# Patient Record
Sex: MALE | Race: WHITE | Employment: FULL TIME | URBAN - METROPOLITAN AREA
[De-identification: names, ages, dates, MRNs, and addresses within clinical notes are randomized per-mention and may not be internally consistent; named-entity substitution may affect disease eponyms.]

---

## 2017-03-06 ENCOUNTER — HOSPITAL ENCOUNTER (OUTPATIENT)
Dept: PHYSICAL THERAPY | Age: 62
Discharge: HOME | End: 2017-03-06
Payer: COMMERCIAL

## 2017-03-06 ENCOUNTER — HOSPITAL ENCOUNTER (OUTPATIENT)
Dept: SURGERY | Age: 62
Discharge: HOME | End: 2017-03-06
Payer: COMMERCIAL

## 2017-03-06 VITALS
BODY MASS INDEX: 35.2 KG/M2 | RESPIRATION RATE: 16 BRPM | DIASTOLIC BLOOD PRESSURE: 84 MMHG | HEART RATE: 78 BPM | WEIGHT: 298.13 LBS | OXYGEN SATURATION: 96 % | HEIGHT: 77 IN | SYSTOLIC BLOOD PRESSURE: 145 MMHG | TEMPERATURE: 97.7 F

## 2017-03-06 LAB
ANION GAP BLD CALC-SCNC: 7 MMOL/L (ref 7–16)
APPEARANCE UR: CLEAR
APTT PPP: 24.4 SEC (ref 25.3–32.9)
BACTERIA SPEC CULT: ABNORMAL
BASOPHILS # BLD AUTO: 0.1 K/UL (ref 0–0.2)
BASOPHILS # BLD: 1 % (ref 0–2)
BILIRUB UR QL: NEGATIVE
BUN SERPL-MCNC: 17 MG/DL (ref 8–23)
CALCIUM SERPL-MCNC: 9 MG/DL (ref 8.3–10.4)
CHLORIDE SERPL-SCNC: 98 MMOL/L (ref 98–107)
CO2 SERPL-SCNC: 30 MMOL/L (ref 21–32)
COLOR UR: YELLOW
CREAT SERPL-MCNC: 0.98 MG/DL (ref 0.8–1.5)
DIFFERENTIAL METHOD BLD: ABNORMAL
EOSINOPHIL # BLD: 0.2 K/UL (ref 0–0.8)
EOSINOPHIL NFR BLD: 3 % (ref 0.5–7.8)
ERYTHROCYTE [DISTWIDTH] IN BLOOD BY AUTOMATED COUNT: 12.9 % (ref 11.9–14.6)
GLUCOSE SERPL-MCNC: 300 MG/DL (ref 65–100)
GLUCOSE UR STRIP.AUTO-MCNC: >1000 MG/DL
HCT VFR BLD AUTO: 46.4 % (ref 41.1–50.3)
HGB BLD-MCNC: 16 G/DL (ref 13.6–17.2)
HGB UR QL STRIP: NEGATIVE
IMM GRANULOCYTES # BLD: 0 K/UL (ref 0–0.5)
IMM GRANULOCYTES NFR BLD AUTO: 0.4 % (ref 0–5)
INR PPP: 1 (ref 0.9–1.2)
KETONES UR QL STRIP.AUTO: NEGATIVE MG/DL
LEUKOCYTE ESTERASE UR QL STRIP.AUTO: NEGATIVE
LYMPHOCYTES # BLD AUTO: 36 % (ref 13–44)
LYMPHOCYTES # BLD: 2.5 K/UL (ref 0.5–4.6)
MCH RBC QN AUTO: 28.3 PG (ref 26.1–32.9)
MCHC RBC AUTO-ENTMCNC: 34.5 G/DL (ref 31.4–35)
MCV RBC AUTO: 82.1 FL (ref 79.6–97.8)
MONOCYTES # BLD: 0.7 K/UL (ref 0.1–1.3)
MONOCYTES NFR BLD AUTO: 10 % (ref 4–12)
NEUTS SEG # BLD: 3.4 K/UL (ref 1.7–8.2)
NEUTS SEG NFR BLD AUTO: 50 % (ref 43–78)
NITRITE UR QL STRIP.AUTO: NEGATIVE
PH UR STRIP: 7 [PH] (ref 5–9)
PLATELET # BLD AUTO: 270 K/UL (ref 150–450)
PMV BLD AUTO: 9.5 FL (ref 10.8–14.1)
POTASSIUM SERPL-SCNC: 4 MMOL/L (ref 3.5–5.1)
PROT UR STRIP-MCNC: NEGATIVE MG/DL
PROTHROMBIN TIME: 10.4 SEC (ref 9.6–12)
RBC # BLD AUTO: 5.65 M/UL (ref 4.23–5.67)
SERVICE CMNT-IMP: ABNORMAL
SODIUM SERPL-SCNC: 135 MMOL/L (ref 136–145)
SP GR UR REFRACTOMETRY: 1.03 (ref 1–1.02)
UROBILINOGEN UR QL STRIP.AUTO: 0.2 EU/DL (ref 0.2–1)
WBC # BLD AUTO: 7 K/UL (ref 4.3–11.1)

## 2017-03-06 PROCEDURE — 97161 PT EVAL LOW COMPLEX 20 MIN: CPT

## 2017-03-06 RX ORDER — METOPROLOL SUCCINATE 25 MG/1
25 TABLET, EXTENDED RELEASE ORAL DAILY
COMMUNITY
End: 2018-06-11 | Stop reason: SDUPTHER

## 2017-03-06 RX ORDER — ASPIRIN 325 MG
325 TABLET ORAL DAILY
COMMUNITY
End: 2017-03-30

## 2017-03-06 RX ORDER — CHOLECALCIFEROL (VITAMIN D3) 125 MCG
CAPSULE ORAL 2 TIMES DAILY
COMMUNITY
End: 2017-03-30

## 2017-03-06 RX ORDER — AA/PROT/LYSINE/METHIO/VIT C/B6 50-12.5 MG
TABLET ORAL DAILY
COMMUNITY

## 2017-03-06 RX ORDER — VERAPAMIL HYDROCHLORIDE 180 MG/1
180 TABLET, EXTENDED RELEASE ORAL DAILY
COMMUNITY
End: 2018-06-11 | Stop reason: SDUPTHER

## 2017-03-06 RX ORDER — METFORMIN HYDROCHLORIDE 1000 MG/1
1000 TABLET ORAL DAILY
COMMUNITY
End: 2017-09-14

## 2017-03-06 NOTE — PROGRESS NOTES
Carol Hughes  : (19 y.o.) [facility] at [facility]  [address], [address]  [phone]       Physical Therapy Prehab Plan of Treatment and Evaluation Summary:3/6/2017    ICD-10: Treatment Diagnosis:   · Pain in Right Knee (M25.561)  · Stiffness of Right Knee, Not elsewhere classified (M25.661)  · Difficulty in walking, Not elsewhere classified (R26.2)  Precautions/Allergies:   Review of patient's allergies indicates no known allergies. MEDICAL/REFERRING DIAGNOSIS:  Unilateral primary osteoarthritis, right knee [M17.11]  REFERRING PHYSICIAN: Beatriz Falcon MD  DATE OF SURGERY: 3/28/17   Assessment:   Comments:  Pt. Had a left monique in . Pt. Inquiring about rehab so he was given information. I encouraged him to think about going home with HHPT and have his wife (works) and friend help him at home. PROBLEM LIST (Impacting functional limitations):  Mr. Devin Coburn presents with the following right lower extremity(s) problems:  1. Strength  2. Range of Motion  3. Home Exercise Program   INTERVENTIONS PLANNED:  1. Home Exercise Program  2. Educational Discussion     TREATMENT PLAN: Effective Dates: 3/6/2017 TO 3/6/2017. Frequency/Duration: Patient to continue to perform home exercise program at least twice per day up until his surgery. GOALS: (Goals have been discussed and agreed upon with patient.)  Discharge Goals: Time Frame: 1 Day  1. Patient will demonstrate independence with a home exercise program designed to increase strength, range of motion and pain control to minimize functional deficits and optimize patient for total joint replacement. Rehabilitation Potential For Stated Goals: Good  Regarding Darren Kenny's therapy, I certify that the treatment plan above will be carried out by a therapist or under their direction.   Thank you for this referral,  Fredo Sharp PT               HISTORY:   Present Symptoms:  Pain Intensity 1:  (8 at worst)  Pain Location 1: Knee   History of Present Injury/Illness (Reason for Referral):  Medical/Referring Diagnosis: Unilateral primary osteoarthritis, right knee [M17.11]   Past Medical History/Comorbidities:   Mr. Manuel Cervantes  has a past medical history of Arrhythmia (10 - 15 years ago); Arthritis; CAD (coronary artery disease); Cancer ([address] Depression; Diabetes ([address] (6-7 years ago); HTN (hypertension) (7/18/2014); Hypercholesterolemia; Hyperlipidemia (7/18/2014); Hypertension (diagnosed at 24years old); Morbid obesity ([address] and Psychiatric disorder. He also has no past medical history of Aneurysm ([address] Asthma; Autoimmune disease ([address] Chronic kidney disease; Chronic pain; Coagulation disorder ([address] COPD; Difficult intubation; Endocarditis; GERD (gastroesophageal reflux disease); Heart failure ([address] Liver disease; Malignant hyperthermia due to anesthesia; Nausea & vomiting; Other ill-defined conditions(799.89); Pseudocholinesterase deficiency; PUD (peptic ulcer disease); Rheumatic fever; Seizures ([address] Stroke [facility]); Thromboembolus ([address] Thyroid disease; Unspecified adverse effect of anesthesia; or Unspecified sleep apnea. Mr. Manuel Cervantes  has a past surgical history that includes other surgical; orthopaedic (Left, 2003); orthopaedic (Left, 2009); colonoscopy (2013); orthopaedic (Right, 2016); and heent (1971).   Social History/Living Environment:   Home Environment: Private residence  # Steps to Enter: 2  Rails to Enter: No  One/Two Story Residence: Two story, live on 1st floor  # of Interior Steps: 10  Interior Rails: Left  Lift Chair Available: No  Living Alone: No  Support Systems: Spouse/Significant Other/Partner  Patient Expects to be Discharged to[de-identified] Private residence  Current DME Used/Available at Home: Walker, rolling, Clerance Spray, straight, Commode, bedside  Tub or Shower Type: Shower  Work/Activity:  Employed, light activity  Dominant Side:  RIGHT  Current Medications:  See Pre-assessment nursing note   Number of Personal Factors/Comorbidities that affect the Plan of Care: 0: LOW COMPLEXITY   EXAMINATION:   ADLs (Current Functional Status):   Ambulation:  [x] Independent  [] Walk Indoors Only  [] Walk Outdoors  [] Use Assistive Device  [] Use Wheelchair Only Dressing:  [x] Independent  Requires Assistance from Someone for:  [] Sock/Shoes  [] Pants  [] Everything   Bathing/Showering:   [x] Independent  [] Requires Assistance from Someone  [] Sponge Bath Only Household Activities:  [x] Routine house and yard work  [] Light Housework Only  [] None   Observation/Orthostatic Postural Assessment:       ROM/Flexibility:   Gross Assessment: Yes  AROM: Within functional limits (left LE)                       RLE Assessment  RLE Assessment (WDL): Exceptions to WDL  RLE AROM  R Knee Flexion: 115  R Knee Extension: 5   Strength:   Gross Assessment: Yes  Strength: Within functional limits (left LE)              RLE Strength  R Knee Flexion: 4  R Knee Extension: 4   Functional Mobility:    Gross Assessment: Yes    Gait Description (WDL): Exceptions to WDL  Stand to Sit: Independent, Additional time  Sit to Stand: Independent, Additional time  Bed to Chair: Independent, Additional time  Distance (ft): 250 Feet (ft)  Ambulation - Level of Assistance: Independent  Stance: Right decreased  Gait Abnormalities: Antalgic          Balance:    Sitting: Intact  Standing: Intact   Body Structures Involved:  1. Bones  2. Joints  3. Muscles  4. Ligaments Body Functions Affected:  1. Movement Related Activities and Participation Affected:  1. Mobility   Number of elements that affect the Plan of Care: 1-2: LOW COMPLEXITY   CLINICAL PRESENTATION:   Presentation: Stable and uncomplicated: LOW COMPLEXITY   CLINICAL DECISION MAKING:   Outcome Measure:    Tool Used: Lower Extremity Functional Scale (LEFS)  Score:  Initial: 56/80 Most Recent: X/80 (Date: -- )   Interpretation of Score: 20 questions each scored on a 5 point scale with 0 representing \"extreme difficulty or unable to perform\" and 4 representing \"no difficulty\". The lower the score, the greater the functional disability. 80/80 represents no disability. Minimal detectable change is 9 points. Score 80 79-65 64-49 48-33 32-17 16-1 0   Modifier CH CI CJ CK CL CM CN     ? Mobility - Walking and Moving Around:     - CURRENT STATUS: CJ - 20%-39% impaired, limited or restricted    - GOAL STATUS: CJ - 20%-39% impaired, limited or restricted    - D/C STATUS:  CJ - 20%-39% impaired, limited or restricted  Medical Necessity:   · Mr. Maximilian Dueñas is expected to optimize his lower extremity strength and ROM in preparation for joint replacement surgery. Reason for Services/Other Comments:  · Achieve baseline assesment of musculoskeletal system, functional mobility and home environment. , educate in PT HEP in preparation for surgery, educate in hospital plan of care. Use of outcome tool(s) and clinical judgement create a POC that gives a: Clear prediction of patient's progress: LOW COMPLEXITY   TREATMENT:   Treatment/Session Assessment:  Patient was instructed in PT- HEP to increase strength and ROM in LEs. Answered all questions. · Post session pain:  No complaints  · Compliance with Program/Exercises: Will assess as treatment progresses.   Total Treatment Duration:  PT Patient Time In/Time Out  Time In: 1220  Time Out: [address], PT

## 2017-03-06 NOTE — PERIOP NOTES
Patient verified name, , and surgery as listed in [facility]. Type 3 surgery, joint camp assessment complete. Labs per surgeon: cbc, bmp, pt, ptt, ua ; results within anesthesia guidelines; glucose >300; printed/placed on chart~routed to PCP, Dr. Gracie Stubbs and to surgeon, Dr. Cyndie Armas for further review. Labs per anesthesia protocol: none  EKG:completed 16~within anesthesia guidelines; request sent to [facility] requesting EKG, ECHO, other tests and most recent office note to place on chart for reference. Hibiclens and instructions given per hospital policy. Nasal Swab collected per MD order and instructions for Mupirocin nasal ointment if required. Patient provided with handouts including Guide to Surgery, Pain Management, Hand Hygiene, Blood Transfusion Education, and [place] [facility] Brochure. Patient answered medical/surgical history questions at their best of ability. All prior to admission medications documented in [facility]. Original medication prescription bottle YES visualized during patient appointment. Patient instructed to hold all vitamins 7 days prior to surgery and NSAIDS 5 days prior to surgery. Medications to be held prior to surgery Naproxen hold for 5 days prior to surgery; Change 325 mg ASpirin to 81 mg 5 days prior to surgery. Patient instructed to continue previous medications as prescribed prior to surgery and to take the following medications the day of surgery according to anesthesia guidelines with a small sip of water: Flecainide, Metoprolol, Acyclovir, Verapamil, Aspirin 81 mg. Patient reminded to bring Living Will papers. Patient taught back and verbalized understanding.

## 2017-03-06 NOTE — PROGRESS NOTES
03/06/17 1200   Oxygen Therapy   O2 Sat (%) 100 %   Pulse via Oximetry 79 beats per minute   O2 Device Room air   Pre-Treatment   Breath Sounds Bilateral Clear   Pre FEV1 (liters) 3.6 liters   % Predicted 85     Initial respiratory Assessment completed with pt. Pt was interviewed and evaluated in Joint camp prior to surgery. Patient ID:  Sayra Lot  [id number]  89 y.o.  1955  Surgeon: Dr. Belem Zhou  Date of Surgery: 3/28/2017  Procedure: Total Right Knee Arthroplasty  Primary Care Physician: Debo Garza -[phone]  Specialists:                                  Pt instructed in the use of Incentive Spirometry. Pt instructed to bring Incentive Spirometer back on date of surgery & to start using Is upon return to pt room. Pt taught proper cough technique      History of smoking:   FORMER 2 PPD      Quit date:QUIT 8/31/1992    Secondhand smoke:PARENTS      Past procedures with Oxygen desaturation:NONE    Past Medical History:   Diagnosis Date    Arrhythmia 10 - 15 years ago    atrial fib. - none in 5 years    Arthritis     hips, knees - osteo    CAD (coronary artery disease)     had cath~states was ok    Cancer ([address]     skin ca. removed    Depression     no longer takes medication~no longer considers self depressed    Diabetes ([address] 6-7 years ago    range: 130's    HTN (hypertension) 7/18/2014    Hypercholesterolemia     Hyperlipidemia 7/18/2014    Hypertension diagnosed at 24years old    Morbid obesity ([address]     Psychiatric disorder     depression                                                                                                                                                         Respiratory history:HX OF PNA AS ACHILD                                  DENIES SOB                                 HX OF ONEYDA?    YES        Respiratory meds:                                                     PAST SLEEP STUDY:       YES                                              FAMILY PRESENT:    NONE                                    ONEYDA assessment:     POSITIVE FOR ONEYDA- 6-7- YEARS AGO PT STATES HE LOST 20-30 LBS,  DANGERS OF LIT-COMPLIANCE EXPLAINED TO PT                                          SLEEPS ON SIDE,   BACK  &  STOMACH                                                 PHYSICAL EXAM   Body mass index is 35.35 kg/(m^2).    Visit Vitals    /84 (BP 1 Location: Right arm, BP Patient Position: Sitting)    Pulse 78    Temp 97.7 °F (36.5 °C)    Resp 16    Ht 6' 5\" (1.956 m)    Wt 135.2 kg (298 lb 2 oz)    SpO2 96%    BMI 35.35 kg/m2     Neck circumference:  50    cm    Loud snoring:YES      Witnessed apnea or wakening gasping or choking:,DENIES, -   DOES WAKE HIMSELF UP SNORING    Awakens with headaches:DENIES    Morning or daytime tiredness/ sleepiness: DENIES      Dry mouth or sore throat in morning:SOME    Freidman stage:4    SACS score:60    STOP/BAN                              CPAP:DOES NOT USE               CONT SAT HS         Referrals:    Pt. Phone Number:

## 2017-03-06 NOTE — PERIOP NOTES
Call placed to Dr. Andrei Troncoso regarding patient's glucose level~states to remind patient to check blood sugar/diet controlled in weeks prior to surgery; pt states ok; made aware that surgery would be cancelled if glucose >300. Glucose note routed to surgeon, Dr. Hedy Davis for review per protocol.

## 2017-03-06 NOTE — PERIOP NOTES
Recent Results (from the past 8 hour(s))   CBC WITH AUTOMATED DIFF    Collection Time: 03/06/17 12:22 PM   Result Value Ref Range    WBC 7.0 4.3 - 11.1 K/uL    RBC 5.65 4.23 - 5.67 M/uL    HGB 16.0 13.6 - 17.2 g/dL    HCT 46.4 41.1 - 50.3 %    MCV 82.1 79.6 - 97.8 FL    MCH 28.3 26.1 - 32.9 PG    MCHC 34.5 31.4 - 35.0 g/dL    RDW 12.9 11.9 - 14.6 %    PLATELET 657 448 - 634 K/uL    MPV 9.5 (L) 10.8 - 14.1 FL    DF AUTOMATED      NEUTROPHILS 50 43 - 78 %    LYMPHOCYTES 36 13 - 44 %    MONOCYTES 10 4.0 - 12.0 %    EOSINOPHILS 3 0.5 - 7.8 %    BASOPHILS 1 0.0 - 2.0 %    IMMATURE GRANULOCYTES 0.4 0.0 - 5.0 %    ABS. NEUTROPHILS 3.4 1.7 - 8.2 K/UL    ABS. LYMPHOCYTES 2.5 0.5 - 4.6 K/UL    ABS. MONOCYTES 0.7 0.1 - 1.3 K/UL    ABS. EOSINOPHILS 0.2 0.0 - 0.8 K/UL    ABS. BASOPHILS 0.1 0.0 - 0.2 K/UL    ABS. IMM.  GRANS. 0.0 0.0 - 0.5 K/UL   METABOLIC PANEL, BASIC    Collection Time: 03/06/17 12:22 PM   Result Value Ref Range    Sodium 135 (L) 136 - 145 mmol/L    Potassium 4.0 3.5 - 5.1 mmol/L    Chloride 98 98 - 107 mmol/L    CO2 30 21 - 32 mmol/L    Anion gap 7 7 - 16 mmol/L    Glucose 300 (H) 65 - 100 mg/dL    BUN 17 8 - 23 MG/DL    Creatinine 0.98 0.8 - 1.5 MG/DL    GFR est AA >60 >60 ml/min/1.73m2    GFR est non-AA >60 >60 ml/min/1.73m2    Calcium 9.0 8.3 - 10.4 MG/DL   PROTHROMBIN TIME + INR    Collection Time: 03/06/17 12:22 PM   Result Value Ref Range    Prothrombin time 10.4 9.6 - 12.0 sec    INR 1.0 0.9 - 1.2     PTT    Collection Time: 03/06/17 12:22 PM   Result Value Ref Range    aPTT 24.4 (L) 25.3 - 32.9 SEC   URINALYSIS W/ RFLX MICROSCOPIC    Collection Time: 03/06/17 12:22 PM   Result Value Ref Range    Color YELLOW      Appearance CLEAR      Specific gravity 1.030 (H) 1.001 - 1.023      pH (UA) 7.0 5.0 - 9.0      Protein NEGATIVE  NEG mg/dL    Glucose >1000 mg/dL    Ketone NEGATIVE  NEG mg/dL    Bilirubin NEGATIVE  NEG      Blood NEGATIVE  NEG      Urobilinogen 0.2 0.2 - 1.0 EU/dL    Nitrites NEGATIVE  NEG Leukocyte Esterase NEGATIVE  NEG

## 2017-03-06 NOTE — PERIOP NOTES
Dr Daphnie Peguero:      Patient: Kimani Artis, DOS 3/28/17    During a recent visit to the surgical preadmission testing center, the above mentioned patient was found to have a non-fasting blood glucose level of 300 mg/dL. This may indicate inadequate diabetic management and raises concerns that the patient is not medically optimized for surgery. It is our standard practice to postpone elective surgery for patients who present fasting blood glucose level >300 mg/dL on the day of their procedure. The patient has been advised of this policy and counseled on the importance of glucose control. We feel that this patient is at increased risk of cancellation; however, their blood glucose may be in acceptable range when they are NPO. Therefore, we will leave the decision to you whether to delay the surgery and refer the patient to their primary care provider or keep them as currently scheduled. Our goal is to prevent as many delays and cancellations as possible while ensuring patient safety.     Sincerely,    [facility]est[facility]

## 2017-03-07 NOTE — ADVANCED PRACTICE NURSE
Total Joint Surgery Preoperative Chart Review      Patient ID:  Laura Howard  [id number]  34 y.o.  1955  Surgeon: Dr. Elisa Magdaleno  Date of Surgery: 3/28/2017  Procedure: Total Right Knee Arthroplasty  Primary Care Physician: Arjun Mclean -[phone]  Specialty Physician(s):      Subjective:   Laura Howard is a 57 y.o. WHITE OR  male who presents for preoperative evaluation for Total Right Knee arthroplasty. This is a preoperative chart review note based on data collected by the nurse at the surgical Pre-Assessment visit. Past Medical History:   Diagnosis Date    Arrhythmia 10 - 15 years ago    atrial fib. - none in 5 years    Arthritis     hips, knees - osteo    CAD (coronary artery disease)     had cath~states was ok    Cancer ([address]     skin ca.  removed    Depression     no longer takes medication~no longer considers self depressed    Diabetes ([address] 6-7 years ago    range: 130's    HTN (hypertension) 7/18/2014    Hypercholesterolemia     Hyperlipidemia 7/18/2014    Hypertension diagnosed at 24years old   Trey Radha Morbid obesity ([address]     Psychiatric disorder     depression      Past Surgical History:   Procedure Laterality Date    HX COLONOSCOPY  2013    HX HEENT  1971    tonsillectomy    HX ORTHOPAEDIC Left 2003     for broken 5th metatarsal    HX ORTHOPAEDIC Left 2009    hip replacement    HX ORTHOPAEDIC Right 2016    bone spur removal; arthroscopy    HX OTHER SURGICAL      hemorroidectomy 1992;tonsilectomy 1971     Family History   Problem Relation Age of Onset    Coronary Artery Disease Father     Heart Attack Father     Diabetes Father     Cancer Mother      multiple myloma    Hypertension Brother     Malignant Hyperthermia Neg Hx     Pseudocholinesterase Deficiency Neg Hx     Delayed Awakening Neg Hx     Post-op Nausea/Vomiting Neg Hx     Emergence Delirium Neg Hx     Post-op Cognitive Dysfunction Neg Hx     Other Neg Hx       Social History   Substance Use Topics    Smoking status: Former Smoker     Packs/day: 2.00     Years: 20.00    Smokeless tobacco: Former User    Alcohol use No       Prior to Admission medications    Medication Sig Start Date End Date Taking? Authorizing Provider   verapamil ER (CALAN-SR) 180 mg CR tablet Take 180 mg by mouth daily. Take / use AM day of surgery  per anesthesia protocols. Indications: hypertension   Yes Historical Provider   aspirin (ASPIRIN) 325 mg tablet Take 325 mg by mouth daily. Change to 81 mg aspirin 5 days prior to surgery   Indications: myocardial infarction prevention   Yes Historical Provider   FA/NIACINAMIDE/CUPRIC OX/ZN OX (NICOTINAMIDE PO) Take  by mouth two (2) times a day. Yes Historical Provider   coenzyme q10 10 mg cap Take  by mouth daily. Yes Historical Provider   naproxen sodium 220 mg cap Take  by mouth two (2) times a day. Indications: Pain   Yes Historical Provider   metFORMIN (GLUCOPHAGE) 1,000 mg tablet Take 1,000 mg by mouth daily. Takes 2 tablets every morning   Indications: type 2 diabetes mellitus   Yes Historical Provider   metoprolol succinate (TOPROL-XL) 25 mg XL tablet Take 25 mg by mouth daily. Take / use AM day of surgery  per anesthesia protocols. Indications: hypertension   Yes Historical Provider   glimepiride (AMARYL) 4 mg tablet TAKE 1 TABLET EVERY MORNING 12/21/16  Yes Dayana Garcias MD   acyclovir (ZOVIRAX) 200 mg capsule Take 1 Cap by mouth daily. Patient taking differently: Take 200 mg by mouth daily. Take / use AM day of surgery  per anesthesia protocols. 11/8/16  Yes Dayana Garcias MD   flecainide (TAMBOCOR) 100 mg tablet Take 100 mg by mouth two (2) times a day. Take / use AM day of surgery  per anesthesia protocols. Indications: PREVENTION OF RECURRENT ATRIAL FIBRILLATION 4/16/15  Yes Historical Provider   simvastatin (ZOCOR) 80 mg tablet Take 1 Tab by mouth nightly. Patient taking differently: Take 80 mg by mouth nightly.  Take / use AM day of surgery  per anesthesia protocols. Indications: hyperlipidemia 6/12/15  Yes Amanda Miller MD   lisinopril (PRINIVIL, ZESTRIL) 10 mg tablet Take 1 Tab by mouth daily. Patient taking differently: Take 10 mg by mouth daily. Indications: hypertension 6/12/15  Yes Amanda Miller MD   OTHER,NON-FORMULARY, daily. multivitamin    Yes Historical Provider   FISH OIL PO take  by mouth. am    Yes Historical Provider   VITAMIN D-3 PO take  by mouth daily. am    Yes Historical Provider     No Known Allergies       Objective:     Physical Exam:   Patient Vitals for the past 24 hrs:   Temp Pulse Resp BP SpO2   03/06/17 1335 97.7 °F (36.5 °C) 78 16 145/84 96 %   03/06/17 1200 - - - - 100 %       ECG:    EKG Results     None          Data Review:   Labs:   Recent Results (from the past 24 hour(s))   CBC WITH AUTOMATED DIFF    Collection Time: 03/06/17 12:22 PM   Result Value Ref Range    WBC 7.0 4.3 - 11.1 K/uL    RBC 5.65 4.23 - 5.67 M/uL    HGB 16.0 13.6 - 17.2 g/dL    HCT 46.4 41.1 - 50.3 %    MCV 82.1 79.6 - 97.8 FL    MCH 28.3 26.1 - 32.9 PG    MCHC 34.5 31.4 - 35.0 g/dL    RDW 12.9 11.9 - 14.6 %    PLATELET 076 951 - 600 K/uL    MPV 9.5 (L) 10.8 - 14.1 FL    DF AUTOMATED      NEUTROPHILS 50 43 - 78 %    LYMPHOCYTES 36 13 - 44 %    MONOCYTES 10 4.0 - 12.0 %    EOSINOPHILS 3 0.5 - 7.8 %    BASOPHILS 1 0.0 - 2.0 %    IMMATURE GRANULOCYTES 0.4 0.0 - 5.0 %    ABS. NEUTROPHILS 3.4 1.7 - 8.2 K/UL    ABS. LYMPHOCYTES 2.5 0.5 - 4.6 K/UL    ABS. MONOCYTES 0.7 0.1 - 1.3 K/UL    ABS. EOSINOPHILS 0.2 0.0 - 0.8 K/UL    ABS. BASOPHILS 0.1 0.0 - 0.2 K/UL    ABS. IMM.  GRANS. 0.0 0.0 - 0.5 K/UL   METABOLIC PANEL, BASIC    Collection Time: 03/06/17 12:22 PM   Result Value Ref Range    Sodium 135 (L) 136 - 145 mmol/L    Potassium 4.0 3.5 - 5.1 mmol/L    Chloride 98 98 - 107 mmol/L    CO2 30 21 - 32 mmol/L    Anion gap 7 7 - 16 mmol/L    Glucose 300 (H) 65 - 100 mg/dL    BUN 17 8 - 23 MG/DL    Creatinine 0.98 0.8 - 1.5 MG/DL    GFR est AA >60 >60 ml/min/1.73m2    GFR est non-AA >60 >60 ml/min/1.73m2    Calcium 9.0 8.3 - 10.4 MG/DL   PROTHROMBIN TIME + INR    Collection Time: 03/06/17 12:22 PM   Result Value Ref Range    Prothrombin time 10.4 9.6 - 12.0 sec    INR 1.0 0.9 - 1.2     PTT    Collection Time: 03/06/17 12:22 PM   Result Value Ref Range    aPTT 24.4 (L) 25.3 - 32.9 SEC   URINALYSIS W/ RFLX MICROSCOPIC    Collection Time: 03/06/17 12:22 PM   Result Value Ref Range    Color YELLOW      Appearance CLEAR      Specific gravity 1.030 (H) 1.001 - 1.023      pH (UA) 7.0 5.0 - 9.0      Protein NEGATIVE  NEG mg/dL    Glucose >1000 mg/dL    Ketone NEGATIVE  NEG mg/dL    Bilirubin NEGATIVE  NEG      Blood NEGATIVE  NEG      Urobilinogen 0.2 0.2 - 1.0 EU/dL    Nitrites NEGATIVE  NEG      Leukocyte Esterase NEGATIVE  NEG     MSSA/MRSA SC BY PCR, NASAL SWAB    Collection Time: 03/06/17  1:40 PM   Result Value Ref Range    Special Requests: NO SPECIAL REQUESTS      Culture result: (A)       MRSA target DNA not detected, SA target DNA detected. A MRSA negative, SA positive test result does not preclude MRSA nasal colonization. Problem List:  )  Patient Active Problem List   Diagnosis Code    [facility] ([facility] E11.9    Hyperlipidemia E78.5    HTN (hypertension) I10    Depression F32.9    CAD (coronary artery disease) I25.10    Cardiac dysrhythmia, unspecified I49.9       Total Joint Surgery Pre-Assessment Recommendations: The patient is not compliant with wearing CPAP. Recommend oxygen saturation monitoring during hospitalization.         Signed By: MARYAM Reyna    March 7, 2017

## 2017-03-09 NOTE — PERIOP NOTES
LE for 3/7/17:      Nasal swab results reviewed:   Culture result:  (A)    Final   MRSA target DNA not detected, SA target DNA detected.   A MRSA negative, SA positive test result does not preclude MRSA nasal colonization.          Called pt and informed of results    Instructed:Called Mupirocin Rx to [facility] . Pt to apply to ea nostril intranasally twice a day for 5 days beginning :3/23/17

## 2017-03-24 NOTE — H&P
H&P    Patient ID:  Gauri Melo  [id number]  42 y.o.  1955  Surgeon:  Osman Goode MD  Date of Surgery: * No surgery date entered *  Procedure: Right Knee Total Arthroplasty  Primary Care Physician: Lisha Becerril MD        Subjective:  Gauri Melo is a 60 y.o. WHITE [place]  male who presents with Right Knee pain. He has history of Right Knee pain for several months ago. Symptoms worse with walking and relieved with rest. Conservative treatment consisting of  activity modification has not helped. The patient  lives with their spouse. The patients goal after surgery is improved pain and function. Past Medical History:   Diagnosis Date    Arrhythmia 10 - 15 years ago    atrial fib. - none in 5 years    Arthritis     hips, knees - osteo    CAD (coronary artery disease)     had cath~states was ok    Cancer ([facility] [address]     skin ca.  removed    Depression     no longer takes medication~no longer considers self depressed    Diabetes ([address] 6-7 years ago    range: 130's    HTN (hypertension) 7/18/2014    Hypercholesterolemia     Hyperlipidemia 7/18/2014    Hypertension diagnosed at 24years old   [facility] Morbid obesity ([facility] [address]     Psychiatric disorder     depression      Past Surgical History:   Procedure Laterality Date    HX COLONOSCOPY  2013    HX HEENT  1971    tonsillectomy    HX ORTHOPAEDIC Left 2003     for broken 5th metatarsal    HX ORTHOPAEDIC Left 2009    hip replacement    HX ORTHOPAEDIC Right 2016    bone spur removal; arthroscopy    HX OTHER SURGICAL      hemorroidectomy 1992;tonsilectomy 1971     Family History   Problem Relation Age of Onset    Coronary Artery Disease Father     Heart Attack Father     Diabetes Father     Cancer Mother      multiple myloma    Hypertension Brother     Malignant Hyperthermia Neg Hx     Pseudocholinesterase Deficiency Neg Hx     Delayed Awakening Neg Hx     Post-op Nausea/Vomiting Neg Hx     Emergence Delirium Neg Hx     Post-op Cognitive Dysfunction Neg Hx     Other Neg Hx       Social History   Substance Use Topics    Smoking status: Former Smoker     Packs/day: 2.00     Years: 20.00    Smokeless tobacco: Former User    Alcohol use No       Prior to Admission medications    Medication Sig Start Date End Date Taking? Authorizing Provider   glimepiride (AMARYL) 4 mg tablet TAKE 1 TABLET EVERY MORNING 3/20/17   Kristen Hayes MD   verapamil ER (CALAN-SR) 180 mg CR tablet Take 180 mg by mouth daily. Take / use AM day of surgery  per anesthesia protocols. Indications: hypertension    Historical Provider   aspirin (ASPIRIN) 325 mg tablet Take 325 mg by mouth daily. Change to 81 mg aspirin 5 days prior to surgery   Indications: myocardial infarction prevention    Historical Provider   FA/NIACINAMIDE/CUPRIC OX/ZN OX (NICOTINAMIDE PO) Take  by mouth two (2) times a day. Historical Provider   coenzyme q10 10 mg cap Take  by mouth daily. Historical Provider   naproxen sodium 220 mg cap Take  by mouth two (2) times a day. Indications: Pain    Historical Provider   metFORMIN (GLUCOPHAGE) 1,000 mg tablet Take 1,000 mg by mouth daily. Takes 2 tablets every morning   Indications: type 2 diabetes mellitus    Historical Provider   metoprolol succinate (TOPROL-XL) 25 mg XL tablet Take 25 mg by mouth daily. Take / use AM day of surgery  per anesthesia protocols. Indications: hypertension    Historical Provider   acyclovir (ZOVIRAX) 200 mg capsule Take 1 Cap by mouth daily. Patient taking differently: Take 200 mg by mouth daily. Take / use AM day of surgery  per anesthesia protocols. 11/8/16   Kristen Hayes MD   flecainide (TAMBOCOR) 100 mg tablet Take 100 mg by mouth two (2) times a day. Take / use AM day of surgery  per anesthesia protocols. Indications: PREVENTION OF RECURRENT ATRIAL FIBRILLATION 4/16/15   Historical Provider   simvastatin (ZOCOR) 80 mg tablet Take 1 Tab by mouth nightly.   Patient taking differently: Take 80 mg by mouth nightly. Take / use AM day of surgery  per anesthesia protocols. Indications: hyperlipidemia 6/12/15   Wesley Benites MD   lisinopril (PRINIVIL, ZESTRIL) 10 mg tablet Take 1 Tab by mouth daily. Patient taking differently: Take 10 mg by mouth daily. Indications: hypertension 6/12/15   Wesley Benites MD   OTHER,NON-FORMULARY, daily. multivitamin     Historical Provider   FISH OIL PO take  by mouth. am     Historical Provider   VITAMIN D-3 PO take  by mouth daily. am     Historical Provider     No Known Allergies     REVIEW OF SYSTEMS:  CONSTITUTIONAL: Denies fever, decreased appetite, weight loss/gain, night sweats or fatigue. HEENT: Denies vision or hearing changes. has glasses. denies hearing aids. CARDIAC: Denies CP, palpitations, rheumatic fever, murmur, peripheral edema, carotid artery disease or syncopal episodes. RESPIRATORY: Denies dyspnea on exertion, asthma, COPD or orthopnea. GI: Denies GERD, history of GI bleed or melena, PUD, hepatitis or cirrhosis. : Denies dysuria, hematuria. denies BPH symptoms. HEMATOLOGIC: Denies anemia or blood disorders. ENDOCRINE: Denies thyroid disease. MUSCULOSKELETAL: See HPI. NEUROLOGIC: Denies seizure, peripheral neuropathy or memory loss. PSYCH: Denies depression, anxiety or insomnia. SKIN: Denies rash or open sores. Objective:    PHYSICAL EXAM  GENERAL: No data found. EYES: PERRL. EOM intact. MOUTH:Teeth and Gums normal. NECK: Full ROM. Trachea midline. No thyromegaly or JVD. CARDIOVASCULAR: Regular rate and rhythm. No murmur or gallops. No carotid bruits. Peripheral pulses: radial 2 +, PT 2+, DP 2+ bilaterally. LUNGS: CTA bilaterally. No wheezes, rhonchi or rales. GI: positive BS. Abdomen nontender. NEUROLOGIC: Alert and oriented x 3. Bilateral equal strong had grasp and bilateral equal strong plantar flexion and dorsiflexion. GAIT: abnormal  MUSCULOSKELETAL: ROM: full range with pain. Tenderness: Medial joint line and Lateral joint line. Crepitus: present.  SKIN: No rash, bruising, swelling, redness or warmth. Labs:  No results found for this or any previous visit (from the past 24 hour(s)). Xray Right Knee: Joint space narrowing    Assessment:  Advanced Right Knee Osteoarthritis. Total Right Knee Arthroplasty Indicated. Patient Active Problem List   Diagnosis Code    Diabetes ([facility] [address] E11.9    Hyperlipidemia E78.5    HTN (hypertension) I10    Depression F32.9    CAD (coronary artery disease) I25.10    Cardiac dysrhythmia, unspecified I49.9       Plan:  I have advised the patient of the risks and consequences, including possible complications of performing total joint replacement, as well as not doing this operation. The patient had the opportunity to ask questions and have them answered to their satisfaction.      Signed:  SUYAPA Jimenez 3/24/2017

## 2017-03-27 ENCOUNTER — ANESTHESIA EVENT (OUTPATIENT)
Dept: SURGERY | Age: 62
DRG: 470 | End: 2017-03-27
Payer: COMMERCIAL

## 2017-03-27 RX ORDER — SODIUM CHLORIDE, SODIUM LACTATE, POTASSIUM CHLORIDE, CALCIUM CHLORIDE 600; 310; 30; 20 MG/100ML; MG/100ML; MG/100ML; MG/100ML
50 INJECTION, SOLUTION INTRAVENOUS CONTINUOUS
Status: CANCELLED | OUTPATIENT
Start: 2017-03-27

## 2017-03-27 RX ORDER — HYDROMORPHONE HYDROCHLORIDE 2 MG/ML
0.5 INJECTION, SOLUTION INTRAMUSCULAR; INTRAVENOUS; SUBCUTANEOUS
Status: CANCELLED | OUTPATIENT
Start: 2017-03-27

## 2017-03-27 RX ORDER — ALBUTEROL SULFATE 0.83 MG/ML
2.5 SOLUTION RESPIRATORY (INHALATION) AS NEEDED
Status: CANCELLED | OUTPATIENT
Start: 2017-03-27

## 2017-03-27 RX ORDER — SODIUM CHLORIDE 0.9 % (FLUSH) 0.9 %
5-10 SYRINGE (ML) INJECTION AS NEEDED
Status: CANCELLED | OUTPATIENT
Start: 2017-03-27

## 2017-03-27 RX ORDER — OXYCODONE HYDROCHLORIDE 5 MG/1
5 TABLET ORAL
Status: CANCELLED | OUTPATIENT
Start: 2017-03-27

## 2017-03-28 ENCOUNTER — SURGERY (OUTPATIENT)
Age: 62
End: 2017-03-28

## 2017-03-28 ENCOUNTER — HOSPITAL ENCOUNTER (INPATIENT)
Age: 62
LOS: 2 days | Discharge: HOME HEALTH | DRG: 470 | End: 2017-03-30
Attending: ORTHOPAEDIC SURGERY | Admitting: ORTHOPAEDIC SURGERY
Payer: COMMERCIAL

## 2017-03-28 ENCOUNTER — ANESTHESIA (OUTPATIENT)
Dept: SURGERY | Age: 62
DRG: 470 | End: 2017-03-28
Payer: COMMERCIAL

## 2017-03-28 ENCOUNTER — HOME HEALTH ADMISSION (OUTPATIENT)
Dept: HOME HEALTH SERVICES | Facility: HOME HEALTH | Age: 62
End: 2017-03-28
Payer: COMMERCIAL

## 2017-03-28 PROBLEM — M19.90 OSTEOARTHRITIS: Status: ACTIVE | Noted: 2017-03-28

## 2017-03-28 LAB
ABO + RH BLD: NORMAL
BLOOD GROUP ANTIBODIES SERPL: NORMAL
GLUCOSE BLD STRIP.AUTO-MCNC: 254 MG/DL (ref 65–100)
GLUCOSE BLD STRIP.AUTO-MCNC: 279 MG/DL (ref 65–100)
HGB BLD-MCNC: 15 G/DL (ref 13.6–17.2)
SPECIMEN EXP DATE BLD: NORMAL

## 2017-03-28 PROCEDURE — 86900 BLOOD TYPING SEROLOGIC ABO: CPT | Performed by: ORTHOPAEDIC SURGERY

## 2017-03-28 PROCEDURE — 74011250636 HC RX REV CODE- 250/636

## 2017-03-28 PROCEDURE — 74011000258 HC RX REV CODE- 258: Performed by: ORTHOPAEDIC SURGERY

## 2017-03-28 PROCEDURE — 76010010054 HC POST OP PAIN BLOCK: Performed by: ORTHOPAEDIC SURGERY

## 2017-03-28 PROCEDURE — 77030011208: Performed by: ORTHOPAEDIC SURGERY

## 2017-03-28 PROCEDURE — 74011250637 HC RX REV CODE- 250/637: Performed by: ANESTHESIOLOGY

## 2017-03-28 PROCEDURE — C1776 JOINT DEVICE (IMPLANTABLE): HCPCS | Performed by: ORTHOPAEDIC SURGERY

## 2017-03-28 PROCEDURE — 77030006720 HC BLD PAT RMR ZIMM -B: Performed by: ORTHOPAEDIC SURGERY

## 2017-03-28 PROCEDURE — 77030007880 HC KT SPN EPDRL BBMI -B: Performed by: ANESTHESIOLOGY

## 2017-03-28 PROCEDURE — 77030013727 HC IRR FAN PULSVC ZIMM -B: Performed by: ORTHOPAEDIC SURGERY

## 2017-03-28 PROCEDURE — 76942 ECHO GUIDE FOR BIOPSY: CPT | Performed by: ORTHOPAEDIC SURGERY

## 2017-03-28 PROCEDURE — 74011250636 HC RX REV CODE- 250/636: Performed by: ORTHOPAEDIC SURGERY

## 2017-03-28 PROCEDURE — 77030003602 HC NDL NRV BLK BBMI -B: Performed by: ANESTHESIOLOGY

## 2017-03-28 PROCEDURE — 77030003665 HC NDL SPN BBMI -A: Performed by: ANESTHESIOLOGY

## 2017-03-28 PROCEDURE — 82962 GLUCOSE BLOOD TEST: CPT

## 2017-03-28 PROCEDURE — 77030006804 HC BLD SAW RECIP CNMD -B: Performed by: ORTHOPAEDIC SURGERY

## 2017-03-28 PROCEDURE — 65270000029 HC RM PRIVATE

## 2017-03-28 PROCEDURE — 86580 TB INTRADERMAL TEST: CPT | Performed by: ORTHOPAEDIC SURGERY

## 2017-03-28 PROCEDURE — 77030012890

## 2017-03-28 PROCEDURE — 77030034849: Performed by: ORTHOPAEDIC SURGERY

## 2017-03-28 PROCEDURE — 74011250636 HC RX REV CODE- 250/636: Performed by: ANESTHESIOLOGY

## 2017-03-28 PROCEDURE — 77030020782 HC GWN BAIR PAWS FLX 3M -B: Performed by: ANESTHESIOLOGY

## 2017-03-28 PROCEDURE — 74011000250 HC RX REV CODE- 250: Performed by: ORTHOPAEDIC SURGERY

## 2017-03-28 PROCEDURE — 74011000250 HC RX REV CODE- 250

## 2017-03-28 PROCEDURE — 77030032490 HC SLV COMPR SCD KNE COVD -B

## 2017-03-28 PROCEDURE — 77030013819 HC MX SYS CEM ZIMM -B: Performed by: ORTHOPAEDIC SURGERY

## 2017-03-28 PROCEDURE — 74011000302 HC RX REV CODE- 302: Performed by: ORTHOPAEDIC SURGERY

## 2017-03-28 PROCEDURE — 77030011640 HC PAD GRND REM COVD -A: Performed by: ORTHOPAEDIC SURGERY

## 2017-03-28 PROCEDURE — 85018 HEMOGLOBIN: CPT | Performed by: ORTHOPAEDIC SURGERY

## 2017-03-28 PROCEDURE — 77030018836 HC SOL IRR NACL ICUM -A: Performed by: ORTHOPAEDIC SURGERY

## 2017-03-28 PROCEDURE — 77030033067 HC SUT PDO STRATFX SPIR J&J -B: Performed by: ORTHOPAEDIC SURGERY

## 2017-03-28 PROCEDURE — 77030012935 HC DRSG AQUACEL BMS -B: Performed by: ORTHOPAEDIC SURGERY

## 2017-03-28 PROCEDURE — 36415 COLL VENOUS BLD VENIPUNCTURE: CPT | Performed by: ORTHOPAEDIC SURGERY

## 2017-03-28 PROCEDURE — 74011250637 HC RX REV CODE- 250/637: Performed by: ORTHOPAEDIC SURGERY

## 2017-03-28 PROCEDURE — 76010000162 HC OR TIME 1.5 TO 2 HR INTENSV-TIER 1: Performed by: ORTHOPAEDIC SURGERY

## 2017-03-28 PROCEDURE — 76210000016 HC OR PH I REC 1 TO 1.5 HR: Performed by: ORTHOPAEDIC SURGERY

## 2017-03-28 PROCEDURE — 77030006777 HC BLD SAW OSC CNMD -B: Performed by: ORTHOPAEDIC SURGERY

## 2017-03-28 PROCEDURE — 77030019557 HC ELECTRD VES SEAL MEDT -F: Performed by: ORTHOPAEDIC SURGERY

## 2017-03-28 PROCEDURE — 0SRC0J9 REPLACEMENT OF RIGHT KNEE JOINT WITH SYNTHETIC SUBSTITUTE, CEMENTED, OPEN APPROACH: ICD-10-PCS | Performed by: ORTHOPAEDIC SURGERY

## 2017-03-28 PROCEDURE — 77010033678 HC OXYGEN DAILY

## 2017-03-28 PROCEDURE — 94762 N-INVAS EAR/PLS OXIMTRY CONT: CPT

## 2017-03-28 PROCEDURE — 77030031139 HC SUT VCRL2 J&J -A: Performed by: ORTHOPAEDIC SURGERY

## 2017-03-28 PROCEDURE — 94760 N-INVAS EAR/PLS OXIMETRY 1: CPT

## 2017-03-28 PROCEDURE — 77030008467 HC STPLR SKN COVD -B: Performed by: ORTHOPAEDIC SURGERY

## 2017-03-28 PROCEDURE — 76060000034 HC ANESTHESIA 1.5 TO 2 HR: Performed by: ORTHOPAEDIC SURGERY

## 2017-03-28 PROCEDURE — C1713 ANCHOR/SCREW BN/BN,TIS/BN: HCPCS | Performed by: ORTHOPAEDIC SURGERY

## 2017-03-28 PROCEDURE — 77030037622 HC TIB TRIAL PK ATTUNE INTTN J&J -C: Performed by: ORTHOPAEDIC SURGERY

## 2017-03-28 PROCEDURE — 77030018673: Performed by: ORTHOPAEDIC SURGERY

## 2017-03-28 PROCEDURE — 74011000250 HC RX REV CODE- 250: Performed by: ANESTHESIOLOGY

## 2017-03-28 DEVICE — IMPLANTABLE DEVICE: Type: IMPLANTABLE DEVICE | Site: KNEE | Status: FUNCTIONAL

## 2017-03-28 DEVICE — COMPONENT PAT DIA41MM POLYETH DOME CEM MEDIALIZED ATTUNE: Type: IMPLANTABLE DEVICE | Site: KNEE | Status: FUNCTIONAL

## 2017-03-28 DEVICE — COMPONENT FEM SZ 9 R KNEE POST STBL CEM ATTUNE: Type: IMPLANTABLE DEVICE | Site: KNEE | Status: FUNCTIONAL

## 2017-03-28 DEVICE — (D)CEMENT BNE HV R 40GM -- DUPE USE ITEM 353850: Type: IMPLANTABLE DEVICE | Site: KNEE | Status: FUNCTIONAL

## 2017-03-28 DEVICE — BASEPLATE TIB SZ 8 KNEE CEM FIX BEAR ATTUNE: Type: IMPLANTABLE DEVICE | Site: KNEE | Status: FUNCTIONAL

## 2017-03-28 RX ORDER — SIMVASTATIN 40 MG/1
80 TABLET, FILM COATED ORAL
Status: DISCONTINUED | OUTPATIENT
Start: 2017-03-28 | End: 2017-03-30 | Stop reason: HOSPADM

## 2017-03-28 RX ORDER — MIDAZOLAM HYDROCHLORIDE 1 MG/ML
2 INJECTION, SOLUTION INTRAMUSCULAR; INTRAVENOUS
Status: DISCONTINUED | OUTPATIENT
Start: 2017-03-28 | End: 2017-03-28 | Stop reason: HOSPADM

## 2017-03-28 RX ORDER — FLECAINIDE ACETATE 100 MG/1
100 TABLET ORAL 2 TIMES DAILY
Status: DISCONTINUED | OUTPATIENT
Start: 2017-03-28 | End: 2017-03-30 | Stop reason: HOSPADM

## 2017-03-28 RX ORDER — INSULIN LISPRO 100 [IU]/ML
8 INJECTION, SOLUTION INTRAVENOUS; SUBCUTANEOUS ONCE
Status: COMPLETED | OUTPATIENT
Start: 2017-03-28 | End: 2017-03-28

## 2017-03-28 RX ORDER — PROPOFOL 10 MG/ML
INJECTION, EMULSION INTRAVENOUS
Status: DISCONTINUED | OUTPATIENT
Start: 2017-03-28 | End: 2017-03-28 | Stop reason: HOSPADM

## 2017-03-28 RX ORDER — AMOXICILLIN 250 MG
2 CAPSULE ORAL DAILY
Status: DISCONTINUED | OUTPATIENT
Start: 2017-03-29 | End: 2017-03-30 | Stop reason: HOSPADM

## 2017-03-28 RX ORDER — SODIUM CHLORIDE 0.9 % (FLUSH) 0.9 %
5-10 SYRINGE (ML) INJECTION EVERY 8 HOURS
Status: DISCONTINUED | OUTPATIENT
Start: 2017-03-28 | End: 2017-03-28 | Stop reason: HOSPADM

## 2017-03-28 RX ORDER — METFORMIN HYDROCHLORIDE 500 MG/1
1000 TABLET ORAL
Status: DISCONTINUED | OUTPATIENT
Start: 2017-03-29 | End: 2017-03-30 | Stop reason: HOSPADM

## 2017-03-28 RX ORDER — OXYCODONE HYDROCHLORIDE 5 MG/1
5-10 TABLET ORAL
Status: DISCONTINUED | OUTPATIENT
Start: 2017-03-28 | End: 2017-03-30 | Stop reason: HOSPADM

## 2017-03-28 RX ORDER — TRISODIUM CITRATE DIHYDRATE AND CITRIC ACID MONOHYDRATE 500; 334 MG/5ML; MG/5ML
30 SOLUTION ORAL
Status: COMPLETED | OUTPATIENT
Start: 2017-03-28 | End: 2017-03-28

## 2017-03-28 RX ORDER — DEXAMETHASONE SODIUM PHOSPHATE 100 MG/10ML
10 INJECTION INTRAMUSCULAR; INTRAVENOUS ONCE
Status: ACTIVE | OUTPATIENT
Start: 2017-03-29 | End: 2017-03-30

## 2017-03-28 RX ORDER — ONDANSETRON 8 MG/1
8 TABLET, ORALLY DISINTEGRATING ORAL
Status: DISCONTINUED | OUTPATIENT
Start: 2017-03-28 | End: 2017-03-30 | Stop reason: HOSPADM

## 2017-03-28 RX ORDER — FENTANYL CITRATE 50 UG/ML
100 INJECTION, SOLUTION INTRAMUSCULAR; INTRAVENOUS ONCE
Status: COMPLETED | OUTPATIENT
Start: 2017-03-28 | End: 2017-03-28

## 2017-03-28 RX ORDER — CEFAZOLIN SODIUM IN 0.9 % NACL 2 G/50 ML
2 INTRAVENOUS SOLUTION, PIGGYBACK (ML) INTRAVENOUS EVERY 8 HOURS
Status: COMPLETED | OUTPATIENT
Start: 2017-03-28 | End: 2017-03-29

## 2017-03-28 RX ORDER — KETOROLAC TROMETHAMINE 30 MG/ML
INJECTION, SOLUTION INTRAMUSCULAR; INTRAVENOUS AS NEEDED
Status: DISCONTINUED | OUTPATIENT
Start: 2017-03-28 | End: 2017-03-28 | Stop reason: HOSPADM

## 2017-03-28 RX ORDER — ONDANSETRON 2 MG/ML
INJECTION INTRAMUSCULAR; INTRAVENOUS AS NEEDED
Status: DISCONTINUED | OUTPATIENT
Start: 2017-03-28 | End: 2017-03-28 | Stop reason: HOSPADM

## 2017-03-28 RX ORDER — ACETAMINOPHEN 10 MG/ML
1000 INJECTION, SOLUTION INTRAVENOUS ONCE
Status: COMPLETED | OUTPATIENT
Start: 2017-03-28 | End: 2017-03-28

## 2017-03-28 RX ORDER — ACETAMINOPHEN 500 MG
1000 TABLET ORAL EVERY 6 HOURS
Status: DISCONTINUED | OUTPATIENT
Start: 2017-03-29 | End: 2017-03-30 | Stop reason: HOSPADM

## 2017-03-28 RX ORDER — ASPIRIN 325 MG
325 TABLET, DELAYED RELEASE (ENTERIC COATED) ORAL EVERY 12 HOURS
Status: DISCONTINUED | OUTPATIENT
Start: 2017-03-28 | End: 2017-03-30 | Stop reason: HOSPADM

## 2017-03-28 RX ORDER — SODIUM CHLORIDE 0.9 % (FLUSH) 0.9 %
5-10 SYRINGE (ML) INJECTION AS NEEDED
Status: DISCONTINUED | OUTPATIENT
Start: 2017-03-28 | End: 2017-03-28 | Stop reason: HOSPADM

## 2017-03-28 RX ORDER — TRANEXAMIC ACID 100 MG/ML
INJECTION, SOLUTION INTRAVENOUS AS NEEDED
Status: DISCONTINUED | OUTPATIENT
Start: 2017-03-28 | End: 2017-03-28 | Stop reason: HOSPADM

## 2017-03-28 RX ORDER — BUPIVACAINE HYDROCHLORIDE 7.5 MG/ML
INJECTION, SOLUTION INTRASPINAL AS NEEDED
Status: DISCONTINUED | OUTPATIENT
Start: 2017-03-28 | End: 2017-03-28 | Stop reason: HOSPADM

## 2017-03-28 RX ORDER — LIDOCAINE HYDROCHLORIDE 10 MG/ML
0.1 INJECTION INFILTRATION; PERINEURAL AS NEEDED
Status: DISCONTINUED | OUTPATIENT
Start: 2017-03-28 | End: 2017-03-28 | Stop reason: HOSPADM

## 2017-03-28 RX ORDER — PROMETHAZINE HYDROCHLORIDE 25 MG/ML
25 INJECTION, SOLUTION INTRAMUSCULAR; INTRAVENOUS
Status: DISCONTINUED | OUTPATIENT
Start: 2017-03-28 | End: 2017-03-30 | Stop reason: HOSPADM

## 2017-03-28 RX ORDER — CELECOXIB 200 MG/1
200 CAPSULE ORAL
Status: COMPLETED | OUTPATIENT
Start: 2017-03-28 | End: 2017-03-28

## 2017-03-28 RX ORDER — HYDROMORPHONE HYDROCHLORIDE 1 MG/ML
1 INJECTION, SOLUTION INTRAMUSCULAR; INTRAVENOUS; SUBCUTANEOUS
Status: DISCONTINUED | OUTPATIENT
Start: 2017-03-28 | End: 2017-03-30 | Stop reason: HOSPADM

## 2017-03-28 RX ORDER — METOPROLOL SUCCINATE 25 MG/1
25 TABLET, EXTENDED RELEASE ORAL DAILY
Status: DISCONTINUED | OUTPATIENT
Start: 2017-03-29 | End: 2017-03-30 | Stop reason: HOSPADM

## 2017-03-28 RX ORDER — SODIUM CHLORIDE 0.9 % (FLUSH) 0.9 %
5-10 SYRINGE (ML) INJECTION AS NEEDED
Status: DISCONTINUED | OUTPATIENT
Start: 2017-03-28 | End: 2017-03-30 | Stop reason: HOSPADM

## 2017-03-28 RX ORDER — ACYCLOVIR 200 MG/1
200 CAPSULE ORAL DAILY
Status: DISCONTINUED | OUTPATIENT
Start: 2017-03-29 | End: 2017-03-30 | Stop reason: HOSPADM

## 2017-03-28 RX ORDER — ROPIVACAINE HYDROCHLORIDE 2 MG/ML
INJECTION, SOLUTION EPIDURAL; INFILTRATION; PERINEURAL AS NEEDED
Status: DISCONTINUED | OUTPATIENT
Start: 2017-03-28 | End: 2017-03-28 | Stop reason: HOSPADM

## 2017-03-28 RX ORDER — DIPHENHYDRAMINE HCL 25 MG
25 CAPSULE ORAL
Status: DISCONTINUED | OUTPATIENT
Start: 2017-03-28 | End: 2017-03-30 | Stop reason: HOSPADM

## 2017-03-28 RX ORDER — SODIUM CHLORIDE 9 MG/ML
100 INJECTION, SOLUTION INTRAVENOUS CONTINUOUS
Status: DISPENSED | OUTPATIENT
Start: 2017-03-28 | End: 2017-03-30

## 2017-03-28 RX ORDER — ZOLPIDEM TARTRATE 5 MG/1
5 TABLET ORAL
Status: DISCONTINUED | OUTPATIENT
Start: 2017-03-28 | End: 2017-03-30 | Stop reason: HOSPADM

## 2017-03-28 RX ORDER — GLIMEPIRIDE 2 MG/1
4 TABLET ORAL
Status: DISCONTINUED | OUTPATIENT
Start: 2017-03-29 | End: 2017-03-30 | Stop reason: HOSPADM

## 2017-03-28 RX ORDER — SODIUM CHLORIDE, SODIUM LACTATE, POTASSIUM CHLORIDE, CALCIUM CHLORIDE 600; 310; 30; 20 MG/100ML; MG/100ML; MG/100ML; MG/100ML
75 INJECTION, SOLUTION INTRAVENOUS CONTINUOUS
Status: DISCONTINUED | OUTPATIENT
Start: 2017-03-28 | End: 2017-03-28 | Stop reason: HOSPADM

## 2017-03-28 RX ORDER — MORPHINE SULFATE 10 MG/ML
INJECTION, SOLUTION INTRAMUSCULAR; INTRAVENOUS AS NEEDED
Status: DISCONTINUED | OUTPATIENT
Start: 2017-03-28 | End: 2017-03-28 | Stop reason: HOSPADM

## 2017-03-28 RX ORDER — VERAPAMIL HYDROCHLORIDE 180 MG/1
180 TABLET, EXTENDED RELEASE ORAL DAILY
Status: DISCONTINUED | OUTPATIENT
Start: 2017-03-29 | End: 2017-03-30 | Stop reason: HOSPADM

## 2017-03-28 RX ORDER — NALOXONE HYDROCHLORIDE 0.4 MG/ML
.2-.4 INJECTION, SOLUTION INTRAMUSCULAR; INTRAVENOUS; SUBCUTANEOUS
Status: DISCONTINUED | OUTPATIENT
Start: 2017-03-28 | End: 2017-03-30 | Stop reason: HOSPADM

## 2017-03-28 RX ORDER — CELECOXIB 200 MG/1
200 CAPSULE ORAL EVERY 12 HOURS
Status: DISCONTINUED | OUTPATIENT
Start: 2017-03-28 | End: 2017-03-30 | Stop reason: HOSPADM

## 2017-03-28 RX ORDER — SODIUM CHLORIDE 0.9 % (FLUSH) 0.9 %
5-10 SYRINGE (ML) INJECTION EVERY 8 HOURS
Status: DISCONTINUED | OUTPATIENT
Start: 2017-03-28 | End: 2017-03-30 | Stop reason: HOSPADM

## 2017-03-28 RX ORDER — DEXAMETHASONE SODIUM PHOSPHATE 4 MG/ML
INJECTION, SOLUTION INTRA-ARTICULAR; INTRALESIONAL; INTRAMUSCULAR; INTRAVENOUS; SOFT TISSUE AS NEEDED
Status: DISCONTINUED | OUTPATIENT
Start: 2017-03-28 | End: 2017-03-28 | Stop reason: HOSPADM

## 2017-03-28 RX ORDER — MIDAZOLAM HYDROCHLORIDE 1 MG/ML
2 INJECTION, SOLUTION INTRAMUSCULAR; INTRAVENOUS ONCE
Status: DISCONTINUED | OUTPATIENT
Start: 2017-03-28 | End: 2017-03-28 | Stop reason: HOSPADM

## 2017-03-28 RX ORDER — LISINOPRIL 5 MG/1
10 TABLET ORAL DAILY
Status: DISCONTINUED | OUTPATIENT
Start: 2017-03-29 | End: 2017-03-30 | Stop reason: HOSPADM

## 2017-03-28 RX ADMIN — LIDOCAINE HYDROCHLORIDE 0.1 ML: 10 INJECTION, SOLUTION INFILTRATION; PERINEURAL at 09:49

## 2017-03-28 RX ADMIN — TUBERCULIN PURIFIED PROTEIN DERIVATIVE 5 UNITS: 5 INJECTION, SOLUTION INTRADERMAL at 09:49

## 2017-03-28 RX ADMIN — SIMVASTATIN 80 MG: 40 TABLET, FILM COATED ORAL at 21:12

## 2017-03-28 RX ADMIN — KETOROLAC TROMETHAMINE 30 MG: 30 INJECTION, SOLUTION INTRAMUSCULAR; INTRAVENOUS at 12:21

## 2017-03-28 RX ADMIN — DEXAMETHASONE SODIUM PHOSPHATE 10 MG: 4 INJECTION, SOLUTION INTRA-ARTICULAR; INTRALESIONAL; INTRAMUSCULAR; INTRAVENOUS; SOFT TISSUE at 11:51

## 2017-03-28 RX ADMIN — Medication 10 ML: at 21:12

## 2017-03-28 RX ADMIN — OXYCODONE HYDROCHLORIDE 10 MG: 5 TABLET ORAL at 21:12

## 2017-03-28 RX ADMIN — PROPOFOL 75 MCG/KG/MIN: 10 INJECTION, EMULSION INTRAVENOUS at 11:47

## 2017-03-28 RX ADMIN — CEFAZOLIN 2 G: 1 INJECTION, POWDER, FOR SOLUTION INTRAMUSCULAR; INTRAVENOUS; PARENTERAL at 18:15

## 2017-03-28 RX ADMIN — ROPIVACAINE HYDROCHLORIDE 60 ML: 2 INJECTION, SOLUTION EPIDURAL; INFILTRATION at 12:22

## 2017-03-28 RX ADMIN — ACETAMINOPHEN 1000 MG: 10 INJECTION, SOLUTION INTRAVENOUS at 18:00

## 2017-03-28 RX ADMIN — SODIUM CHLORIDE, SODIUM LACTATE, POTASSIUM CHLORIDE, AND CALCIUM CHLORIDE 75 ML/HR: 600; 310; 30; 20 INJECTION, SOLUTION INTRAVENOUS at 09:49

## 2017-03-28 RX ADMIN — ONDANSETRON 4 MG: 2 INJECTION INTRAMUSCULAR; INTRAVENOUS at 11:51

## 2017-03-28 RX ADMIN — CEFAZOLIN 3 G: 1 INJECTION, POWDER, FOR SOLUTION INTRAMUSCULAR; INTRAVENOUS; PARENTERAL at 11:29

## 2017-03-28 RX ADMIN — MIDAZOLAM HYDROCHLORIDE 2 MG: 1 INJECTION, SOLUTION INTRAMUSCULAR; INTRAVENOUS at 10:55

## 2017-03-28 RX ADMIN — SODIUM CITRATE AND CITRIC ACID MONOHYDRATE 30 ML: 500; 334 SOLUTION ORAL at 10:08

## 2017-03-28 RX ADMIN — BUPIVACAINE HYDROCHLORIDE 2 ML: 7.5 INJECTION, SOLUTION INTRASPINAL at 11:44

## 2017-03-28 RX ADMIN — SODIUM CHLORIDE, SODIUM LACTATE, POTASSIUM CHLORIDE, AND CALCIUM CHLORIDE: 600; 310; 30; 20 INJECTION, SOLUTION INTRAVENOUS at 11:28

## 2017-03-28 RX ADMIN — ASPIRIN 325 MG: 325 TABLET, DELAYED RELEASE ORAL at 21:12

## 2017-03-28 RX ADMIN — CELECOXIB 200 MG: 200 CAPSULE ORAL at 09:48

## 2017-03-28 RX ADMIN — TRANEXAMIC ACID 1000 MG: 100 INJECTION, SOLUTION INTRAVENOUS at 12:03

## 2017-03-28 RX ADMIN — FENTANYL CITRATE 50 MCG: 50 INJECTION, SOLUTION INTRAMUSCULAR; INTRAVENOUS at 10:55

## 2017-03-28 RX ADMIN — SODIUM CHLORIDE 1 G: 900 INJECTION, SOLUTION INTRAVENOUS at 12:22

## 2017-03-28 RX ADMIN — CELECOXIB 200 MG: 200 CAPSULE ORAL at 21:12

## 2017-03-28 RX ADMIN — SODIUM CHLORIDE 100 ML/HR: 900 INJECTION, SOLUTION INTRAVENOUS at 15:00

## 2017-03-28 RX ADMIN — MORPHINE SULFATE 10 MG: 10 INJECTION INTRAMUSCULAR; INTRAVENOUS; SUBCUTANEOUS at 12:22

## 2017-03-28 RX ADMIN — INSULIN LISPRO 8 UNITS: 100 INJECTION, SOLUTION INTRAVENOUS; SUBCUTANEOUS at 10:29

## 2017-03-28 RX ADMIN — FLECAINIDE ACETATE 100 MG: 100 TABLET ORAL at 18:04

## 2017-03-28 NOTE — PERIOP NOTES
TRANSFER - OUT REPORT:    Verbal report given to [facility] RN on Cherylene Guernsey  being transferred to [facility] for routine progression of care       Report consisted of patients Situation, Background, Assessment and   Recommendations(SBAR). Information from the following report(s) SBAR was reviewed with the receiving nurse. Lines:   Peripheral IV 03/28/17 Left; Inner Forearm (Active)   Site Assessment Clean, dry, & intact 3/28/2017  1:30 PM   Phlebitis Assessment 0 3/28/2017  1:30 PM   Infiltration Assessment 0 3/28/2017  1:30 PM   Dressing Status Clean, dry, & intact 3/28/2017  1:30 PM   Dressing Type Tape;Transparent 3/28/2017  1:30 PM   Hub Color/Line Status Infusing 3/28/2017  1:30 PM   Action Taken Blood drawn 3/28/2017  9:37 AM        Opportunity for questions and clarification was provided.       Patient transported with:   O2 @ 2 liters

## 2017-03-28 NOTE — PROGRESS NOTES
Patient arrived via bed into room. Patient alert and oriented. Patient unable to move lower extremities due to surgery. Pedal pulses present 2+. Compression stockings applied. Admission assessment complete. Discussed diet and pain.

## 2017-03-28 NOTE — ANESTHESIA POSTPROCEDURE EVALUATION
Post-Anesthesia Evaluation and Assessment    Patient: Angie Phillips MRN: [id number]  SSN: [id number]    YOB: 1955  Age: 60 y.o. Sex: male       Cardiovascular Function/Vital Signs  Visit Vitals    /80 (BP 1 Location: Right arm, BP Patient Position: At rest)    Pulse 84    Temp 36.6 °C (97.8 °F)    Resp 16    Ht 6' 5\" (1.956 m)    Wt 135.2 kg (298 lb 2 oz)    SpO2 96%    BMI 35.35 kg/m2       Patient is status post spinal anesthesia for Procedure(s):  RIGHT KNEE ARTHROPLASTY TOTAL/ [facility]. Nausea/Vomiting: None    Postoperative hydration reviewed and adequate. Pain:  Pain Scale 1: Numeric (0 - 10) (03/28/17 1414)  Pain Intensity 1: 0 (03/28/17 1414)   Managed    Neurological Status:   Neuro (WDL): Exceptions to WDL (03/28/17 1414)  Neuro  Neurologic State: Drowsy (03/28/17 1414)  Orientation Level: Oriented X4 (03/28/17 1414)  Cognition: Follows commands (03/28/17 1414)  Speech: Clear (03/28/17 1414)  LUE Motor Response: Purposeful (03/28/17 1414)  LLE Motor Response: Pharmacologically paralyzed (03/28/17 1414)  RUE Motor Response: Purposeful (03/28/17 1414)  RLE Motor Response: Pharmacologically paralyzed (03/28/17 1414)   At baseline    Mental Status and Level of Consciousness: Arousable    Pulmonary Status:   O2 Device: Nasal cannula (03/28/17 1429)   Adequate oxygenation and airway patent    Complications related to anesthesia: None    Post-anesthesia assessment completed.  No concerns    Signed By: Carlyn Campos MD     March 28, 2017

## 2017-03-28 NOTE — PROGRESS NOTES
03/28/17 1640   Oxygen Therapy   O2 Sat (%) 96 %   Pulse via Oximetry 62 beats per minute   O2 Device Nasal cannula   O2 Flow Rate (L/min) 2 l/min  (weaned to room air)    Patient encouraged to do IS every hour while awake-patient agreed and demonstrated. No shortness of breath or distress noted. BS are clear b/l.    [facility] notes reviewed- continuous sat # 7 ordered HS

## 2017-03-28 NOTE — IP AVS SNAPSHOT
[address] [place] [address] 
[phone] Patient: Patrick Mckay MRN: [id number] [facility]:3/82/9974 You are allergic to the following No active allergies Immunizations Administered for This Admission Name Date  
 TB Skin Test (PPD) Intradermal 3/28/2017 Recent Documentation Height Weight BMI Smoking Status 1.956 m 135.2 kg 35.35 kg/m2 Former Smoker Emergency Contacts Name Discharge Info Relation Home Work Mobile Jenny Kenny DISCHARGE CAREGIVER [3] Spouse [3]   [phone] Chalino Peña  Child [2] [phone] About your hospitalization You were admitted on:  March 28, 2017 You last received care in the:  [facility] You were discharged on:  March 30, 2017 Unit phone number:  [phone] Why you were hospitalized Your primary diagnosis was:  S/P Total Knee Arthroplasty Your diagnoses also included:  Osteoarthritis Providers Seen During Your Hospitalizations Provider Role Specialty Primary office phone Derrell Sanches MD Attending Provider Orthopedic Surgery [phone] Your Primary Care Physician (PCP) Primary Care Physician Office Phone Office Fax Via [address] [phone] Follow-up Information Follow up With Details Comments Contact Info Batool August MD  As needed 7892 [facility] [address] [place] [address] 123  Kathryn Phan 
[phone] Derrell Sanches MD  As scheduled by office [address] [employer] [facility] [address] 
[phone] [address]  Will contact you within 48 hrs [address] [place] [address] [address] [place] [id number] 
[phone] Current Discharge Medication List  
  
START taking these medications Dose & Instructions Dispensing Information Comments Morning Noon Evening Bedtime  
 aspirin delayed-release 325 mg tablet Replaces:  aspirin 325 mg tablet Your next dose is: Today Dose:  325 mg Take 1 Tab by mouth every twelve (12) hours every twelve (12) hours for 30 days. Quantity:  60 Tab Refills:  0  
     
   
   
  
   
  
 oxyCODONE IR 5 mg immediate release tablet Commonly known as:  Godfreylizzy Bhaskariwona Your next dose is: Today Dose:  5-10 mg Take 1-2 Tabs by mouth every three (3) hours as needed. Max Daily Amount: 80 mg.  
 Quantity:  60 Tab Refills:  0 CONTINUE these medications which have CHANGED Dose & Instructions Dispensing Information Comments Morning Noon Evening Bedtime  
 acyclovir 200 mg capsule Commonly known as:  ZOVIRAX What changed:  additional instructions Your next dose is:  Tomorrow Dose:  200 mg Take 1 Cap by mouth daily. Quantity:  30 Cap Refills:  0  
     
  
   
   
   
  
 simvastatin 80 mg tablet Commonly known as:  ZOCOR What changed:  additional instructions Your next dose is: Today Dose:  80 mg Take 1 Tab by mouth nightly. Quantity:  90 Tab Refills:  1 CONTINUE these medications which have NOT CHANGED Dose & Instructions Dispensing Information Comments Morning Noon Evening Bedtime  
 coenzyme q10 10 mg Cap Your next dose is:  DO NOT TAKE X 5 WEEKS!!  
   
 Take  by mouth daily. Refills:  0  
     
   
   
   
  
 flecainide 100 mg tablet Commonly known as:  TAMBOCOR Your next dose is: Today Dose:  100 mg Take 100 mg by mouth two (2) times a day. Take / use AM day of surgery  per anesthesia protocols. Indications: PREVENTION OF RECURRENT ATRIAL FIBRILLATION Refills:  0  
     
   
   
  
   
  
 glimepiride 4 mg tablet Commonly known as:  AMARYL Your next dose is:  Tomorrow TAKE 1 TABLET EVERY MORNING Quantity:  90 Tab Refills:  1 lisinopril 10 mg tablet Commonly known as:  Fifi Barreto Your next dose is:  Tomorrow Dose:  10 mg Take 1 Tab by mouth daily. Quantity:  90 Tab Refills:  1  
     
  
   
   
   
  
 metFORMIN 1,000 mg tablet Commonly known as:  GLUCOPHAGE Your next dose is:  Tomorrow Dose:  1000 mg Take 1,000 mg by mouth daily. Takes 2 tablets every morning   Indications: type 2 diabetes mellitus Refills:  0  
     
  
   
   
   
  
 metoprolol succinate 25 mg XL tablet Commonly known as:  TOPROL-XL Your next dose is:  Tomorrow Dose:  25 mg Take 25 mg by mouth daily. Take / use AM day of surgery  per anesthesia protocols. Indications: hypertension Refills:  0  
     
  
   
   
   
  
 NICOTINAMIDE PO Your next dose is: Today Take  by mouth two (2) times a day. Refills:  0  
     
   
   
  
   
  
 verapamil  mg CR tablet Commonly known as:  CALAN-SR Your next dose is:  Tomorrow Dose:  180 mg Take 180 mg by mouth daily. Take / use AM day of surgery  per anesthesia protocols. Indications: hypertension Refills:  0  
     
  
   
   
   
  
 VITAMIN D3 PO Your next dose is:  Tomorrow  
   
 take  by mouth daily. am  
 Refills:  0 STOP taking these medications   
 aspirin 325 mg tablet Commonly known as:  ASPIRIN Replaced by:  aspirin delayed-release 325 mg tablet BACTROBAN NASAL 2 % nasal ointment Generic drug:  mupirocin calcium FISH OIL PO  
   
  
 naproxen sodium 220 mg Cap OTHER(NON-FORMULARY) Where to Get Your Medications Information on where to get these meds will be given to you by the nurse or doctor. ! Ask your nurse or doctor about these medications  
  aspirin delayed-release 325 mg tablet  
 oxyCODONE IR 5 mg immediate release tablet Discharge Instructions [facility] [employer] Patient Discharge Instructions Kameron Rangel / [id number] : 1955 Admitted 3/28/2017 Discharged: 3/30/2017 IF YOU HAVE ANY PROBLEMS ONCE YOU ARE AT HOME CALL THE FOLLOWING NUMBERS:  
Main office number: [phone] Take Home Medications · It is important that you take the medication exactly as they are prescribed. · Keep your medication in the bottles provided by the pharmacist and keep a list of the medication names, dosages, and times to be taken in your wallet. · Do not take other medications without consulting your doctor. What to do at [facility] Resume your prehospital diet. If you have excessive nausea or vomitting call your doctor's office Home Physical Therapy is arranged. Use rolling walker when walking. Use Denilson Hose stockings until we see you in the office for your follow up appointment with Dr. Frank Dugan. Patients who have had a joint replacement should not drive until you are seen for your follow up appointment by Dr. Frank Dugan. When to Call - Call if you have a temperature greater then 101 
- Unable to keep food down - Loose control of your bladder or bowel function - Are unable to bear any weight  
- Need a pain medication refill DISCHARGE SUMMARY from Nurse The following personal items collected during your admission are returned to you:  
Dental Appliance: Dental Appliances: None Vision: Visual Aid: Glasses Hearing Aid:   na 
Jewelry: Jewelry: None Clothing: Clothing: At bedside, Footwear, Pants, Shirt, Socks, Undergarments Other Valuables: Other Valuables: Cell Phone, Other (comment) (Michele, chrome book ) Valuables sent to safe:   na 
 
PATIENT INSTRUCTIONS: 
 
After general anesthesia or intravenous sedation, for 24 hours or while taking prescription Narcotics: · Limit your activities · Do not drive and operate hazardous machinery · Do not make important personal or business decisions · Do  not drink alcoholic beverages · If you have not urinated within 8 hours after discharge, please contact your surgeon on call. Report the following to your surgeon: 
· Excessive pain, swelling, redness or odor of or around the surgical area · Temperature over 101 · Nausea and vomiting lasting longer than 4 hours or if unable to take medications · Any signs of decreased circulation or nerve impairment to extremity: change in color, persistent  numbness, tingling, coldness or increase pain · Any questions, call office @ [phone] Keep scheduled follow up appointment. If need to change, call office @ [phone]. *  Please give a list of your current medications to your Primary Care Provider. *  Please update this list whenever your medications are discontinued, doses are 
    changed, or new medications (including over-the-counter products) are added. *  Please carry medication information at all times in case of emergency situations. Total Knee Replacement: What to Expect at Home Your Recovery When you leave the hospital, you should be able to move around with a walker or crutches. But you will need someone to help you at home for the next few weeks or until you have more energy and can move around better. If there is no one to help you at home, you may go to a rehabilitation center. You will go home with a bandage and stitches or staples. Change the bandage as your doctor tells you to. Your doctor will remove your stitches or staples 10 to 21 days after your surgery. You may still have some mild pain, and the area may be swollen for 3 to 6 months after surgery. Your knee will continue to improve for 6 to 12 months. You will probably use a walker for 1 to 3 weeks and then use crutches. When you are ready, you can use a cane. You will probably be able to walk on your own in 4 to 8 weeks.  
You will need to do months of physical rehabilitation (rehab) after a knee replacement. Rehab will help you strengthen the muscles of the knee and help you regain movement. After you recover, your artificial knee will allow you to do normal daily activities with less pain or no pain at all. You may be able to hike, dance, ride a bike, and play golf. Talk to your doctor about whether you can do more strenuous activities. Always tell your caregivers that you have an artificial knee. How long it will take to walk on your own, return to normal activities, and go back to work depends on your health and how well your rehabilitation (rehab) program goes. The better you do with your rehab exercises, the quicker you will get your strength and movement back. This care sheet gives you a general idea about how long it will take for you to recover. But each person recovers at a different pace. Follow the steps below to get better as quickly as possible. How can you care for yourself at home? Activity · Rest when you feel tired. You may take a nap, but do not stay in bed all day. When you sit, use a chair with arms. You can use the arms to help you stand up. · Work with your physical therapist to find the best way to exercise. You may be able to take frequent, short walks using crutches or a walker. What you can do as your knee heals will depend on whether your new knee is cemented or uncemented. You may not be able to do certain things for a while if your new knee is uncemented. · After your knee has healed enough, you can do more strenuous activities with caution. ¨ You can golf, but use a golf cart, and do not wear shoes with spikes. ¨ You can bike on a flat road or on a stationary bike. Avoid biking up hills. ¨ Your doctor may suggest that you stay away from activities that put stress on your knee. These include tennis or badminton, squash or racquetball, contact sports like football, jumping (such as in basketball), jogging, or running. ¨ Avoid activities where you might fall. These include horseback riding, skiing, and mountain biking. · Do not sit for more than 1 hour at a time. Get up and walk around for a while before you sit again. If you must sit for a long time, prop up your leg with a chair or footstool. This will help you avoid swelling. · Ask your doctor when you can shower. You may need to take sponge baths until your stitches or staples have been removed. · Ask your doctor when you can drive again. It may take up to 8 weeks after knee replacement surgery before it is safe for you to drive. · When you get into a car, sit on the edge of the seat. Then pull in your legs, and turn to face the front. · You should be able to do many everyday activities 3 to 6 weeks after your surgery. You will probably need to take 4 to 16 weeks off from work. When you can go back to work depends on the type of work you do and how you feel. · Ask your doctor when it is okay for you to have sex. · Do not lift anything heavier than 10 pounds and do not lift weights for 12 weeks. Diet · By the time you leave the hospital, you should be eating your normal diet. If your stomach is upset, try bland, low-fat foods like plain rice, broiled chicken, toast, and yogurt. Your doctor may suggest that you take iron and vitamin supplements. · Drink plenty of fluids (unless your doctor tells you not to). · Eat healthy foods, and watch your portion sizes. Try to stay at your ideal weight. Too much weight puts more stress on your new knee. · You may notice that your bowel movements are not regular right after your surgery. This is common. Try to avoid constipation and straining with bowel movements. You may want to take a fiber supplement every day. If you have not had a bowel movement after a couple of days, ask your doctor about taking a mild laxative. Medicines · Your doctor will tell you if and when you can restart your medicines.  He or she will also give you instructions about taking any new medicines. · If you take blood thinners, such as warfarin (Coumadin), clopidogrel (Plavix), or aspirin, be sure to talk to your doctor. He or she will tell you if and when to start taking those medicines again. Make sure that you understand exactly what your doctor wants you to do. · Your doctor may give you a blood-thinning medicine to prevent blood clots. If you take a blood thinner, be sure you get instructions about how to take your medicine safely. Blood thinners can cause serious bleeding problems. This medicine could be in pill form or as a shot (injection). If a shot is necessary, your doctor will tell you how to do this. · Be safe with medicines. Take pain medicines exactly as directed. ¨ If the doctor gave you a prescription medicine for pain, take it as prescribed. ¨ If you are not taking a prescription pain medicine, ask your doctor if you can take an over-the-counter medicine. ¨ Plan to take your pain medicine 30 minutes before exercises. It is easier to prevent pain before it starts than to stop it once it has started. · If you think your pain medicine is making you sick to your stomach: 
¨ Take your medicine after meals (unless your doctor has told you not to). ¨ Ask your doctor for a different pain medicine. · If your doctor prescribed antibiotics, take them as directed. Do not stop taking them just because you feel better. You need to take the full course of antibiotics. Incision care · You will have a bandage over the cut (incision) and staples or stitches. Take the bandage off when your doctor says it is okay. · Your doctor will remove the staples or stitches 10 days to 3 weeks after the surgery and replace them with strips of tape. Leave the tape on for a week or until it falls off. Exercise · Your rehab program will give you a number of exercises to do to help you get back your knee's range of motion and strength. Always do them as your therapist tells you. Ice and elevation · For pain and swelling, put ice or a cold pack on the area for 10 to 20 minutes at a time. Put a thin cloth between the ice and your skin. Other instructions · Continue to wear your support stockings as your doctor says. These help to prevent blood clots. The length of time that you will have to wear them depends on your activity level and the amount of swelling. · Wear medical alert jewelry that says you may need antibiotics before any procedure, including dental work. You can buy this at most [employer]. · You have metal pieces in your knee. These may set off some airport metal detectors. Carry a medical alert card that says you have an artificial joint, just in case. Follow-up care is a key part of your treatment and safety. Be sure to make and go to all appointments, and call your doctor if you are having problems. It's also a good idea to know your test results and keep a list of the medicines you take. When should you call for help? Call 911 anytime you think you may need emergency care. For example, call if: 
· You passed out (lost consciousness). · You have severe trouble breathing. · You have sudden chest pain and shortness of breath, or you cough up blood. Call your doctor now or seek immediate medical care if: 
· You have signs of infection, such as: 
¨ Increased pain, swelling, warmth, or redness. ¨ Red streaks leading from the incision. ¨ Pus draining from the incision. ¨ A fever. · You have signs of a blood clot, such as: 
¨ Pain in your calf, back of the knee, thigh, or groin. ¨ Redness and swelling in your leg or groin. · Your incision comes open and begins to bleed, or the bleeding increases. · You have pain that does not get better after you take pain medicine. Watch closely for changes in your health, and be sure to contact your doctor if: · You do not have a bowel movement after taking a laxative. Where can you learn more? Go to http://amberly-brandon.info/. Enter D001 in the search box to learn more about \"Total Knee Replacement: What to Expect at Home. \" Current as of: August 4, 2016 Content Version: 11.2 © 2655-9472 [employer]. Care instructions adapted under license by [employer] (which disclaims liability or warranty for this information). If you have questions about a medical condition or this instruction, always ask your healthcare professional. [place] [address] any warranty or liability for your use of this information. These are general instructions for a healthy lifestyle: No smoking/ No tobacco products/ Avoid exposure to second hand smoke Surgeon General's Warning:  Quitting smoking now greatly reduces serious risk to your health. Obesity, smoking, and sedentary lifestyle greatly increases your risk for illness A healthy diet, regular physical exercise & weight monitoring are important for maintaining a healthy lifestyle You may be retaining fluid if you have a history of heart failure or if you experience any of the following symptoms:  Weight gain of 3 pounds or more overnight or 5 pounds in a week, increased swelling in our hands or feet or shortness of breath while lying flat in bed. Please call your doctor as soon as you notice any of these symptoms; do not wait until your next office visit. Recognize signs and symptoms of STROKE: 
 
F-face looks uneven A-arms unable to move or move even S-speech slurred or non-existent T-time-call 911 as soon as signs and symptoms begin-DO NOT go Back to bed or wait to see if you get better-TIME IS BRAIN. The discharge information has been reviewed with the patient. The patient verbalized understanding.  
 
 
Information obtained by : 
 I understand that if any problems occur once I am at home I am to contact my physician. I understand and acknowledge receipt of the instructions indicated above. Physician's or R.N.'s Signature                                                                  Date/Time Patient or Representative Signature                                                          Date/Time Discharge Orders None [facility] Announcement We are excited to announce that we are making your provider's discharge notes available to you in Kallik. You will see these notes when they are completed and signed by the physician that discharged you from your recent hospital stay. If you have any questions or concerns about any information you see in K[employer]ik, please call the Health Information Department where you were seen or reach out to your Primary Care Provider for more information about your plan of care. Introducing [facility]! Herb Sheehan introduces K[employer]ik patient portal. Now you can access parts of your medical record, email your doctor's office, and request medication refills online. 1. In your internet browser, go to [email or web address] 2. Click on the First Time User? Click Here link in the Sign In box. You will see the New Member Sign Up page. 3. Enter your Kallik Access Code exactly as it appears below. You will not need to use this code after youve completed the sign-up process. If you do not sign up before the expiration date, you must request a new code. · Kallik Access Code: [email or web address] Expires: 5/24/2017  5:11 PM 
 
 4. Enter the last four digits of your Social Security Number (xxxx) and Date of Birth (mm/dd/yyyy) as indicated and click Submit. You will be taken to the next sign-up page. 5. Create a [employer] ID. This will be your [employer] login ID and cannot be changed, so think of one that is secure and easy to remember. 6. Create a [employer] password. You can change your password at any time. 7. Enter your Password Reset Question and Answer. This can be used at a later time if you forget your password. 8. Enter your e-mail address. You will receive e-mail notification when new information is available in [address] 9. Click Sign Up. You can now view and download portions of your medical record. 10. Click the Download Summary menu link to download a portable copy of your medical information. If you have questions, please visit the Frequently Asked Questions section of the [employer] website. Remember, [employer] is NOT to be used for urgent needs. For medical emergencies, dial 911. Now available from your iPhone and Android! General Information Please provide this summary of care documentation to your next provider. Patient Signature:  ____________________________________________________________ Date:  ____________________________________________________________  
  
Aloma Snellen Provider Signature:  ____________________________________________________________ Date:  ____________________________________________________________

## 2017-03-28 NOTE — ANESTHESIA PROCEDURE NOTES
Spinal Block    Start time: 3/28/2017 11:35 AM  End time: 3/28/2017 11:44 AM  Performed by: Tara Campos  Authorized by: Tara Campos     Pre-procedure:   Indications: primary anesthetic  Preanesthetic Checklist: patient identified, risks and benefits discussed, anesthesia consent, patient being monitored and timeout performed    Timeout Time: 11:35          Spinal Block:   Patient Position:  Seated  Prep Region:  Lumbar  Prep: chlorhexidine and patient draped      Location:  L3-4  Technique:  Single shot  Local:  Lidocaine 1%  Local Dose (mL):  3    Needle:   Needle Type:  Pencan  Needle Gauge:  25 G  Attempts:  2      Events: CSF confirmed, no blood with aspiration and no paresthesia        Assessment:  Insertion:  Uncomplicated  Patient tolerance:  Patient tolerated the procedure well with no immediate complications  Long spinal needle used

## 2017-03-28 NOTE — PROGRESS NOTES
TRANSFER - IN REPORT:    [facility] on Lex Rinne  being received from PACU (unit) for routine post - op      Report consisted of patients Situation, Background, Assessment and   Recommendations(SBAR). Information from the following report(s) SBAR, Kardex, OR Summary, Procedure Summary and Intake/Output was reviewed with the receiving nurse. Opportunity for questions and clarification was provided. Assessment completed upon patients arrival to unit and care assumed.

## 2017-03-28 NOTE — OP NOTES
[facility]  Cemented Total Knee Arthroplasty  Patient:Kingsley Tran   : 1955  Medical Record OIDUDW:[id number]  Pre-operative Diagnosis:  Unilateral primary osteoarthritis, right knee [M17.11]  Post-operative Diagnosis: Unilateral primary osteoarthritis, right knee [M17.11]    Surgeon: Piyush Ryan MD  Assistant: Leona Tucker PA-C    Anesthesia: Spinal    Procedure: Total Knee Arthroplasty with use of Bone Cement  The complexity of the total joint surgery requires the use of a first assistant for positioning, retraction and assistance in closure. The patient's Body mass index is 35.35 kg/(m^2). , BMI's greater then 40 make surgical exposure and retraction extremely difficult and increase operative time. Tourniquet Time: none  EBL: 150cc  Additional Findings: Severe DJD  Releases none    No Hayden was brought to the operating room and positioned on the operating table. He was anethestized  A yanez catheter was placed preoperatively and IV antibiotics was administered. Prior to the incision being made a timeout was called identifying the patient, procedure ,operative side and surgeon. . The right leg was prepped and draped in the usual sterile manner  An anterior longitudinal incision was accomplished just medial to the tibial tubercle and extending approximal 6 centimeters proximal to the superior pole of the patella. A medial parapatellar capsular incision was performed. The medial capsular flap was elevated around to the insertion of the semimembranous tendon. The patella was everted and the knee flexed and externally rotated. The medial and external menisci were excised. The lateral half of the fat pad excised and the patella femoral ligament was released. The anterior cruciate ligament was resected and the posterior cruciate ligament was substituted. Using extramedullary instrumentation, the tibial cut was accomplished with appropriate posterior slope. Approxiamately 2 mm of bone was removed from the low side of the tibia. The distal femur was next addressed. A drill hole was made above the intracondylar notch. Using appropriate intramedullary instrumentation,a 6 degree valgus distal cut was accomplished. A femur was sized. The anterior and posterior cuts were then made about the distal femur. The osteophytes were removed from the tibial and femoral surfaces. The flexion and extension gaps were assessed with the appropriate spacer blocks. Additional surgical procedures included none. The flexion and extension gaps were deemed appropriately balanced. The appropriate cutting blocks were then utilized to perform the anterior chamfer, posterior chamfer and notch cuts, with appropriate lateral tranlation accomplished for the patellofemoral groove. The tibia was sized. The tibial base plate was pinned into place with the appropriate external rotation and stem site prepared. A preliminary range of motion was accomplished with the above size trial components. A polyethylene insert allowed the patient to obtain full extension as well as appropriate flexion. The patient's ligaments were stable in flexion and extension to medial and lateral stressing and the alignment was through the appropriate mechanical axis. The patella was then everted. The bone was resected appropriately for a pegged patella button. A trial reduction revealed appropriate tracking through the patellofemoral groove. All trial components were removed and the cut surfaces prepared for cementing with irrigation and debridement of the bone interstices. There were no femoral deficiencies. There were no tibial deficiencies. No augmentation was utilized. The implants were cemented into position and pressurized in the usual fashion. Bone and cement debris were meticulously removed. The betadine lavage protocol was used.     Laura Curd knee was placed through range of motion and noted to be stable as mentioned above with the trail components. The wound was dry, therefore no drain was used. The operative knee was injected with 60cc of Naropin, 10 cc's of morphine and 1 cc of 30mg of Toradol. The capsular layer was closed using a #1 vicryl suture, while subcutaneous layers were closed using 2-0 Vicryl interrupted sutures. Finally the skin was closed using 3-0 Vicryl and skin staples, which were applied in occlusive fashion and sterile bandage applied. An Iceman cryo pad was applied on the operative leg. Sponge count and needle counts were correct. Yamilka Brannon left the operating room     Implants:   Implant Name Type Inv.  Item Serial No.  Lot No. LRB No. Used   CEMENT BNE HV R 40GM -- PALACOS - [id number]  CEMENT BNE HV R 40GM -- PALACOS [id number] [employer] [id number] Right 2   PAT DARIUSZ DOME MEDIAL 41MM -- ATTUNE - [id number]  PAT DARIUSZ DOME MEDIAL 41MM -- ATTUNE [id number] [facility] [id number] Right 1   BASE TIB FB SZ 8 DARIUSZ -- ATTUNE - [id number]  BASE TIB FB SZ 8 DARIUSZ -- ATTUNE [id number] [facility] [id number] Right 1   FEM PS SZ 9 RT DARIUSZ -- ATTUNE - [id number]  FEM PS SZ 9 RT DARIUSZ -- ATTUNE [id number] [facility] [id number] Right 1   INSERT TIB FB PS SZ 3 6MM -- ATTUNE - [id number]   INSERT TIB FB PS SZ 3 6MM -- ATTUNE [id number] [facility] [id number] Right 1     Signed By: Buck Rosas MD

## 2017-03-28 NOTE — PROGRESS NOTES
PT/OT note:  Order received, chart reviewed and attempted to see pt, however bilateral lower extremities still numb and unsafe to mobilize. Will see in the morning.   Haley Ashley, OT

## 2017-03-28 NOTE — PERIOP NOTES
TRANSFER - OUT REPORT:    Verbal report given to Herminia Allison RN  on Patrick Mckay  being transferred to block holding area for routine progression of care       Report consisted of patients Situation, Background, Assessment and   Recommendations(SBAR). Information from the following report(s) Kardex, MAR, Recent Results and Med Rec Status was reviewed with the receiving nurse. Lines:   Peripheral IV 03/28/17 Left; Inner Forearm (Active)   Site Assessment Clean, dry, & intact 3/28/2017  9:37 AM   Phlebitis Assessment 0 3/28/2017  9:37 AM   Infiltration Assessment 0 3/28/2017  9:37 AM   Dressing Status Clean, dry, & intact 3/28/2017  9:37 AM   Dressing Type Transparent;Tape 3/28/2017  9:37 AM   Hub Color/Line Status Infusing 3/28/2017  9:37 AM   Action Taken Blood drawn 3/28/2017  9:37 AM        Opportunity for questions and clarification was provided.       Patient transported with:   [employer]

## 2017-03-28 NOTE — PERIOP NOTES
Betadine lavage:  17.5cc of betadine lot #[id number], exp. 11/18,  in 500cc of . 9NS Lot #[id number], exp.  [id number] : RIGHT KNEE

## 2017-03-28 NOTE — PROGRESS NOTES
Patient is A&Ox4. Able to verbalize needs. Resting quietly with no distress noted. Dressing to surgical site is dry and intact. Neurovascular and peripheral vascular checks WNL. Parsons draining clear yellow urine to bag. Denies needs. Bed low and locked. Call light within reach. Instructed to call for assistance. Patient verbalizes understanding. Will monitor.

## 2017-03-28 NOTE — ANESTHESIA PROCEDURE NOTES
Peripheral Block    Start time: 3/28/2017 10:55 AM  End time: 3/28/2017 10:59 AM  Performed by: Wilner Hughes  Authorized by: Wilner Hughes       Pre-procedure: Indications: at surgeon's request and post-op pain management    Preanesthetic Checklist: patient identified, risks and benefits discussed, site marked, timeout performed, anesthesia consent given and patient being monitored    Timeout Time: 10:55          Block Type:   Block Type:   Adductor canal  Laterality:  Right  Monitoring:  Responsive to questions, continuous pulse ox, oxygen, frequent vital sign checks and heart rate  Injection Technique:  Single shot  Procedures: ultrasound guided    Patient Position: supine  Prep: chlorhexidine    Location:  Upper thigh  Needle Type:  Stimuplex  Needle Gauge:  21 G  Needle Localization:  Ultrasound guidance  Medication Injected:  0.2%  ropivacaine  Adds:  Epi 1:200K  Volume (mL):  30    Assessment:  Number of attempts:  1  Injection Assessment:  Incremental injection every 5 mL, negative aspiration for CSF, no paresthesia, ultrasound image on chart, no intravascular symptoms, negative aspiration for blood and local visualized surrounding nerve on ultrasound  Patient tolerance:  Patient tolerated the procedure well with no immediate complications

## 2017-03-28 NOTE — PERIOP NOTES
Jeanette Coates, wife, is here with patient this AM. Best phone number to reach her at is [phone].

## 2017-03-28 NOTE — PERIOP NOTES
TRANSFER - IN REPORT:    Verbal report received from [facility] on Jos Tito  being received from ortho for routine progression of care      Report consisted of patients Situation, Background, Assessment and   Recommendations(SBAR). Information from the following report(s) SBAR was reviewed with the receiving nurse. Opportunity for questions and clarification was provided. Assessment completed upon patients arrival to unit and care assumed.

## 2017-03-28 NOTE — INTERVAL H&P NOTE
H&P Update:  Nura Goldstein was seen and examined. History and physical has been reviewed. The patient has been examined.  There have been no significant clinical changes since the completion of the originally dated History and Physical.    Signed By: SUYAPA Tejeda     March 28, 2017 10:12 AM

## 2017-03-29 PROBLEM — Z96.659 S/P TOTAL KNEE ARTHROPLASTY: Status: ACTIVE | Noted: 2017-03-29

## 2017-03-29 LAB
ANION GAP BLD CALC-SCNC: 11 MMOL/L (ref 7–16)
BUN SERPL-MCNC: 18 MG/DL (ref 8–23)
CALCIUM SERPL-MCNC: 8.4 MG/DL (ref 8.3–10.4)
CHLORIDE SERPL-SCNC: 100 MMOL/L (ref 98–107)
CO2 SERPL-SCNC: 23 MMOL/L (ref 21–32)
CREAT SERPL-MCNC: 1.07 MG/DL (ref 0.8–1.5)
EST. AVERAGE GLUCOSE BLD GHB EST-MCNC: 235 MG/DL
GLUCOSE BLD STRIP.AUTO-MCNC: 157 MG/DL (ref 65–100)
GLUCOSE BLD STRIP.AUTO-MCNC: 211 MG/DL (ref 65–100)
GLUCOSE BLD STRIP.AUTO-MCNC: 273 MG/DL (ref 65–100)
GLUCOSE SERPL-MCNC: 279 MG/DL (ref 65–100)
HBA1C MFR BLD: 9.8 % (ref 4.8–6)
HGB BLD-MCNC: 13.8 G/DL (ref 13.6–17.2)
POTASSIUM SERPL-SCNC: 4.2 MMOL/L (ref 3.5–5.1)
SODIUM SERPL-SCNC: 134 MMOL/L (ref 136–145)

## 2017-03-29 PROCEDURE — 97535 SELF CARE MNGMENT TRAINING: CPT

## 2017-03-29 PROCEDURE — 94760 N-INVAS EAR/PLS OXIMETRY 1: CPT

## 2017-03-29 PROCEDURE — 74011250636 HC RX REV CODE- 250/636: Performed by: ORTHOPAEDIC SURGERY

## 2017-03-29 PROCEDURE — 36415 COLL VENOUS BLD VENIPUNCTURE: CPT | Performed by: ORTHOPAEDIC SURGERY

## 2017-03-29 PROCEDURE — 97110 THERAPEUTIC EXERCISES: CPT

## 2017-03-29 PROCEDURE — 74011636637 HC RX REV CODE- 636/637: Performed by: ORTHOPAEDIC SURGERY

## 2017-03-29 PROCEDURE — 97165 OT EVAL LOW COMPLEX 30 MIN: CPT

## 2017-03-29 PROCEDURE — 97161 PT EVAL LOW COMPLEX 20 MIN: CPT

## 2017-03-29 PROCEDURE — 80048 BASIC METABOLIC PNL TOTAL CA: CPT | Performed by: ORTHOPAEDIC SURGERY

## 2017-03-29 PROCEDURE — 82962 GLUCOSE BLOOD TEST: CPT

## 2017-03-29 PROCEDURE — 74011250636 HC RX REV CODE- 250/636: Performed by: NURSE PRACTITIONER

## 2017-03-29 PROCEDURE — 97116 GAIT TRAINING THERAPY: CPT

## 2017-03-29 PROCEDURE — 65270000029 HC RM PRIVATE

## 2017-03-29 PROCEDURE — 85018 HEMOGLOBIN: CPT | Performed by: ORTHOPAEDIC SURGERY

## 2017-03-29 PROCEDURE — 83036 HEMOGLOBIN GLYCOSYLATED A1C: CPT | Performed by: ORTHOPAEDIC SURGERY

## 2017-03-29 PROCEDURE — 94762 N-INVAS EAR/PLS OXIMTRY CONT: CPT

## 2017-03-29 PROCEDURE — 74011250637 HC RX REV CODE- 250/637: Performed by: ORTHOPAEDIC SURGERY

## 2017-03-29 RX ORDER — INSULIN LISPRO 100 [IU]/ML
INJECTION, SOLUTION INTRAVENOUS; SUBCUTANEOUS
Status: DISCONTINUED | OUTPATIENT
Start: 2017-03-29 | End: 2017-03-29

## 2017-03-29 RX ORDER — INSULIN LISPRO 100 [IU]/ML
INJECTION, SOLUTION INTRAVENOUS; SUBCUTANEOUS
Status: DISCONTINUED | OUTPATIENT
Start: 2017-03-29 | End: 2017-03-30 | Stop reason: HOSPADM

## 2017-03-29 RX ORDER — OXYCODONE HYDROCHLORIDE 5 MG/1
5-10 TABLET ORAL
Qty: 60 TAB | Refills: 0 | Status: SHIPPED | OUTPATIENT
Start: 2017-03-29 | End: 2017-04-24 | Stop reason: ALTCHOICE

## 2017-03-29 RX ORDER — ASPIRIN 325 MG
325 TABLET, DELAYED RELEASE (ENTERIC COATED) ORAL EVERY 12 HOURS
Qty: 60 TAB | Refills: 0 | Status: SHIPPED | OUTPATIENT
Start: 2017-03-29 | End: 2017-04-28

## 2017-03-29 RX ADMIN — ACETAMINOPHEN 1000 MG: 500 TABLET, FILM COATED ORAL at 12:16

## 2017-03-29 RX ADMIN — ASPIRIN 325 MG: 325 TABLET, DELAYED RELEASE ORAL at 21:16

## 2017-03-29 RX ADMIN — METOPROLOL SUCCINATE 25 MG: 25 TABLET, EXTENDED RELEASE ORAL at 09:43

## 2017-03-29 RX ADMIN — OXYCODONE HYDROCHLORIDE 10 MG: 5 TABLET ORAL at 12:16

## 2017-03-29 RX ADMIN — VERAPAMIL HYDROCHLORIDE 180 MG: 180 TABLET, FILM COATED, EXTENDED RELEASE ORAL at 09:43

## 2017-03-29 RX ADMIN — LISINOPRIL 10 MG: 5 TABLET ORAL at 09:43

## 2017-03-29 RX ADMIN — FLECAINIDE ACETATE 100 MG: 100 TABLET ORAL at 16:08

## 2017-03-29 RX ADMIN — CEFAZOLIN 2 G: 1 INJECTION, POWDER, FOR SOLUTION INTRAMUSCULAR; INTRAVENOUS; PARENTERAL at 02:00

## 2017-03-29 RX ADMIN — ASPIRIN 325 MG: 325 TABLET, DELAYED RELEASE ORAL at 09:43

## 2017-03-29 RX ADMIN — ACETAMINOPHEN 1000 MG: 500 TABLET, FILM COATED ORAL at 00:06

## 2017-03-29 RX ADMIN — FLECAINIDE ACETATE 100 MG: 100 TABLET ORAL at 09:43

## 2017-03-29 RX ADMIN — GLIMEPIRIDE 4 MG: 2 TABLET ORAL at 06:09

## 2017-03-29 RX ADMIN — METFORMIN HYDROCHLORIDE 1000 MG: 500 TABLET, FILM COATED ORAL at 09:42

## 2017-03-29 RX ADMIN — OXYCODONE HYDROCHLORIDE 10 MG: 5 TABLET ORAL at 16:07

## 2017-03-29 RX ADMIN — ACETAMINOPHEN 1000 MG: 500 TABLET, FILM COATED ORAL at 06:09

## 2017-03-29 RX ADMIN — CELECOXIB 200 MG: 200 CAPSULE ORAL at 09:43

## 2017-03-29 RX ADMIN — OXYCODONE HYDROCHLORIDE 10 MG: 5 TABLET ORAL at 09:42

## 2017-03-29 RX ADMIN — ACYCLOVIR 200 MG: 200 CAPSULE ORAL at 09:43

## 2017-03-29 RX ADMIN — Medication 10 ML: at 06:09

## 2017-03-29 RX ADMIN — OXYCODONE HYDROCHLORIDE 10 MG: 5 TABLET ORAL at 06:09

## 2017-03-29 RX ADMIN — SODIUM CHLORIDE 100 ML/HR: 900 INJECTION, SOLUTION INTRAVENOUS at 00:07

## 2017-03-29 RX ADMIN — INSULIN LISPRO 4 UNITS: 100 INJECTION, SOLUTION INTRAVENOUS; SUBCUTANEOUS at 21:16

## 2017-03-29 RX ADMIN — SIMVASTATIN 80 MG: 40 TABLET, FILM COATED ORAL at 21:15

## 2017-03-29 RX ADMIN — OXYCODONE HYDROCHLORIDE 10 MG: 5 TABLET ORAL at 21:15

## 2017-03-29 RX ADMIN — INSULIN LISPRO 8 UNITS: 100 INJECTION, SOLUTION INTRAVENOUS; SUBCUTANEOUS at 12:16

## 2017-03-29 RX ADMIN — CELECOXIB 200 MG: 200 CAPSULE ORAL at 21:16

## 2017-03-29 RX ADMIN — PRAVASTATIN SODIUM 2 TABLET: 20 TABLET ORAL at 09:47

## 2017-03-29 NOTE — CONSULTS
HOSPITALIST H&P  NAME:  Bart Herrera   Age:  60 y.o.  :   1955   MRN:   [id number]  PCP: Pritesh Pierre MD  Treatment Team: Attending Provider: Teena Tsang MD; Consulting Provider: Heber Bullard MD; Consulting Provider: Cedric Duckworth MD; Care Manager: DADA Zambrano  Full Code   HPI:   Bart Herrera is a 60 y.o. male that underwent a right TKR 3/28/17 for OA. Patient denies discomfort, states is doing well and plans on going home tomorrow. The hospitalist have been consulted for medical management. Chart reviewed. Results summary of Diagnostic Studies/Procedures copied from within [facility] EMR:      Complete ROS done and is as stated in HPI or otherwise negative  Past Medical History:   Diagnosis Date    Arrhythmia 10 - 15 years ago    atrial fib. - none in 5 years    Arthritis     hips, knees - osteo    CAD (coronary artery disease)     had cath~states was ok    Cancer ([facility] [address]     skin ca. removed    Depression     no longer takes medication~no longer considers self depressed    Diabetes ([address] 6-7 years ago    range: 130's    HTN (hypertension) 2014    Hypercholesterolemia     Hyperlipidemia 2014    Hypertension diagnosed at 24years old    Morbid obesity ([address]     Psychiatric disorder     depression      Past Surgical History:   Procedure Laterality Date    HX COLONOSCOPY      HX HEENT  1971    tonsillectomy    HX ORTHOPAEDIC Left      for broken 5th metatarsal    HX ORTHOPAEDIC Left     hip replacement    HX ORTHOPAEDIC Right     bone spur removal; arthroscopy    HX OTHER SURGICAL      hemorroidectomy ;tonsilectomy       Prior to Admission Medications   Prescriptions Last Dose Informant Patient Reported? Taking? FA/NIACINAMIDE/CUPRIC OX/ZN OX (NICOTINAMIDE PO) 3/27/2017 at Unknown time  Yes Yes   Sig: Take  by mouth two (2) times a day. FISH OIL PO 3/27/2017 at Unknown time  Yes Yes   Sig: take  by mouth.  am OTHER,NON-FORMULARY, 3/27/2017 at Unknown time  Yes Yes   Sig: daily. multivitamin    VITAMIN D-3 PO 3/27/2017 at Unknown time  Yes Yes   Sig: take  by mouth daily. am    acyclovir (ZOVIRAX) 200 mg capsule 3/28/2017 at Unknown time  No Yes   Sig: Take 1 Cap by mouth daily. Patient taking differently: Take 200 mg by mouth daily. Take / use AM day of surgery  per anesthesia protocols. aspirin (ASPIRIN) 325 mg tablet 3/28/2017 at Unknown time  Yes Yes   Sig: Take 325 mg by mouth daily. Change to 81 mg aspirin 5 days prior to surgery   Indications: myocardial infarction prevention   coenzyme q10 10 mg cap 3/27/2017 at Unknown time  Yes Yes   Sig: Take  by mouth daily. flecainide (TAMBOCOR) 100 mg tablet 3/28/2017 at Unknown time  Yes Yes   Sig: Take 100 mg by mouth two (2) times a day. Take / use AM day of surgery  per anesthesia protocols. Indications: PREVENTION OF RECURRENT ATRIAL FIBRILLATION   glimepiride (AMARYL) 4 mg tablet 3/27/2017 at Unknown time  No Yes   Sig: TAKE 1 TABLET EVERY MORNING   lisinopril (PRINIVIL, ZESTRIL) 10 mg tablet 3/27/2017 at Unknown time  No Yes   Sig: Take 1 Tab by mouth daily. Patient taking differently: Take 10 mg by mouth daily. Indications: hypertension   metFORMIN (GLUCOPHAGE) 1,000 mg tablet 3/27/2017 at Unknown time  Yes Yes   Sig: Take 1,000 mg by mouth daily. Takes 2 tablets every morning   Indications: type 2 diabetes mellitus   metoprolol succinate (TOPROL-XL) 25 mg XL tablet 3/28/2017 at 0800  Yes Yes   Sig: Take 25 mg by mouth daily. Take / use AM day of surgery  per anesthesia protocols. Indications: hypertension   mupirocin calcium (BACTROBAN NASAL) 2 % nasal ointment 3/28/2017 at Unknown time  Yes Yes   Sig: by Both Nostrils route two (2) times a day. Used 5 days prior to surgery   naproxen sodium 220 mg cap 3/21/2017 at Unknown time  Yes Yes   Sig: Take  by mouth two (2) times a day.  Indications: Pain   simvastatin (ZOCOR) 80 mg tablet 3/28/2017 at Unknown time  No Yes   Sig: Take 1 Tab by mouth nightly. Patient taking differently: Take 80 mg by mouth nightly. Take / use AM day of surgery  per anesthesia protocols. Indications: hyperlipidemia   verapamil ER (CALAN-SR) 180 mg CR tablet 3/28/2017 at Unknown time  Yes Yes   Sig: Take 180 mg by mouth daily. Take / use AM day of surgery  per anesthesia protocols. Indications: hypertension      Facility-Administered Medications: None     No Known Allergies   Social History   Substance Use Topics    Smoking status: Former Smoker     Packs/day: 2.00     Years: 20.00    Smokeless tobacco: Former User    Alcohol use No      Family History   Problem Relation Age of Onset    Coronary Artery Disease Father     Heart Attack Father     Diabetes Father     Cancer Mother      multiple myloma    Hypertension Brother     Malignant Hyperthermia Neg Hx     Pseudocholinesterase Deficiency Neg Hx     Delayed Awakening Neg Hx     Post-op Nausea/Vomiting Neg Hx     Emergence Delirium Neg Hx     Post-op Cognitive Dysfunction Neg Hx     Other Neg Hx           Objective:     Visit Vitals    /86    Pulse 86    Temp 96.3 °F (35.7 °C)    Resp 18    Ht 6' 5\" (1.956 m)    Wt 135.2 kg (298 lb 2 oz)    SpO2 95%    BMI 35.35 kg/m2      Temp (24hrs), Av.4 °F (36.3 °C), Min:96.3 °F (35.7 °C), Max:98.2 °F (36.8 °C)    Oxygen Therapy  O2 Sat (%): 95 % (17 0754)  Pulse via Oximetry: 105 beats per minute (17 2152)  O2 Device: Room air (17 2152)  O2 Flow Rate (L/min): 2 l/min (weaned to room air) (17 1640)  Physical Exam:  General:    Alert, cooperative, no distress, appears stated age. Head:   Normocephalic, without obvious abnormality, atraumatic. Nose:  Nares normal. No drainage or sinus tenderness. Lungs:   CTA  Heart:  RRR S1S2 normal.  No pedal edema  Abdomen:   Soft, non-tender. Not distended. Bowel sounds normal. No masses  Extremities: No cyanosis. Pulses palpable.  No clubbing. Skin:     Texture, turgor normal. No rashes or lesions. Not Jaundiced  Neurologic: Alert and oriented x3 speech clear, CN2-12 grossly intact  Data Review:   Recent Results (from the past 24 hour(s))   TYPE & SCREEN    Collection Time: 03/28/17  9:45 AM   Result Value Ref Range    Crossmatch Expiration 03/31/2017     ABO/Rh(D) Gerilyn Clonts POSITIVE     Antibody screen NEG    GLUCOSE, POC    Collection Time: 03/28/17 10:11 AM   Result Value Ref Range    Glucose (POC) 254 (H) 65 - 100 mg/dL   GLUCOSE, POC    Collection Time: 03/28/17  4:57 PM   Result Value Ref Range    Glucose (POC) 279 (H) 65 - 100 mg/dL   HEMOGLOBIN    Collection Time: 03/28/17  7:49 PM   Result Value Ref Range    HGB 15.0 13.6 - 17.2 g/dL   HEMOGLOBIN    Collection Time: 03/29/17  4:20 AM   Result Value Ref Range    HGB 13.8 13.6 - 17.2 g/dL   HEMOGLOBIN A1C WITH EAG    Collection Time: 03/29/17  4:20 AM   Result Value Ref Range    Hemoglobin A1c 9.8 (H) 4.8 - 6.0 %    Est. average glucose 570 mg/dL   METABOLIC PANEL, BASIC    Collection Time: 03/29/17  4:20 AM   Result Value Ref Range    Sodium 134 (L) 136 - 145 mmol/L    Potassium 4.2 3.5 - 5.1 mmol/L    Chloride 100 98 - 107 mmol/L    CO2 23 21 - 32 mmol/L    Anion gap 11 7 - 16 mmol/L    Glucose 279 (H) 65 - 100 mg/dL    BUN 18 8 - 23 MG/DL    Creatinine 1.07 0.8 - 1.5 MG/DL    GFR est AA >60 >60 ml/min/1.73m2    GFR est non-AA >60 >60 ml/min/1.73m2    Calcium 8.4 8.3 - 10.4 MG/DL       Assessment and Plan: Active Hospital Problems    Diagnosis Date Noted    S/P total knee arthroplasty 03/29/2017    Osteoarthritis 03/28/2017   DM2: Add SSI and monitor. HTN:  Continue home meds. Thank you for allowing us to participate in the care of this pleasant man. Will be onstandby, call if needed. Plan discussed with Dr. Wilson Mota and patient.     Sung Sweet NP

## 2017-03-29 NOTE — PROGRESS NOTES
Continuous sat monitor ordered QHS  Follow up from initial assessment    SOB evaluated: None noted    Breath Sounds: Clear    IS patient effort: Excellent  Patient achieved:4000  Ml/sec    Patient resting comfortably, denies any discomfort at this time.

## 2017-03-29 NOTE — PROGRESS NOTES
2017         Post Op day: 1 Day Post-Op     Admit Date: 3/28/2017  Admit Diagnosis: Unilateral primary osteoarthritis, right knee [M17.11]        Subjective: Doing well, No complaints, No SOB, No Chest Pain, No Nausea or Vomiting     Objective:   Vital Signs are Stable, No Acute Distress, Alert and Oriented, Dressing is Dry,  Neurovascular exam is normal.     Assessment / Plan :  Patient Active Problem List   Diagnosis Code    [facility] ([facility] E11.9    Hyperlipidemia E78.5    HTN (hypertension) I10    Depression F32.9    CAD (coronary artery disease) I25.10    Cardiac dysrhythmia, unspecified I49.9    Osteoarthritis M19.90    S/P total knee arthroplasty Z96.659    Patient Vitals for the past 8 hrs:   BP Temp Pulse Resp SpO2   17 0400 115/72 97.1 °F (36.2 °C) 91 18 96 %   17 0012 112/61 97.5 °F (36.4 °C) 100 18 93 %    Temp (24hrs), Av.6 °F (36.4 °C), Min:97.1 °F (36.2 °C), Max:98.2 °F (36.8 °C)    Body mass index is 35.35 kg/(m^2).     Lab Results   Component Value Date/Time    HGB 13.8 2017 04:20 AM      Pt seen by and discussed with Supervising Physician   Continue PT, current pain meds  Plan for home tomorrow       Signed By: SUYAPA Bee

## 2017-03-29 NOTE — PROGRESS NOTES
[facility] - INPATIENT  Face to Face Encounter    Patients Name: Lex Rinne    YOB: 1955    Ordering Physician:  Nan Fiore    Primary Diagnosis: Unilateral primary osteoarthritis, right knee [M17.11]  S/p Jose    Date of Face to Face:   3-28-17                                 Face to Face Encounter findings are related to primary reason for home care:   yes. 1. I certify that the patient needs intermittent care as follows: physical therapy: gait/stair training    2. I certify that this patient is homebound, that is: 1) patient requires the use of a walker device, special transportation, or assistance of another to leave the home; or 2) patient's condition makes leaving the home medically contraindicated; and 3) patient has a normal inability to leave the home and leaving the home requires considerable and taxing effort. Patient may leave the home for infrequent and short duration for medical reasons, and occasional absences for non-medical reasons. Homebound status is due to the following functional limitations: Patient's ambulation limited secondary to severe pain and requires the use of an assistive device and the assistance of a caregiver for safe completion. Patient with strength and ROM deficits limiting ambulation endurance requiring the use of an assistive device and the assistance of a caregiver. Patient deemed temporarily homebound secondary to increased risk for infection when leaving home and going out into the community. 3. I certify that this patient is under my care and that I, or a nurse practitioner or  [id number], or clinical nurse specialist, or certified nurse midwife, working with me, had a Face-to-Face Encounter that meets the physician Face-to-Face Encounter requirements.   The following are the clinical findings from the [address] [place] [address] encounter that support the need for skilled services and is a summary of the encounter: see hospital chart        Judi Anderson Moose Bhagat, [address]  3/29/2017      THE FOLLOWING TO BE COMPLETED BY THE COMMUNITY PHYSICIAN:    I concur with the findings described above from the F2F encounter that this patient is homebound and in need of a skilled service.     Certifying Physician: _____________________________________      Printed Certifying Physician Name: _____________________________________    Date: _________________

## 2017-03-29 NOTE — PROGRESS NOTES
Problem: Self Care Deficits Care Plan (Adult)  Goal: *Acute Goals and Plan of Care (Insert Text)  GOALS:   DISCHARGE GOALS (in preparation for going home/rehab): 3 days  1. Mr. Ramakrishna Resendiz will perform one lower body dressing activity with minimal assistance required to demonstrate improved functional mobility and safety. 2. Mr. Ramakrishna Resendiz will perform one lower body bathing activity with minimal assistance required to demonstrate improved functional mobility and safety. 3. Mr. Ramakrishna Resendiz will perform toileting/toilet transfer with contact guard assistance to demonstrate improved functional mobility and safety. 4. Mr. Ramakrishna Resendiz will perform shower transfer with contact guard assistance to demonstrate improved functional mobility and safety. JOINT CAMP OCCUPATIONAL THERAPY TKA: Initial Assessment and AM 3/29/2017  INPATIENT: Hospital Day: 2  Payor: BLUE CROSS / Plan: [place] BLUE CROSS [facility] / Product Type: PPO /      NAME/AGE/GENDER: Saadia Vance is a 60 y.o. male     PRIMARY DIAGNOSIS:  Unilateral primary osteoarthritis, right knee [M17.11]              Procedure(s) and Anesthesia Type:     * RIGHT KNEE ARTHROPLASTY TOTAL/ 69 [facility] - Spinal (Right)  ICD-10: Treatment Diagnosis:        · Pain in Left Knee (M25.562)  · Stiffness of Left Knee, Not elsewhere classified (E18.190)  · Other lack of cordination (R27.8)       ASSESSMENT:      Mr. Ramakrishna Resendiz is s/p right TKA and presents with decreased weight bearing on right LE and decreased independence with functional mobility and activities of daily living. Patient would benefit from skilled Occupational Therapy to maximize independence and safety with self-care task and functional mobility. Pt would also benefit from education on adaptive equipment and safety precautions in preparation for going home or for recommendations for post-hospital rehab program.  Patient plans for further rehab at home with home health services and good family support.   OT reviewed therapy schedule and plan of care with patient. Patient was able to transfer and preform self care skills as charted below. Patient instructed to call for assistance when needing to get up from the bed and all needs in reach. Patient verbalized understanding of call light. This section established at most recent assessment   PROBLEM LIST (Impairments causing functional limitations):  1. Decreased Strength  2. Decreased ADL/Functional Activities  3. Decreased Transfer Abilities  4. Increased Pain  5. Increased Fatigue  6. Decreased Flexibility/Joint Mobility  7. Decreased Knowledge of Precautions    INTERVENTIONS PLANNED: (Benefits and precautions of occupational therapy have been discussed with the patient.)  1. Activities of daily living training  2. Adaptive equipment training  3. Balance training  4. Clothing management  5. Donning&doffing training  6. Theraputic activity      TREATMENT PLAN: Frequency/Duration: Follow patient 1 time to address above goals. Rehabilitation Potential For Stated Goals: GOOD      RECOMMENDED REHABILITATION/EQUIPMENT: (at time of discharge pending progress): Continue Skilled Therapy and Home Health: Physical Therapy. OCCUPATIONAL PROFILE AND HISTORY:   History of Present Injury/Illness (Reason for Referral): Pt presents this date s/p (right) TKA. Past Medical History/Comorbidities:   Mr. Dimitris Bynum  has a past medical history of Arrhythmia (10 - 15 years ago); Arthritis; CAD (coronary artery disease); Cancer ([address] Depression; Diabetes ([address] (6-7 years ago); HTN (hypertension) (7/18/2014); Hypercholesterolemia; Hyperlipidemia (7/18/2014); Hypertension (diagnosed at 24years old); Morbid obesity ([address] and Psychiatric disorder. He also has no past medical history of Aneurysm ([address] Asthma; Autoimmune disease ([address] Chronic kidney disease; Chronic pain; Coagulation disorder ([address] COPD;  Difficult intubation; Endocarditis; GERD (gastroesophageal reflux disease); Heart failure ([address] Liver disease; Malignant hyperthermia due to anesthesia; Nausea & vomiting; Other ill-defined conditions(799.89); Pseudocholinesterase deficiency; PUD (peptic ulcer disease); Rheumatic fever; Seizures ([address] Stroke [facility]); Thromboembolus ([facility] Thyroid disease; Unspecified adverse effect of anesthesia; or Unspecified sleep apnea. Mr. Dino Gregory  has a past surgical history that includes other surgical; orthopaedic (Left, 2003); orthopaedic (Left, 2009); colonoscopy (2013); orthopaedic (Right, 2016); and heent (1971). Social History/Living Environment:   Home Environment: Private residence  # Steps to Enter: 2  Rails to Enter: No  One/Two Story Residence: Two story, live on 1st floor  # of Interior Steps: 10  Living Alone: No  Support Systems: Spouse/Significant Other/Partner  Patient Expects to be Discharged to[de-identified] Private residence  Current DME Used/Available at Home: None  Tub or Shower Type: Shower  Prior Level of Function/Work/Activity:  Works from home independent with ADLs   Number of Personal Factors/Comorbidities that affect the Plan of Care: Brief history (0):  LOW COMPLEXITY   ASSESSMENT OF OCCUPATIONAL PERFORMANCE[de-identified]   Most Recent Physical Functioning:   Balance  Sitting: Intact; Without support  Standing: Impaired; With support (walker)        Patient Vitals for the past 6 hrs:       BP SpO2 Pulse   03/29/17 0754 145/86 95 % 86   03/29/17 0920 - 93 % -        Gross Assessment: Yes  Gross Assessment  AROM: Within functional limits (left LE)  Strength: Within functional limits (left LE)            LLE Assessment  LLE Assessment (WDL): Within defined limits Coordination  Fine Motor Skills-Upper: Left Intact; Right Intact  Gross Motor Skills-Upper: Left Intact; Right Intact           Mental Status  Neurologic State: Alert  Orientation Level: Oriented X4  Cognition: Appropriate decision making; Appropriate for age attention/concentration  Perception: Appears intact  Perseveration: No perseveration noted  Safety/Judgement: Awareness of environment; Fall prevention            RLE Assessment  RLE Assessment (WDL): Exceptions to WDL  RLE PROM  R Knee Flexion: 65 (~post op)  R Knee Extension: -10 (~post op)       Basic ADLs (From Assessment) Complex ADLs (From Assessment)   Basic ADL  Feeding: Setup  Oral Facial Hygiene/Grooming: Supervision  Bathing: Moderate assistance  Upper Body Dressing: Supervision  Lower Body Dressing: Moderate assistance  Toileting: Contact guard assistance, Minimum assistance     Grooming/Bathing/Dressing Activities of Daily Living     Cognitive Retraining  Safety/Judgement: Awareness of environment; Fall prevention                 Functional Transfers  Toilet Transfer : Contact guard assistance;Minimum assistance  Shower Transfer: Contact guard assistance;Minimum assistance     Bed/Mat Mobility  Supine to Sit: Stand-by asssistance  Sit to Supine: Stand-by asssistance  Sit to Stand: Contact guard assistance  Bed to Chair: Contact guard assistance (with walker)  Scooting: Stand-by asssistance           Physical Skills Involved:  1. Range of Motion  2. Balance  3. Mobility Cognitive Skills Affected (resulting in the inability to perform in a timely and safe manner): 1. none Psychosocial Skills Affected:  1. Routines and Behaviors   Number of elements that affect the Plan of Care: 3-5:  MODERATE COMPLEXITY   CLINICAL DECISION MAKIN [facility] [address] [address] AM-PAC 6 Clicks   Basic Mobility Inpatient Short Form  How much help from another person does the patient currently need. .. Total A Lot A Little None   1. Putting on and taking off regular lower body clothing?   [ ] 1   [X] 2   [ ] 3   [ ] 4   2. Bathing (including washing, rinsing, drying)? [ ] 1   [X] 2   [ ] 3   [ ] 4   3. Toileting, which includes using toilet, bedpan or urinal?   [ ] 1   [ ] 2   [X] 3   [ ] 4   4. Putting on and taking off regular upper body clothing?   [ ] 1   [ ] 2   [X] 3   [ ] 4   5.   Taking care of personal grooming such as brushing teeth? [ ] 1   [ ] 2   [X] 3   [ ] 4   6. Eating meals? [ ] 1   [ ] 2   [ ] 3   [X] 4   © 2007, Trustees of [address] [place] [address] [address] [address], under license to [employer]. All rights reserved   Score:  Initial:17 Most Recent: X (Date: -- )     Interpretation of Tool:  Represents activities that are increasingly more difficult (i.e. Bed mobility, Transfers, Gait). Score 24 23 22-20 19-15 14-10 9-7 6       Modifier CH CI CJ CK CL CM CN         · Self Care:               - CURRENT STATUS:    CK - 40%-59% impaired, limited or restricted               - GOAL STATUS:           CJ - 20%-39% impaired, limited or restricted               - D/C STATUS:                       ---------------To be determined---------------  Payor: BLUE CROSS / Plan: [facility] / Product Type: PPO /       Medical Necessity:     · Patient is expected to demonstrate progress in balance, coordination and functional technique to decrease assistance required with self care and functional mobiltiy and improve safety during self care and functional mobiltiy. Reason for Services/Other Comments:  · Patient continues to require skilled intervention due to decreased self care and functional mobiltiy.    Use of outcome tool(s) and clinical judgement create a POC that gives a: LOW COMPLEXITY                 TREATMENT:   (In addition to Assessment/Re-Assessment sessions the following treatments were rendered)      Pre-treatment Symptoms/Complaints:  none  Pain: Initial:   Pain Intensity 1: 0  Post Session:  0/10      Assessment/Reassessment only, no treatment provided today     Treatment/Session Assessment:         Response to Treatment:  Tolerated well, wants to shower today, waiting on wife to come in with his clothing     Education:  [ ] Home Exercises  [X] Fall Precautions  [ ] Hip Precautions [ ] Going Home Video  [X] Knee/Hip Prosthesis Review  [X] Walker Management/Safety [X] Adaptive Equipment as Needed         Interdisciplinary Collaboration:   · Physical Therapist  · Occupational Therapist  · Registered Nurse  · Certified Nursing Assistant/Patient Care Technician     After treatment position/precautions:   · Up in chair  · Bed/Chair-wheels locked  · Bed in low position  · Call light within reach  · RN notified     Compliance with Program/Exercises: compliant all of the time. Recommendations/Intent for next treatment session:  Treatment next visit will focus on increasing Mr. Merinos independence with bed mobility, transfers, gait training, strength/ROM exercises, modalities for pain, and patient education.        Total Treatment Duration:  OT Patient Time In/Time Out  Time In: 3656  Time Out: Roberto Rasmussen OT

## 2017-03-29 NOTE — PROGRESS NOTES
Problem: Mobility Impaired (Adult and Pediatric)  Goal: *Acute Goals and Plan of Care (Insert Text)  GOALS (1-4 days):  (1.)Mr. Jasper Arnett will move from supine to sit and sit to supine in bed with MODIFIED INDEPENDENCE. (2.)Mr. Jasper Arnett will transfer from bed to chair and chair to bed with SUPERVISION using the least restrictive device. (3.)Mr. Jasper Arnett will ambulate with SUPERVISION for 200 feet with the least restrictive device. (4.)Mr. Jasper Arnett will ambulate up/down 1 steps with no railing with STAND BY ASSIST with walker. (5.)Mr. Jasper Arnett will increase right knee ROM to 5°-80°.  ________________________________________________________________________________________________      PHYSICAL THERAPY JOINT CAMP TKA: Daily Note, Treatment Day: Day of Assessment and PM 3/29/2017  INPATIENT: Hospital Day: 2  Payor: Sai Mohr / Plan: [facility] / Product Type: PPO /      NAME/AGE/GENDER: Jos Francis is a 60 y.o. male     PRIMARY DIAGNOSIS:  Unilateral primary osteoarthritis, right knee [M17.11]              Procedure(s) and Anesthesia Type:     * RIGHT KNEE ARTHROPLASTY TOTAL/ 69 [facility] - Spinal (Right)  ICD-10: Treatment Diagnosis:        · Pain in Right Knee (M25.561)  · Stiffness of Right Knee, Not elsewhere classified (M25.661)  · Difficulty in walking, Not elsewhere classified (R26.2)       ASSESSMENT:      Mr. Jasper Arnett presents with impaired strength & mobility s/p right TKA. Pt also had decreased stability during out of bed activity. Pt will benefit from follow up therapy to help restore safe function prior to returning home with caregiver. In pm session pt showed increased gait distance & improved level of assist for transfers & gait. This section established at most recent assessment   PROBLEM LIST (Impairments causing functional limitations):  1. Decreased Strength  2. Decreased ADL/Functional Activities  3. Decreased Transfer Abilities  4.  Decreased Ambulation Ability/Technique  5. Decreased Balance  6. Increased Pain  7. Decreased Activity Tolerance  8. Decreased Flexibility/Joint Mobility  9. Decreased [place] with Home Exercise Program    INTERVENTIONS PLANNED: (Benefits and precautions of physical therapy have been discussed with the patient.)  1. Bed Mobility  2. Gait Training  3. Home Exercise Program (HEP)  4. Therapeutic Exercise/Strengthening  5. Transfer Training  6. Range of Motion: active/assisted/passive  7. Therapeutic Activities  8. Group Therapy      TREATMENT PLAN: Frequency/Duration: Follow patient BID   to address above goals. Rehabilitation Potential For Stated Goals: GOOD      RECOMMENDED REHABILITATION/EQUIPMENT: (at time of discharge pending progress): Continue Skilled Therapy and Home Health: Physical Therapy. HISTORY:   History of Present Injury/Illness (Reason for Referral):  Samantha Potter is a 60 y.o. WHITE [place]  male who presents with Right Knee pain. He has history of Right Knee pain for several months ago. Symptoms worse with walking and relieved with rest. Conservative treatment consisting of activity modification has not helped. The patient lives with their spouse. The patients goal after surgery is improved pain and function. Past Medical History/Comorbidities:   Mr. Karla De La Paz  has a past medical history of Arrhythmia (10 - 15 years ago); Arthritis; CAD (coronary artery disease); Cancer ([address] Depression; Diabetes ([address] (6-7 years ago); HTN (hypertension) (7/18/2014); Hypercholesterolemia; Hyperlipidemia (7/18/2014); Hypertension (diagnosed at 24years old); Morbid obesity ([address] and Psychiatric disorder. He also has no past medical history of Aneurysm ([address] Asthma; Autoimmune disease ([address] Chronic kidney disease; Chronic pain; Coagulation disorder ([address] COPD; Difficult intubation; Endocarditis; GERD (gastroesophageal reflux disease); Heart failure ([address]  Liver disease; Malignant hyperthermia due to anesthesia; Nausea & vomiting; Other ill-defined conditions(799.89); Pseudocholinesterase deficiency; PUD (peptic ulcer disease); Rheumatic fever; Seizures ([facility] Stroke [facility]); Thromboembolus ([facility] Thyroid disease; Unspecified adverse effect of anesthesia; or Unspecified sleep apnea. Mr. Maximino Miller  has a past surgical history that includes other surgical; orthopaedic (Left, 2003); orthopaedic (Left, 2009); colonoscopy (2013); orthopaedic (Right, 2016); and heent (1971). Social History/Living Environment:   Home Environment: Private residence  # Steps to Enter: 2  Rails to Enter: No  One/Two Story Residence: Two story, live on 1st floor  # of Interior Steps: 10  Living Alone: No  Support Systems: Spouse/Significant Other/Partner  Patient Expects to be Discharged to[de-identified] Private residence  Current DME Used/Available at Home: None  Tub or Shower Type: Shower  Prior Level of Function/Work/Activity:  Pt was independent without an assistive device prior to this admission   Number of Personal Factors/Comorbidities that affect the Plan of Care: 3+: HIGH COMPLEXITY   EXAMINATION:   Most Recent Physical Functioning:   Gross Assessment: Yes  Gross Assessment  AROM: Within functional limits (left LE)  Strength: Within functional limits (left LE)          RLE PROM  R Knee Flexion: 65 (~post op)  R Knee Extension: -10 (~post op)             Bed Mobility  Supine to Sit:  (NT)  Sit to Supine: Stand-by asssistance  Scooting: Stand-by asssistance     Transfers  Sit to Stand: Stand-by asssistance  Stand to Sit: Stand-by asssistance  Bed to Chair: Stand-by asssistance (with walker)     Balance  Sitting: Intact; Without support  Standing: Impaired; With support (walker)                Weight Bearing Status  Right Side Weight Bearing: As tolerated  Distance (ft): 500 Feet (ft)  Ambulation - Level of Assistance: Stand-by asssistance  Assistive Device: Walker, rolling  Speed/[place]: Delayed  Stance: Right decreased  Gait Abnormalities: Antalgic;Decreased step clearance         Braces/Orthotics: none     Right Knee Cold  Type: Cryocuff       Body Structures Involved:  1. Joints  2. Muscles Body Functions Affected:  1. Movement Related Activities and Participation Affected:  1. Mobility   Number of elements that affect the Plan of Care: 4+: HIGH COMPLEXITY   CLINICAL PRESENTATION:   Presentation: Stable and uncomplicated: LOW COMPLEXITY   CLINICAL DECISION MAKIN[address] [place] [address] AM-PAC 6 Clicks   Basic Mobility Inpatient Short Form  How much difficulty does the patient currently have. .. Unable A Lot A Little None   1. Turning over in bed (including adjusting bedclothes, sheets and blankets)? [ ] 1   [ ] 2   [X] 3   [ ] 4   2. Sitting down on and standing up from a chair with arms ( e.g., wheelchair, bedside commode, etc.)   [ ] 1   [ ] 2   [X] 3   [ ] 4   3. Moving from lying on back to sitting on the side of the bed? [ ] 1   [ ] 2   [X] 3   [ ] 4   How much help from another person does the patient currently need. .. Total A Lot A Little None   4. Moving to and from a bed to a chair (including a wheelchair)? [ ] 1   [ ] 2   [X] 3   [ ] 4   5. Need to walk in hospital room? [ ] 1   [ ] 2   [X] 3   [ ] 4   6. Climbing 3-5 steps with a railing? [X] 1   [ ] 2   [ ] 3   [ ] 4   © , Trustees of [address] [place] [address], under license to [email or web address]. All rights reserved       Score:  Initial: 16 Most Recent: X (Date: -- )     Interpretation of Tool:  Represents activities that are increasingly more difficult (i.e. Bed mobility, Transfers, Gait).        Score 24 23 22-20 19-15 14-10 9-7 6       Modifier CH CI CJ CK CL CM CN         · Mobility - Walking and Moving Around:               - CURRENT STATUS:    CK - 40%-59% impaired, limited or restricted               - GOAL STATUS:           CJ - 20%-39% impaired, limited or restricted               - D/C STATUS:                       ---------------To be determined---------------  Payor: BLUE CROSS / Plan[place]UE CR[place] [facility] / Product Type: PPO /       Medical Necessity:     · Patient is expected to demonstrate progress in strength, range of motion, balance, coordination and functional technique to decrease assistance required with bed mobility, transfers & gait. Reason for Services/Other Comments:  · Patient continues to require skilled intervention due to pt not independent with functional mobility. Use of outcome tool(s) and clinical judgement create a POC that gives a: Clear prediction of patient's progress: LOW COMPLEXITY                 TREATMENT:   (In addition to Assessment/Re-Assessment sessions the following treatments were rendered)      Pre-treatment Symptoms/Complaints:  Pt happy with progress  Pain: Initial: visual scale  Pain Intensity 1: 2  Pain Location 1: Knee  Pain Orientation 1: Right  Pain Intervention(s) 1: Cold pack, Exercise  Post Session:  2/10      Therapeutic Exercise: (13 Minutes):  Exercises per grid below to improve mobility and dynamic movement of leg - right to improve functional endurance. Required minimal verbal cues to promote proper body alignment and promote proper body mechanics. Progressed range and repetitions as indicated. Gait Training (12 Minutes):  Gait training to improve and/or restore physical functioning as related to mobility, strength, balance, coordination and dynamic movement of leg - right to improve functional gait. Ambulated 500 Feet (ft) with Stand-by asssistance using a Walker, rolling and minimal cues related to their stride length and heel strike to promote proper body alignment, promote proper body posture and promote proper body mechanics.            Date:  3/29 Date:    Date:      ACTIVITY/EXERCISE AM PM AM PM AM PM   GROUP THERAPY  [ ]  [ ]  [ ]  [ ]  [ ]  [ ]   Ankle Pumps 15  20           Quad Sets 15  20           Gluteal Sets 15  20           Hip ABd/ADduction 15  20 Straight Leg Raises 15  20           Knee Slides 15  20           Short Arc Quads 15  20           Long Arc Quads               Chair Slides                               B = bilateral; AA = active assistive; A = active; P = passive       Treatment/Session Assessment:         Response to Treatment:  No concerns     Education:  [X] Home Exercises  [X] Fall Precautions  [ ] Hip Precautions [ ] Going Home Video  [ ] Knee/Hip Prosthesis Review  [X] Walker Management/Safety [ ] Adaptive Equipment as Needed         Interdisciplinary Collaboration:   · Registered Nurse     After treatment position/precautions:   · Supine in bed, Bed/Chair-wheels locked, Bed in low position, Caregiver at bedside, Call light within reach, RN notified and Family at bedside     Compliance with Program/Exercises: Will assess as treatment progresses. Recommendations/Intent for next treatment session:  Treatment next visit will focus on increasing Mr. Merinos independence with bed mobility, transfers, gait training, strength/ROM exercises, modalities for pain, and patient education.        Total Treatment Duration:  PT Patient Time In/Time Out  Time In: 1335  Time Out: [address] [place] PT

## 2017-03-29 NOTE — PROGRESS NOTES
Problem: Mobility Impaired (Adult and Pediatric)  Goal: *Acute Goals and Plan of Care (Insert Text)  GOALS (1-4 days):  (1.)Mr. Jasper Arnett will move from supine to sit and sit to supine in bed with MODIFIED INDEPENDENCE. (2.)Mr. Jasper Arnett will transfer from bed to chair and chair to bed with SUPERVISION using the least restrictive device. (3.)Mr. Jasper Arnett will ambulate with SUPERVISION for 200 feet with the least restrictive device. (4.)Mr. Jasper Arnett will ambulate up/down 1 steps with no railing with STAND BY ASSIST with walker. (5.)Mr. Jasper Arnett will increase right knee ROM to 5°-80°.  ________________________________________________________________________________________________      PHYSICAL THERAPY JOINT CAMP TKA: INITIAL ASSESSMENT, TREATMENT DAY: DAY OF ASSESSMENT, AM 3/29/2017  INPATIENT: Hospital Day: 2  Payor[place]UE CR[place] / [place] [facility] / Product Type: PPO /      NAME/AGE/GENDER: Jos Francis is a 60 y.o. male     PRIMARY DIAGNOSIS:  Unilateral primary osteoarthritis, right knee [M17.11]              Procedure(s) and Anesthesia Type:     * RIGHT KNEE ARTHROPLASTY TOTAL/ [facility] - Spinal (Right)  ICD-10: Treatment Diagnosis:        · Pain in Right Knee (M25.561)  · Stiffness of Right Knee, Not elsewhere classified (M25.661)  · Difficulty in walking, Not elsewhere classified (R26.2)       ASSESSMENT:      Mr. Jasper Arnett presents with impaired strength & mobility s/p right TKA. Pt also had decreased stability during out of bed activity. Pt will benefit from follow up therapy to help restore safe function prior to returning home with caregiver      This section established at most recent assessment   PROBLEM LIST (Impairments causing functional limitations):  1. Decreased Strength  2. Decreased ADL/Functional Activities  3. Decreased Transfer Abilities  4. Decreased Ambulation Ability/Technique  5. Decreased Balance  6. Increased Pain  7. Decreased Activity Tolerance  8.  Decreased Flexibility/Joint Mobility  9. Decreased [place] with Home Exercise Program    INTERVENTIONS PLANNED: (Benefits and precautions of physical therapy have been discussed with the patient.)  1. Bed Mobility  2. Gait Training  3. Home Exercise Program (HEP)  4. Therapeutic Exercise/Strengthening  5. Transfer Training  6. Range of Motion: active/assisted/passive  7. Therapeutic Activities  8. Group Therapy      TREATMENT PLAN: Frequency/Duration: Follow patient BID   to address above goals. Rehabilitation Potential For Stated Goals: GOOD      RECOMMENDED REHABILITATION/EQUIPMENT: (at time of discharge pending progress): Continue Skilled Therapy and Home Health: Physical Therapy. HISTORY:   History of Present Injury/Illness (Reason for Referral):  Sayra Brandon is a 60 y.o. WHITE [place]  male who presents with Right Knee pain. He has history of Right Knee pain for several months ago. Symptoms worse with walking and relieved with rest. Conservative treatment consisting of activity modification has not helped. The patient lives with their spouse. The patients goal after surgery is improved pain and function. Past Medical History/Comorbidities:   Mr. Cheikh Jo  has a past medical history of Arrhythmia (10 - 15 years ago); Arthritis; CAD (coronary artery disease); Cancer ([address] Depression; Diabetes ([address] (6-7 years ago); HTN (hypertension) (7/18/2014); Hypercholesterolemia; Hyperlipidemia (7/18/2014); Hypertension (diagnosed at 24years old); Morbid obesity ([address] and Psychiatric disorder. He also has no past medical history of Aneurysm ([address] Asthma; Autoimmune disease ([address] Chronic kidney disease; Chronic pain; Coagulation disorder ([address] COPD; Difficult intubation; Endocarditis; GERD (gastroesophageal reflux disease); Heart failure ([address] Liver disease; Malignant hyperthermia due to anesthesia; Nausea & vomiting; Other ill-defined conditions(799.89);  Pseudocholinesterase deficiency; PUD (peptic ulcer disease); Rheumatic fever; Seizures ([facility] Stroke [facility]); Thromboembolus ([facility] [address] Thyroid disease; Unspecified adverse effect of anesthesia; or Unspecified sleep apnea. Mr. Mary Jorgensen  has a past surgical history that includes other surgical; orthopaedic (Left, 2003); orthopaedic (Left, 2009); colonoscopy (2013); orthopaedic (Right, 2016); and heent (1971). Social History/Living Environment:   Home Environment: Private residence  # Steps to Enter: 2  Rails to Enter: No  One/Two Story Residence: Two story, live on 1st floor  # of Interior Steps: 10  Living Alone: No  Support Systems: Spouse/Significant Other/Partner  Patient Expects to be Discharged to[de-identified] Private residence  Current DME Used/Available at Home: None  Tub or Shower Type: Shower  Prior Level of Function/Work/Activity:  Pt was independent without an assistive device prior to this admission   Number of Personal Factors/Comorbidities that affect the Plan of Care: 3+: HIGH COMPLEXITY   EXAMINATION:   Most Recent Physical Functioning:   Gross Assessment: Yes  Gross Assessment  AROM: Within functional limits (left LE)  Strength: Within functional limits (left LE)          RLE PROM  R Knee Flexion: 65 (~post op)  R Knee Extension: -10 (~post op)             Bed Mobility  Supine to Sit: Stand-by asssistance  Sit to Supine: Stand-by asssistance  Scooting: Stand-by asssistance     Transfers  Sit to Stand: Contact guard assistance  Stand to Sit: Contact guard assistance  Bed to Chair: Contact guard assistance (with walker)     Balance  Sitting: Intact; Without support  Standing: Impaired; With support (walker)                Weight Bearing Status  Right Side Weight Bearing: As tolerated  Distance (ft): 120 Feet (ft)  Ambulation - Level of Assistance: Contact guard assistance  Assistive Device: Walker, rolling  Speed/[place]: Delayed  Stance: Right decreased  Gait Abnormalities: Antalgic;Decreased step clearance         Braces/Orthotics: none     Right Knee Cold  Type: Cryocuff       Body Structures Involved:  1. Joints  2. Muscles Body Functions Affected:  1. Movement Related Activities and Participation Affected:  1. Mobility   Number of elements that affect the Plan of Care: 4+: HIGH COMPLEXITY   CLINICAL PRESENTATION:   Presentation: Stable and uncomplicated: LOW COMPLEXITY   CLINICAL DECISION MAKIN[address] [place] [address] AM-PAC 6 Clicks   Basic Mobility Inpatient Short Form  How much difficulty does the patient currently have. .. Unable A Lot A Little None   1. Turning over in bed (including adjusting bedclothes, sheets and blankets)? [ ] 1   [ ] 2   [X] 3   [ ] 4   2. Sitting down on and standing up from a chair with arms ( e.g., wheelchair, bedside commode, etc.)   [ ] 1   [ ] 2   [X] 3   [ ] 4   3. Moving from lying on back to sitting on the side of the bed? [ ] 1   [ ] 2   [X] 3   [ ] 4   How much help from another person does the patient currently need. .. Total A Lot A Little None   4. Moving to and from a bed to a chair (including a wheelchair)? [ ] 1   [ ] 2   [X] 3   [ ] 4   5. Need to walk in hospital room? [ ] 1   [ ] 2   [X] 3   [ ] 4   6. Climbing 3-5 steps with a railing? [X] 1   [ ] 2   [ ] 3   [ ] 4   © , Trustees of [address] [place] [address], under license to [employer]. All rights reserved       Score:  Initial: 16 Most Recent: X (Date: -- )     Interpretation of Tool:  Represents activities that are increasingly more difficult (i.e. Bed mobility, Transfers, Gait).        Score 24 23 22-20 19-15 14-10 9-7 6       Modifier CH CI CJ CK CL CM CN         · Mobility - Walking and Moving Around:               - CURRENT STATUS:    CK - 40%-59% impaired, limited or restricted               - GOAL STATUS:           CJ - 20%-39% impaired, limited or restricted               - D/C STATUS:                       ---------------To be determined---------------  Payor: BLUE CROSS / Plan: [facility] / Product Type: PPO /       Medical Necessity:     · Patient is expected to demonstrate progress in strength, range of motion, balance, coordination and functional technique to decrease assistance required with bed mobility, transfers & gait. Reason for Services/Other Comments:  · Patient continues to require skilled intervention due to pt not independent with functional mobility. Use of outcome tool(s) and clinical judgement create a POC that gives a: Clear prediction of patient's progress: LOW COMPLEXITY                 TREATMENT:   (In addition to Assessment/Re-Assessment sessions the following treatments were rendered)      Pre-treatment Symptoms/Complaints:  none  Pain: Initial: visual scale  Pain Intensity 1: 2  Pain Location 1: Knee  Pain Orientation 1: Right  Pain Intervention(s) 1: Cold pack, Exercise  Post Session:  2/10      Therapeutic Exercise: (13 Minutes):  Exercises per grid below to improve mobility and dynamic movement of leg - right to improve functional endurance. Required minimal verbal cues to promote proper body alignment and promote proper body mechanics. Progressed range and repetitions as indicated. Assessment/12 min           Date:  3/29 Date:    Date:      ACTIVITY/EXERCISE AM PM AM PM AM PM   GROUP THERAPY  [ ]  [ ]  [ ]  [ ]  [ ]  [ ]   Ankle Pumps 15             Quad Sets 15             Gluteal Sets 15             Hip ABd/ADduction 15             Straight Leg Raises 15             Knee Slides 15             Short Arc Quads 15             Long Arc Quads               Chair Slides                               B = bilateral; AA = active assistive; A = active; P = passive       Treatment/Session Assessment:         Response to Treatment:  Tolerated well.      Education:  [X] Home Exercises  [X] Fall Precautions  [ ] Hip Precautions [ ] Going Home Video  [ ] Knee/Hip Prosthesis Review  [X] Walker Management/Safety [ ] Adaptive Equipment as Needed         Interdisciplinary Collaboration: · Registered Nurse     After treatment position/precautions:   · Up in chair  · Bed/Chair-wheels locked  · Call light within reach  · RN notified     Compliance with Program/Exercises: Will assess as treatment progresses. Recommendations/Intent for next treatment session:  Treatment next visit will focus on increasing Mr. Kenny's independence with bed mobility, transfers, gait training, strength/ROM exercises, modalities for pain, and patient education.        Total Treatment Duration:  PT Patient Time In/Time Out  Time In: 0805  Time Out: Megha 19, PT

## 2017-03-29 NOTE — PROGRESS NOTES
Care Management Interventions  Mode of Transport at Discharge: Self  Transition of Care Consult (CM Consult): [address]: Yes  Discharge Durable Medical Equipment: No  Physical Therapy Consult: Yes  Occupational Therapy Consult: Yes  Current Support Network: Lives with Spouse  Confirm Follow Up Transport: Family  Plan discussed with Pt/Family/Caregiver: Yes  Freedom of Choice Offered: Yes  Discharge Location  Discharge Placement: Home with home health    Patient is a 60y.o. year old male admitted for Right TKA . Patient lives with His spouse and plans to return home on discharge. Order received to arrange home health. Patient without preference towards agency. Referral sent to [facility]. Patient denies any equipment needs as he has a walker and bedside commode. Will follow until discharge. Bill Ochoa    Thurs 3-30. Patient now asking for us to arrange a walker and BSC and feels he needs heavy duty. Referral for heavy duty walker and BSC sent to [address] [place] [address] and requested delivery before discharge today 3-30.   Bill Ochoa

## 2017-03-29 NOTE — PROGRESS NOTES
Problem: Self Care Deficits Care Plan (Adult)  Goal: *Acute Goals and Plan of Care (Insert Text)  GOALS:   DISCHARGE GOALS (in preparation for going home/rehab): 3 days  1. Mr. Jasper Arnett will perform one lower body dressing activity with minimal assistance required to demonstrate improved functional mobility and safety. GOAL MET 3/29/2017    2. Mr. Jasper Arnett will perform one lower body bathing activity with minimal assistance required to demonstrate improved functional mobility and safety. GOAL MET 3/29/2017    3. Mr. Jasper Arnett will perform toileting/toilet transfer with contact guard assistance to demonstrate improved functional mobility and safety. GOAL MET 3/29/2017    4. Mr. Jasper Arnett will perform shower transfer with contact guard assistance to demonstrate improved functional mobility and safety. GOAL MET 3/29/2017        JOINT CAMP OCCUPATIONAL THERAPY TKA: Daily Note and AM 3/29/2017  INPATIENT: Hospital Day: 2  Payor[place]UE CR[place] / [place] [facility] / Product Type: PPO /      NAME/AGE/GENDER: Jos Francis is a 60 y.o. male     PRIMARY DIAGNOSIS:  Unilateral primary osteoarthritis, right knee [M17.11]              Procedure(s) and Anesthesia Type:     * RIGHT KNEE ARTHROPLASTY TOTAL/ [facility] - Spinal (Right)  ICD-10: Treatment Diagnosis:        · Pain in Left Knee (M25.562)  · Stiffness of Left Knee, Not elsewhere classified (W37.217)  · Other lack of cordination (R27.8)       ASSESSMENT:      Mr. Jasper Arnett is s/p right TKA and presents with decreased weight bearing on right LE and decreased independence with functional mobility and activities of daily living. Patient completed shower and dressing as charter below in ADL grid and is ambulating with rolling walker and CGA assist.  Patient has met 4/4 goals and plans to return home with good family support. Family able to provide patient with appropriate level of assistance at this time.   OT reviewed safety precautions throughout session and therapy schedule for the remainder of today. Patient instructed to call for assistance when needing to get up from recliner and all needs in reach. Patient verbalized understanding of call light. This section established at most recent assessment   PROBLEM LIST (Impairments causing functional limitations):  1. Decreased Strength  2. Decreased ADL/Functional Activities  3. Decreased Transfer Abilities  4. Increased Pain  5. Increased Fatigue  6. Decreased Flexibility/Joint Mobility  7. Decreased Knowledge of Precautions    INTERVENTIONS PLANNED: (Benefits and precautions of occupational therapy have been discussed with the patient.)  1. Activities of daily living training  2. Adaptive equipment training  3. Balance training  4. Clothing management  5. Donning&doffing training  6. Theraputic activity      TREATMENT PLAN: Frequency/Duration: Follow patient 1 time to address above goals. Rehabilitation Potential For Stated Goals: GOOD      RECOMMENDED REHABILITATION/EQUIPMENT: (at time of discharge pending progress): Continue Skilled Therapy and Home Health: Physical Therapy. OCCUPATIONAL PROFILE AND HISTORY:   History of Present Injury/Illness (Reason for Referral): Pt presents this date s/p (right) TKA. Past Medical History/Comorbidities:   Mr. Ambreen Zelaya  has a past medical history of Arrhythmia (10 - 15 years ago); Arthritis; CAD (coronary artery disease); Cancer ([address] Depression; Diabetes ([address] (6-7 years ago); HTN (hypertension) (7/18/2014); Hypercholesterolemia; Hyperlipidemia (7/18/2014); Hypertension (diagnosed at 24years old); Morbid obesity ([address] and Psychiatric disorder. He also has no past medical history of Aneurysm ([address] Asthma; Autoimmune disease ([address] Chronic kidney disease; Chronic pain; Coagulation disorder ([address] COPD; Difficult intubation; Endocarditis; GERD (gastroesophageal reflux disease); Heart failure ([address]  Liver disease; Malignant hyperthermia due to anesthesia; Nausea & vomiting; Other ill-defined conditions(799.89); Pseudocholinesterase deficiency; PUD (peptic ulcer disease); Rheumatic fever; Seizures ([facility] [address] Stroke [facility]); Thromboembolus ([facility] [address] [facility] Thyroid disease; Unspecified adverse effect of anesthesia; or Unspecified sleep apnea. Mr. Angelika Trejo  has a past surgical history that includes other surgical; orthopaedic (Left, 2003); orthopaedic (Left, 2009); colonoscopy (2013); orthopaedic (Right, 2016); and heent (1971). Social History/Living Environment:   Home Environment: Private residence  # Steps to Enter: 2  Rails to Enter: No  One/Two Story Residence: Two story, live on 1st floor  # of Interior Steps: 10  Living Alone: No  Support Systems: Spouse/Significant Other/Partner  Patient Expects to be Discharged to[de-identified] Private residence  Current DME Used/Available at Home: None  Tub or Shower Type: Shower  Prior Level of Function/Work/Activity:  Works from home independent with ADLs   Number of Personal Factors/Comorbidities that affect the Plan of Care: Brief history (0):  LOW COMPLEXITY   ASSESSMENT OF OCCUPATIONAL PERFORMANCE[de-identified]   Most Recent Physical Functioning:   Balance  Sitting: Intact; Without support  Standing: Impaired; With support (walker)        Patient Vitals for the past 6 hrs:   BP SpO2 Pulse   03/29/17 0754 145/86 95 % 86   03/29/17 0920 - 93 % -        Gross Assessment: Yes  Gross Assessment  AROM: Within functional limits (left LE)  Strength: Within functional limits (left LE)            LLE Assessment  LLE Assessment (WDL): Within defined limits Coordination  Fine Motor Skills-Upper: Left Intact; Right Intact  Gross Motor Skills-Upper: Left Intact; Right Intact           Mental Status  Neurologic State: Alert; Appropriate for age  Orientation Level: Appropriate for age  Cognition: Appropriate for age attention/concentration; Follows commands; Impulsive  Perception: Appears intact  Perseveration: No perseveration noted  Safety/Judgement: Awareness of environment; Fall prevention            RLE Assessment  RLE Assessment (WDL): Exceptions to WDL  RLE PROM  R Knee Flexion: 65 (~post op)  R Knee Extension: -10 (~post op)       Basic ADLs (From Assessment) Complex ADLs (From Assessment)   Basic ADL  Feeding: Setup  Oral Facial Hygiene/Grooming: Supervision  Bathing: Moderate assistance  Upper Body Dressing: Supervision  Lower Body Dressing: Moderate assistance  Toileting: Contact guard assistance, Minimum assistance     Grooming/Bathing/Dressing Activities of Daily Living   Grooming  Grooming Assistance: Supervision/set up  Washing Face: Supervision/set-up  Washing Hands: Supervision/set-up Cognitive Retraining  Safety/Judgement: Awareness of environment; Fall prevention   Upper Body Bathing  Bathing Assistance: Supervision/set-up  Position Performed: Seated in chair;Standing     Lower Body Bathing  Bathing Assistance: Minimum assistance  Perineal  : Supervision/set-up; Stand-by assistance  Position Performed: Seated in chair;Standing  Lower Body : Minimum assistance (min assist dry feet)  Position Performed: Seated in chair;Standing  Adaptive Equipment: Grab bar; Shower chair     Upper Body Dressing Assistance  Dressing Assistance: Supervision/set-up  Pullover Shirt: Supervision/set-up Functional Transfers  Bathroom Mobility: Contact guard assistance  Toilet Transfer : Stand-by asssistance  Shower Transfer: Stand-by asssistance   Lower Body Dressing Assistance  Dressing Assistance: Minimum assistance  Underpants: Minimum assistance  Pants With Elastic Waist: Supervision/set-up; Stand-by assistance  Socks: Moderate assistance  Antiembolitic Stockings: Maximum assistance Bed/Mat Mobility  Supine to Sit: Stand-by asssistance  Sit to Supine: Stand-by asssistance  Sit to Stand: Contact guard assistance  Bed to Chair: Contact guard assistance (with walker)  Scooting: Stand-by asssistance           Physical Skills Involved:  1. Range of Motion  2. Balance  3.  Mobility Cognitive Skills Affected (resulting in the inability to perform in a timely and safe manner): 1. none Psychosocial Skills Affected:  1. Routines and Behaviors   Number of elements that affect the Plan of Care: 3-5:  MODERATE COMPLEXITY   CLINICAL DECISION MAKIN[address] [place] [address] AM-PAC 6 Clicks   Basic Mobility Inpatient Short Form  How much help from another person does the patient currently need. .. Total A Lot A Little None   1. Putting on and taking off regular lower body clothing?   [ ] 1   [X] 2   [ ] 3   [ ] 4   2. Bathing (including washing, rinsing, drying)? [ ] 1   [X] 2   [ ] 3   [ ] 4   3. Toileting, which includes using toilet, bedpan or urinal?   [ ] 1   [ ] 2   [X] 3   [ ] 4   4. Putting on and taking off regular upper body clothing?   [ ] 1   [ ] 2   [X] 3   [ ] 4   5. Taking care of personal grooming such as brushing teeth? [ ] 1   [ ] 2   [X] 3   [ ] 4   6. Eating meals? [ ] 1   [ ] 2   [ ] 3   [X] 4   © , Trustees of [address] [place] [address], under license to [employer]. All rights reserved   Score:  Initial:17 Most Recent: X (Date: -- )     Interpretation of Tool:  Represents activities that are increasingly more difficult (i.e. Bed mobility, Transfers, Gait). Score 24 23 22-20 19-15 14-10 9-7 6       Modifier CH CI CJ CK CL CM CN         · Self Care:               - CURRENT STATUS:    CK - 40%-59% impaired, limited or restricted               - GOAL STATUS:           CJ - 20%-39% impaired, limited or restricted               - D/C STATUS:                       ---------------To be determined---------------  Payor[place] BLUE CR[place] / [place] [facility] / Product Type: PPO /       Medical Necessity:     · Patient is expected to demonstrate progress in balance, coordination and functional technique to decrease assistance required with self care and functional mobiltiy and improve safety during self care and functional mobiltiy.   Reason for Services/Other Comments:  · Patient continues to require skilled intervention due to decreased self care and functional mobiltiy. Use of outcome tool(s) and clinical judgement create a POC that gives a: LOW COMPLEXITY                 TREATMENT:   (In addition to Assessment/Re-Assessment sessions the following treatments were rendered)      Pre-treatment Symptoms/Complaints:  none  Pain: Initial:   Pain Intensity 1: 0  Post Session:  0/10      Self Care: (15): Procedure(s) (per grid) utilized to improve and/or restore self-care/home management as related to dressing, bathing, toileting and grooming. Required minimal visual, verbal and tactile cueing to facilitate activities of daily living skills and compensatory activities. Treatment/Session Assessment:         Response to Treatment:  Tolerated well, no complaints going home tomorrow     Education:  [ ] Home Exercises  [X] Fall Precautions  [ ] Hip Precautions [ ] [address] [email or web address] Video  [X] Knee/Hip Prosthesis Review  [X] Walker Management/Safety [X] Adaptive Equipment as Needed         Interdisciplinary Collaboration:   · Occupational Therapist, Registered Nurse and Certified Nursing Assistant/Patient Care Technician     After treatment position/precautions:   · Up in chair, Bed/Chair-wheels locked, Bed in low position, Call light within reach, RN notified and Family at bedside     Compliance with Program/Exercises: compliant all of the time. Recommendations/Intent for next treatment session:  Treatment next visit will focus on increasing Mr. Merinos independence with bed mobility, transfers, gait training, strength/ROM exercises, modalities for pain, and patient education.        Total Treatment Duration:15  OT Patient Time In/Time Out  Time In: 1140  Time Out: [facility], OT

## 2017-03-29 NOTE — DISCHARGE SUMMARY
[facility]  Total [facility] Discharge Summary      Patient ID:  Samantha Potter  [id number]  69 y.o.  1955    Admit date: 3/28/2017  Discharge date and time: 3-30-17  Admitting Physician: Cecilia Tran MD  Surgeon: Same  Admission Diagnoses: Unilateral primary osteoarthritis, right knee [M17.11]  Discharge Diagnoses: Principal Problem:    S/P total knee arthroplasty (3/29/2017)    Active Problems:    Osteoarthritis (3/28/2017)                                Perioperative Antibiotics: Ancef 1 to 2 mg was given depending on patient's weight. If allergic to Ancef or due to other indications, patient was given Vancomycin. Hospital Medications given:   [unfilled]  [unfilled]  [unfilled]    Discharge Medications given:  Current Discharge Medication List      START taking these medications    Details   aspirin delayed-release 325 mg tablet Take 1 Tab by mouth every twelve (12) hours every twelve (12) hours for 30 days. Qty: 60 Tab, Refills: 0      oxyCODONE IR (ROXICODONE) 5 mg immediate release tablet Take 1-2 Tabs by mouth every three (3) hours as needed. Max Daily Amount: 80 mg.  Qty: 60 Tab, Refills: 0         CONTINUE these medications which have NOT CHANGED    Details   glimepiride (AMARYL) 4 mg tablet TAKE 1 TABLET EVERY MORNING  Qty: 90 Tab, Refills: 1      verapamil ER (CALAN-SR) 180 mg CR tablet Take 180 mg by mouth daily. Take / use AM day of surgery  per anesthesia protocols. Indications: hypertension      FA/NIACINAMIDE/CUPRIC OX/ZN OX (NICOTINAMIDE PO) Take  by mouth two (2) times a day. coenzyme q10 10 mg cap Take  by mouth daily. metFORMIN (GLUCOPHAGE) 1,000 mg tablet Take 1,000 mg by mouth daily. Takes 2 tablets every morning   Indications: type 2 diabetes mellitus      metoprolol succinate (TOPROL-XL) 25 mg XL tablet Take 25 mg by mouth daily. Take / use AM day of surgery  per anesthesia protocols.    Indications: hypertension      acyclovir (ZOVIRAX) 200 mg capsule Take 1 Cap by mouth daily. Qty: 30 Cap, Refills: 0      flecainide (TAMBOCOR) 100 mg tablet Take 100 mg by mouth two (2) times a day. Take / use AM day of surgery  per anesthesia protocols. Indications: PREVENTION OF RECURRENT ATRIAL FIBRILLATION    Associated Diagnoses: Ventricular tachycardia (HCC)      simvastatin (ZOCOR) 80 mg tablet Take 1 Tab by mouth nightly. Qty: 90 Tab, Refills: 1    Associated Diagnoses: Hyperlipidemia      lisinopril (PRINIVIL, ZESTRIL) 10 mg tablet Take 1 Tab by mouth daily. Qty: 90 Tab, Refills: 1    Associated Diagnoses: Essential hypertension      VITAMIN D-3 PO take  by mouth daily. am          STOP taking these medications       mupirocin calcium (BACTROBAN NASAL) 2 % nasal ointment Comments:   Reason for Stopping:         aspirin (ASPIRIN) 325 mg tablet Comments:   Reason for Stopping:         naproxen sodium 220 mg cap Comments:   Reason for Stopping:         OTHER,NON-FORMULARY, Comments:   Reason for Stopping:         FISH OIL PO Comments:   Reason for Stopping:                Additional DVT Prophylaxis:  NEYDA Hose,Plexi-Pulse    Postoperative transfusions:   none  Post Op complications: none    Hemoglobin at discharge:   Lab Results   Component Value Date/Time    HGB 13.8 03/29/2017 04:20 AM       Wound appears to be healing without any evidence of infection. Physical Therapy started on the day following surgery and progressed to independent ambulation with the aid of a walker. At the time of discharge, able to go up and down stairs and had understanding of precautions needed following surgery.       PT/OT:            Assistive Device: Walker (comment)                Discharged to: home    Discharge instructions:  -Rx pain medication given  - Anticoagulate with: Ecotrin 325 mg PO BID x 4 weeks  -Resume pre hospital diet             -Resume home medications per medical continuation form     -Ambulate with walker, appropriate total joint protocol  -Follow up in office as scheduled       Signed:  SUYAPA Zhong  3/29/2017  7:27 AM

## 2017-03-30 VITALS
TEMPERATURE: 96.9 F | HEIGHT: 77 IN | SYSTOLIC BLOOD PRESSURE: 143 MMHG | HEART RATE: 68 BPM | DIASTOLIC BLOOD PRESSURE: 74 MMHG | RESPIRATION RATE: 18 BRPM | BODY MASS INDEX: 35.2 KG/M2 | OXYGEN SATURATION: 96 % | WEIGHT: 298.13 LBS

## 2017-03-30 LAB — GLUCOSE BLD STRIP.AUTO-MCNC: 215 MG/DL (ref 65–100)

## 2017-03-30 PROCEDURE — 82962 GLUCOSE BLOOD TEST: CPT

## 2017-03-30 PROCEDURE — 77030012935 HC DRSG AQUACEL BMS -B

## 2017-03-30 PROCEDURE — 97150 GROUP THERAPEUTIC PROCEDURES: CPT

## 2017-03-30 PROCEDURE — 74011250637 HC RX REV CODE- 250/637: Performed by: ORTHOPAEDIC SURGERY

## 2017-03-30 PROCEDURE — 94762 N-INVAS EAR/PLS OXIMTRY CONT: CPT

## 2017-03-30 PROCEDURE — 97116 GAIT TRAINING THERAPY: CPT

## 2017-03-30 RX ADMIN — OXYCODONE HYDROCHLORIDE 10 MG: 5 TABLET ORAL at 09:40

## 2017-03-30 RX ADMIN — CELECOXIB 200 MG: 200 CAPSULE ORAL at 09:40

## 2017-03-30 RX ADMIN — ACYCLOVIR 200 MG: 200 CAPSULE ORAL at 09:41

## 2017-03-30 RX ADMIN — GLIMEPIRIDE 4 MG: 2 TABLET ORAL at 05:37

## 2017-03-30 RX ADMIN — LISINOPRIL 10 MG: 5 TABLET ORAL at 09:40

## 2017-03-30 RX ADMIN — ASPIRIN 325 MG: 325 TABLET, DELAYED RELEASE ORAL at 09:40

## 2017-03-30 RX ADMIN — VERAPAMIL HYDROCHLORIDE 180 MG: 180 TABLET, FILM COATED, EXTENDED RELEASE ORAL at 09:40

## 2017-03-30 RX ADMIN — Medication 10 ML: at 05:39

## 2017-03-30 RX ADMIN — PRAVASTATIN SODIUM 2 TABLET: 20 TABLET ORAL at 09:40

## 2017-03-30 RX ADMIN — OXYCODONE HYDROCHLORIDE 10 MG: 5 TABLET ORAL at 14:04

## 2017-03-30 RX ADMIN — FLECAINIDE ACETATE 100 MG: 100 TABLET ORAL at 09:41

## 2017-03-30 RX ADMIN — METFORMIN HYDROCHLORIDE 1000 MG: 500 TABLET, FILM COATED ORAL at 09:41

## 2017-03-30 RX ADMIN — METOPROLOL SUCCINATE 25 MG: 25 TABLET, EXTENDED RELEASE ORAL at 09:40

## 2017-03-30 RX ADMIN — OXYCODONE HYDROCHLORIDE 10 MG: 5 TABLET ORAL at 05:37

## 2017-03-30 RX ADMIN — ACETAMINOPHEN 1000 MG: 500 TABLET, FILM COATED ORAL at 05:37

## 2017-03-30 NOTE — PROGRESS NOTES
Patient place on oxinet #7, alarms set and functioning properly. Monitor room notified at this time.

## 2017-03-30 NOTE — DISCHARGE INSTRUCTIONS
[id number] [facility]   Patient Discharge Instructions    Lex Rinne / [id number] : 1955    Admitted 3/28/2017 Discharged: 3/30/2017     IF YOU HAVE ANY PROBLEMS ONCE YOU ARE AT HOME CALL THE FOLLOWING NUMBERS:   Main office number: [phone]    Take Home Medications     · It is important that you take the medication exactly as they are prescribed. · Keep your medication in the bottles provided by the pharmacist and keep a list of the medication names, dosages, and times to be taken in your wallet. · Do not take other medications without consulting your doctor. What to do at [address] your prehospital diet. If you have excessive nausea or vomitting call your doctor's office     Home Physical Therapy is arranged. Use rolling walker when walking. Use Denilson Hose stockings until we see you in the office for your follow up appointment with Dr. Nan Fiore. Patients who have had a joint replacement should not drive until you are seen for your follow up appointment by Dr. Nan Fiore. When to Call    - Call if you have a temperature greater then 101  - Unable to keep food down  - Loose control of your bladder or bowel function  - Are unable to bear any weight   - Need a pain medication refill       DISCHARGE SUMMARY from Nurse    The following personal items collected during your admission are returned to you:   Dental Appliance: Dental Appliances: None  Vision: Visual Aid: Glasses  Hearing Aid:   na  Jewelry: Jewelry: None  Clothing: Clothing: At bedside, Footwear, Pants, Shirt, Socks, Undergarments  Other Valuables:  Other Valuables: Cell Phone, Other (comment) (Michele, chrome book )  Valuables sent to safe:   na    PATIENT INSTRUCTIONS:    After general anesthesia or intravenous sedation, for 24 hours or while taking prescription Narcotics:  · Limit your activities  · Do not drive and operate hazardous machinery  · Do not make important personal or business decisions  · Do  not drink alcoholic beverages  · If you have not urinated within 8 hours after discharge, please contact your surgeon on call. Report the following to your surgeon:  · Excessive pain, swelling, redness or odor of or around the surgical area  · Temperature over 101  · Nausea and vomiting lasting longer than 4 hours or if unable to take medications  · Any signs of decreased circulation or nerve impairment to extremity: change in color, persistent  numbness, tingling, coldness or increase pain  · Any questions, call office @ [phone]      Keep scheduled follow up appointment. If need to change, call office @ [phone]. *  Please give a list of your current medications to your Primary Care Provider. *  Please update this list whenever your medications are discontinued, doses are      changed, or new medications (including over-the-counter products) are added. *  Please carry medication information at all times in case of emergency situations. Total Knee Replacement: What to Expect at [address] [place] [address]    When you leave the hospital, you should be able to move around with a walker or crutches. But you will need someone to help you at home for the next few weeks or until you have more energy and can move around better. If there is no one to help you at home, you may go to a rehabilitation center. You will go home with a bandage and stitches or staples. Change the bandage as your doctor tells you to. Your doctor will remove your stitches or staples 10 to 21 days after your surgery. You may still have some mild pain, and the area may be swollen for 3 to 6 months after surgery. Your knee will continue to improve for 6 to 12 months. You will probably use a walker for 1 to 3 weeks and then use crutches. When you are ready, you can use a cane. You will probably be able to walk on your own in 4 to 8 weeks. You will need to do months of physical rehabilitation (rehab) after a knee replacement.  Rehab will help you strengthen the muscles of the knee and help you regain movement. After you recover, your artificial knee will allow you to do normal daily activities with less pain or no pain at all. You may be able to hike, dance, ride a bike, and play golf. Talk to your doctor about whether you can do more strenuous activities. Always tell your caregivers that you have an artificial knee. How long it will take to walk on your own, return to normal activities, and go back to work depends on your health and how well your rehabilitation (rehab) program goes. The better you do with your rehab exercises, the quicker you will get your strength and movement back. This care sheet gives you a general idea about how long it will take for you to recover. But each person recovers at a different pace. Follow the steps below to get better as quickly as possible. How can you care for yourself at home? Activity  · Rest when you feel tired. You may take a nap, but do not stay in bed all day. When you sit, use a chair with arms. You can use the arms to help you stand up. · Work with your physical therapist to find the best way to exercise. You may be able to take frequent, short walks using crutches or a walker. What you can do as your knee heals will depend on whether your new knee is cemented or uncemented. You may not be able to do certain things for a while if your new knee is uncemented. · After your knee has healed enough, you can do more strenuous activities with caution. ¨ You can golf, but use a golf cart, and do not wear shoes with spikes. ¨ You can bike on a flat road or on a stationary bike. Avoid biking up hills. ¨ Your doctor may suggest that you stay away from activities that put stress on your knee. These include tennis or badminton, squash or racquetball, contact sports like football, jumping (such as in basketball), jogging, or running. ¨ Avoid activities where you might fall.  These include horseback riding, skiing, and mountain biking. · Do not sit for more than 1 hour at a time. Get up and walk around for a while before you sit again. If you must sit for a long time, prop up your leg with a chair or footstool. This will help you avoid swelling. · Ask your doctor when you can shower. You may need to take sponge baths until your stitches or staples have been removed. · Ask your doctor when you can drive again. It may take up to 8 weeks after knee replacement surgery before it is safe for you to drive. · When you get into a car, sit on the edge of the seat. Then pull in your legs, and turn to face the front. · You should be able to do many everyday activities 3 to 6 weeks after your surgery. You will probably need to take 4 to 16 weeks off from work. When you can go back to work depends on the type of work you do and how you feel. · Ask your doctor when it is okay for you to have sex. · Do not lift anything heavier than 10 pounds and do not lift weights for 12 weeks. Diet  · By the time you leave the hospital, you should be eating your normal diet. If your stomach is upset, try bland, low-fat foods like plain rice, broiled chicken, toast, and yogurt. Your doctor may suggest that you take iron and vitamin supplements. · Drink plenty of fluids (unless your doctor tells you not to). · Eat healthy foods, and watch your portion sizes. Try to stay at your ideal weight. Too much weight puts more stress on your new knee. · You may notice that your bowel movements are not regular right after your surgery. This is common. Try to avoid constipation and straining with bowel movements. You may want to take a fiber supplement every day. If you have not had a bowel movement after a couple of days, ask your doctor about taking a mild laxative. Medicines  · Your doctor will tell you if and when you can restart your medicines. He or she will also give you instructions about taking any new medicines.   · If you take blood thinners, such as warfarin (Coumadin), clopidogrel (Plavix), or aspirin, be sure to talk to your doctor. He or she will tell you if and when to start taking those medicines again. Make sure that you understand exactly what your doctor wants you to do. · Your doctor may give you a blood-thinning medicine to prevent blood clots. If you take a blood thinner, be sure you get instructions about how to take your medicine safely. Blood thinners can cause serious bleeding problems. This medicine could be in pill form or as a shot (injection). If a shot is necessary, your doctor will tell you how to do this. · Be safe with medicines. Take pain medicines exactly as directed. ¨ If the doctor gave you a prescription medicine for pain, take it as prescribed. ¨ If you are not taking a prescription pain medicine, ask your doctor if you can take an over-the-counter medicine. ¨ Plan to take your pain medicine 30 minutes before exercises. It is easier to prevent pain before it starts than to stop it once it has started. · If you think your pain medicine is making you sick to your stomach:  ¨ Take your medicine after meals (unless your doctor has told you not to). ¨ Ask your doctor for a different pain medicine. · If your doctor prescribed antibiotics, take them as directed. Do not stop taking them just because you feel better. You need to take the full course of antibiotics. Incision care  · You will have a bandage over the cut (incision) and staples or stitches. Take the bandage off when your doctor says it is okay. · Your doctor will remove the staples or stitches 10 days to 3 weeks after the surgery and replace them with strips of tape. Leave the tape on for a week or until it falls off. Exercise  · Your rehab program will give you a number of exercises to do to help you get back your knee's range of motion and strength. Always do them as your therapist tells you.   Ice and elevation  · For pain and swelling, put ice or a cold pack on the area for 10 to 20 minutes at a time. Put a thin cloth between the ice and your skin. Other instructions  · Continue to wear your support stockings as your doctor says. These help to prevent blood clots. The length of time that you will have to wear them depends on your activity level and the amount of swelling. · Wear medical alert jewelry that says you may need antibiotics before any procedure, including dental work. You can buy this at most drugstores. · You have metal pieces in your knee. These may set off some airport metal detectors. Carry a medical alert card that says you have an artificial joint, just in case. Follow-up care is a key part of your treatment and safety. Be sure to make and go to all appointments, and call your doctor if you are having problems. It's also a good idea to know your test results and keep a list of the medicines you take. When should you call for help? Call 911 anytime you think you may need emergency care. For example, call if:  · You passed out (lost consciousness). · You have severe trouble breathing. · You have sudden chest pain and shortness of breath, or you cough up blood. Call your doctor now or seek immediate medical care if:  · You have signs of infection, such as:  ¨ Increased pain, swelling, warmth, or redness. ¨ Red streaks leading from the incision. ¨ Pus draining from the incision. ¨ A fever. · You have signs of a blood clot, such as:  ¨ Pain in your calf, back of the knee, thigh, or groin. ¨ Redness and swelling in your leg or groin. · Your incision comes open and begins to bleed, or the bleeding increases. · You have pain that does not get better after you take pain medicine. Watch closely for changes in your health, and be sure to contact your doctor if:  · You do not have a bowel movement after taking a laxative. Where can you learn more? Go to http://amberly-brandon.info/.   Enter B849 in the search box to learn more about \"Total Knee Replacement: What to Expect at Home. \"  Current as of: August 4, 2016  Content Version: 11.2  © 3020-6227 [employer]. Care instructions adapted under license by [employer] (which disclaims liability or warranty for this information). If you have questions about a medical condition or this instruction, always ask your healthcare professional. [facility] any warranty or liability for your use of this information. These are general instructions for a healthy lifestyle:    No smoking/ No tobacco products/ Avoid exposure to second hand smoke    Surgeon General's Warning:  Quitting smoking now greatly reduces serious risk to your health. Obesity, smoking, and sedentary lifestyle greatly increases your risk for illness    A healthy diet, regular physical exercise & weight monitoring are important for maintaining a healthy lifestyle    You may be retaining fluid if you have a history of heart failure or if you experience any of the following symptoms:  Weight gain of 3 pounds or more overnight or 5 pounds in a week, increased swelling in our hands or feet or shortness of breath while lying flat in bed. Please call your doctor as soon as you notice any of these symptoms; do not wait until your next office visit. Recognize signs and symptoms of STROKE:    F-face looks uneven    A-arms unable to move or move even    S-speech slurred or non-existent    T-time-call 911 as soon as signs and symptoms begin-DO NOT go       Back to bed or wait to see if you get better-TIME IS BRAIN. The discharge information has been reviewed with the patient. The patient verbalized understanding. Information obtained by :  I understand that if any problems occur once I am at home I am to contact my physician. I understand and acknowledge receipt of the instructions indicated above. Physician's or R.N.'s Signature                                                                  Date/Time                                                                                                                                              Patient or Representative Signature                                                          Date/Time

## 2017-03-30 NOTE — PROGRESS NOTES
2017         Post Op day: 2 Days Post-Op     Admit Date: 3/28/2017  Admit Diagnosis: Unilateral primary osteoarthritis, right knee [M17.11]        Subjective: Doing well, No complaints, No SOB, No Chest Pain, No Nausea or Vomiting     Objective:   Vital Signs are Stable, No Acute Distress, Alert and Oriented, Dressing is Dry,  Neurovascular exam is normal.     Assessment / Plan :  Patient Active Problem List   Diagnosis Code    [facility] E11.9    Hyperlipidemia E78.5    HTN (hypertension) I10    Depression F32.9    CAD (coronary artery disease) I25.10    Cardiac dysrhythmia, unspecified I49.9    Osteoarthritis M19.90    S/P total knee arthroplasty Z96.659    Patient Vitals for the past 8 hrs:   BP Temp Pulse Resp SpO2   17 0400 115/84 97.1 °F (36.2 °C) 67 18 94 %   17 0000 120/86 95.8 °F (35.4 °C) 72 18 95 %    Temp (24hrs), Av.5 °F (35.8 °C), Min:95.8 °F (35.4 °C), Max:97.1 °F (36.2 °C)    Body mass index is 35.35 kg/(m^2).     Lab Results   Component Value Date/Time    HGB 13.8 2017 04:20 AM      Pt seen by and discussed with Supervising Physician   Continue PT, current pain meds  Plan for home today        Signed By: SUYAPA Bello

## 2017-03-30 NOTE — PROGRESS NOTES
Problem: Mobility Impaired (Adult and Pediatric)  Goal: *Acute Goals and Plan of Care (Insert Text)  GOALS (1-4 days):  (1.)Mr. Maximilian Dueñas will move from supine to sit and sit to supine in bed with MODIFIED INDEPENDENCE. (2.)Mr. Maximilian Dueñas will transfer from bed to chair and chair to bed with SUPERVISION using the least restrictive device. (3.)Mr. Maximilian Dueñas will ambulate with SUPERVISION for 200 feet with the least restrictive device. (4.)Mr. Maximilian Dueñas will ambulate up/down 1 steps with no railing with STAND BY ASSIST with walker. (5.)Mr. Maximilian Dueñas will increase right knee ROM to 5°-80°.  ________________________________________________________________________________________________      PHYSICAL THERAPY JOINT CAMP TKA: Daily Note and AM 3/30/2017  INPATIENT: Hospital Day: 3  Payor: BLUE CROSS / Plan: [place] BLUE CROSS [facility] / Product Type: PPO /      NAME/AGE/GENDER: Leeroy Campos is a 60 y.o. male     PRIMARY DIAGNOSIS:  Unilateral primary osteoarthritis, right knee [M17.11]              Procedure(s) and Anesthesia Type:     * RIGHT KNEE ARTHROPLASTY TOTAL/ [facility] - Spinal (Right)  ICD-10: Treatment Diagnosis:        · Pain in Right Knee (M25.561)  · Stiffness of Right Knee, Not elsewhere classified (M25.661)  · Difficulty in walking, Not elsewhere classified (R26.2)       ASSESSMENT:      Mr. Maximilian Dueñas participated well with therapy this morning despite hurting more today. He is planning on going home with HHPT today. He is waiting on his walker to be delivered. This section established at most recent assessment   PROBLEM LIST (Impairments causing functional limitations):  1. Decreased Strength  2. Decreased ADL/Functional Activities  3. Decreased Transfer Abilities  4. Decreased Ambulation Ability/Technique  5. Decreased Balance  6. Increased Pain  7. Decreased Activity Tolerance  8. Decreased Flexibility/Joint Mobility  9.  Decreased [place] with Home Exercise Program    INTERVENTIONS PLANNED: (Benefits and precautions of physical therapy have been discussed with the patient.)  1. Bed Mobility  2. Gait Training  3. Home Exercise Program (HEP)  4. Therapeutic Exercise/Strengthening  5. Transfer Training  6. Range of Motion: active/assisted/passive  7. Therapeutic Activities  8. Group Therapy      TREATMENT PLAN: Frequency/Duration: Follow patient BID   to address above goals. Rehabilitation Potential For Stated Goals: GOOD      RECOMMENDED REHABILITATION/EQUIPMENT: (at time of discharge pending progress): Continue Skilled Therapy and Home Health: Physical Therapy. HISTORY:   History of Present Injury/Illness (Reason for Referral):  Shola Naranjo is a 60 y.o. WHITE [place]  male who presents with Right Knee pain. He has history of Right Knee pain for several months ago. Symptoms worse with walking and relieved with rest. Conservative treatment consisting of activity modification has not helped. The patient lives with their spouse. The patients goal after surgery is improved pain and function. Past Medical History/Comorbidities:   Mr. Manuel Cervantes  has a past medical history of Arrhythmia (10 - 15 years ago); Arthritis; CAD (coronary artery disease); Cancer ([address] Depression; Diabetes ([address] (6-7 years ago); HTN (hypertension) (7/18/2014); Hypercholesterolemia; Hyperlipidemia (7/18/2014); Hypertension (diagnosed at 24years old); Morbid obesity ([address] and Psychiatric disorder. He also has no past medical history of Aneurysm ([address] Asthma; Autoimmune disease ([address] Chronic kidney disease; Chronic pain; Coagulation disorder ([address] COPD; Difficult intubation; Endocarditis; GERD (gastroesophageal reflux disease); Heart failure ([address] Liver disease; Malignant hyperthermia due to anesthesia; Nausea & vomiting; Other ill-defined conditions(799.89); Pseudocholinesterase deficiency; PUD (peptic ulcer disease); Rheumatic fever; Seizures ([address] Stroke [facility]); Thromboembolus ([address]  Thyroid disease; Unspecified adverse effect of anesthesia; or Unspecified sleep apnea. Mr. Ramakrishna Resendiz  has a past surgical history that includes other surgical; orthopaedic (Left, 2003); orthopaedic (Left, 2009); colonoscopy (2013); orthopaedic (Right, 2016); and heent (1971). Social History/Living Environment:   Home Environment: Private residence  # Steps to Enter: 2  Rails to Enter: No  One/Two Story Residence: Two story, live on 1st floor  # of Interior Steps: 10  Living Alone: No  Support Systems: Spouse/Significant Other/Partner  Patient Expects to be Discharged to[de-identified] Private residence  Current DME Used/Available at Home: None  Tub or Shower Type: Shower  Prior Level of Function/Work/Activity:  Pt was independent without an assistive device prior to this admission   Number of Personal Factors/Comorbidities that affect the Plan of Care: 3+: HIGH COMPLEXITY   EXAMINATION:   Most Recent Physical Functioning:                 RLE PROM  R Knee Flexion: 92  R Knee Extension: 8             Bed Mobility  Supine to Sit: Stand-by asssistance  Sit to Supine: Stand-by asssistance  Scooting: Stand-by asssistance     Transfers  Sit to Stand: Stand-by asssistance  Stand to Sit: Stand-by asssistance  Bed to Chair: Stand-by asssistance     Balance  Sitting: Intact  Standing: Pull to stand; Without support  Standing - Static: Good  Standing - Dynamic : Good                Weight Bearing Status  Right Side Weight Bearing: As tolerated  Distance (ft): 308 Feet (ft) (walked distance twice)  Ambulation - Level of Assistance: Stand-by asssistance  Assistive Device: Walker, rolling  Speed/[place]: Pace decreased (<100 feet/min)  Stance: Right decreased  Gait Abnormalities: Antalgic;Decreased step clearance  Number of Stairs Trained: 3  Stairs - Level of Assistance: Stand-by asssistance  Interventions: Safety awareness training      Braces/Orthotics: none     Right Knee Cold  Type: Cryocuff       Body Structures Involved:  1. Joints  2.  Muscles Body Functions Affected:  1. Movement Related Activities and Participation Affected:  1. Mobility   Number of elements that affect the Plan of Care: 4+: HIGH COMPLEXITY   CLINICAL PRESENTATION:   Presentation: Stable and uncomplicated: LOW COMPLEXITY   CLINICAL DECISION MAKING:   [employer] AM-PAC 6 Clicks   Basic Mobility Inpatient Short Form  How much difficulty does the patient currently have. .. Unable A Lot A Little None   1. Turning over in bed (including adjusting bedclothes, sheets and blankets)? [ ] 1   [ ] 2   [X] 3   [ ] 4   2. Sitting down on and standing up from a chair with arms ( e.g., wheelchair, bedside commode, etc.)   [ ] 1   [ ] 2   [X] 3   [ ] 4   3. Moving from lying on back to sitting on the side of the bed? [ ] 1   [ ] 2   [X] 3   [ ] 4   How much help from another person does the patient currently need. .. Total A Lot A Little None   4. Moving to and from a bed to a chair (including a wheelchair)? [ ] 1   [ ] 2   [X] 3   [ ] 4   5. Need to walk in hospital room? [ ] 1   [ ] 2   [X] 3   [ ] 4   6. Climbing 3-5 steps with a railing? [X] 1   [ ] 2   [ ] 3   [ ] 4   © 2007, Trustees of [facility] [employer], under license to [employer]. All rights reserved       Score:  Initial: 16 Most Recent: X (Date: -- )     Interpretation of Tool:  Represents activities that are increasingly more difficult (i.e. Bed mobility, Transfers, Gait).        Score 24 23 22-20 19-15 14-10 9-7 6       Modifier CH CI CJ CK CL CM CN         · Mobility - Walking and Moving Around:               - CURRENT STATUS:    CK - 40%-59% impaired, limited or restricted               - GOAL STATUS:           CJ - 20%-39% impaired, limited or restricted               - D/C STATUS:                       ---------------To be determined---------------  Payor: BLUE CROSS / Plan: [facility] / Product Type: PPO /       Medical Necessity:     · Patient is expected to demonstrate progress in strength, range of motion, balance, coordination and functional technique to decrease assistance required with bed mobility, transfers & gait. Reason for Services/Other Comments:  · Patient continues to require skilled intervention due to pt not independent with functional mobility. Use of outcome tool(s) and clinical judgement create a POC that gives a: Clear prediction of patient's progress: LOW COMPLEXITY                 TREATMENT:   (In addition to Assessment/Re-Assessment sessions the following treatments were rendered)      Pre-treatment Symptoms/Complaints:  Pt happy with progress  Pain: Initial: visual scale  Pain Intensity 1: 4  Post Session: 4/10      Therapeutic Exercise: (45 Minutes (group therapy)):  Exercises per grid below to improve mobility and dynamic movement of leg - right to improve functional endurance. Required minimal verbal cues to promote proper body alignment and promote proper body mechanics. Progressed range and repetitions as indicated. Gait Training (15 Minutes):  Gait training to improve and/or restore physical functioning as related to mobility, strength, balance, coordination and dynamic movement of leg - right to improve functional gait. Ambulated 308 Feet (ft) (walked distance twice) with Stand-by asssistance using a Walker, rolling and minimal cues related to their stride length and heel strike to promote proper body alignment, promote proper body posture and promote proper body mechanics.            Date:  3/29 Date:  3/30  Date:      ACTIVITY/EXERCISE AM PM AM PM AM PM   GROUP THERAPY  [ ]  [ ]  [ ]  [ ]  [ ]  [ ]   Ankle Pumps 15  20 20          Quad Sets 15  20  20         Gluteal Sets 15  20  20         Hip ABd/ADduction 15  20  20         Straight Leg Raises 15  20  20         Knee Slides 15  20  20         Short Arc Quads 15  20  20         Long Arc Quads               Chair Slides      20                         B = bilateral; AA = active assistive; A = active; P = passive       Treatment/Session Assessment:         Response to Treatment:  No concerns     Education:  [X] Home Exercises  [X] Fall Precautions  [ ] Hip Precautions [ ] Going Home Video  [x ] Knee/Hip Prosthesis Review  [X] Walker Management/Safety [ ] Adaptive Equipment as Needed         Interdisciplinary Collaboration:   · Physical Therapist, Physical Therapy Assistant, Registered Nurse and Rehabilitation Attendant     After treatment position/precautions:   · Supine in bed, Bed/Chair-wheels locked, Bed in low position, Caregiver at bedside, Call light within reach, RN notified and Family at bedside     Compliance with Program/Exercises: Will assess as treatment progresses. Recommendations/Intent for next treatment session:  Treatment next visit will focus on increasing Mr. Merinos independence with bed mobility, transfers, gait training, strength/ROM exercises, modalities for pain, and patient education.        Total Treatment Duration:  PT Patient Time In/Time Out  Time In: 1030  Time Out: 24 Tana Jorgensen, PT

## 2017-04-01 ENCOUNTER — HOME CARE VISIT (OUTPATIENT)
Dept: SCHEDULING | Facility: HOME HEALTH | Age: 62
End: 2017-04-01
Payer: COMMERCIAL

## 2017-04-01 VITALS
TEMPERATURE: 97.9 F | HEART RATE: 90 BPM | SYSTOLIC BLOOD PRESSURE: 142 MMHG | DIASTOLIC BLOOD PRESSURE: 78 MMHG | RESPIRATION RATE: 18 BRPM

## 2017-04-01 PROCEDURE — 400013 HH SOC

## 2017-04-01 PROCEDURE — G0151 HHCP-SERV OF PT,EA 15 MIN: HCPCS

## 2017-04-03 ENCOUNTER — HOME CARE VISIT (OUTPATIENT)
Dept: SCHEDULING | Facility: HOME HEALTH | Age: 62
End: 2017-04-03
Payer: COMMERCIAL

## 2017-04-03 VITALS
TEMPERATURE: 98.4 F | RESPIRATION RATE: 17 BRPM | SYSTOLIC BLOOD PRESSURE: 138 MMHG | DIASTOLIC BLOOD PRESSURE: 78 MMHG | HEART RATE: 88 BPM

## 2017-04-03 PROCEDURE — G0157 HHC PT ASSISTANT EA 15: HCPCS

## 2017-04-05 ENCOUNTER — HOME CARE VISIT (OUTPATIENT)
Dept: SCHEDULING | Facility: HOME HEALTH | Age: 62
End: 2017-04-05
Payer: COMMERCIAL

## 2017-04-05 VITALS
SYSTOLIC BLOOD PRESSURE: 124 MMHG | HEART RATE: 90 BPM | TEMPERATURE: 96.9 F | DIASTOLIC BLOOD PRESSURE: 76 MMHG | RESPIRATION RATE: 17 BRPM

## 2017-04-05 PROCEDURE — G0157 HHC PT ASSISTANT EA 15: HCPCS

## 2017-04-07 ENCOUNTER — HOME CARE VISIT (OUTPATIENT)
Dept: SCHEDULING | Facility: HOME HEALTH | Age: 62
End: 2017-04-07
Payer: COMMERCIAL

## 2017-04-07 PROCEDURE — G0157 HHC PT ASSISTANT EA 15: HCPCS

## 2017-04-09 VITALS
DIASTOLIC BLOOD PRESSURE: 84 MMHG | HEART RATE: 78 BPM | RESPIRATION RATE: 17 BRPM | TEMPERATURE: 97 F | SYSTOLIC BLOOD PRESSURE: 130 MMHG

## 2017-04-10 ENCOUNTER — HOME CARE VISIT (OUTPATIENT)
Dept: SCHEDULING | Facility: HOME HEALTH | Age: 62
End: 2017-04-10
Payer: COMMERCIAL

## 2017-04-10 VITALS
RESPIRATION RATE: 17 BRPM | HEART RATE: 92 BPM | DIASTOLIC BLOOD PRESSURE: 78 MMHG | SYSTOLIC BLOOD PRESSURE: 130 MMHG | TEMPERATURE: 97.3 F

## 2017-04-10 PROCEDURE — G0157 HHC PT ASSISTANT EA 15: HCPCS

## 2017-04-11 ENCOUNTER — HOME CARE VISIT (OUTPATIENT)
Dept: SCHEDULING | Facility: HOME HEALTH | Age: 62
End: 2017-04-11
Payer: COMMERCIAL

## 2017-04-11 VITALS
RESPIRATION RATE: 17 BRPM | DIASTOLIC BLOOD PRESSURE: 84 MMHG | HEART RATE: 88 BPM | TEMPERATURE: 96.9 F | SYSTOLIC BLOOD PRESSURE: 130 MMHG

## 2017-04-11 PROCEDURE — G0157 HHC PT ASSISTANT EA 15: HCPCS

## 2017-04-14 ENCOUNTER — HOME CARE VISIT (OUTPATIENT)
Dept: SCHEDULING | Facility: HOME HEALTH | Age: 62
End: 2017-04-14
Payer: COMMERCIAL

## 2017-04-14 PROCEDURE — G0157 HHC PT ASSISTANT EA 15: HCPCS

## 2017-04-16 VITALS
TEMPERATURE: 97 F | HEART RATE: 76 BPM | DIASTOLIC BLOOD PRESSURE: 82 MMHG | SYSTOLIC BLOOD PRESSURE: 130 MMHG | RESPIRATION RATE: 17 BRPM

## 2017-04-17 ENCOUNTER — HOME CARE VISIT (OUTPATIENT)
Dept: SCHEDULING | Facility: HOME HEALTH | Age: 62
End: 2017-04-17
Payer: COMMERCIAL

## 2017-04-17 VITALS — SYSTOLIC BLOOD PRESSURE: 140 MMHG | RESPIRATION RATE: 17 BRPM | HEART RATE: 79 BPM | DIASTOLIC BLOOD PRESSURE: 94 MMHG

## 2017-04-17 PROCEDURE — G0157 HHC PT ASSISTANT EA 15: HCPCS

## 2017-04-19 ENCOUNTER — HOME CARE VISIT (OUTPATIENT)
Dept: SCHEDULING | Facility: HOME HEALTH | Age: 62
End: 2017-04-19
Payer: COMMERCIAL

## 2017-04-19 VITALS
SYSTOLIC BLOOD PRESSURE: 160 MMHG | TEMPERATURE: 98 F | OXYGEN SATURATION: 98 % | HEART RATE: 87 BPM | RESPIRATION RATE: 16 BRPM | DIASTOLIC BLOOD PRESSURE: 90 MMHG

## 2017-04-19 PROCEDURE — G0151 HHCP-SERV OF PT,EA 15 MIN: HCPCS

## 2017-04-19 PROCEDURE — A4649 SURGICAL SUPPLIES: HCPCS

## 2017-04-24 PROBLEM — I48.0 PAROXYSMAL ATRIAL FIBRILLATION (HCC): Status: ACTIVE | Noted: 2017-04-24

## 2017-04-24 PROBLEM — I47.29: Status: ACTIVE | Noted: 2017-04-24

## 2017-04-24 PROBLEM — Z96.659 S/P TOTAL KNEE ARTHROPLASTY: Status: RESOLVED | Noted: 2017-03-29 | Resolved: 2017-04-24

## 2017-04-24 PROBLEM — I34.0 MITRAL VALVE INSUFFICIENCY: Status: ACTIVE | Noted: 2017-04-24

## 2017-04-24 PROBLEM — E78.5 DYSLIPIDEMIA: Status: ACTIVE | Noted: 2017-04-24

## 2017-04-27 ENCOUNTER — HOSPITAL ENCOUNTER (OUTPATIENT)
Dept: PHYSICAL THERAPY | Age: 62
Discharge: HOME | End: 2017-04-27
Payer: COMMERCIAL

## 2017-04-27 PROCEDURE — 97162 PT EVAL MOD COMPLEX 30 MIN: CPT

## 2017-04-27 PROCEDURE — 97110 THERAPEUTIC EXERCISES: CPT

## 2017-04-27 NOTE — PROGRESS NOTES
Ambulatory/Rehab Services H2 Model Falls Risk Assessment    Risk Factor Pts. ·   Confusion/Disorientation/Impulsivity  []    4 ·   Symptomatic Depression  []   2 ·   Altered Elimination  []   1 ·   Dizziness/Vertigo  []   1 ·   Gender (Male)  [x]   1 ·   Any administered antiepileptics (anticonvulsants):  []   2 ·   Any administered benzodiazepines:  []   1 ·   Visual Impairment (specify):  []   1 ·   Portable Oxygen Use  []   1 ·   Orthostatic ? BP  []   1 ·   History of Recent Falls (within 3 mos.)  []   5     Ability to Rise from Chair (choose one) Pts. ·   Ability to rise in a single movement  []   0 ·   Pushes up, successful in one attempt  [x]   1 ·   Multiple attempts, but successful  []   3 ·   Unable to rise without assistance  []   4   Total: (5 or greater = High Risk) 2     Falls Prevention Plan:   []                Physical Limitations to Exercise (specify):   []                Mobility Assistance Device (type):   []                Exercise/Equipment Adaptation (specify):    ©2010 [facility] of [employer] Patent #[id number].  Federal Law prohibits the replication, distribution or use without written permission from [facility] [employer]

## 2017-04-27 NOTE — PROGRESS NOTES
Alice Dixon  : 1955 [facility] [address] [place] [address]  [phone]   [phone]        OUTPATIENT PHYSICAL THERAPY:Initial Assessment and Daily Note 2017    ICD-10: Treatment Diagnosis: pain in joint; right knee M25.561  ICD-10: Treatment Diagnosis: stiffness in joint; right knee M25.661  ICD-10: Treatment Diagnosis: muscle weakness, generalized M62.81  Precautions/Allergies: Review of patient's allergies indicates no known allergies. Fall Risk Score: 2 (? 5 = High Risk)  MD Orders: evaluate and treat for right TKA MEDICAL/REFERRING DIAGNOSIS:  Status post total right knee replacement [Z96.651]   DATE OF ONSET: surgical date: 3/28/17  REFERRING PHYSICIAN: Boubacar Bello MD  RETURN PHYSICIAN APPOINTMENT: 5/10/17     INITIAL ASSESSMENT:  Mr. Liya Galan is a 60 y.o. male who presents to physical therapy s/p right TKA surgery on 3/28/17. He presents with limitations in right knee ROM, strength and endurance. He is challenged with gait, secondary to unable to obtain terminal knee extension. Currently using a cane with ambulation. He would benefit from physical therapy services to improve overall mobility and function to return to his ADLs and recreational activities. PROBLEM LIST (Impacting functional limitations):  1. Decreased Strength  2. Decreased ADL/Functional Activities  3. Decreased Transfer Abilities  4. Decreased Ambulation Ability/Technique  5. Decreased Balance  6. Increased Pain  7. Decreased Activity Tolerance  8. Increased Fatigue  9. Decreased Flexibility/Joint Mobility INTERVENTIONS PLANNED:  1. Balance Exercise  2. Cold  3. Gait Training  4. Home Exercise Program (HEP)  5. Manual Therapy  6. Neuromuscular Re-education/Strengthening  7. Range of Motion (ROM)  8. Therapeutic Activites  9. Therapeutic Exercise/Strengthening   TREATMENT PLAN:  Effective Dates: 17 TO 17.   Frequency/Duration: 2 times a week for 8 weeks, progress to 1x a week for 4 weeks. GOALS: (Goals have been discussed and agreed upon with patient.)  Short-Term Functional Goals: Time Frame:  6 weeks  1. Patient demonstrates independence with his HEP without verbal cueing from therapist.  2. Demonstrates improve right knee ROM 0-120 degrees to perform daily activities. 3. Patient able to ambulate for 15 minutes with pain no more than 0-2/10  Discharge Goals: Time Frame: 12 weeks  1. Improve LE and core strength to 4+/5 to perform ADLs  2. Patient improve right knee ROM to 0-125 degrees to perform ADLs  3. Patient able to ambulate for 30 minutes without onset of right knee pain. 4. Improve LEFS score from 44/80 to 60/80 to improve daily activities. Rehabilitation Potential For Stated Goals: Excellent  Regarding Don Kenny's therapy, I certify that the treatment plan above will be carried out by a therapist or under their direction. Thank you for this referral,  Shelley Persaud, PT     Referring Physician Signature: Lukas Goel MD              Date                    The information in this section was collected on 4/27/17 (except where otherwise noted). HISTORY:   History of Present Injury/Illness (Reason for Referral):  Chronic history of right knee pain, s/p right TKA on 3/28/17, obtained 3 weeks of home health PT, notes improvement in overall mobility and less pain levels. In 2016, he had right knee surgery to repair ITB/patella. Notes he was not able to straighten his leg completely after than procedure. Notes overall mobility around the home is fair, able to drive without challenges. He continues to be limited with amount of ambulation secondary to increased discomfort with prolonged weight bearing and ambulation. Patient denies dizziness, drop attacks, numbness/tingling, bowel/bladder dysfunction and/or unexplained weight gain/loss.     Past Medical History/Comorbidities:   Mr. Mercedes Muro  has a past medical history of Arrhythmia (10 - 15 years ago); Arthritis; CAD (coronary artery disease); Cancer ([facility] Depression; Diabetes ([facility] [address] (6-7 years ago); HTN (hypertension) (7/18/2014); Hypercholesterolemia; Hyperlipidemia (7/18/2014); Hypertension (diagnosed at 24years old); Knee pain (5/25/2016); Morbid obesity ([facility] [address] Psychiatric disorder; and S/P total knee arthroplasty (3/29/2017). He also has no past medical history of Aneurysm ([address] Asthma; Autoimmune disease ([address] Chronic kidney disease; Chronic pain; Coagulation disorder ([facility] [address] [facility] COPD; Difficult intubation; Endocarditis; GERD (gastroesophageal reflux disease); Heart failure ([facility] [address] Liver disease; Malignant hyperthermia due to anesthesia; Nausea & vomiting; Other ill-defined conditions; Pseudocholinesterase deficiency; PUD (peptic ulcer disease); Rheumatic fever; Seizures ([facility] [address] [facility] Stroke [facility]); Thromboembolus ([facility] [address] [facility] Thyroid disease; Unspecified adverse effect of anesthesia; or Unspecified sleep apnea. Mr. Annel Long  has a past surgical history that includes other surgical; colonoscopy (2013); heent (1971); orthopaedic (Left, 2003); orthopaedic (Left, 2009); orthopaedic (Right, 2016); and knee replacement (Right, 03/28/2017). Social History/Living Environment:     Lives in private home with his wife, one story home, with stairs into home. Prior Level of Function/Work/Activity:  Independent, however experienced limited knee extension with gait, works full time as a .   Dominant Side:         RIGHT    Current Medications:       Current Outpatient Prescriptions:     docusate sodium (COLACE) 100 mg capsule, Take 200 mg by mouth daily. , Disp: , Rfl:     aspirin delayed-release 325 mg tablet, Take 1 Tab by mouth every twelve (12) hours every twelve (12) hours for 30 days. , Disp: 60 Tab, Rfl: 0    glimepiride (AMARYL) 4 mg tablet, TAKE 1 TABLET EVERY MORNING, Disp: 90 Tab, Rfl: 1    verapamil ER (CALAN-SR) 180 mg CR tablet, Take 180 mg by mouth daily.  Take / use AM day of surgery  per anesthesia protocols. Indications: hypertension, Disp: , Rfl:     FA/NIACINAMIDE/CUPRIC OX/ZN OX (NICOTINAMIDE PO), Take  by mouth two (2) times a day., Disp: , Rfl:     coenzyme q10 10 mg cap, Take  by mouth daily. , Disp: , Rfl:     metFORMIN (GLUCOPHAGE) 1,000 mg tablet, Take 1,000 mg by mouth daily. Takes 2 tablets every morning   Indications: type 2 diabetes mellitus, Disp: , Rfl:     metoprolol succinate (TOPROL-XL) 25 mg XL tablet, Take 25 mg by mouth daily. Take / use AM day of surgery  per anesthesia protocols. Indications: hypertension, Disp: , Rfl:     acyclovir (ZOVIRAX) 200 mg capsule, Take 1 Cap by mouth daily. (Patient taking differently: Take 200 mg by mouth daily. Take / use AM day of surgery  per anesthesia protocols.), Disp: 30 Cap, Rfl: 0    flecainide (TAMBOCOR) 100 mg tablet, Take 100 mg by mouth two (2) times a day. Take / use AM day of surgery  per anesthesia protocols. Indications: PREVENTION OF RECURRENT ATRIAL FIBRILLATION, Disp: , Rfl:     simvastatin (ZOCOR) 80 mg tablet, Take 1 Tab by mouth nightly. (Patient taking differently: Take 80 mg by mouth nightly. Take / use AM day of surgery  per anesthesia protocols. Indications: hyperlipidemia), Disp: 90 Tab, Rfl: 1    lisinopril (PRINIVIL, ZESTRIL) 10 mg tablet, Take 1 Tab by mouth daily. (Patient taking differently: Take 10 mg by mouth daily. Indications: hypertension), Disp: 90 Tab, Rfl: 1    VITAMIN D-3 PO, take  by mouth daily. am , Disp: , Rfl:    Date Last Reviewed:  4/27/2017   Number of Personal Factors/Comorbidities that affect the Plan of Care: 1-2: MODERATE COMPLEXITY   EXAMINATION:   Observation/Orthostatic Postural Assessment:           Lower extremity weight bearing is slight increased left, with right knee flexed and weight shifted left. Observation of gait indicates decreased right knee extension, demonstrates antalgic gait, currently using straight cane.  Patient exhibits a neutral lumbar lordosis and slight increased thoracic kyphosis. Palpation of lower quadrant bony landmarks are left iliac crest elevated compared to right. Knee alignment is right knee flexed, bilateral femur internally rotated, right greater than left. Patellar tracking with short knee bend indicates restricted movement in right knee. Single leg stand exhibits challenged to perform, 2 second hold. Soft tissue observation indicates restrictions noted in right anterior quadriceps, iliopsoas and bilateral hamstrings. Palpation: Patient exhibits tenderness to palpation of right incision. In modified Cristiano position, muscle length of tensor fascia marilu (TFL)  is restricted bilaterally, right greater than left; muscle length of iliopsoas is restricted right greater than left; and muscle length of rectus femoris is restricted right greater than left. Hamstring flexibility tested supine with straight leg raise is restricted bilaterally, right 50 degrees, left 60 degrees. Piriformis flexibility is restricted  bilaterally. Quadriceps flexibility tested heel to buttock is restricted bilaterally. Muscle length of gastrocnemius is restricted bilaterally. Muscle length of soleus is restricted right greater than left. ROM:   AROM(PROM) Right Left   Knee flexion 3-108 1-130   Knee extension Lacks 3 degrees Lacks 1 degree   Hip flexion 85 90   Hip external rotation (ER) 20 20   Hip internal rotation (IR) 10 15   Hip extension 5 5     Strength:     Manual Muscle Test (*/5) Right Left   Knee extension 4- 4+   Knee flexion 4 4+   Hip flexion 4- 4   Hip ER 4 4   Hip IR 4 4   Hip extension 3+ 4-   Hip abduction 3+ 4-   Hip adduction 5 5   Ankle DF 5 5   Ankle PF 4+ 5   Core stability 4-/5     Functional strength with standing heel raise is WFL. Functional strength with single leg partial squat exhibits not tested today. Special Tests:  None today    Neurological Screen:  Myotomes: Key muscle strength testing for bilateral LE is intact.   Dermatomes: Sensation testing through bilateral LE for light touch is intact. Reflexes: Patellar (L4) and achilles (S1) are not tested today. Neural tension tests: Slump test is negative. Passive straight leg raise (SLR) test is negative. Functional Mobility:  Challenged with prolonged sitting, standing and ambulation. Body Structures Involved:  1. Nerves  2. Bones  3. Joints  4. Muscles Body Functions Affected:  1. Sensory/Pain  2. Neuromusculoskeletal  3. Movement Related Activities and Participation Affected:  1. General Tasks and Demands  2. Mobility  3. Self Care  4. Community, Social and [place]   Number of elements (examined above) that affect the Plan of Care: 4+: HIGH COMPLEXITY   CLINICAL PRESENTATION:   Presentation: Evolving clinical presentation with changing clinical characteristics: MODERATE COMPLEXITY   CLINICAL DECISION MAKING:   Outcome Measure: Tool Used: Lower Extremity Functional Scale (LEFS)  Score:  Initial: 44/80 Most Recent: X/80 (Date: -- )   Interpretation of Score: 20 questions each scored on a 5 point scale with 0 representing \"extreme difficulty or unable to perform\" and 4 representing \"no difficulty\". The lower the score, the greater the functional disability. 80/80 represents no disability. Minimal detectable change is 9 points. Medical Necessity:   · Patient is expected to demonstrate progress in strength, range of motion, balance and coordination to increase independence with ADLs and improve endurance with ambulation. Reason for Services/Other Comments:  · Patient continues to require skilled intervention due to continues to demonstrate limited ROM in right knee, challenged with prolonged sitting and prolonged weight bearing and ambulation.    Use of outcome tool(s) and clinical judgement create a POC that gives a: Questionable prediction of patient's progress: MODERATE COMPLEXITY            TREATMENT:   (In addition to Assessment/Re-Assessment sessions the following treatments were rendered)  Pre-treatment Symptoms/Complaints:  Patient notes pain levels have been low, however increase with ROM exercises, especially as he reaches his available end range of knee flexion and extension  Pain: Initial:   Pain Intensity 1: 2  Pain Location 1: Knee  Pain Orientation 1: Right  Post Session:  1/10     Therapeutic Exercise: (15 Minutes):  Exercises per grid below to improve mobility, strength, balance and coordination. Required minimal verbal and manual cues to promote proper body alignment, promote proper body posture and promote proper body mechanics. Progressed resistance, range, repetitions and complexity of movement as indicated. Date:  4/27/2017   Activity/Exercise Parameters   Review exercises provided by home health X 5 minutes   Quad sets X 10 reps, 10 sec holds   Straight leg raises X 10 reps, 5 sec holds   AAROM knee flexion with strap X 10 reps, 5 sec holds  Followed by x 10 reps AROM heelslides   Prone knee hangs X 3 minutes             Manual Therapy (    Soft Tissue Mobilization Duration  Duration: 5 Minutes): Manual techniques to facilitate improved motion and decreased pain. · Supine right patella mobilizations in all directions  · Supine soft tissue mobilization and scar tissue mobilization around right knee incision. (Used abbreviations: MET - muscle energy technique; PNF - proprioceptive neuromuscular facilitation; NMR - neuromuscular re-education; a/p - anterior to posterior; p/a - posterior to anterior, FMP - functional movement patterns)    Treatment/Session Assessment:    · Response to Treatment:  Improved awareness of importance of obtaining ROM in right knee to perform daily activities, improved knee ROM to 2-118 degrees at end of session. · Compliance with Program/Exercises: compliant all of the time. · Recommendations/Intent for next treatment session: \"Next visit will focus on advancements to more challenging activities\".   Total Treatment Duration:  PT Patient Time In/Time Out  Time In: 1430  Time Out: [address] [place], PT

## 2017-05-02 ENCOUNTER — HOSPITAL ENCOUNTER (OUTPATIENT)
Dept: PHYSICAL THERAPY | Age: 62
Discharge: HOME | End: 2017-05-02
Payer: COMMERCIAL

## 2017-05-02 PROCEDURE — 97110 THERAPEUTIC EXERCISES: CPT

## 2017-05-02 PROCEDURE — 97140 MANUAL THERAPY 1/> REGIONS: CPT

## 2017-05-02 NOTE — PROGRESS NOTES
Nohemi Dodd  : [facility] [address] [place] [address]  [phone]   [phone]        OUTPATIENT PHYSICAL THERAPY:Daily Note 2017    ICD-10: Treatment Diagnosis: pain in joint; right knee M25.561  ICD-10: Treatment Diagnosis: stiffness in joint; right knee M25.661  ICD-10: Treatment Diagnosis: muscle weakness, generalized M62.81  Precautions/Allergies: Review of patient's allergies indicates no known allergies. Fall Risk Score: 2 (? 5 = High Risk)  MD Orders: evaluate and treat for right TKA MEDICAL/REFERRING DIAGNOSIS:  Status post total right knee replacement [Z96.651]   DATE OF ONSET: surgical date: 3/28/17  REFERRING PHYSICIAN: Randy Xavier MD  RETURN PHYSICIAN APPOINTMENT: 5/10/17     INITIAL ASSESSMENT:  Mr. Karina Lloyd is a 60 y.o. male who presents to physical therapy s/p right TKA surgery on 3/28/17. He presents with limitations in right knee ROM, strength and endurance. He is challenged with gait, secondary to unable to obtain terminal knee extension. Currently using a cane with ambulation. He would benefit from physical therapy services to improve overall mobility and function to return to his ADLs and recreational activities. PROBLEM LIST (Impacting functional limitations):  1. Decreased Strength  2. Decreased ADL/Functional Activities  3. Decreased Transfer Abilities  4. Decreased Ambulation Ability/Technique  5. Decreased Balance  6. Increased Pain  7. Decreased Activity Tolerance  8. Increased Fatigue  9. Decreased Flexibility/Joint Mobility INTERVENTIONS PLANNED:  1. Balance Exercise  2. Cold  3. Gait Training  4. Home Exercise Program (HEP)  5. Manual Therapy  6. Neuromuscular Re-education/Strengthening  7. Range of Motion (ROM)  8. Therapeutic Activites  9. Therapeutic Exercise/Strengthening   TREATMENT PLAN:  Effective Dates: 17 TO 17.   Frequency/Duration: 2 times a week for 8 weeks, progress to 1x a week for 4 weeks. GOALS: (Goals have been discussed and agreed upon with patient.)  Short-Term Functional Goals: Time Frame:  6 weeks  1. Patient demonstrates independence with his HEP without verbal cueing from therapist.  2. Demonstrates improve right knee ROM 0-120 degrees to perform daily activities. 3. Patient able to ambulate for 15 minutes with pain no more than 0-2/10  Discharge Goals: Time Frame: 12 weeks  1. Improve LE and core strength to 4+/5 to perform ADLs  2. Patient improve right knee ROM to 0-125 degrees to perform ADLs  3. Patient able to ambulate for 30 minutes without onset of right knee pain. 4. Improve LEFS score from 44/80 to 60/80 to improve daily activities. Rehabilitation Potential For Stated Goals: Excellent  Regarding Akshat Kenny's therapy, I certify that the treatment plan above will be carried out by a therapist or under their direction. Thank you for this referral,  Ronit Whyte, PT     Referring Physician Signature: Wilner Kimball MD              Date                    The information in this section was collected on 4/27/17 (except where otherwise noted). HISTORY:   History of Present Injury/Illness (Reason for Referral):  Chronic history of right knee pain, s/p right TKA on 3/28/17, obtained 3 weeks of home health PT, notes improvement in overall mobility and less pain levels. In 2016, he had right knee surgery to repair ITB/patella. Notes he was not able to straighten his leg completely after than procedure. Notes overall mobility around the home is fair, able to drive without challenges. He continues to be limited with amount of ambulation secondary to increased discomfort with prolonged weight bearing and ambulation. Patient denies dizziness, drop attacks, numbness/tingling, bowel/bladder dysfunction and/or unexplained weight gain/loss.     Past Medical History/Comorbidities:   Mr. Karina Hernández  has a past medical history of Arrhythmia (10 - 15 years ago); Arthritis; CAD (coronary artery disease); Cancer ([facility] Depression; Diabetes ([facility] (6-7 years ago); HTN (hypertension) (7/18/2014); Hypercholesterolemia; Hyperlipidemia (7/18/2014); Hypertension (diagnosed at 24years old); Knee pain (5/25/2016); Morbid obesity ([facility] Psychiatric disorder; and S/P total knee arthroplasty (3/29/2017). He also has no past medical history of Aneurysm ([facility] Asthma; Autoimmune disease ([facility] Chronic kidney disease; Chronic pain; Coagulation disorder ([facility] COPD; Difficult intubation; Endocarditis; GERD (gastroesophageal reflux disease); Heart failure ([facility] Liver disease; Malignant hyperthermia due to anesthesia; Nausea & vomiting; Other ill-defined conditions; Pseudocholinesterase deficiency; PUD (peptic ulcer disease); Rheumatic fever; Seizures ([facility] Stroke [facility]); Thromboembolus ([facility] Thyroid disease; Unspecified adverse effect of anesthesia; or Unspecified sleep apnea. Mr. Susan Pineda  has a past surgical history that includes other surgical; colonoscopy (2013); heent (1971); orthopaedic (Left, 2003); orthopaedic (Left, 2009); orthopaedic (Right, 2016); and knee replacement (Right, 03/28/2017). Social History/Living Environment:     Lives in private home with his wife, one story home, with stairs into home. Prior Level of Function/Work/Activity:  Independent, however experienced limited knee extension with gait, works full time as a .   Dominant Side:         RIGHT    Current Medications:       Current Outpatient Prescriptions:     docusate sodium (COLACE) 100 mg capsule, Take 200 mg by mouth daily. , Disp: , Rfl:     glimepiride (AMARYL) 4 mg tablet, TAKE 1 TABLET EVERY MORNING, Disp: 90 Tab, Rfl: 1    verapamil ER (CALAN-SR) 180 mg CR tablet, Take 180 mg by mouth daily. Take / use AM day of surgery  per anesthesia protocols.   Indications: hypertension, Disp: , Rfl:     FA/NIACINAMIDE/CUPRIC OX/ZN OX (NICOTINAMIDE PO), Take  by mouth two (2) times a day., Disp: , Rfl:    coenzyme q10 10 mg cap, Take  by mouth daily. , Disp: , Rfl:     metFORMIN (GLUCOPHAGE) 1,000 mg tablet, Take 1,000 mg by mouth daily. Takes 2 tablets every morning   Indications: type 2 diabetes mellitus, Disp: , Rfl:     metoprolol succinate (TOPROL-XL) 25 mg XL tablet, Take 25 mg by mouth daily. Take / use AM day of surgery  per anesthesia protocols. Indications: hypertension, Disp: , Rfl:     acyclovir (ZOVIRAX) 200 mg capsule, Take 1 Cap by mouth daily. (Patient taking differently: Take 200 mg by mouth daily. Take / use AM day of surgery  per anesthesia protocols.), Disp: 30 Cap, Rfl: 0    flecainide (TAMBOCOR) 100 mg tablet, Take 100 mg by mouth two (2) times a day. Take / use AM day of surgery  per anesthesia protocols. Indications: PREVENTION OF RECURRENT ATRIAL FIBRILLATION, Disp: , Rfl:     simvastatin (ZOCOR) 80 mg tablet, Take 1 Tab by mouth nightly. (Patient taking differently: Take 80 mg by mouth nightly. Take / use AM day of surgery  per anesthesia protocols. Indications: hyperlipidemia), Disp: 90 Tab, Rfl: 1    lisinopril (PRINIVIL, ZESTRIL) 10 mg tablet, Take 1 Tab by mouth daily. (Patient taking differently: Take 10 mg by mouth daily. Indications: hypertension), Disp: 90 Tab, Rfl: 1    VITAMIN D-3 PO, take  by mouth daily. am , Disp: , Rfl:    Date Last Reviewed:  5/2/2017   Number of Personal Factors/Comorbidities that affect the Plan of Care: 1-2: MODERATE COMPLEXITY   EXAMINATION:   Observation/Orthostatic Postural Assessment:           Lower extremity weight bearing is slight increased left, with right knee flexed and weight shifted left. Observation of gait indicates decreased right knee extension, demonstrates antalgic gait, currently using straight cane. Patient exhibits a neutral lumbar lordosis and slight increased thoracic kyphosis. Palpation of lower quadrant bony landmarks are left iliac crest elevated compared to right.  Knee alignment is right knee flexed, bilateral femur internally rotated, right greater than left. Patellar tracking with short knee bend indicates restricted movement in right knee. Single leg stand exhibits challenged to perform, 2 second hold. Soft tissue observation indicates restrictions noted in right anterior quadriceps, iliopsoas and bilateral hamstrings. Palpation: Patient exhibits tenderness to palpation of right incision. In modified Cristiano position, muscle length of tensor fascia marilu (TFL)  is restricted bilaterally, right greater than left; muscle length of iliopsoas is restricted right greater than left; and muscle length of rectus femoris is restricted right greater than left. Hamstring flexibility tested supine with straight leg raise is restricted bilaterally, right 50 degrees, left 60 degrees. Piriformis flexibility is restricted  bilaterally. Quadriceps flexibility tested heel to buttock is restricted bilaterally. Muscle length of gastrocnemius is restricted bilaterally. Muscle length of soleus is restricted right greater than left. ROM:   AROM(PROM) Right Left   Knee flexion 3-108 1-130   Knee extension Lacks 3 degrees Lacks 1 degree   Hip flexion 85 90   Hip external rotation (ER) 20 20   Hip internal rotation (IR) 10 15   Hip extension 5 5     Strength:     Manual Muscle Test (*/5) Right Left   Knee extension 4- 4+   Knee flexion 4 4+   Hip flexion 4- 4   Hip ER 4 4   Hip IR 4 4   Hip extension 3+ 4-   Hip abduction 3+ 4-   Hip adduction 5 5   Ankle DF 5 5   Ankle PF 4+ 5   Core stability 4-/5     Functional strength with standing heel raise is WFL. Functional strength with single leg partial squat exhibits not tested today. Special Tests:  None today    Neurological Screen:  Myotomes: Key muscle strength testing for bilateral LE is intact. Dermatomes: Sensation testing through bilateral LE for light touch is intact. Reflexes: Patellar (L4) and achilles (S1) are not tested today. Neural tension tests: Slump test is negative. Passive straight leg raise (SLR) test is negative. Functional Mobility:  Challenged with prolonged sitting, standing and ambulation. Body Structures Involved:  1. Nerves  2. Bones  3. Joints  4. Muscles Body Functions Affected:  1. Sensory/Pain  2. Neuromusculoskeletal  3. Movement Related Activities and Participation Affected:  1. General Tasks and Demands  2. Mobility  3. Self Care  4. Community, Social and [place]   Number of elements (examined above) that affect the Plan of Care: 4+: HIGH COMPLEXITY   CLINICAL PRESENTATION:   Presentation: Evolving clinical presentation with changing clinical characteristics: MODERATE COMPLEXITY   CLINICAL DECISION MAKING:   Outcome Measure: Tool Used: Lower Extremity Functional Scale (LEFS)  Score:  Initial: 44/80 Most Recent: X/80 (Date: -- )   Interpretation of Score: 20 questions each scored on a 5 point scale with 0 representing \"extreme difficulty or unable to perform\" and 4 representing \"no difficulty\". The lower the score, the greater the functional disability. 80/80 represents no disability. Minimal detectable change is 9 points. Medical Necessity:   · Patient is expected to demonstrate progress in strength, range of motion, balance and coordination to increase independence with ADLs and improve endurance with ambulation. Reason for Services/Other Comments:  · Patient continues to require skilled intervention due to continues to demonstrate limited ROM in right knee, challenged with prolonged sitting and prolonged weight bearing and ambulation.    Use of outcome tool(s) and clinical judgement create a POC that gives a: Questionable prediction of patient's progress: MODERATE COMPLEXITY            TREATMENT:   (In addition to Assessment/Re-Assessment sessions the following treatments were rendered)  Pre-treatment Symptoms/Complaints:  Patient notes has been up since 5am and notes some discomfort from sitting in waiting room waiting for his wife from outpatient surgery  Pain: Initial:   Pain Intensity 1: 2  Pain Location 1: Knee  Pain Orientation 1: Right  Post Session:  1/10     Therapeutic Exercise: (35 Minutes):  Exercises per grid below to improve mobility, strength, balance and coordination. Required minimal verbal and manual cues to promote proper body alignment, promote proper body posture and promote proper body mechanics. Progressed resistance, range, repetitions and complexity of movement as indicated. Date:  5/2/2017   Activity/Exercise Parameters   Review exercises provided by home health X 5 minutes   Quad sets X 10 reps, 10 sec holds   Straight leg raises X 10 reps, 5 sec holds  X 10 reps , 5 sec holds hip abduction/side lying   AAROM knee flexion with strap X 10 reps, 5 sec holds  Followed by x 10 reps AROM heelslides   Prone knee hangs 5 x 30 sec holds   Upright bike X 6 minutes   Standing knee extension ball on wall X 10 reps, 5 sec holds   Prone knee extension X 10 reps, 5 sec holds   Standing hip abduction, flexion, extension on firm foam X 10 reps each leg   Standing calf stretch 2 X 30 sec holds   Quarterback Squats X 10 reps     Manual Therapy (    Soft Tissue Mobilization Duration  Duration: 10 Minutes): Manual techniques to facilitate improved motion and decreased pain. · Supine right patella mobilizations in all directions  · Supine soft tissue mobilization and scar tissue mobilization around right knee incision. · Prone soft tissue mobilization to right posterior hamstrings and calf with FMP of knee flexion    (Used abbreviations: MET - muscle energy technique; PNF - proprioceptive neuromuscular facilitation; NMR - neuromuscular re-education; a/p - anterior to posterior; p/a - posterior to anterior, FMP - functional movement patterns)    Treatment/Session Assessment:    · Response to Treatment:  Fatigued with weight bearing exercises and hip abduction, demonstrated full rotation with upright bike.   · Compliance with Program/Exercises: compliant all of the time. · Recommendations/Intent for next treatment session: \"Next visit will focus on advancements to more challenging activities\".   Total Treatment Duration:  PT Patient Time In/Time Out  Time In: 0930  Time Out: John Crabtree, PT

## 2017-05-04 ENCOUNTER — HOSPITAL ENCOUNTER (OUTPATIENT)
Dept: PHYSICAL THERAPY | Age: 62
Discharge: HOME | End: 2017-05-04
Payer: COMMERCIAL

## 2017-05-04 PROCEDURE — 97110 THERAPEUTIC EXERCISES: CPT

## 2017-05-04 PROCEDURE — 97140 MANUAL THERAPY 1/> REGIONS: CPT

## 2017-05-05 NOTE — PROGRESS NOTES
Kierra Josiah  : [facility] at [address] [place] [address]  [phone]   [phone]        OUTPATIENT PHYSICAL THERAPY:Daily Note 2017    ICD-10: Treatment Diagnosis: pain in joint; right knee M25.561  ICD-10: Treatment Diagnosis: stiffness in joint; right knee M25.661  ICD-10: Treatment Diagnosis: muscle weakness, generalized M62.81  Precautions/Allergies: Review of patient's allergies indicates no known allergies. Fall Risk Score: 2 (? 5 = High Risk)  MD Orders: evaluate and treat for right TKA MEDICAL/REFERRING DIAGNOSIS:  Status post total right knee replacement [Z96.651]   DATE OF ONSET: surgical date: 3/28/17  REFERRING PHYSICIAN: Adan Li MD  RETURN PHYSICIAN APPOINTMENT: 5/10/17     INITIAL ASSESSMENT:  Mr. Hubert Gatica is a 60 y.o. male who presents to physical therapy s/p right TKA surgery on 3/28/17. He presents with limitations in right knee ROM, strength and endurance. He is challenged with gait, secondary to unable to obtain terminal knee extension. Currently using a cane with ambulation. He would benefit from physical therapy services to improve overall mobility and function to return to his ADLs and recreational activities. PROBLEM LIST (Impacting functional limitations):  1. Decreased Strength  2. Decreased ADL/Functional Activities  3. Decreased Transfer Abilities  4. Decreased Ambulation Ability/Technique  5. Decreased Balance  6. Increased Pain  7. Decreased Activity Tolerance  8. Increased Fatigue  9. Decreased Flexibility/Joint Mobility INTERVENTIONS PLANNED:  1. Balance Exercise  2. Cold  3. Gait Training  4. Home Exercise Program (HEP)  5. Manual Therapy  6. Neuromuscular Re-education/Strengthening  7. Range of Motion (ROM)  8. Therapeutic Activites  9. Therapeutic Exercise/Strengthening   TREATMENT PLAN:  Effective Dates: 17 TO 17.   Frequency/Duration: 2 times a week for 8 weeks, progress to 1x a week for 4 weeks. GOALS: (Goals have been discussed and agreed upon with patient.)  Short-Term Functional Goals: Time Frame:  6 weeks  1. Patient demonstrates independence with his HEP without verbal cueing from therapist.  2. Demonstrates improve right knee ROM 0-120 degrees to perform daily activities. 3. Patient able to ambulate for 15 minutes with pain no more than 0-2/10  Discharge Goals: Time Frame: 12 weeks  1. Improve LE and core strength to 4+/5 to perform ADLs  2. Patient improve right knee ROM to 0-125 degrees to perform ADLs  3. Patient able to ambulate for 30 minutes without onset of right knee pain. 4. Improve LEFS score from 44/80 to 60/80 to improve daily activities. Rehabilitation Potential For Stated Goals: Excellent  Regarding Tahir Ember Hernandezck's therapy, I certify that the treatment plan above will be carried out by a therapist or under their direction. Thank you for this referral,  Malika Alatorre PT     Referring Physician Signature: Tawana Antunez MD              Date                    The information in this section was collected on 4/27/17 (except where otherwise noted). HISTORY:   History of Present Injury/Illness (Reason for Referral):  Chronic history of right knee pain, s/p right TKA on 3/28/17, obtained 3 weeks of home health PT, notes improvement in overall mobility and less pain levels. In 2016, he had right knee surgery to repair ITB/patella. Notes he was not able to straighten his leg completely after than procedure. Notes overall mobility around the home is fair, able to drive without challenges. He continues to be limited with amount of ambulation secondary to increased discomfort with prolonged weight bearing and ambulation. Patient denies dizziness, drop attacks, numbness/tingling, bowel/bladder dysfunction and/or unexplained weight gain/loss.     Past Medical History/Comorbidities:   Mr. Oliverio Kim  has a past medical history of Arrhythmia (10 - 15 years ago); Arthritis; CAD (coronary artery disease); Cancer ([facility] Depression; Diabetes ([facility] (6-7 years ago); HTN (hypertension) (7/18/2014); Hypercholesterolemia; Hyperlipidemia (7/18/2014); Hypertension (diagnosed at 24years old); Knee pain (5/25/2016); Morbid obesity ([facility] Psychiatric disorder; and S/P total knee arthroplasty (3/29/2017). He also has no past medical history of Aneurysm ([facility] Asthma; Autoimmune disease ([facility] Chronic kidney disease; Chronic pain; Coagulation disorder ([facility] COPD; Difficult intubation; Endocarditis; GERD (gastroesophageal reflux disease); Heart failure ([facility] Liver disease; Malignant hyperthermia due to anesthesia; Nausea & vomiting; Other ill-defined conditions; Pseudocholinesterase deficiency; PUD (peptic ulcer disease); Rheumatic fever; Seizures ([facility] Stroke [facility]); Thromboembolus ([facility] Thyroid disease; Unspecified adverse effect of anesthesia; or Unspecified sleep apnea. Mr. Marisa Terrazas  has a past surgical history that includes other surgical; colonoscopy (2013); heent (1971); orthopaedic (Left, 2003); orthopaedic (Left, 2009); orthopaedic (Right, 2016); and knee replacement (Right, 03/28/2017). Social History/Living Environment:     Lives in private home with his wife, one story home, with stairs into home. Prior Level of Function/Work/Activity:  Independent, however experienced limited knee extension with gait, works full time as a .   Dominant Side:         RIGHT    Current Medications:       Current Outpatient Prescriptions:     docusate sodium (COLACE) 100 mg capsule, Take 200 mg by mouth daily. , Disp: , Rfl:     glimepiride (AMARYL) 4 mg tablet, TAKE 1 TABLET EVERY MORNING, Disp: 90 Tab, Rfl: 1    verapamil ER (CALAN-SR) 180 mg CR tablet, Take 180 mg by mouth daily. Take / use AM day of surgery  per anesthesia protocols.   Indications: hypertension, Disp: , Rfl:     FA/NIACINAMIDE/CUPRIC OX/ZN OX (NICOTINAMIDE PO), Take  by mouth two (2) times a day., Disp: , Rfl:    coenzyme q10 10 mg cap, Take  by mouth daily. , Disp: , Rfl:     metFORMIN (GLUCOPHAGE) 1,000 mg tablet, Take 1,000 mg by mouth daily. Takes 2 tablets every morning   Indications: type 2 diabetes mellitus, Disp: , Rfl:     metoprolol succinate (TOPROL-XL) 25 mg XL tablet, Take 25 mg by mouth daily. Take / use AM day of surgery  per anesthesia protocols. Indications: hypertension, Disp: , Rfl:     acyclovir (ZOVIRAX) 200 mg capsule, Take 1 Cap by mouth daily. (Patient taking differently: Take 200 mg by mouth daily. Take / use AM day of surgery  per anesthesia protocols.), Disp: 30 Cap, Rfl: 0    flecainide (TAMBOCOR) 100 mg tablet, Take 100 mg by mouth two (2) times a day. Take / use AM day of surgery  per anesthesia protocols. Indications: PREVENTION OF RECURRENT ATRIAL FIBRILLATION, Disp: , Rfl:     simvastatin (ZOCOR) 80 mg tablet, Take 1 Tab by mouth nightly. (Patient taking differently: Take 80 mg by mouth nightly. Take / use AM day of surgery  per anesthesia protocols. Indications: hyperlipidemia), Disp: 90 Tab, Rfl: 1    lisinopril (PRINIVIL, ZESTRIL) 10 mg tablet, Take 1 Tab by mouth daily. (Patient taking differently: Take 10 mg by mouth daily. Indications: hypertension), Disp: 90 Tab, Rfl: 1    VITAMIN D-3 PO, take  by mouth daily. am , Disp: , Rfl:    Date Last Reviewed:  5/4/2017   Number of Personal Factors/Comorbidities that affect the Plan of Care: 1-2: MODERATE COMPLEXITY   EXAMINATION:   Observation/Orthostatic Postural Assessment:           Lower extremity weight bearing is slight increased left, with right knee flexed and weight shifted left. Observation of gait indicates decreased right knee extension, demonstrates antalgic gait, currently using straight cane. Patient exhibits a neutral lumbar lordosis and slight increased thoracic kyphosis. Palpation of lower quadrant bony landmarks are left iliac crest elevated compared to right.  Knee alignment is right knee flexed, bilateral femur internally rotated, right greater than left. Patellar tracking with short knee bend indicates restricted movement in right knee. Single leg stand exhibits challenged to perform, 2 second hold. Soft tissue observation indicates restrictions noted in right anterior quadriceps, iliopsoas and bilateral hamstrings. Palpation: Patient exhibits tenderness to palpation of right incision. In modified Cristiano position, muscle length of tensor fascia marilu (TFL)  is restricted bilaterally, right greater than left; muscle length of iliopsoas is restricted right greater than left; and muscle length of rectus femoris is restricted right greater than left. Hamstring flexibility tested supine with straight leg raise is restricted bilaterally, right 50 degrees, left 60 degrees. Piriformis flexibility is restricted  bilaterally. Quadriceps flexibility tested heel to buttock is restricted bilaterally. Muscle length of gastrocnemius is restricted bilaterally. Muscle length of soleus is restricted right greater than left. ROM:   AROM(PROM) Right Left   Knee flexion 3-108 1-130   Knee extension Lacks 3 degrees Lacks 1 degree   Hip flexion 85 90   Hip external rotation (ER) 20 20   Hip internal rotation (IR) 10 15   Hip extension 5 5     Strength:     Manual Muscle Test (*/5) Right Left   Knee extension 4- 4+   Knee flexion 4 4+   Hip flexion 4- 4   Hip ER 4 4   Hip IR 4 4   Hip extension 3+ 4-   Hip abduction 3+ 4-   Hip adduction 5 5   Ankle DF 5 5   Ankle PF 4+ 5   Core stability 4-/5     Functional strength with standing heel raise is WFL. Functional strength with single leg partial squat exhibits not tested today. Special Tests:  None today    Neurological Screen:  Myotomes: Key muscle strength testing for bilateral LE is intact. Dermatomes: Sensation testing through bilateral LE for light touch is intact. Reflexes: Patellar (L4) and achilles (S1) are not tested today. Neural tension tests: Slump test is negative. Passive straight leg raise (SLR) test is negative. Functional Mobility:  Challenged with prolonged sitting, standing and ambulation. Body Structures Involved:  1. Nerves  2. Bones  3. Joints  4. Muscles Body Functions Affected:  1. Sensory/Pain  2. Neuromusculoskeletal  3. Movement Related Activities and Participation Affected:  1. General Tasks and Demands  2. Mobility  3. Self Care  4. Community, Social and [place]   Number of elements (examined above) that affect the Plan of Care: 4+: HIGH COMPLEXITY   CLINICAL PRESENTATION:   Presentation: Evolving clinical presentation with changing clinical characteristics: MODERATE COMPLEXITY   CLINICAL DECISION MAKING:   Outcome Measure: Tool Used: Lower Extremity Functional Scale (LEFS)  Score:  Initial: 44/80 Most Recent: X/80 (Date: -- )   Interpretation of Score: 20 questions each scored on a 5 point scale with 0 representing \"extreme difficulty or unable to perform\" and 4 representing \"no difficulty\". The lower the score, the greater the functional disability. 80/80 represents no disability. Minimal detectable change is 9 points. Medical Necessity:   · Patient is expected to demonstrate progress in strength, range of motion, balance and coordination to increase independence with ADLs and improve endurance with ambulation. Reason for Services/Other Comments:  · Patient continues to require skilled intervention due to continues to demonstrate limited ROM in right knee, challenged with prolonged sitting and prolonged weight bearing and ambulation. Use of outcome tool(s) and clinical judgement create a POC that gives a: Questionable prediction of patient's progress: MODERATE COMPLEXITY            TREATMENT:   (In addition to Assessment/Re-Assessment sessions the following treatments were rendered)  Pre-treatment Symptoms/Complaints:  Patient notes soreness noted with bending knee, however notes his ambulation is getting better.    Pain: Initial:   Pain Intensity 1: 2  Pain Location 1: Knee  Pain Orientation 1: Right  Post Session:  1/10     Therapeutic Exercise: (35 Minutes):  Exercises per grid below to improve mobility, strength, balance and coordination. Required minimal verbal and manual cues to promote proper body alignment, promote proper body posture and promote proper body mechanics. Progressed resistance, range, repetitions and complexity of movement as indicated. Date:  5/4/2017   Activity/Exercise Parameters   Review HEP X 5 minutes   Quad sets X 10 reps, 10 sec holds   Straight leg raises X 10 reps, 5 sec holds  X 10 reps , 5 sec holds hip abduction/side lying   AAROM knee flexion with strap X 10 reps, 5 sec holds  Followed by x 10 reps AROM heelslides   Prone knee hangs 5 x 30 sec holds   Upright bike X 6 minutes   Standing knee extension ball on wall X 10 reps, 5 sec holds   Prone knee extension X 10 reps, 5 sec holds   Standing hip abduction, flexion, extension on firm foam X 10 reps each leg   Standing calf stretch 2 X 30 sec holds   Quarterback Squats    Step ups X 10 reps BLE  X 10 reps BLE side steps  X 10 reps BLE lateral step downs         Manual Therapy (    Soft Tissue Mobilization Duration  Duration: 10 Minutes): Manual techniques to facilitate improved motion and decreased pain. · Supine right patella mobilizations in all directions  · Supine soft tissue mobilization and scar tissue mobilization around right knee incision.    · Prone soft tissue mobilization to right posterior hamstrings and calf with FMP of knee flexion  · Prone manual  rectus femoris stretch, right knee PROM     Modalities: (10 minutes): ice applied to right knee to assist in decreased swelling and inflammation     (Used abbreviations: MET - muscle energy technique; PNF - proprioceptive neuromuscular facilitation; NMR - neuromuscular re-education; a/p - anterior to posterior; p/a - posterior to anterior, FMP - functional movement patterns)    Treatment/Session Assessment:    · Response to Treatment:  Improving overall mobility in right knee, improving LE strength and endurance. · Compliance with Program/Exercises: compliant all of the time. · Recommendations/Intent for next treatment session: \"Next visit will focus on advancements to more challenging activities\".   Total Treatment Duration:  PT Patient Time In/Time Out  Time In: 0930  Time Out: [address] Lissette Ho PT

## 2017-05-08 ENCOUNTER — HOSPITAL ENCOUNTER (OUTPATIENT)
Dept: PHYSICAL THERAPY | Age: 62
Discharge: HOME | End: 2017-05-08
Payer: COMMERCIAL

## 2017-05-08 PROBLEM — E66.9 OBESITY, CLASS I, BMI 30-34.9: Status: ACTIVE | Noted: 2017-05-08

## 2017-05-08 PROBLEM — K40.90 RIGHT INGUINAL HERNIA: Status: ACTIVE | Noted: 2017-05-08

## 2017-05-08 PROCEDURE — 97110 THERAPEUTIC EXERCISES: CPT

## 2017-05-08 PROCEDURE — 97140 MANUAL THERAPY 1/> REGIONS: CPT

## 2017-05-08 NOTE — PROGRESS NOTES
Prakash Jackson  : 1955 [facility] at [address] [place] [address]  [phone]   [phone]        OUTPATIENT PHYSICAL THERAPY:Daily Note 2017    ICD-10: Treatment Diagnosis: pain in joint; right knee M25.561  ICD-10: Treatment Diagnosis: stiffness in joint; right knee M25.661  ICD-10: Treatment Diagnosis: muscle weakness, generalized M62.81  Precautions/Allergies: Review of patient's allergies indicates no known allergies. Fall Risk Score: 2 (? 5 = High Risk)  MD Orders: evaluate and treat for right TKA MEDICAL/REFERRING DIAGNOSIS:  Status post total right knee replacement [Z96.651]   DATE OF ONSET: surgical date: 3/28/17  REFERRING PHYSICIAN: Carolina Kelsey MD  RETURN PHYSICIAN APPOINTMENT: 5/10/17     INITIAL ASSESSMENT:  Mr. Margean Bloch is a 60 y.o. male who presents to physical therapy s/p right TKA surgery on 3/28/17. He presents with limitations in right knee ROM, strength and endurance. He is challenged with gait, secondary to unable to obtain terminal knee extension. Currently using a cane with ambulation. He would benefit from physical therapy services to improve overall mobility and function to return to his ADLs and recreational activities. PROBLEM LIST (Impacting functional limitations):  1. Decreased Strength  2. Decreased ADL/Functional Activities  3. Decreased Transfer Abilities  4. Decreased Ambulation Ability/Technique  5. Decreased Balance  6. Increased Pain  7. Decreased Activity Tolerance  8. Increased Fatigue  9. Decreased Flexibility/Joint Mobility INTERVENTIONS PLANNED:  1. Balance Exercise  2. Cold  3. Gait Training  4. Home Exercise Program (HEP)  5. Manual Therapy  6. Neuromuscular Re-education/Strengthening  7. Range of Motion (ROM)  8. Therapeutic Activites  9. Therapeutic Exercise/Strengthening   TREATMENT PLAN:  Effective Dates: 17 TO 17.   Frequency/Duration: 2 times a week for 8 weeks, progress to 1x a week for 4 weeks. GOALS: (Goals have been discussed and agreed upon with patient.)  Short-Term Functional Goals: Time Frame:  6 weeks  1. Patient demonstrates independence with his HEP without verbal cueing from therapist.  2. Demonstrates improve right knee ROM 0-120 degrees to perform daily activities. 3. Patient able to ambulate for 15 minutes with pain no more than 0-2/10  Discharge Goals: Time Frame: 12 weeks  1. Improve LE and core strength to 4+/5 to perform ADLs  2. Patient improve right knee ROM to 0-125 degrees to perform ADLs  3. Patient able to ambulate for 30 minutes without onset of right knee pain. 4. Improve LEFS score from 44/80 to 60/80 to improve daily activities. Rehabilitation Potential For Stated Goals: Excellent  Regarding Akshat Kenny's therapy, I certify that the treatment plan above will be carried out by a therapist or under their direction. Thank you for this referral,  Ronit Whyte, PT     Referring Physician Signature: Wilner Kimball MD              Date                    The information in this section was collected on 4/27/17 (except where otherwise noted). HISTORY:   History of Present Injury/Illness (Reason for Referral):  Chronic history of right knee pain, s/p right TKA on 3/28/17, obtained 3 weeks of home health PT, notes improvement in overall mobility and less pain levels. In 2016, he had right knee surgery to repair ITB/patella. Notes he was not able to straighten his leg completely after than procedure. Notes overall mobility around the home is fair, able to drive without challenges. He continues to be limited with amount of ambulation secondary to increased discomfort with prolonged weight bearing and ambulation. Patient denies dizziness, drop attacks, numbness/tingling, bowel/bladder dysfunction and/or unexplained weight gain/loss.     Past Medical History/Comorbidities:   Mr. Karina Hernández  has a past medical history of Arrhythmia (10 - 15 years ago); Arthritis; CAD (coronary artery disease); Cancer ([facility] Depression; Diabetes ([facility] (6-7 years ago); HTN (hypertension) (7/18/2014); Hypercholesterolemia; Hyperlipidemia (7/18/2014); Hypertension (diagnosed at 24years old); Knee pain (5/25/2016); Morbid obesity ([facility] Psychiatric disorder; and S/P total knee arthroplasty (3/29/2017). He also has no past medical history of Aneurysm ([facility] Asthma; Autoimmune disease ([facility] Chronic kidney disease; Chronic pain; Coagulation disorder ([facility] COPD; Difficult intubation; Endocarditis; GERD (gastroesophageal reflux disease); Heart failure ([facility] Liver disease; Malignant hyperthermia due to anesthesia; Nausea & vomiting; Other ill-defined conditions; Pseudocholinesterase deficiency; PUD (peptic ulcer disease); Rheumatic fever; Seizures ([facility] Stroke [facility]); Thromboembolus ([facility] Thyroid disease; Unspecified adverse effect of anesthesia; or Unspecified sleep apnea. Mr. Meri Leung  has a past surgical history that includes other surgical; colonoscopy (2013); heent (1971); orthopaedic (Left, 2003); orthopaedic (Left, 2009); orthopaedic (Right, 2016); and knee replacement (Right, 03/28/2017). Social History/Living Environment:     Lives in private home with his wife, one story home, with stairs into home. Prior Level of Function/Work/Activity:  Independent, however experienced limited knee extension with gait, works full time as a .   Dominant Side:         RIGHT    Current Medications:       Current Outpatient Prescriptions:     docusate sodium (COLACE) 100 mg capsule, Take 200 mg by mouth daily. , Disp: , Rfl:     glimepiride (AMARYL) 4 mg tablet, TAKE 1 TABLET EVERY MORNING, Disp: 90 Tab, Rfl: 1    verapamil ER (CALAN-SR) 180 mg CR tablet, Take 180 mg by mouth daily. Take / use AM day of surgery  per anesthesia protocols.   Indications: hypertension, Disp: , Rfl:     FA/NIACINAMIDE/CUPRIC OX/ZN OX (NICOTINAMIDE PO), Take  by mouth two (2) times a day., Disp: , Rfl:    coenzyme q10 10 mg cap, Take  by mouth daily. , Disp: , Rfl:     metFORMIN (GLUCOPHAGE) 1,000 mg tablet, Take 1,000 mg by mouth daily. Takes 2 tablets every morning   Indications: type 2 diabetes mellitus, Disp: , Rfl:     metoprolol succinate (TOPROL-XL) 25 mg XL tablet, Take 25 mg by mouth daily. Take / use AM day of surgery  per anesthesia protocols. Indications: hypertension, Disp: , Rfl:     acyclovir (ZOVIRAX) 200 mg capsule, Take 1 Cap by mouth daily. (Patient taking differently: Take 200 mg by mouth daily. Take / use AM day of surgery  per anesthesia protocols.), Disp: 30 Cap, Rfl: 0    flecainide (TAMBOCOR) 100 mg tablet, Take 100 mg by mouth two (2) times a day. Take / use AM day of surgery  per anesthesia protocols. Indications: PREVENTION OF RECURRENT ATRIAL FIBRILLATION, Disp: , Rfl:     simvastatin (ZOCOR) 80 mg tablet, Take 1 Tab by mouth nightly. (Patient taking differently: Take 80 mg by mouth nightly. Take / use AM day of surgery  per anesthesia protocols. Indications: hyperlipidemia), Disp: 90 Tab, Rfl: 1    lisinopril (PRINIVIL, ZESTRIL) 10 mg tablet, Take 1 Tab by mouth daily. (Patient taking differently: Take 10 mg by mouth daily. Indications: hypertension), Disp: 90 Tab, Rfl: 1    VITAMIN D-3 PO, take  by mouth daily. am , Disp: , Rfl:    Date Last Reviewed:  5/8/2017   Number of Personal Factors/Comorbidities that affect the Plan of Care: 1-2: MODERATE COMPLEXITY   EXAMINATION:   Observation/Orthostatic Postural Assessment:           Lower extremity weight bearing is slight increased left, with right knee flexed and weight shifted left. Observation of gait indicates decreased right knee extension, demonstrates antalgic gait, currently using straight cane. Patient exhibits a neutral lumbar lordosis and slight increased thoracic kyphosis. Palpation of lower quadrant bony landmarks are left iliac crest elevated compared to right.  Knee alignment is right knee flexed, bilateral femur internally rotated, right greater than left. Patellar tracking with short knee bend indicates restricted movement in right knee. Single leg stand exhibits challenged to perform, 2 second hold. Soft tissue observation indicates restrictions noted in right anterior quadriceps, iliopsoas and bilateral hamstrings. Palpation: Patient exhibits tenderness to palpation of right incision. In modified Cristiano position, muscle length of tensor fascia marilu (TFL)  is restricted bilaterally, right greater than left; muscle length of iliopsoas is restricted right greater than left; and muscle length of rectus femoris is restricted right greater than left. Hamstring flexibility tested supine with straight leg raise is restricted bilaterally, right 50 degrees, left 60 degrees. Piriformis flexibility is restricted  bilaterally. Quadriceps flexibility tested heel to buttock is restricted bilaterally. Muscle length of gastrocnemius is restricted bilaterally. Muscle length of soleus is restricted right greater than left. ROM:   AROM(PROM) Right Left   Knee flexion 3-108 1-130   Knee extension Lacks 3 degrees Lacks 1 degree   Hip flexion 85 90   Hip external rotation (ER) 20 20   Hip internal rotation (IR) 10 15   Hip extension 5 5     Strength:     Manual Muscle Test (*/5) Right Left   Knee extension 4- 4+   Knee flexion 4 4+   Hip flexion 4- 4   Hip ER 4 4   Hip IR 4 4   Hip extension 3+ 4-   Hip abduction 3+ 4-   Hip adduction 5 5   Ankle DF 5 5   Ankle PF 4+ 5   Core stability 4-/5     Functional strength with standing heel raise is WFL. Functional strength with single leg partial squat exhibits not tested today. Special Tests:  None today    Neurological Screen:  Myotomes: Key muscle strength testing for bilateral LE is intact. Dermatomes: Sensation testing through bilateral LE for light touch is intact. Reflexes: Patellar (L4) and achilles (S1) are not tested today. Neural tension tests: Slump test is negative. Passive straight leg raise (SLR) test is negative. Functional Mobility:  Challenged with prolonged sitting, standing and ambulation. Body Structures Involved:  1. Nerves  2. Bones  3. Joints  4. Muscles Body Functions Affected:  1. Sensory/Pain  2. Neuromusculoskeletal  3. Movement Related Activities and Participation Affected:  1. General Tasks and Demands  2. Mobility  3. Self Care  4. Community, Social and [place]   Number of elements (examined above) that affect the Plan of Care: 4+: HIGH COMPLEXITY   CLINICAL PRESENTATION:   Presentation: Evolving clinical presentation with changing clinical characteristics: MODERATE COMPLEXITY   CLINICAL DECISION MAKING:   Outcome Measure: Tool Used: Lower Extremity Functional Scale (LEFS)  Score:  Initial: 44/80 Most Recent: X/80 (Date: -- )   Interpretation of Score: 20 questions each scored on a 5 point scale with 0 representing \"extreme difficulty or unable to perform\" and 4 representing \"no difficulty\". The lower the score, the greater the functional disability. 80/80 represents no disability. Minimal detectable change is 9 points. Medical Necessity:   · Patient is expected to demonstrate progress in strength, range of motion, balance and coordination to increase independence with ADLs and improve endurance with ambulation. Reason for Services/Other Comments:  · Patient continues to require skilled intervention due to continues to demonstrate limited ROM in right knee, challenged with prolonged sitting and prolonged weight bearing and ambulation. Use of outcome tool(s) and clinical judgement create a POC that gives a: Questionable prediction of patient's progress: MODERATE COMPLEXITY            TREATMENT:   (In addition to Assessment/Re-Assessment sessions the following treatments were rendered)  Pre-treatment Symptoms/Complaints:  Patient notes right anterior thigh continues to be very tight and sore.  Notes he thinks he may have an inguinal hernia and follows up with MD today. Pain: Initial:   Pain Intensity 1: 2  Pain Location 1: Knee  Pain Orientation 1: Right  Post Session:  1/10     Therapeutic Exercise: (30 Minutes):  Exercises per grid below to improve mobility, strength, balance and coordination. Required minimal verbal and manual cues to promote proper body alignment, promote proper body posture and promote proper body mechanics. Progressed resistance, range, repetitions and complexity of movement as indicated. Date:  5/8/2017   Activity/Exercise Parameters   Review HEP X 5 minutes   Quad sets X 10 reps, 10 sec holds   Straight leg raises - side lying X 10 reps , 5 sec holds hip abduction/side lying   AAROM knee flexion with strap X 10 reps, 5 sec holds  Followed by x 10 reps AROM heelslides   Prone knee hangs    Upright bike X 8 minutes   Standing knee extension ball on wall X 10 reps, 5 sec holds   Prone knee extension X 10 reps, 5 sec holds   Standing hip abduction, flexion, extension on firm foam X 10 reps each leg   Standing calf stretch 2 X 30 sec holds   Quarterback Squats    Step ups X 10 reps BLE  X 10 reps BLE side steps  X 10 reps BLE lateral step downs   Standing lateral steps with t-band X 3 reps of '6' distance, green t-band   Supine hamstrings self stretch X 5 reps, 20 sec holds BLE     Manual Therapy (    Soft Tissue Mobilization Duration  Duration: 25 Minutes): Manual techniques to facilitate improved motion and decreased pain. · Supine right patella mobilizations in all directions  · Supine self soft tissue mobilization rolling techniques to anterior thigh as well as strumming techniques to anterior thigh/quadriceps. · Supine soft tissue mobilization and scar tissue mobilization around right knee incision. · Supine femur on tibia and tibia on femur a/p mobilizations for improved ROM. · Supine manual stretch to bilateral hamstrings and piriformis.       (Used abbreviations: MET - muscle energy technique; PNF - proprioceptive neuromuscular facilitation; NMR - neuromuscular re-education; a/p - anterior to posterior; p/a - posterior to anterior, FMP - functional movement patterns)    Treatment/Session Assessment:    · Response to Treatment:  Improving right knee ROM in flexion and extension, decreased soft tissue restrictions noted in right anterior thigh and improving hip/knee strength. · Compliance with Program/Exercises: compliant all of the time. · Recommendations/Intent for next treatment session: \"Next visit will focus on advancements to more challenging activities\".   Total Treatment Duration:  PT Patient Time In/Time Out  Time In: 0830  Time Out: 13 [facility] Emy Aragon PT

## 2017-05-11 ENCOUNTER — HOSPITAL ENCOUNTER (OUTPATIENT)
Dept: SURGERY | Age: 62
Discharge: HOME | End: 2017-05-11
Payer: COMMERCIAL

## 2017-05-11 VITALS
DIASTOLIC BLOOD PRESSURE: 79 MMHG | SYSTOLIC BLOOD PRESSURE: 138 MMHG | HEIGHT: 77 IN | HEART RATE: 94 BPM | WEIGHT: 275.31 LBS | BODY MASS INDEX: 32.51 KG/M2 | RESPIRATION RATE: 18 BRPM | TEMPERATURE: 98.5 F

## 2017-05-11 LAB — GLUCOSE BLD STRIP.AUTO-MCNC: 253 MG/DL (ref 65–100)

## 2017-05-11 PROCEDURE — 82962 GLUCOSE BLOOD TEST: CPT

## 2017-05-11 NOTE — PERIOP NOTES
Patient verified name, , and surgery as listed in [facility]. TYPE  CASE:1B  Orders per surgeon: not Received  Labs per surgeon:none  Labs per anesthesia protocol: SQBS 253 : results   EKG  :  Not needed at time of PAT      Patient provided with handouts including guide to surgery , transfusions, pain management and hand hygiene for the family and community. Pt verbalizes understanding of all pre-op instructions . Instructed that family must be present in building at all times. Nothing to eat or drink after midnight the night prior to surgery. Hibiclens and instructions given per hospital policy. Instructed patient to continue  previous medications as prescribed prior to surgery and  to take the following medications the day of surgery according to anesthesia guidelines : metoprolol, verapamil       Original medication prescription bottles not visualized during patient appointment. Continue all previous medications unless otherwise directed. Instructed patient to hold  the following medications prior to surgery: CoQ10, Vitamin D      Patient verbalized understanding of all instructions and provided all medical/health information to the best of their ability.

## 2017-05-12 ENCOUNTER — HOSPITAL ENCOUNTER (OUTPATIENT)
Dept: PHYSICAL THERAPY | Age: 62
Discharge: HOME | End: 2017-05-12
Payer: COMMERCIAL

## 2017-05-12 PROCEDURE — 97110 THERAPEUTIC EXERCISES: CPT

## 2017-05-12 PROCEDURE — 97140 MANUAL THERAPY 1/> REGIONS: CPT

## 2017-05-12 RX ORDER — VANCOMYCIN 1.75 GRAM/500 ML IN 0.9 % SODIUM CHLORIDE INTRAVENOUS
1750 ONCE
Status: CANCELLED | OUTPATIENT
Start: 2017-05-16 | End: 2017-05-16

## 2017-05-12 NOTE — PROGRESS NOTES
Roxanna De Paz  : 1955 [facility] at [address] [place] [address]  [phone]   [phone]        OUTPATIENT PHYSICAL THERAPY:Daily Note 2017    ICD-10: Treatment Diagnosis: pain in joint; right knee M25.561  ICD-10: Treatment Diagnosis: stiffness in joint; right knee M25.661  ICD-10: Treatment Diagnosis: muscle weakness, generalized M62.81  Precautions/Allergies: Review of patient's allergies indicates no known allergies. Fall Risk Score: 2 (? 5 = High Risk)  MD Orders: evaluate and treat for right TKA MEDICAL/REFERRING DIAGNOSIS:  Status post total right knee replacement [Z96.651]   DATE OF ONSET: surgical date: 3/28/17  REFERRING PHYSICIAN: Jesusita Ferrari MD  RETURN PHYSICIAN APPOINTMENT: 5/10/17     INITIAL ASSESSMENT:  Mr. Anup Plasencia is a 60 y.o. male who presents to physical therapy s/p right TKA surgery on 3/28/17. He presents with limitations in right knee ROM, strength and endurance. He is challenged with gait, secondary to unable to obtain terminal knee extension. Currently using a cane with ambulation. He would benefit from physical therapy services to improve overall mobility and function to return to his ADLs and recreational activities. PROBLEM LIST (Impacting functional limitations):  1. Decreased Strength  2. Decreased ADL/Functional Activities  3. Decreased Transfer Abilities  4. Decreased Ambulation Ability/Technique  5. Decreased Balance  6. Increased Pain  7. Decreased Activity Tolerance  8. Increased Fatigue  9. Decreased Flexibility/Joint Mobility INTERVENTIONS PLANNED:  1. Balance Exercise  2. Cold  3. Gait Training  4. Home Exercise Program (HEP)  5. Manual Therapy  6. Neuromuscular Re-education/Strengthening  7. Range of Motion (ROM)  8. Therapeutic Activites  9. Therapeutic Exercise/Strengthening   TREATMENT PLAN:  Effective Dates: 17 TO 17.   Frequency/Duration: 2 times a week for 8 weeks, progress to 1x a week for 4 weeks. GOALS: (Goals have been discussed and agreed upon with patient.)  Short-Term Functional Goals: Time Frame:  6 weeks  1. Patient demonstrates independence with his HEP without verbal cueing from therapist.  2. Demonstrates improve right knee ROM 0-120 degrees to perform daily activities. 3. Patient able to ambulate for 15 minutes with pain no more than 0-2/10  Discharge Goals: Time Frame: 12 weeks  1. Improve LE and core strength to 4+/5 to perform ADLs  2. Patient improve right knee ROM to 0-125 degrees to perform ADLs  3. Patient able to ambulate for 30 minutes without onset of right knee pain. 4. Improve LEFS score from 44/80 to 60/80 to improve daily activities. Rehabilitation Potential For Stated Goals: Excellent  Regarding Edie Hargrove Zoya's therapy, I certify that the treatment plan above will be carried out by a therapist or under their direction. Thank you for this referral,  Janis Dalton , DPT, OCS, FAAOMPT, CSCS  Referring Physician Signature: Jluis Skaggs MD              Date                    The information in this section was collected on 4/27/17 (except where otherwise noted). HISTORY:   History of Present Injury/Illness (Reason for Referral):  Chronic history of right knee pain, s/p right TKA on 3/28/17, obtained 3 weeks of home health PT, notes improvement in overall mobility and less pain levels. In 2016, he had right knee surgery to repair ITB/patella. Notes he was not able to straighten his leg completely after than procedure. Notes overall mobility around the home is fair, able to drive without challenges. He continues to be limited with amount of ambulation secondary to increased discomfort with prolonged weight bearing and ambulation. Patient denies dizziness, drop attacks, numbness/tingling, bowel/bladder dysfunction and/or unexplained weight gain/loss.     Past Medical History/Comorbidities:   Mr. rPashant Yoo  has a past medical history of Arrhythmia (10 - 13 years ago); Arthritis; CAD (coronary artery disease); Cancer ([facility] Depression; Diabetes ([facility] (6-7 years ago); HTN (hypertension) (7/18/2014); Hypercholesterolemia; Hyperlipidemia (7/18/2014); Hypertension (diagnosed at 24years old); Knee pain (5/25/2016); Morbid obesity ([facility] Psychiatric disorder; and S/P total knee arthroplasty (3/29/2017). He also has no past medical history of Difficult intubation; Malignant hyperthermia due to anesthesia; Nausea & vomiting; Other ill-defined conditions; Pseudocholinesterase deficiency; or Unspecified adverse effect of anesthesia. Mr. Oliverio Kim  has a past surgical history that includes other surgical; colonoscopy (2013); heent (1971); orthopaedic (Left, 2003); orthopaedic (Left, 2009); orthopaedic (Right, 2016); and knee replacement (Right, 03/28/2017). Social History/Living Environment:     Lives in private home with his wife, one story home, with stairs into home. Prior Level of Function/Work/Activity:  Independent, however experienced limited knee extension with gait, works full time as a .   Dominant Side:         RIGHT    Current Medications:       Current Outpatient Prescriptions:     glimepiride (AMARYL) 4 mg tablet, TAKE 1 TABLET EVERY MORNING, Disp: 90 Tab, Rfl: 1    verapamil ER (CALAN-SR) 180 mg CR tablet, Take 180 mg by mouth daily. Take / use AM day of surgery  per anesthesia protocols. Indications: hypertension, Disp: , Rfl:     FA/NIACINAMIDE/CUPRIC OX/ZN OX (NICOTINAMIDE PO), Take  by mouth two (2) times a day., Disp: , Rfl:     coenzyme q10 10 mg cap, Take  by mouth daily. , Disp: , Rfl:     metFORMIN (GLUCOPHAGE) 1,000 mg tablet, Take 1,000 mg by mouth daily. Takes 2 tablets every morning   Indications: type 2 diabetes mellitus, Disp: , Rfl:     metoprolol succinate (TOPROL-XL) 25 mg XL tablet, Take 25 mg by mouth daily. Take / use AM day of surgery  per anesthesia protocols.    Indications: hypertension, Disp: , Rfl:     acyclovir (ZOVIRAX) 200 mg capsule, Take 1 Cap by mouth daily. (Patient taking differently: Take 200 mg by mouth daily. Take / use AM day of surgery  per anesthesia protocols.), Disp: 30 Cap, Rfl: 0    flecainide (TAMBOCOR) 100 mg tablet, Take 100 mg by mouth two (2) times a day. Take / use AM day of surgery  per anesthesia protocols. Indications: PREVENTION OF RECURRENT ATRIAL FIBRILLATION, Disp: , Rfl:     simvastatin (ZOCOR) 80 mg tablet, Take 1 Tab by mouth nightly. (Patient taking differently: Take 80 mg by mouth nightly. Take / use AM day of surgery  per anesthesia protocols. Indications: hyperlipidemia), Disp: 90 Tab, Rfl: 1    lisinopril (PRINIVIL, ZESTRIL) 10 mg tablet, Take 1 Tab by mouth daily. (Patient taking differently: Take 10 mg by mouth daily. Indications: hypertension), Disp: 90 Tab, Rfl: 1    VITAMIN D-3 PO, take  by mouth daily. am , Disp: , Rfl:    Date Last Reviewed:  5/12/2017   Number of Personal Factors/Comorbidities that affect the Plan of Care: 1-2: MODERATE COMPLEXITY   EXAMINATION:   Observation/Orthostatic Postural Assessment:           Lower extremity weight bearing is slight increased left, with right knee flexed and weight shifted left. Observation of gait indicates decreased right knee extension, demonstrates antalgic gait, currently using straight cane. Patient exhibits a neutral lumbar lordosis and slight increased thoracic kyphosis. Palpation of lower quadrant bony landmarks are left iliac crest elevated compared to right. Knee alignment is right knee flexed, bilateral femur internally rotated, right greater than left. Patellar tracking with short knee bend indicates restricted movement in right knee. Single leg stand exhibits challenged to perform, 2 second hold. Soft tissue observation indicates restrictions noted in right anterior quadriceps, iliopsoas and bilateral hamstrings. Palpation: Patient exhibits tenderness to palpation of right incision.  In modified Cristiano position, muscle length of tensor fascia marilu (TFL)  is restricted bilaterally, right greater than left; muscle length of iliopsoas is restricted right greater than left; and muscle length of rectus femoris is restricted right greater than left. Hamstring flexibility tested supine with straight leg raise is restricted bilaterally, right 50 degrees, left 60 degrees. Piriformis flexibility is restricted  bilaterally. Quadriceps flexibility tested heel to buttock is restricted bilaterally. Muscle length of gastrocnemius is restricted bilaterally. Muscle length of soleus is restricted right greater than left. ROM:   AROM(PROM) Right Left   Knee flexion 3-108 1-130   Knee extension Lacks 3 degrees Lacks 1 degree   Hip flexion 85 90   Hip external rotation (ER) 20 20   Hip internal rotation (IR) 10 15   Hip extension 5 5     Strength:     Manual Muscle Test (*/5) Right Left   Knee extension 4- 4+   Knee flexion 4 4+   Hip flexion 4- 4   Hip ER 4 4   Hip IR 4 4   Hip extension 3+ 4-   Hip abduction 3+ 4-   Hip adduction 5 5   Ankle DF 5 5   Ankle PF 4+ 5   Core stability 4-/5     Functional strength with standing heel raise is WFL. Functional strength with single leg partial squat exhibits not tested today. Special Tests:  None today    Neurological Screen:  Myotomes: Key muscle strength testing for bilateral LE is intact. Dermatomes: Sensation testing through bilateral LE for light touch is intact. Reflexes: Patellar (L4) and achilles (S1) are not tested today. Neural tension tests: Slump test is negative. Passive straight leg raise (SLR) test is negative. Functional Mobility:  Challenged with prolonged sitting, standing and ambulation. Body Structures Involved:  1. Nerves  2. Bones  3. Joints  4. Muscles Body Functions Affected:  1. Sensory/Pain  2. Neuromusculoskeletal  3. Movement Related Activities and Participation Affected:  1. General Tasks and Demands  2. Mobility  3. Self Care  4.  Community, Social and [facility] Number of elements (examined above) that affect the Plan of Care: 4+: HIGH COMPLEXITY   CLINICAL PRESENTATION:   Presentation: Evolving clinical presentation with changing clinical characteristics: MODERATE COMPLEXITY   CLINICAL DECISION MAKING:   Outcome Measure: Tool Used: Lower Extremity Functional Scale (LEFS)  Score:  Initial: 44/80 Most Recent: X/80 (Date: -- )   Interpretation of Score: 20 questions each scored on a 5 point scale with 0 representing \"extreme difficulty or unable to perform\" and 4 representing \"no difficulty\". The lower the score, the greater the functional disability. 80/80 represents no disability. Minimal detectable change is 9 points. Medical Necessity:   · Patient is expected to demonstrate progress in strength, range of motion, balance and coordination to increase independence with ADLs and improve endurance with ambulation. Reason for Services/Other Comments:  · Patient continues to require skilled intervention due to continues to demonstrate limited ROM in right knee, challenged with prolonged sitting and prolonged weight bearing and ambulation. Use of outcome tool(s) and clinical judgement create a POC that gives a: Questionable prediction of patient's progress: MODERATE COMPLEXITY            TREATMENT:   (In addition to Assessment/Re-Assessment sessions the following treatments were rendered)  Pre-treatment Symptoms/Complaints:  Patient notes he feels like he is progressing well overall. He reports he felt like his distal thigh tightness felt much more lose after soft tissue mobility today     Pain: Initial:   Pain Intensity 1: 2  Pain Location 1: Knee  Pain Orientation 1: Right  Post Session:  1/10     Therapeutic Exercise: (30 Minutes):  Exercises per grid below to improve mobility, strength, balance and coordination. Required minimal verbal and manual cues to promote proper body alignment, promote proper body posture and promote proper body mechanics.   Progressed resistance, range, repetitions and complexity of movement as indicated. Date:  5/12/2017   Activity/Exercise Parameters   Review HEP X 5 minutes   Quad sets X 10 reps, 10 sec holds   (not performed)   Straight leg raises - side lying X 10 reps , 5 sec holds hip abduction/side lying   AAROM knee flexion with strap X 10 reps, 5 sec holds  Followed by x 10 reps AROM heelslides   Prone knee hangs    Upright bike X 8 minutes   Standing knee extension ball on wall X 10 reps, 5 sec holds   (not performed)   Prone knee extension X 10 reps, 5 sec holds   (not performed)   Standing hip abduction, flexion, extension on firm foam X 10 reps each leg   Standing calf stretch 2 X 30 sec holds   Quarterback Squats    Step ups X 10 reps BLE  X 10 reps BLE side steps  X 10 reps BLE lateral step downs   Standing lateral steps with t-band X 3 reps of '6' distance, green t-band  (not performed)   Supine hamstrings self stretch X 5 reps, 20 sec holds BLE   Standing Lateral Heel Taps 3x10  6 inch step   Single Leg Stance 3x30 sec each LE     Manual Therapy (    Soft Tissue Mobilization Duration  Duration: 15 Minutes): Manual techniques to facilitate improved motion and decreased pain. · Supine right patella mobilizations in all directions  · Supine self soft tissue mobilization rolling techniques to anterior thigh as well as strumming techniques to anterior thigh/quadriceps. · Supine soft tissue mobilization and scar tissue mobilization around right knee incision. · Supine femur on tibia and tibia on femur a/p mobilizations for improved ROM. · Supine manual stretch to bilateral hamstrings and piriformis.       ICE- 15 min on his R knee    (Used abbreviations: MET - muscle energy technique; PNF - proprioceptive neuromuscular facilitation; NMR - neuromuscular re-education; a/p - anterior to posterior; p/a - posterior to anterior, FMP - functional movement patterns)    Treatment/Session Assessment:    · Response to Treatment:  Pt shows good tolerance for exercises today with no irritation of pain only increased fatigue. He shows improved soft tissue mobility of proximal area of scar after manual treatments today. · Compliance with Program/Exercises: compliant all of the time. · Recommendations/Intent for next treatment session: \"Next visit will focus on advancements to more challenging activities\".   Total Treatment Duration:  PT Patient Time In/Time Out  Time In: 1300  Time Out: [id number] , DPT, OCS, FAAOMPT, CSCS

## 2017-05-15 ENCOUNTER — ANESTHESIA EVENT (OUTPATIENT)
Dept: SURGERY | Age: 62
End: 2017-05-15
Payer: COMMERCIAL

## 2017-05-15 NOTE — H&P
[facility]  [address] [place] [address]  [phone]    H&P Note   Cam Boucher   MRN: [id number]     : 1955        HPI: Cam Boucher is a 60 y.o. male who is referred by Jonel Galvez for right inguinal hernia. The patient had a right knee replacement by Dr. Caroline Nunez on . He has done well from that. He was noted to be colonized with staph aureus in his pre-operative evaluation. Since that surgery he has noticed more symptoms related to a bulge in his right groin. He has noticed that bulge for at least a year. He has never noticed a bulge on the left side. He has no history of prior hernia repairs. He has not had any symptoms consistent with incarceration or obstruction. He has had no other abdominal surgeries. He has had colonoscopies in the past and he believes he is up-to-date. He does not have any GI symptoms especially he has not seen any blood per rectum. He has experienced some constipation after his knee surgery which is likely from his pain medicine. He is a well-controlled diabetic. He does not take insulin. Past Medical History:   Diagnosis Date    Arrhythmia 10 - 15 years ago    atrial fib. - none in 5 years    Arthritis     hips, knees - osteo    CAD (coronary artery disease)     had cath~states was ok    Cancer ([facility]     skin ca.  removed    Depression     no longer takes medication~no longer considers self depressed    Diabetes ([address] 6-7 years ago    range: 130's, no hyposymptoms    HTN (hypertension) 2014    Hypercholesterolemia     Hyperlipidemia 2014    Hypertension diagnosed at 24years old   Miley Mcdaniel Knee pain 2016    Morbid obesity ([address]     Psychiatric disorder     depression    S/P total knee arthroplasty 3/29/2017     Past Surgical History:   Procedure Laterality Date    HX COLONOSCOPY      HX HEENT  1971    tonsillectomy    HX KNEE REPLACEMENT Right 2017    HX ORTHOPAEDIC Left 2003     for broken 5th metatarsal    HX ORTHOPAEDIC Left 2009    hip replacement    HX ORTHOPAEDIC Right 2016    bone spur removal; arthroscopy    HX OTHER SURGICAL      hemorroidectomy 1992;tonsilectomy 1971     No current facility-administered medications for this encounter. Current Outpatient Prescriptions   Medication Sig    glimepiride (AMARYL) 4 mg tablet TAKE 1 TABLET EVERY MORNING    verapamil ER (CALAN-SR) 180 mg CR tablet Take 180 mg by mouth daily. Take / use AM day of surgery  per anesthesia protocols. Indications: hypertension    FA/NIACINAMIDE/CUPRIC OX/ZN OX (NICOTINAMIDE PO) Take  by mouth two (2) times a day.  coenzyme q10 10 mg cap Take  by mouth daily.  metFORMIN (GLUCOPHAGE) 1,000 mg tablet Take 1,000 mg by mouth daily. Takes 2 tablets every morning   Indications: type 2 diabetes mellitus    metoprolol succinate (TOPROL-XL) 25 mg XL tablet Take 25 mg by mouth daily. Take / use AM day of surgery  per anesthesia protocols. Indications: hypertension    acyclovir (ZOVIRAX) 200 mg capsule Take 1 Cap by mouth daily. (Patient taking differently: Take 200 mg by mouth daily. Take / use AM day of surgery  per anesthesia protocols.)    flecainide (TAMBOCOR) 100 mg tablet Take 100 mg by mouth two (2) times a day. Take / use AM day of surgery  per anesthesia protocols. Indications: PREVENTION OF RECURRENT ATRIAL FIBRILLATION    simvastatin (ZOCOR) 80 mg tablet Take 1 Tab by mouth nightly. (Patient taking differently: Take 80 mg by mouth nightly. Take / use AM day of surgery  per anesthesia protocols. Indications: hyperlipidemia)    lisinopril (PRINIVIL, ZESTRIL) 10 mg tablet Take 1 Tab by mouth daily. (Patient taking differently: Take 10 mg by mouth daily. Indications: hypertension)    VITAMIN D-3 PO take  by mouth daily. am      ALLERGIES:  Review of patient's allergies indicates no known allergies.     Social History     Social History    Marital status:      Spouse name: N/A    Number of children: N/A    Years of education: N/A     Social History Main Topics    Smoking status: Former Smoker     Packs/day: 2.00     Years: 20.00    Smokeless tobacco: Former User    Alcohol use No    Drug use: No    Sexual activity: Yes     Partners: Female     Other Topics Concern    Not on file     Social History Narrative     History   Smoking Status    Former Smoker    Packs/day: 2.00    Years: 20.00   Smokeless Tobacco    Former User     Family History   Problem Relation Age of Onset    Coronary Artery Disease Father     Heart Attack Father     Diabetes Father     Cancer Mother      multiple myloma    Hypertension Brother     Malignant Hyperthermia Neg Hx     Pseudocholinesterase Deficiency Neg Hx     Delayed Awakening Neg Hx     Post-op Nausea/Vomiting Neg Hx     Emergence Delirium Neg Hx     Post-op Cognitive Dysfunction Neg Hx     Other Neg Hx        ROS: The patient has no difficulty with chest pain or shortness of breath. No fever or chills. The patient denies any personal or family history of abnormal clotting or bleeding. Comprehensive review of systems was otherwise unremarkable except as noted above. Physical Exam:   Constitutional: Alert oriented cooperative patient in no acute distress. Visit Vitals    /74    Pulse 90    Ht 6' 5\" (1.956 m)    Wt 275 lb 8 oz (125 kg)    SpO2 97%    BMI 32.67 kg/m2   Eyes:Sclera are clear without icterus. ENMT: no obvious neck masses, no ear or lip lesions  CV: RRR. Normal perfusion  Resp: No JVD. Breathing is  non-labored. GI: soft and non-distended   :  normal male external genitalia. Normal testes bilaterally. Reducible right inguinal hernia with bulging identified in supine and standing positions with straining. No left inguinal hernia examined in same manner with straining. Musculoskeletal: unremarkable with normal function.    Neuro:  No obvious focal deficits  Psychiatric: normal affect and mood, no memory impairment    Lab Results   Component Value Date/Time    WBC 7.0 03/06/2017 12:22 PM    HGB 13.8 03/29/2017 04:20 AM    HCT 46.4 03/06/2017 12:22 PM    PLATELET 636 80/44/4592 12:22 PM    MCV 82.1 03/06/2017 12:22 PM       Lab Results   Component Value Date/Time    Sodium 134 03/29/2017 04:20 AM    Potassium 4.2 03/29/2017 04:20 AM    Chloride 100 03/29/2017 04:20 AM    CO2 23 03/29/2017 04:20 AM    BUN 18 03/29/2017 04:20 AM    Creatinine 1.07 03/29/2017 04:20 AM    Glucose 279 03/29/2017 04:20 AM    INR 1.0 03/06/2017 12:22 PM    aPTT 24.4 03/06/2017 12:22 PM    Bilirubin, total 0.5 09/18/2015 09:33 AM    AST (SGOT) 20 09/18/2015 09:33 AM    Alk. phosphatase 90 09/18/2015 09:33 AM       Assessment/Plan:     Asia Gant is a 60 y.o. male who has signs and symptoms consistent with a mildly symptomatic right inguinal hernia. He has no prior history of repairs. There is no left inguinal hernia present on exam.  We discussed proceeding with an open right inguinal hernia repair using mesh. We discussed other options. He appears to be up-to-date on his colonoscopies. He has no issues with refraining from lifting for 6 weeks following the repair. He does plan a scuba trip in mid July and hopefully we will be able to get his repair and recuperation completed in this time period. I discussed the patient's condition and treatment options with the patient. I discussed risks of surgery in language the patient could understand including bleeding, infection, recurrence, chronic pain, chronic numbness, injury to cord structures, need for further surgery, abscess, fistula, SBO, DVT, PE, heart attack, stroke, renal failure, respiratory failure, ventilatory dependence, and death. The patient voiced understanding of all this and all questions were answered. Alternatives to surgery were discussed also and risks of the alternatives. The patient requested that we proceed with surgery.   Informed consent was obtained.       Problem List  Date Reviewed: 5/8/2017          Codes Class Noted    Right inguinal hernia ICD-10-CM: K40.90  ICD-9-CM: 550.90  5/8/2017        Obesity, Class I, BMI 30-34.9 ICD-10-CM: E66.9  ICD-9-CM: 278.00  5/8/2017        Dyslipidemia ICD-10-CM: E78.5  ICD-9-CM: 272.4  4/24/2017        Mitral valve insufficiency ICD-10-CM: I34.0  ICD-9-CM: 424.0  4/24/2017    Overview Signed 4/24/2017  1:52 PM by Moira Montgomery RN     Overview:   mild             Paroxysmal atrial fibrillation ([facility] ICD-10-CM: I48.0  ICD-9-CM: 427.31  4/24/2017        Right ventricular outflow tract ventricular tachycardia (HCC) ICD-10-CM: I47.2  ICD-9-CM: 427.1  4/24/2017    Overview Signed 4/24/2017  1:52 PM by Moira Montgomery RN     Overview:   RVOT/NSVT             Osteoarthritis ICD-10-CM: M19.90  ICD-9-CM: 715.90  3/28/2017        Osteoarthritis of knee ICD-10-CM: M17.10  ICD-9-CM: 715.36  5/25/2016        CAD (coronary artery disease) ICD-10-CM: I25.10  ICD-9-CM: 414.00  7/20/2014        Cardiac dysrhythmia, unspecified ICD-10-CM: I49.9  ICD-9-CM: 427.9  7/20/2014        Diabetes ([facility] ICD-10-CM: E11.9  ICD-9-CM: 250.00  7/18/2014        Essential (primary) hypertension ICD-10-CM: I10  ICD-9-CM: 401.9  7/18/2014        Depression ICD-10-CM: F32.9  ICD-9-CM: 282  7/18/2014                Amirah Byrd MD,  FACS

## 2017-05-16 ENCOUNTER — HOSPITAL ENCOUNTER (OUTPATIENT)
Age: 62
Setting detail: OUTPATIENT SURGERY
Discharge: HOME | End: 2017-05-16
Attending: SURGERY | Admitting: SURGERY
Payer: COMMERCIAL

## 2017-05-16 ENCOUNTER — ANESTHESIA (OUTPATIENT)
Dept: SURGERY | Age: 62
End: 2017-05-16
Payer: COMMERCIAL

## 2017-05-16 VITALS
OXYGEN SATURATION: 100 % | SYSTOLIC BLOOD PRESSURE: 127 MMHG | BODY MASS INDEX: 31.93 KG/M2 | WEIGHT: 269.25 LBS | RESPIRATION RATE: 16 BRPM | TEMPERATURE: 98.9 F | DIASTOLIC BLOOD PRESSURE: 72 MMHG | HEART RATE: 60 BPM

## 2017-05-16 LAB — GLUCOSE BLD STRIP.AUTO-MCNC: 168 MG/DL (ref 65–100)

## 2017-05-16 PROCEDURE — 74011000250 HC RX REV CODE- 250: Performed by: SURGERY

## 2017-05-16 PROCEDURE — 77030008703 HC TU ET UNCUF COVD -A: Performed by: ANESTHESIOLOGY

## 2017-05-16 PROCEDURE — 74011250636 HC RX REV CODE- 250/636: Performed by: ANESTHESIOLOGY

## 2017-05-16 PROCEDURE — 76210000020 HC REC RM PH II FIRST 0.5 HR: Performed by: SURGERY

## 2017-05-16 PROCEDURE — 77030012406 HC DRN WND PENRS BARD -A: Performed by: SURGERY

## 2017-05-16 PROCEDURE — 74011250636 HC RX REV CODE- 250/636: Performed by: SURGERY

## 2017-05-16 PROCEDURE — 74011250636 HC RX REV CODE- 250/636

## 2017-05-16 PROCEDURE — 76010000153 HC OR TIME 1.5 TO 2 HR: Performed by: SURGERY

## 2017-05-16 PROCEDURE — 74011000250 HC RX REV CODE- 250

## 2017-05-16 PROCEDURE — 76210000063 HC OR PH I REC FIRST 0.5 HR: Performed by: SURGERY

## 2017-05-16 PROCEDURE — 74011250637 HC RX REV CODE- 250/637: Performed by: ANESTHESIOLOGY

## 2017-05-16 PROCEDURE — 77030018836 HC SOL IRR NACL ICUM -A: Performed by: SURGERY

## 2017-05-16 PROCEDURE — 77030032490 HC SLV COMPR SCD KNE COVD -B: Performed by: SURGERY

## 2017-05-16 PROCEDURE — 74011000258 HC RX REV CODE- 258: Performed by: SURGERY

## 2017-05-16 PROCEDURE — 77030002986 HC SUT PROL J&J -A: Performed by: SURGERY

## 2017-05-16 PROCEDURE — 77030011640 HC PAD GRND REM COVD -A: Performed by: SURGERY

## 2017-05-16 PROCEDURE — 82962 GLUCOSE BLOOD TEST: CPT

## 2017-05-16 PROCEDURE — 77030010507 HC ADH SKN DERMBND J&J -B: Performed by: SURGERY

## 2017-05-16 PROCEDURE — 77030018673: Performed by: SURGERY

## 2017-05-16 PROCEDURE — 77030008477 HC STYL SATN SLP COVD -A: Performed by: ANESTHESIOLOGY

## 2017-05-16 PROCEDURE — 77030003028 HC SUT VCRL J&J -A: Performed by: SURGERY

## 2017-05-16 PROCEDURE — 77030031139 HC SUT VCRL2 J&J -A: Performed by: SURGERY

## 2017-05-16 PROCEDURE — C1781 MESH (IMPLANTABLE): HCPCS | Performed by: SURGERY

## 2017-05-16 PROCEDURE — 76060000034 HC ANESTHESIA 1.5 TO 2 HR: Performed by: SURGERY

## 2017-05-16 PROCEDURE — 77030019940 HC BLNKT HYPOTHRM STRY -B: Performed by: ANESTHESIOLOGY

## 2017-05-16 DEVICE — MESH HERN W3XL4.75IN L PLA PRECUT FLAP STYL PARTIALLY ABSRB: Type: IMPLANTABLE DEVICE | Site: GROIN | Status: FUNCTIONAL

## 2017-05-16 RX ORDER — LIDOCAINE HYDROCHLORIDE 10 MG/ML
0.1 INJECTION INFILTRATION; PERINEURAL AS NEEDED
Status: DISCONTINUED | OUTPATIENT
Start: 2017-05-16 | End: 2017-05-16 | Stop reason: HOSPADM

## 2017-05-16 RX ORDER — BUPIVACAINE HYDROCHLORIDE AND EPINEPHRINE 2.5; 5 MG/ML; UG/ML
INJECTION, SOLUTION EPIDURAL; INFILTRATION; INTRACAUDAL; PERINEURAL AS NEEDED
Status: DISCONTINUED | OUTPATIENT
Start: 2017-05-16 | End: 2017-05-16 | Stop reason: HOSPADM

## 2017-05-16 RX ORDER — FAMOTIDINE 20 MG/1
20 TABLET, FILM COATED ORAL ONCE
Status: COMPLETED | OUTPATIENT
Start: 2017-05-16 | End: 2017-05-16

## 2017-05-16 RX ORDER — METOPROLOL TARTRATE 25 MG/1
25 TABLET, FILM COATED ORAL ONCE
Status: COMPLETED | OUTPATIENT
Start: 2017-05-16 | End: 2017-05-16

## 2017-05-16 RX ORDER — NEOSTIGMINE METHYLSULFATE 1 MG/ML
INJECTION INTRAVENOUS AS NEEDED
Status: DISCONTINUED | OUTPATIENT
Start: 2017-05-16 | End: 2017-05-16 | Stop reason: HOSPADM

## 2017-05-16 RX ORDER — GLYCOPYRROLATE 0.2 MG/ML
INJECTION INTRAMUSCULAR; INTRAVENOUS AS NEEDED
Status: DISCONTINUED | OUTPATIENT
Start: 2017-05-16 | End: 2017-05-16 | Stop reason: HOSPADM

## 2017-05-16 RX ORDER — SODIUM CHLORIDE 9 MG/ML
INJECTION INTRAMUSCULAR; INTRAVENOUS; SUBCUTANEOUS AS NEEDED
Status: DISCONTINUED | OUTPATIENT
Start: 2017-05-16 | End: 2017-05-16 | Stop reason: HOSPADM

## 2017-05-16 RX ORDER — PROPOFOL 10 MG/ML
INJECTION, EMULSION INTRAVENOUS AS NEEDED
Status: DISCONTINUED | OUTPATIENT
Start: 2017-05-16 | End: 2017-05-16 | Stop reason: HOSPADM

## 2017-05-16 RX ORDER — DEXAMETHASONE SODIUM PHOSPHATE 4 MG/ML
INJECTION, SOLUTION INTRA-ARTICULAR; INTRALESIONAL; INTRAMUSCULAR; INTRAVENOUS; SOFT TISSUE AS NEEDED
Status: DISCONTINUED | OUTPATIENT
Start: 2017-05-16 | End: 2017-05-16 | Stop reason: HOSPADM

## 2017-05-16 RX ORDER — ONDANSETRON 2 MG/ML
INJECTION INTRAMUSCULAR; INTRAVENOUS AS NEEDED
Status: DISCONTINUED | OUTPATIENT
Start: 2017-05-16 | End: 2017-05-16 | Stop reason: HOSPADM

## 2017-05-16 RX ORDER — MIDAZOLAM HYDROCHLORIDE 1 MG/ML
2 INJECTION, SOLUTION INTRAMUSCULAR; INTRAVENOUS ONCE
Status: COMPLETED | OUTPATIENT
Start: 2017-05-16 | End: 2017-05-16

## 2017-05-16 RX ORDER — SODIUM CHLORIDE, SODIUM LACTATE, POTASSIUM CHLORIDE, CALCIUM CHLORIDE 600; 310; 30; 20 MG/100ML; MG/100ML; MG/100ML; MG/100ML
75 INJECTION, SOLUTION INTRAVENOUS CONTINUOUS
Status: DISCONTINUED | OUTPATIENT
Start: 2017-05-16 | End: 2017-05-16 | Stop reason: HOSPADM

## 2017-05-16 RX ORDER — FENTANYL CITRATE 50 UG/ML
100 INJECTION, SOLUTION INTRAMUSCULAR; INTRAVENOUS ONCE
Status: DISCONTINUED | OUTPATIENT
Start: 2017-05-16 | End: 2017-05-16 | Stop reason: HOSPADM

## 2017-05-16 RX ORDER — MIDAZOLAM HYDROCHLORIDE 1 MG/ML
2 INJECTION, SOLUTION INTRAMUSCULAR; INTRAVENOUS ONCE
Status: DISCONTINUED | OUTPATIENT
Start: 2017-05-16 | End: 2017-05-16 | Stop reason: HOSPADM

## 2017-05-16 RX ORDER — ROCURONIUM BROMIDE 10 MG/ML
INJECTION, SOLUTION INTRAVENOUS AS NEEDED
Status: DISCONTINUED | OUTPATIENT
Start: 2017-05-16 | End: 2017-05-16 | Stop reason: HOSPADM

## 2017-05-16 RX ORDER — HYDROCODONE BITARTRATE AND ACETAMINOPHEN 7.5; 325 MG/1; MG/1
TABLET ORAL
Qty: 50 TAB | Refills: 0 | Status: SHIPPED | OUTPATIENT
Start: 2017-05-16 | End: 2017-06-16 | Stop reason: ALTCHOICE

## 2017-05-16 RX ORDER — HYDROMORPHONE HYDROCHLORIDE 2 MG/ML
0.5 INJECTION, SOLUTION INTRAMUSCULAR; INTRAVENOUS; SUBCUTANEOUS
Status: DISCONTINUED | OUTPATIENT
Start: 2017-05-16 | End: 2017-05-16 | Stop reason: HOSPADM

## 2017-05-16 RX ORDER — SUCCINYLCHOLINE CHLORIDE 20 MG/ML
INJECTION INTRAMUSCULAR; INTRAVENOUS AS NEEDED
Status: DISCONTINUED | OUTPATIENT
Start: 2017-05-16 | End: 2017-05-16 | Stop reason: HOSPADM

## 2017-05-16 RX ORDER — CEFAZOLIN SODIUM 1 G/3ML
INJECTION, POWDER, FOR SOLUTION INTRAMUSCULAR; INTRAVENOUS AS NEEDED
Status: DISCONTINUED | OUTPATIENT
Start: 2017-05-16 | End: 2017-05-16 | Stop reason: HOSPADM

## 2017-05-16 RX ORDER — LIDOCAINE HYDROCHLORIDE 20 MG/ML
INJECTION, SOLUTION EPIDURAL; INFILTRATION; INTRACAUDAL; PERINEURAL AS NEEDED
Status: DISCONTINUED | OUTPATIENT
Start: 2017-05-16 | End: 2017-05-16 | Stop reason: HOSPADM

## 2017-05-16 RX ORDER — FENTANYL CITRATE 50 UG/ML
INJECTION, SOLUTION INTRAMUSCULAR; INTRAVENOUS AS NEEDED
Status: DISCONTINUED | OUTPATIENT
Start: 2017-05-16 | End: 2017-05-16 | Stop reason: HOSPADM

## 2017-05-16 RX ORDER — OXYCODONE HYDROCHLORIDE 5 MG/1
5 TABLET ORAL
Status: DISCONTINUED | OUTPATIENT
Start: 2017-05-16 | End: 2017-05-16 | Stop reason: HOSPADM

## 2017-05-16 RX ORDER — OXYCODONE HYDROCHLORIDE 5 MG/1
10 TABLET ORAL
Status: DISCONTINUED | OUTPATIENT
Start: 2017-05-16 | End: 2017-05-16 | Stop reason: HOSPADM

## 2017-05-16 RX ORDER — DOCUSATE SODIUM 100 MG/1
100 CAPSULE, LIQUID FILLED ORAL 2 TIMES DAILY
Qty: 60 CAP | Refills: 1 | Status: SHIPPED | OUTPATIENT
Start: 2017-05-16 | End: 2017-06-15

## 2017-05-16 RX ADMIN — PROPOFOL 200 MG: 10 INJECTION, EMULSION INTRAVENOUS at 11:55

## 2017-05-16 RX ADMIN — ONDANSETRON 4 MG: 2 INJECTION INTRAMUSCULAR; INTRAVENOUS at 13:11

## 2017-05-16 RX ADMIN — PROPOFOL 30 MG: 10 INJECTION, EMULSION INTRAVENOUS at 13:19

## 2017-05-16 RX ADMIN — FENTANYL CITRATE 100 MCG: 50 INJECTION, SOLUTION INTRAMUSCULAR; INTRAVENOUS at 11:51

## 2017-05-16 RX ADMIN — MIDAZOLAM HYDROCHLORIDE 2 MG: 1 INJECTION, SOLUTION INTRAMUSCULAR; INTRAVENOUS at 11:00

## 2017-05-16 RX ADMIN — ROCURONIUM BROMIDE 10 MG: 10 INJECTION, SOLUTION INTRAVENOUS at 12:15

## 2017-05-16 RX ADMIN — SUCCINYLCHOLINE CHLORIDE 200 MG: 20 INJECTION INTRAMUSCULAR; INTRAVENOUS at 11:55

## 2017-05-16 RX ADMIN — PROPOFOL 20 MG: 10 INJECTION, EMULSION INTRAVENOUS at 13:29

## 2017-05-16 RX ADMIN — ROCURONIUM BROMIDE 5 MG: 10 INJECTION, SOLUTION INTRAVENOUS at 13:02

## 2017-05-16 RX ADMIN — SODIUM CHLORIDE, SODIUM LACTATE, POTASSIUM CHLORIDE, AND CALCIUM CHLORIDE 75 ML/HR: 600; 310; 30; 20 INJECTION, SOLUTION INTRAVENOUS at 10:00

## 2017-05-16 RX ADMIN — LIDOCAINE HYDROCHLORIDE 100 MG: 20 INJECTION, SOLUTION EPIDURAL; INFILTRATION; INTRACAUDAL; PERINEURAL at 11:55

## 2017-05-16 RX ADMIN — GLYCOPYRROLATE 0.6 MG: 0.2 INJECTION INTRAMUSCULAR; INTRAVENOUS at 13:17

## 2017-05-16 RX ADMIN — SODIUM CHLORIDE 900 MG: 900 INJECTION, SOLUTION INTRAVENOUS at 11:57

## 2017-05-16 RX ADMIN — ROCURONIUM BROMIDE 25 MG: 10 INJECTION, SOLUTION INTRAVENOUS at 12:04

## 2017-05-16 RX ADMIN — ROCURONIUM BROMIDE 5 MG: 10 INJECTION, SOLUTION INTRAVENOUS at 11:55

## 2017-05-16 RX ADMIN — SODIUM CHLORIDE, SODIUM LACTATE, POTASSIUM CHLORIDE, AND CALCIUM CHLORIDE: 600; 310; 30; 20 INJECTION, SOLUTION INTRAVENOUS at 12:44

## 2017-05-16 RX ADMIN — FAMOTIDINE 20 MG: 20 TABLET, FILM COATED ORAL at 10:00

## 2017-05-16 RX ADMIN — DEXAMETHASONE SODIUM PHOSPHATE 4 MG: 4 INJECTION, SOLUTION INTRA-ARTICULAR; INTRALESIONAL; INTRAMUSCULAR; INTRAVENOUS; SOFT TISSUE at 12:13

## 2017-05-16 RX ADMIN — NEOSTIGMINE METHYLSULFATE 4 MG: 1 INJECTION INTRAVENOUS at 13:18

## 2017-05-16 RX ADMIN — METOPROLOL TARTRATE 25 MG: 25 TABLET, FILM COATED ORAL at 10:15

## 2017-05-16 RX ADMIN — ROCURONIUM BROMIDE 10 MG: 10 INJECTION, SOLUTION INTRAVENOUS at 12:45

## 2017-05-16 NOTE — ANESTHESIA POSTPROCEDURE EVALUATION
Post-Anesthesia Evaluation and Assessment    Patient: Felipe Cifuentes MRN: [id number]  SSN: [id number]    YOB: 1955  Age: 60 y.o. Sex: male       Cardiovascular Function/Vital Signs  Visit Vitals    /66    Pulse (!) 58    Temp 37.6 °C (99.6 °F)    Resp 16    Wt 122.1 kg (269 lb 4 oz)    SpO2 100%    BMI 31.93 kg/m2       Patient is status post general anesthesia for Procedure(s):  RIGHT INGUINAL HERNIA REPAIR/ PROGRIP MESH. Nausea/Vomiting: None    Postoperative hydration reviewed and adequate. Pain:  Pain Scale 1: Numeric (0 - 10) (05/16/17 1344)  Pain Intensity 1: 0 (05/16/17 1344)   Managed    Neurological Status:   Neuro (WDL): Within Defined Limits (05/16/17 1344)   At baseline    Mental Status and Level of Consciousness: Arousable    Pulmonary Status:   O2 Device: Nasal cannula (05/16/17 1344)   Adequate oxygenation and airway patent    Complications related to anesthesia: None    Post-anesthesia assessment completed.  No concerns    Signed By: Silva Reyes MD     May 16, 2017

## 2017-05-16 NOTE — IP AVS SNAPSHOT
Current Discharge Medication List  
  
START taking these medications Dose & Instructions Dispensing Information Comments Morning Noon Evening Bedtime  
 docusate sodium 100 mg capsule Commonly known as:  Herminio Choudhury Your last dose was: Your next dose is:    
   
   
 Dose:  100 mg Take 1 Cap by mouth two (2) times a day for 30 days. Quantity:  60 Cap Refills:  1 HYDROcodone-acetaminophen 7.5-325 mg per tablet Commonly known as:  Radha Reeda Your last dose was: Your next dose is:    
   
   
 1-2 tabs by mouth every 6 hours prn Quantity:  50 Tab Refills:  0 CONTINUE these medications which have CHANGED Dose & Instructions Dispensing Information Comments Morning Noon Evening Bedtime  
 acyclovir 200 mg capsule Commonly known as:  ZOVIRAX What changed:  additional instructions Your last dose was: Your next dose is:    
   
   
 Dose:  200 mg Take 1 Cap by mouth daily. Quantity:  30 Cap Refills:  0  
     
   
   
   
  
 simvastatin 80 mg tablet Commonly known as:  ZOCOR What changed:  additional instructions Your last dose was: Your next dose is:    
   
   
 Dose:  80 mg Take 1 Tab by mouth nightly. Quantity:  90 Tab Refills:  1 CONTINUE these medications which have NOT CHANGED Dose & Instructions Dispensing Information Comments Morning Noon Evening Bedtime  
 coenzyme q10 10 mg Cap Your last dose was: Your next dose is: Take  by mouth daily. Refills:  0  
     
   
   
   
  
 flecainide 100 mg tablet Commonly known as:  TAMBOCOR Your last dose was: Your next dose is:    
   
   
 Dose:  100 mg Take 100 mg by mouth two (2) times a day. Take / use AM day of surgery  per anesthesia protocols. Indications: PREVENTION OF RECURRENT ATRIAL FIBRILLATION Refills:  0 glimepiride 4 mg tablet Commonly known as:  AMARYL Your last dose was: Your next dose is: TAKE 1 TABLET EVERY MORNING Quantity:  90 Tab Refills:  1  
     
   
   
   
  
 lisinopril 10 mg tablet Commonly known as:  Jose Reyes Your last dose was: Your next dose is:    
   
   
 Dose:  10 mg Take 1 Tab by mouth daily. Quantity:  90 Tab Refills:  1  
     
   
   
   
  
 metFORMIN 1,000 mg tablet Commonly known as:  GLUCOPHAGE Your last dose was: Your next dose is:    
   
   
 Dose:  1000 mg Take 1,000 mg by mouth daily. Takes 2 tablets every morning   Indications: type 2 diabetes mellitus Refills:  0  
     
   
   
   
  
 metoprolol succinate 25 mg XL tablet Commonly known as:  TOPROL-XL Your last dose was: Your next dose is:    
   
   
 Dose:  25 mg Take 25 mg by mouth daily. Take / use AM day of surgery  per anesthesia protocols. Indications: hypertension Refills:  0  
     
   
   
   
  
 NICOTINAMIDE PO Your last dose was: Your next dose is: Take  by mouth two (2) times a day. Refills:  0  
     
   
   
   
  
 verapamil  mg CR tablet Commonly known as:  CALAN-SR Your last dose was: Your next dose is:    
   
   
 Dose:  180 mg Take 180 mg by mouth daily. Take / use AM day of surgery  per anesthesia protocols. Indications: hypertension Refills:  0  
     
   
   
   
  
 VITAMIN D3 PO Your last dose was: Your next dose is:    
   
   
 take  by mouth daily. am  
 Refills:  0 Where to Get Your Medications Information on where to get these meds will be given to you by the nurse or doctor. ! Ask your nurse or doctor about these medications  
  docusate sodium 100 mg capsule HYDROcodone-acetaminophen 7.5-325 mg per tablet

## 2017-05-16 NOTE — DISCHARGE INSTRUCTIONS
ACTIVITY  · As tolerated and as directed by your doctor. · Bathe or shower as directed by your doctor. DIET  · Clear liquids until no nausea or vomiting; then light diet for the first day. · Advance to regular diet on second day, unless your doctor orders otherwise. · If nausea and vomiting continues, call your doctor. PAIN  · Take pain medication as directed by your doctor. · Call your doctor if pain is NOT relieved by medication. · DO NOT take aspirin of blood thinners unless directed by your doctor. DRESSING CARE       CALL YOUR DOCTOR IF   · Excessive bleeding that does not stop after holding pressure over the area  · Temperature of 101 degrees F or above  · Excessive redness, swelling or bruising, and/ or green or yellow, smelly discharge from incision    AFTER ANESTHESIA   · For the first 24 hours: DO NOT Drive, Drink alcoholic beverages, or Make important decisions. · Be aware of dizziness following anesthesia and while taking pain medication. APPOINTMENT DATE/ TIME    May  30 @ 215pm    YOUR DOCTOR'S PHONE NUMBER #[phone]      DISCHARGE SUMMARY from Nurse    PATIENT INSTRUCTIONS:    After general anesthesia or intravenous sedation, for 24 hours or while taking prescription Narcotics:  · Limit your activities  · Do not drive and operate hazardous machinery  · Do not make important personal or business decisions  · Do  not drink alcoholic beverages  · If you have not urinated within 8 hours after discharge, please contact your surgeon on call. *  Please give a list of your current medications to your Primary Care Provider. *  Please update this list whenever your medications are discontinued, doses are      changed, or new medications (including over-the-counter products) are added. *  Please carry medication information at all times in case of emergency situations.       These are general instructions for a healthy lifestyle:    No smoking/ No tobacco products/ Avoid exposure to second hand smoke    Surgeon General's Warning:  Quitting smoking now greatly reduces serious risk to your health. Obesity, smoking, and sedentary lifestyle greatly increases your risk for illness    A healthy diet, regular physical exercise & weight monitoring are important for maintaining a healthy lifestyle    You may be retaining fluid if you have a history of heart failure or if you experience any of the following symptoms:  Weight gain of 3 pounds or more overnight or 5 pounds in a week, increased swelling in our hands or feet or shortness of breath while lying flat in bed. Please call your doctor as soon as you notice any of these symptoms; do not wait until your next office visit. Recognize signs and symptoms of STROKE:    F-face looks uneven    A-arms unable to move or move unevenly    S-speech slurred or non-existent    T-time-call 911 as soon as signs and symptoms begin-DO NOT go       Back to bed or wait to see if you get better-TIME IS BRAIN. Leave dressing for 4 days, then remove. Leave glue dressing intact until seen in follow up. OK to shower tomorrow but do not spray water directly into wound. No heavy lifting (>10lbs) for 6 weeks to reduce risk of disrupting hernia repair. May resume normal activity including walking, which is encouraged to reduce risk of blood clots. No driving until you are completely off pain meds for 24hrs and have no pain with movements associated with driving.    Pain prescription on chart for patient to use as needed for pain   Stool softener to help reduce straining at stool  Follow-up with Dr Maurice Robert in 2 weeks ([phone])

## 2017-05-16 NOTE — IP AVS SNAPSHOT
[address] 
[phone] Patient: Whit Cool MRN: [id number] JQX:[id number] You are allergic to the following No active allergies Recent Documentation Weight BMI Smoking Status  
  
  
  
 122.1 kg 31.93 kg/m2 Former Smoker Emergency Contacts Name Discharge Info Relation Home Work Mobile Jenny Kenny DISCHARGE CAREGIVER [3] Spouse [3] [phone] [address] CAREGIVER [3] Son [22] [phone] About your hospitalization You were admitted on:  May 16, 2017 You last received care in the:  [facility] OP PACU You were discharged on:  May 16, 2017 Unit phone number:  [phone] Why you were hospitalized Your primary diagnosis was:  Not on File Providers Seen During Your Hospitalizations Provider Role Specialty Primary office phone Serena Pitt MD Attending Provider General Surgery [phone] Your Primary Care Physician (PCP) Primary Care Physician Office Phone Office Fax Via [address] [phone] Follow-up Information Follow up With Details Comments Contact Info Ashvin Ching MD   [address] [place] [address] Kathryn Phan 
[phone] Your Appointments Monday May 22, 2017  9:30 AM EDT  
SC PT RECUR VISIT 1 HOUR with KASSY Hernandez 4575 at [facility] ([address]) [address]  [address]  
[phone] Thursday May 25, 2017 10:30 AM EDT  
SC PT RECUR VISIT 1 HOUR with KASSY Hernandez 4575 at [facility] ([address]) [address]  [address]  
[phone] Tuesday May 30, 2017  2:15 PM EDT Global Post Op with Serena Pitt MD  
[facility] MAIN ([facility] MAIN) [address] [place] [address] Dominique [address] [facility]  
[phone] Wednesday May 31, 2017  9:30 AM EDT  
SC PT RECUR VISIT 1 HOUR with Mellissa Barclay, PT Frank Keyes 4575 at [facility] ([address]) [address]  [phone]  
[phone] Thursday June 01, 2017  3:30 PM EDT  
SC PT RECUR VISIT 1 HOUR with Mellissa Barclay, PT Frank Keyes 4575 at [facility] ([address]) [address]  [address]  
[phone] Monday June 05, 2017 10:30 AM EDT  
SC PT RECUR VISIT 1 HOUR with Mellissa Barclay, PT Frank Keyes 4575 at [facility] ([address]) [address]  [phone]  
[phone] Thursday June 08, 2017 10:30 AM EDT  
SC PT RECUR VISIT 1 HOUR with Mellissa Barclay, PT Frank Keyes 4575 at [facility] ([address]) [address] [place] [address]  [phone] Current Discharge Medication List  
  
START taking these medications Dose & Instructions Dispensing Information Comments Morning Noon Evening Bedtime  
 docusate sodium 100 mg capsule Commonly known as:  Spring Saul Your last dose was: Your next dose is:    
   
   
 Dose:  100 mg Take 1 Cap by mouth two (2) times a day for 30 days. Quantity:  60 Cap Refills:  1 HYDROcodone-acetaminophen 7.5-325 mg per tablet Commonly known as:  Nettie Crkeatone Your last dose was: Your next dose is:    
   
   
 1-2 tabs by mouth every 6 hours prn Quantity:  50 Tab Refills:  0 CONTINUE these medications which have CHANGED Dose & Instructions Dispensing Information Comments Morning Noon Evening Bedtime  
 acyclovir 200 mg capsule Commonly known as:  ZOVIRAX What changed:  additional instructions Your last dose was: Your next dose is: Dose:  200 mg Take 1 Cap by mouth daily. Quantity:  30 Cap Refills:  0  
     
   
   
   
  
 simvastatin 80 mg tablet Commonly known as:  ZOCOR What changed:  additional instructions Your last dose was: Your next dose is:    
   
   
 Dose:  80 mg Take 1 Tab by mouth nightly. Quantity:  90 Tab Refills:  1 CONTINUE these medications which have NOT CHANGED Dose & Instructions Dispensing Information Comments Morning Noon Evening Bedtime  
 coenzyme q10 10 mg Cap Your last dose was: Your next dose is: Take  by mouth daily. Refills:  0  
     
   
   
   
  
 flecainide 100 mg tablet Commonly known as:  TAMBOCOR Your last dose was: Your next dose is:    
   
   
 Dose:  100 mg Take 100 mg by mouth two (2) times a day. Take / use AM day of surgery  per anesthesia protocols. Indications: PREVENTION OF RECURRENT ATRIAL FIBRILLATION Refills:  0  
     
   
   
   
  
 glimepiride 4 mg tablet Commonly known as:  AMARYL Your last dose was: Your next dose is: TAKE 1 TABLET EVERY MORNING Quantity:  90 Tab Refills:  1  
     
   
   
   
  
 lisinopril 10 mg tablet Commonly known as:  Daniele Roya Your last dose was: Your next dose is:    
   
   
 Dose:  10 mg Take 1 Tab by mouth daily. Quantity:  90 Tab Refills:  1  
     
   
   
   
  
 metFORMIN 1,000 mg tablet Commonly known as:  GLUCOPHAGE Your last dose was: Your next dose is:    
   
   
 Dose:  1000 mg Take 1,000 mg by mouth daily. Takes 2 tablets every morning   Indications: type 2 diabetes mellitus Refills:  0  
     
   
   
   
  
 metoprolol succinate 25 mg XL tablet Commonly known as:  TOPROL-XL Your last dose was: Your next dose is:    
   
   
 Dose:  25 mg Take 25 mg by mouth daily.  Take / use AM day of surgery  per anesthesia protocols. Indications: hypertension Refills:  0  
     
   
   
   
  
 NICOTINAMIDE PO Your last dose was: Your next dose is: Take  by mouth two (2) times a day. Refills:  0  
     
   
   
   
  
 verapamil  mg CR tablet Commonly known as:  CALAN-SR Your last dose was: Your next dose is:    
   
   
 Dose:  180 mg Take 180 mg by mouth daily. Take / use AM day of surgery  per anesthesia protocols. Indications: hypertension Refills:  0  
     
   
   
   
  
 VITAMIN D3 PO Your last dose was: Your next dose is:    
   
   
 take  by mouth daily. am  
 Refills:  0 Where to Get Your Medications Information on where to get these meds will be given to you by the nurse or doctor. ! Ask your nurse or doctor about these medications  
  docusate sodium 100 mg capsule HYDROcodone-acetaminophen 7.5-325 mg per tablet Discharge Instructions ACTIVITY · As tolerated and as directed by your doctor. · Bathe or shower as directed by your doctor. DIET · Clear liquids until no nausea or vomiting; then light diet for the first day. · Advance to regular diet on second day, unless your doctor orders otherwise. · If nausea and vomiting continues, call your doctor. PAIN 
· Take pain medication as directed by your doctor. · Call your doctor if pain is NOT relieved by medication. · DO NOT take aspirin of blood thinners unless directed by your doctor. DRESSING CARE  
 
 
 
YOUR DOCTOR'S PHONE NUMBER #[phone] DISCHARGE SUMMARY from Nurse PATIENT INSTRUCTIONS: 
 
After general anesthesia or intravenous sedation, for 24 hours or while taking prescription Narcotics: · Limit your activities · Do not drive and operate hazardous machinery · Do not make important personal or business decisions · Do  not drink alcoholic beverages · If you have not urinated within 8 hours after discharge, please contact your surgeon on call. *  Please give a list of your current medications to your Primary Care Provider. *  Please update this list whenever your medications are discontinued, doses are 
    changed, or new medications (including over-the-counter products) are added. *  Please carry medication information at all times in case of emergency situations. These are general instructions for a healthy lifestyle: No smoking/ No tobacco products/ Avoid exposure to second hand smoke Surgeon General's Warning:  Quitting smoking now greatly reduces serious risk to your health. Obesity, smoking, and sedentary lifestyle greatly increases your risk for illness A healthy diet, regular physical exercise & weight monitoring are important for maintaining a healthy lifestyle You may be retaining fluid if you have a history of heart failure or if you experience any of the following symptoms:  Weight gain of 3 pounds or more overnight or 5 pounds in a week, increased swelling in our hands or feet or shortness of breath while lying flat in bed. Please call your doctor as soon as you notice any of these symptoms; do not wait until your next office visit. Recognize signs and symptoms of STROKE: 
 
F-face looks uneven A-arms unable to move or move unevenly S-speech slurred or non-existent T-time-call 911 as soon as signs and symptoms begin-DO NOT go Back to bed or wait to see if you get better-TIME IS BRAIN. Leave dressing for 4 days, then remove. Leave glue dressing intact until seen in follow up. OK to shower tomorrow but do not spray water directly into wound. No heavy lifting (>10lbs) for 6 weeks to reduce risk of disrupting hernia repair. May resume normal activity including walking, which is encouraged to reduce risk of blood clots. No driving until you are completely off pain meds for 24hrs and have no pain with movements associated with driving. Pain prescription on chart for patient to use as needed for pain Stool softener to help reduce straining at stool Follow-up with Dr Priti Galan in 2 weeks ([phone]) Discharge Orders None Introducing [facility]! [facility] introduces [employer] patient portal. Now you can access parts of your medical record, email your doctor's office, and request medication refills online. 1. In your internet browser, go to [email or web address] 2. Click on the First Time User? Click Here link in the Sign In box. You will see the New Member Sign Up page. 3. Enter your [employer] Access Code exactly as it appears below. You will not need to use this code after youve completed the sign-up process. If you do not sign up before the expiration date, you must request a new code. · [employer] Access Code: [email or web address] Expires: 5/24/2017  5:11 PM 
 
4. Enter the last four digits of your Social Security Number (xxxx) and Date of Birth (mm/dd/yyyy) as indicated and click Submit. You will be taken to the next sign-up page. 5. Create a [employer] ID. This will be your [employer] login ID and cannot be changed, so think of one that is secure and easy to remember. 6. Create a [employer] password. You can change your password at any time. 7. Enter your Password Reset Question and Answer.  This can be used at a later time if you forget your password. 8. Enter your e-mail address. You will receive e-mail notification when new information is available in [address] 9. Click Sign Up. You can now view and download portions of your medical record. 10. Click the Download Summary menu link to download a portable copy of your medical information. If you have questions, please visit the Frequently Asked Questions section of the [employer] website. Remember, [employer] is NOT to be used for urgent needs. For medical emergencies, dial 911. Now available from your iPhone and Android! General Information Please provide this summary of care documentation to your next provider. Patient Signature:  ____________________________________________________________ Date:  ____________________________________________________________  
  
Kenan Armand Provider Signature:  ____________________________________________________________ Date:  ____________________________________________________________

## 2017-05-16 NOTE — ANESTHESIA PREPROCEDURE EVALUATION
Anesthetic History               Review of Systems / Medical History  Patient summary reviewed and pertinent labs reviewed    Pulmonary                   Neuro/Psych              Cardiovascular    Hypertension: well controlled        Dysrhythmias : atrial fibrillation  CAD         GI/Hepatic/Renal                Endo/Other    Diabetes: type 2    Obesity and arthritis     Other Findings              Physical Exam    Airway  Mallampati: II  TM Distance: > 6 cm  Neck ROM: normal range of motion   Mouth opening: Normal     Cardiovascular  Regular rate and rhythm,  S1 and S2 normal,  no murmur, click, rub, or gallop  Rhythm: regular  Rate: normal         Dental  No notable dental hx       Pulmonary  Breath sounds clear to auscultation               Abdominal         Other Findings            Anesthetic Plan    ASA: 3  Anesthesia type: general            Anesthetic plan and risks discussed with: Patient

## 2017-05-16 NOTE — BRIEF OP NOTE
BRIEF OPERATIVE NOTE    Date of Procedure: 5/16/2017   Preoperative Diagnosis: Hernia, inguinal, right [K40.90]  Postoperative Diagnosis: Right pantaloon sliding inguinal hernia    Procedure(s):  RIGHT INGUINAL HERNIA REPAIR/ PROGRIP MESH  Surgeon(s) and Role:     * Kassandra Parada MD - Primary         Assistant Staff:       Surgical Staff:  Circ-1: Eva De La O RN  Circ-Relief: Amaya Earl RN  Scrub Tech-1: Lidia Mora  Scrub Tech-2: [employer]  Scrub Tech-Relief: Tahira Satnamrory Jarvis  Event Time In   Incision Start 1206   Incision Close 1326     Anesthesia: General   Estimated Blood Loss: <20 cc  Specimens: * No specimens in log *   Findings: mild direct hernia and complex sliding indirect hernia (pantaloon sliding hernia). ilioinguinal nerve preserved with cord. Floor repaired with Progrip mesh. Typical fixation with additional running 0-prolene along iliopubic tract. Complications: none apparent  Implants:   Implant Name Type Inv.  Item Serial No.  Lot No. LRB No. Used Action   MESH POLYSTR SLF- 12X8 RT -- PROGRIP - [id number]   MESH POLYSTR SLF- 12X8 RT -- [address] [place] [address] [id number] 1 Implanted     Kassandra Parada MD

## 2017-05-17 NOTE — OP NOTES
[address] [facility]  [phone]    OPERATVE REPORT    Name: Felipe Cifuentes     Date of Surgery: 5/16/2017  Med Record Number: [id number]   Age: 60 y.o. Sex: male   Preoperative Diagnosis: Hernia, inguinal, right [K40.90]  Postoperative Diagnosis: Right pantaloon sliding inguinal hernia   Procedure(s):  RIGHT INGUINAL HERNIA REPAIR/ PROGRIP MESH  Surgeon(s) and Role:  * Priscila Dickey MD - Primary      Assistant Staff:         Surgical Staff:  Circ-1: Bola Foster RN  Circ-Relief: Alecia Gonzalez RN  Scrub Tech-1: Sada Conti  Scrub Tech-2: lAecia Chang  Scrub Tech-Relief: Cyndi Jarvis  Event Time In   Incision Start 1206   Incision Close 1326      Anesthesia: General   Estimated Blood Loss: <20 cc  Specimens: * No specimens in log *   Findings: mild direct hernia and complex sliding indirect hernia (pantaloon sliding hernia). ilioinguinal nerve preserved with cord. Floor repaired with Progrip mesh. Typical fixation with additional running 0-prolene along iliopubic tract. Complications: none apparent  Implants:   Implant Name Type Inv. Item Serial No.  Lot No. LRB No. Used Action   MESH POLYSTR SLF- 12X8 RT -- PROGRIP - [id number]    MESH POLYSTR SLF- 12X8 RT -- Obdulio Jackson  SURGICAL [id number] Right 1 Implanted         Procedure Description:   The risks, benefits, potential complications, treatment options, and expected outcomes were discussed with the patient pre-operatively. The possibilities of reaction to medication, chronic pain, bleeding, infection, injury to internal organs, recurrence, testicular damage, urinary retention, blood clots, the need for further surgery, heart attack, stroke, and death were discussed with the patient. The patient voiced understanding and gave informed consent preoperatively. The surgical site was marked preoperatively.  The patient was taken to the Operating Room, and the [address] [place] [address] time-out protocol checklist was followed. After the induction of adequate anesthesia, the abdomen was prepped and draped in the usual sterile fashion. An Rometta Moment was used to prevent contact with the skin. Preoperative antibiotics were given. A skinline inguinal incision was made and carried to the external oblique fascia and external ring. The external oblique was opened in the direction of its fibers down to the ring and the spermatic cord was encircled with a penrose drain. The cord was very thickened and indirect hernia was suspected. There was also bulging in the direct space c/w a direct hernia and ultimately would be a pantaloon hernia. The indirect hernia sac was dissected free from the spermatic cord taking great care not to injure the cord structures. The hernia sac was opened. High dissection was performed and one wall of the sac was a very thickened fatty structure. This was consistent with a sliding hernia. The most likely viscus was bladder. A finger was placed within the sac to palpate for femoral hernia which was not found. The sac was ligated high with a running 2-0 vicryl transfixing mattress suture of the parietal peritoneal portion of the sac. The distal sac was excised and removed from the field and the proximal sac was allowed to retract into the preperitioneal space. A cord lipoma was excised. Additional fatty tissue was present within the cord which was left in place due to intertwining with the cord structures. The wound was irrigated with Ancef irrigation. The direct hernia defect did not require imbrication to keep it out of the field. A piece of Right sided Progrip mesh was placed over the inguinal floor and anchored medial to the pubic tubercle with 2 x 0 prolene sutures. The entire inguinal floor was covered. The overlapping flap in the mesh to allow accommodation of the cord was secured with 0-prolene suture superomedially.   An additional 0-prolene suture was run along the iliopubic tract to limit risk of early mesh migration. The wound was once again irrigated with Ancef and hemostasis confirmed. It was then anesthetized with marcaine and the external oblique was re approximated to recreate an appropriate size external ring with running 2-0 vicryl suture. Anita's fascia was closed with 3-0 vicryl suture. The skin was closed with subcuticular 4-0 vicryl suture and Dermabond. A sterile dressing of Telfa and tegaderm was placed. At the end of the operation, all sponge, instruments, and needle counts were correct.      Kedar Epperson MD, FACS

## 2017-05-22 ENCOUNTER — HOSPITAL ENCOUNTER (OUTPATIENT)
Dept: PHYSICAL THERAPY | Age: 62
Discharge: HOME | End: 2017-05-22
Payer: COMMERCIAL

## 2017-05-22 PROCEDURE — 97140 MANUAL THERAPY 1/> REGIONS: CPT

## 2017-05-22 PROCEDURE — 97110 THERAPEUTIC EXERCISES: CPT

## 2017-05-22 NOTE — PROGRESS NOTES
Nohemi Dodd  : [facility] [address] [place] [address]  [phone]   [phone]        OUTPATIENT PHYSICAL THERAPY:Daily Note 2017    ICD-10: Treatment Diagnosis: pain in joint; right knee M25.561  ICD-10: Treatment Diagnosis: stiffness in joint; right knee M25.661  ICD-10: Treatment Diagnosis: muscle weakness, generalized M62.81  Precautions/Allergies: Review of patient's allergies indicates no known allergies. Fall Risk Score: 2 (? 5 = High Risk)  MD Orders: evaluate and treat for right TKA MEDICAL/REFERRING DIAGNOSIS:  Status post total right knee replacement [Z96.651]   DATE OF ONSET: surgical date: 3/28/17  REFERRING PHYSICIAN: Randy Xavier MD  RETURN PHYSICIAN APPOINTMENT: 5/10/17     INITIAL ASSESSMENT:  Mr. Karina Lloyd is a 60 y.o. male who presents to physical therapy s/p right TKA surgery on 3/28/17. He presents with limitations in right knee ROM, strength and endurance. He is challenged with gait, secondary to unable to obtain terminal knee extension. Currently using a cane with ambulation. He would benefit from physical therapy services to improve overall mobility and function to return to his ADLs and recreational activities. PROBLEM LIST (Impacting functional limitations):  1. Decreased Strength  2. Decreased ADL/Functional Activities  3. Decreased Transfer Abilities  4. Decreased Ambulation Ability/Technique  5. Decreased Balance  6. Increased Pain  7. Decreased Activity Tolerance  8. Increased Fatigue  9. Decreased Flexibility/Joint Mobility INTERVENTIONS PLANNED:  1. Balance Exercise  2. Cold  3. Gait Training  4. Home Exercise Program (HEP)  5. Manual Therapy  6. Neuromuscular Re-education/Strengthening  7. Range of Motion (ROM)  8. Therapeutic Activites  9. Therapeutic Exercise/Strengthening   TREATMENT PLAN:  Effective Dates: 17 TO 17.   Frequency/Duration: 2 times a week for 8 weeks, progress to 1x a week for 4 weeks. GOALS: (Goals have been discussed and agreed upon with patient.)  Short-Term Functional Goals: Time Frame:  6 weeks  1. Patient demonstrates independence with his HEP without verbal cueing from therapist.  2. Demonstrates improve right knee ROM 0-120 degrees to perform daily activities. 3. Patient able to ambulate for 15 minutes with pain no more than 0-2/10  Discharge Goals: Time Frame: 12 weeks  1. Improve LE and core strength to 4+/5 to perform ADLs  2. Patient improve right knee ROM to 0-125 degrees to perform ADLs  3. Patient able to ambulate for 30 minutes without onset of right knee pain. 4. Improve LEFS score from 44/80 to 60/80 to improve daily activities. Rehabilitation Potential For Stated Goals: Excellent  Regarding Akshat Kenny's therapy, I certify that the treatment plan above will be carried out by a therapist or under their direction. Thank you for this referral,  Ronit Whyte, PT   Referring Physician Signature: Wilner Kimball MD              Date                    The information in this section was collected on 4/27/17 (except where otherwise noted). HISTORY:   History of Present Injury/Illness (Reason for Referral):  Chronic history of right knee pain, s/p right TKA on 3/28/17, obtained 3 weeks of home health PT, notes improvement in overall mobility and less pain levels. In 2016, he had right knee surgery to repair ITB/patella. Notes he was not able to straighten his leg completely after than procedure. Notes overall mobility around the home is fair, able to drive without challenges. He continues to be limited with amount of ambulation secondary to increased discomfort with prolonged weight bearing and ambulation. Patient denies dizziness, drop attacks, numbness/tingling, bowel/bladder dysfunction and/or unexplained weight gain/loss.     Past Medical History/Comorbidities:   Mr. Karina Hernández  has a past medical history of Arrhythmia (10 - 15 years ago); Arthritis; CAD (coronary artery disease); Cancer ([address] Depression; Diabetes ([address] (6-7 years ago); HTN (hypertension) (7/18/2014); Hypercholesterolemia; Hyperlipidemia (7/18/2014); Hypertension (diagnosed at 24years old); Knee pain (5/25/2016); Morbid obesity ([address] Psychiatric disorder; and S/P total knee arthroplasty (3/29/2017). He also has no past medical history of Difficult intubation; Malignant hyperthermia due to anesthesia; Nausea & vomiting; Other ill-defined conditions; Pseudocholinesterase deficiency; or Unspecified adverse effect of anesthesia. Mr. Aris Long  has a past surgical history that includes other surgical; colonoscopy (2013); heent (1971); orthopaedic (Left, 2003); orthopaedic (Left, 2009); orthopaedic (Right, 2016); and knee replacement (Right, 03/28/2017). Social History/Living Environment:     Lives in private home with his wife, one story home, with stairs into home. Prior Level of Function/Work/Activity:  Independent, however experienced limited knee extension with gait, works full time as a .   Dominant Side:         RIGHT    Current Medications:       Current Outpatient Prescriptions:     promethazine (PHENERGAN) 25 mg tablet, Take 1 Tab by mouth every six (6) hours as needed for Nausea (and vomiting). , Disp: 12 Tab, Rfl: 0    HYDROcodone-acetaminophen (NORCO) 7.5-325 mg per tablet, 1-2 tabs by mouth every 6 hours prn, Disp: 50 Tab, Rfl: 0    docusate sodium (COLACE) 100 mg capsule, Take 1 Cap by mouth two (2) times a day for 30 days. , Disp: 60 Cap, Rfl: 1    glimepiride (AMARYL) 4 mg tablet, TAKE 1 TABLET EVERY MORNING, Disp: 90 Tab, Rfl: 1    verapamil ER (CALAN-SR) 180 mg CR tablet, Take 180 mg by mouth daily. Take / use AM day of surgery  per anesthesia protocols. Indications: hypertension, Disp: , Rfl:     FA/NIACINAMIDE/CUPRIC OX/ZN OX (NICOTINAMIDE PO), Take  by mouth two (2) times a day., Disp: , Rfl:     coenzyme q10 10 mg cap, Take  by mouth daily. , Disp: , Rfl:     metFORMIN (GLUCOPHAGE) 1,000 mg tablet, Take 1,000 mg by mouth daily. Takes 2 tablets every morning   Indications: type 2 diabetes mellitus, Disp: , Rfl:     metoprolol succinate (TOPROL-XL) 25 mg XL tablet, Take 25 mg by mouth daily. Take / use AM day of surgery  per anesthesia protocols. Indications: hypertension, Disp: , Rfl:     acyclovir (ZOVIRAX) 200 mg capsule, Take 1 Cap by mouth daily. (Patient taking differently: Take 200 mg by mouth daily. Take / use AM day of surgery  per anesthesia protocols.), Disp: 30 Cap, Rfl: 0    flecainide (TAMBOCOR) 100 mg tablet, Take 100 mg by mouth two (2) times a day. Take / use AM day of surgery  per anesthesia protocols. Indications: PREVENTION OF RECURRENT ATRIAL FIBRILLATION, Disp: , Rfl:     simvastatin (ZOCOR) 80 mg tablet, Take 1 Tab by mouth nightly. (Patient taking differently: Take 80 mg by mouth nightly. Take / use AM day of surgery  per anesthesia protocols. Indications: hyperlipidemia), Disp: 90 Tab, Rfl: 1    lisinopril (PRINIVIL, ZESTRIL) 10 mg tablet, Take 1 Tab by mouth daily. (Patient taking differently: Take 10 mg by mouth daily. Indications: hypertension), Disp: 90 Tab, Rfl: 1    VITAMIN D-3 PO, take  by mouth daily. am , Disp: , Rfl:    Date Last Reviewed:  5/22/2017   Number of Personal Factors/Comorbidities that affect the Plan of Care: 1-2: MODERATE COMPLEXITY   EXAMINATION:   Observation/Orthostatic Postural Assessment:           Lower extremity weight bearing is slight increased left, with right knee flexed and weight shifted left. Observation of gait indicates decreased right knee extension, demonstrates antalgic gait, currently using straight cane. Patient exhibits a neutral lumbar lordosis and slight increased thoracic kyphosis. Palpation of lower quadrant bony landmarks are left iliac crest elevated compared to right. Knee alignment is right knee flexed, bilateral femur internally rotated, right greater than left.   Patellar tracking with short knee bend indicates restricted movement in right knee. Single leg stand exhibits challenged to perform, 2 second hold. Soft tissue observation indicates restrictions noted in right anterior quadriceps, iliopsoas and bilateral hamstrings. Palpation: Patient exhibits tenderness to palpation of right incision. In modified Cristiano position, muscle length of tensor fascia marilu (TFL)  is restricted bilaterally, right greater than left; muscle length of iliopsoas is restricted right greater than left; and muscle length of rectus femoris is restricted right greater than left. Hamstring flexibility tested supine with straight leg raise is restricted bilaterally, right 50 degrees, left 60 degrees. Piriformis flexibility is restricted  bilaterally. Quadriceps flexibility tested heel to buttock is restricted bilaterally. Muscle length of gastrocnemius is restricted bilaterally. Muscle length of soleus is restricted right greater than left. ROM:   AROM(PROM) Right Left   Knee flexion 3-108 1-130   Knee extension Lacks 3 degrees Lacks 1 degree   Hip flexion 85 90   Hip external rotation (ER) 20 20   Hip internal rotation (IR) 10 15   Hip extension 5 5     Strength:     Manual Muscle Test (*/5) Right Left   Knee extension 4- 4+   Knee flexion 4 4+   Hip flexion 4- 4   Hip ER 4 4   Hip IR 4 4   Hip extension 3+ 4-   Hip abduction 3+ 4-   Hip adduction 5 5   Ankle DF 5 5   Ankle PF 4+ 5   Core stability 4-/5     Functional strength with standing heel raise is WFL. Functional strength with single leg partial squat exhibits not tested today. Special Tests:  None today    Neurological Screen:  Myotomes: Key muscle strength testing for bilateral LE is intact. Dermatomes: Sensation testing through bilateral LE for light touch is intact. Reflexes: Patellar (L4) and achilles (S1) are not tested today. Neural tension tests: Slump test is negative.  Passive straight leg raise (SLR) test is negative. Functional Mobility:  Challenged with prolonged sitting, standing and ambulation. Body Structures Involved:  1. Nerves  2. Bones  3. Joints  4. Muscles Body Functions Affected:  1. Sensory/Pain  2. Neuromusculoskeletal  3. Movement Related Activities and Participation Affected:  1. General Tasks and Demands  2. Mobility  3. Self Care  4. Community, Social and [place]   Number of elements (examined above) that affect the Plan of Care: 4+: HIGH COMPLEXITY   CLINICAL PRESENTATION:   Presentation: Evolving clinical presentation with changing clinical characteristics: MODERATE COMPLEXITY   CLINICAL DECISION MAKING:   Outcome Measure: Tool Used: Lower Extremity Functional Scale (LEFS)  Score:  Initial: 44/80 Most Recent: X/80 (Date: -- )   Interpretation of Score: 20 questions each scored on a 5 point scale with 0 representing \"extreme difficulty or unable to perform\" and 4 representing \"no difficulty\". The lower the score, the greater the functional disability. 80/80 represents no disability. Minimal detectable change is 9 points. Medical Necessity:   · Patient is expected to demonstrate progress in strength, range of motion, balance and coordination to increase independence with ADLs and improve endurance with ambulation. Reason for Services/Other Comments:  · Patient continues to require skilled intervention due to continues to demonstrate limited ROM in right knee, challenged with prolonged sitting and prolonged weight bearing and ambulation. Use of outcome tool(s) and clinical judgement create a POC that gives a: Questionable prediction of patient's progress: MODERATE COMPLEXITY            TREATMENT:   (In addition to Assessment/Re-Assessment sessions the following treatments were rendered)  Pre-treatment Symptoms/Complaints:  Patient notes had inguinal hernia surgery repair last week and has been recovering all week from procedure.  Notes he feels very weak and has regressed with knee progression secondary to being sedentary the past week. Pain: Initial:   Pain Intensity 1: 3  Pain Location 1: Groin, Knee  Pain Orientation 1: Right  Post Session:  2/10     Therapeutic Exercise: (30 Minutes):  Exercises per grid below to improve mobility, strength, balance and coordination. Required minimal verbal and manual cues to promote proper body alignment, promote proper body posture and promote proper body mechanics. Progressed resistance, range, repetitions and complexity of movement as indicated. Date:  5/22/2017   Activity/Exercise Parameters   Review HEP X 5 minutes   Quad sets    Straight leg raises - side lying     knee flexion with strap X 10 reps, 5 sec holds  Followed by x 10 reps AROM heelslides   Prone knee hangs    Upright bike X 8 minutes   Standing knee extension ball on wall X 10 reps, 5 sec holds     Followed by 3 minutes of heel strike ambulation, focus on terminal knee extension   Prone knee extension    Standing hip abduction, flexion, extension on firm foam X 15 reps each leg   Standing calf stretch 5 X 30 sec holds   Quarterback Squats    Step ups X 10 reps BLE lateral step downs   Standing lateral steps with t-band    Supine hamstrings self stretch X 5 reps, 20 sec holds BLE       Single Leg Stance 3x30 sec each LE     Manual Therapy (    Soft Tissue Mobilization Duration  Duration: 15 Minutes): Manual techniques to facilitate improved motion and decreased pain. · Supine right patella mobilizations in all directions  · Supine self soft tissue mobilization rolling techniques to anterior thigh as well as strumming techniques to anterior thigh/quadriceps. · Supine soft tissue mobilization and scar tissue mobilization around right knee incision. · Supine femur on tibia and tibia on femur a/p mobilizations for improved ROM.         (Used abbreviations: MET - muscle energy technique; PNF - proprioceptive neuromuscular facilitation; NMR - neuromuscular re-education; a/p - anterior to posterior; p/a - posterior to anterior, FMP - functional movement patterns)    Treatment/Session Assessment:    · Response to Treatment:  Pt fatigued easily with weight bearing activities, avoided any open chain exercises today to not increase stress placed to right anterior hip/pelvis. · Compliance with Program/Exercises: compliant all of the time. · Recommendations/Intent for next treatment session: \"Next visit will focus on advancements to more challenging activities\".   Total Treatment Duration:  PT Patient Time In/Time Out  Time In: 0935  Time Out: [address] [facility] Fili Buckner, [place]ASS[place]

## 2017-05-25 ENCOUNTER — HOSPITAL ENCOUNTER (OUTPATIENT)
Dept: PHYSICAL THERAPY | Age: 62
Discharge: HOME | End: 2017-05-25
Payer: COMMERCIAL

## 2017-05-25 PROCEDURE — 97110 THERAPEUTIC EXERCISES: CPT

## 2017-05-25 PROCEDURE — 97140 MANUAL THERAPY 1/> REGIONS: CPT

## 2017-05-25 NOTE — PROGRESS NOTES
Eric Don  : [facility] [address] [place] [address]  [phone]   [phone]        OUTPATIENT PHYSICAL THERAPY:Daily Note 2017    ICD-10: Treatment Diagnosis: pain in joint; right knee M25.561  ICD-10: Treatment Diagnosis: stiffness in joint; right knee M25.661  ICD-10: Treatment Diagnosis: muscle weakness, generalized M62.81  Precautions/Allergies: Review of patient's allergies indicates no known allergies. Fall Risk Score: 2 (? 5 = High Risk)  MD Orders: evaluate and treat for right TKA MEDICAL/REFERRING DIAGNOSIS:  Status post total right knee replacement [Z96.651]   DATE OF ONSET: surgical date: 3/28/17  REFERRING PHYSICIAN: Wilner Kimball MD  RETURN PHYSICIAN APPOINTMENT: 5/10/17     INITIAL ASSESSMENT:  Mr. Karina Hernández is a 60 y.o. male who presents to physical therapy s/p right TKA surgery on 3/28/17. He presents with limitations in right knee ROM, strength and endurance. He is challenged with gait, secondary to unable to obtain terminal knee extension. Currently using a cane with ambulation. He would benefit from physical therapy services to improve overall mobility and function to return to his ADLs and recreational activities. PROBLEM LIST (Impacting functional limitations):  1. Decreased Strength  2. Decreased ADL/Functional Activities  3. Decreased Transfer Abilities  4. Decreased Ambulation Ability/Technique  5. Decreased Balance  6. Increased Pain  7. Decreased Activity Tolerance  8. Increased Fatigue  9. Decreased Flexibility/Joint Mobility INTERVENTIONS PLANNED:  1. Balance Exercise  2. Cold  3. Gait Training  4. Home Exercise Program (HEP)  5. Manual Therapy  6. Neuromuscular Re-education/Strengthening  7. Range of Motion (ROM)  8. Therapeutic Activites  9. Therapeutic Exercise/Strengthening   TREATMENT PLAN:  Effective Dates: 17 TO 17.   Frequency/Duration: 2 times a week for 8 weeks, progress to 1x a week for 4 weeks. GOALS: (Goals have been discussed and agreed upon with patient.)  Short-Term Functional Goals: Time Frame:  6 weeks  1. Patient demonstrates independence with his HEP without verbal cueing from therapist.  2. Demonstrates improve right knee ROM 0-120 degrees to perform daily activities. 3. Patient able to ambulate for 15 minutes with pain no more than 0-2/10  Discharge Goals: Time Frame: 12 weeks  1. Improve LE and core strength to 4+/5 to perform ADLs  2. Patient improve right knee ROM to 0-125 degrees to perform ADLs  3. Patient able to ambulate for 30 minutes without onset of right knee pain. 4. Improve LEFS score from 44/80 to 60/80 to improve daily activities. Rehabilitation Potential For Stated Goals: Excellent  Regarding Maurilio Kenny's therapy, I certify that the treatment plan above will be carried out by a therapist or under their direction. Thank you for this referral,  Yonatan Osman, PT   Referring Physician Signature: Per Rivera MD              Date                    The information in this section was collected on 4/27/17 (except where otherwise noted). HISTORY:   History of Present Injury/Illness (Reason for Referral):  Chronic history of right knee pain, s/p right TKA on 3/28/17, obtained 3 weeks of home health PT, notes improvement in overall mobility and less pain levels. In 2016, he had right knee surgery to repair ITB/patella. Notes he was not able to straighten his leg completely after than procedure. Notes overall mobility around the home is fair, able to drive without challenges. He continues to be limited with amount of ambulation secondary to increased discomfort with prolonged weight bearing and ambulation. Patient denies dizziness, drop attacks, numbness/tingling, bowel/bladder dysfunction and/or unexplained weight gain/loss.     Past Medical History/Comorbidities:   Mr. Clarice Chua  has a past medical history of Arrhythmia (10 - 15 years ago); Arthritis; CAD (coronary artery disease); Cancer ([facility] [address] Depression; Diabetes ([facility] [address] (6-7 years ago); HTN (hypertension) (7/18/2014); Hypercholesterolemia; Hyperlipidemia (7/18/2014); Hypertension (diagnosed at 24years old); Knee pain (5/25/2016); Morbid obesity ([facility] [address] Psychiatric disorder; and S/P total knee arthroplasty (3/29/2017). He also has no past medical history of Difficult intubation; Malignant hyperthermia due to anesthesia; Nausea & vomiting; Other ill-defined conditions; Pseudocholinesterase deficiency; or Unspecified adverse effect of anesthesia. Mr. Angelica Linda  has a past surgical history that includes other surgical; colonoscopy (2013); heent (1971); orthopaedic (Left, 2003); orthopaedic (Left, 2009); orthopaedic (Right, 2016); and knee replacement (Right, 03/28/2017). Social History/Living Environment:     Lives in private home with his wife, one story home, with stairs into home. Prior Level of Function/Work/Activity:  Independent, however experienced limited knee extension with gait, works full time as a .   Dominant Side:         RIGHT    Current Medications:       Current Outpatient Prescriptions:     promethazine (PHENERGAN) 25 mg tablet, Take 1 Tab by mouth every six (6) hours as needed for Nausea (and vomiting). , Disp: 12 Tab, Rfl: 0    HYDROcodone-acetaminophen (NORCO) 7.5-325 mg per tablet, 1-2 tabs by mouth every 6 hours prn, Disp: 50 Tab, Rfl: 0    docusate sodium (COLACE) 100 mg capsule, Take 1 Cap by mouth two (2) times a day for 30 days. , Disp: 60 Cap, Rfl: 1    glimepiride (AMARYL) 4 mg tablet, TAKE 1 TABLET EVERY MORNING, Disp: 90 Tab, Rfl: 1    verapamil ER (CALAN-SR) 180 mg CR tablet, Take 180 mg by mouth daily. Take / use AM day of surgery  per anesthesia protocols. Indications: hypertension, Disp: , Rfl:     FA/NIACINAMIDE/CUPRIC OX/ZN OX (NICOTINAMIDE PO), Take  by mouth two (2) times a day., Disp: , Rfl:     coenzyme q10 10 mg cap, Take  by mouth daily. , Disp: , Rfl:     metFORMIN (GLUCOPHAGE) 1,000 mg tablet, Take 1,000 mg by mouth daily. Takes 2 tablets every morning   Indications: type 2 diabetes mellitus, Disp: , Rfl:     metoprolol succinate (TOPROL-XL) 25 mg XL tablet, Take 25 mg by mouth daily. Take / use AM day of surgery  per anesthesia protocols. Indications: hypertension, Disp: , Rfl:     acyclovir (ZOVIRAX) 200 mg capsule, Take 1 Cap by mouth daily. (Patient taking differently: Take 200 mg by mouth daily. Take / use AM day of surgery  per anesthesia protocols.), Disp: 30 Cap, Rfl: 0    flecainide (TAMBOCOR) 100 mg tablet, Take 100 mg by mouth two (2) times a day. Take / use AM day of surgery  per anesthesia protocols. Indications: PREVENTION OF RECURRENT ATRIAL FIBRILLATION, Disp: , Rfl:     simvastatin (ZOCOR) 80 mg tablet, Take 1 Tab by mouth nightly. (Patient taking differently: Take 80 mg by mouth nightly. Take / use AM day of surgery  per anesthesia protocols. Indications: hyperlipidemia), Disp: 90 Tab, Rfl: 1    lisinopril (PRINIVIL, ZESTRIL) 10 mg tablet, Take 1 Tab by mouth daily. (Patient taking differently: Take 10 mg by mouth daily. Indications: hypertension), Disp: 90 Tab, Rfl: 1    VITAMIN D-3 PO, take  by mouth daily. am , Disp: , Rfl:    Date Last Reviewed:  5/25/2017   Number of Personal Factors/Comorbidities that affect the Plan of Care: 1-2: MODERATE COMPLEXITY   EXAMINATION:   Observation/Orthostatic Postural Assessment:           Lower extremity weight bearing is slight increased left, with right knee flexed and weight shifted left. Observation of gait indicates decreased right knee extension, demonstrates antalgic gait, currently using straight cane. Patient exhibits a neutral lumbar lordosis and slight increased thoracic kyphosis. Palpation of lower quadrant bony landmarks are left iliac crest elevated compared to right. Knee alignment is right knee flexed, bilateral femur internally rotated, right greater than left.   Patellar tracking with short knee bend indicates restricted movement in right knee. Single leg stand exhibits challenged to perform, 2 second hold. Soft tissue observation indicates restrictions noted in right anterior quadriceps, iliopsoas and bilateral hamstrings. Palpation: Patient exhibits tenderness to palpation of right incision. In modified Cristiano position, muscle length of tensor fascia marilu (TFL)  is restricted bilaterally, right greater than left; muscle length of iliopsoas is restricted right greater than left; and muscle length of rectus femoris is restricted right greater than left. Hamstring flexibility tested supine with straight leg raise is restricted bilaterally, right 50 degrees, left 60 degrees. Piriformis flexibility is restricted  bilaterally. Quadriceps flexibility tested heel to buttock is restricted bilaterally. Muscle length of gastrocnemius is restricted bilaterally. Muscle length of soleus is restricted right greater than left. ROM:   AROM(PROM) Right Left   Knee flexion 3-108 1-130   Knee extension Lacks 3 degrees Lacks 1 degree   Hip flexion 85 90   Hip external rotation (ER) 20 20   Hip internal rotation (IR) 10 15   Hip extension 5 5     Strength:     Manual Muscle Test (*/5) Right Left   Knee extension 4- 4+   Knee flexion 4 4+   Hip flexion 4- 4   Hip ER 4 4   Hip IR 4 4   Hip extension 3+ 4-   Hip abduction 3+ 4-   Hip adduction 5 5   Ankle DF 5 5   Ankle PF 4+ 5   Core stability 4-/5     Functional strength with standing heel raise is WFL. Functional strength with single leg partial squat exhibits not tested today. Special Tests:  None today    Neurological Screen:  Myotomes: Key muscle strength testing for bilateral LE is intact. Dermatomes: Sensation testing through bilateral LE for light touch is intact. Reflexes: Patellar (L4) and achilles (S1) are not tested today. Neural tension tests: Slump test is negative.  Passive straight leg raise (SLR) test is negative. Functional Mobility:  Challenged with prolonged sitting, standing and ambulation. Body Structures Involved:  1. Nerves  2. Bones  3. Joints  4. Muscles Body Functions Affected:  1. Sensory/Pain  2. Neuromusculoskeletal  3. Movement Related Activities and Participation Affected:  1. General Tasks and Demands  2. Mobility  3. Self Care  4. Community, Social and [place]   Number of elements (examined above) that affect the Plan of Care: 4+: HIGH COMPLEXITY   CLINICAL PRESENTATION:   Presentation: Evolving clinical presentation with changing clinical characteristics: MODERATE COMPLEXITY   CLINICAL DECISION MAKING:   Outcome Measure: Tool Used: Lower Extremity Functional Scale (LEFS)  Score:  Initial: 44/80 Most Recent: X/80 (Date: -- )   Interpretation of Score: 20 questions each scored on a 5 point scale with 0 representing \"extreme difficulty or unable to perform\" and 4 representing \"no difficulty\". The lower the score, the greater the functional disability. 80/80 represents no disability. Minimal detectable change is 9 points. Medical Necessity:   · Patient is expected to demonstrate progress in strength, range of motion, balance and coordination to increase independence with ADLs and improve endurance with ambulation. Reason for Services/Other Comments:  · Patient continues to require skilled intervention due to continues to demonstrate limited ROM in right knee, challenged with prolonged sitting and prolonged weight bearing and ambulation. Use of outcome tool(s) and clinical judgement create a POC that gives a: Questionable prediction of patient's progress: MODERATE COMPLEXITY            TREATMENT:   (In addition to Assessment/Re-Assessment sessions the following treatments were rendered)  Pre-treatment Symptoms/Complaints:  Patient notes a little fatigued this week, continues to be recovering from inguinal hernia surgery from last week.    Pain: Initial:   Pain Intensity 1: 2  Pain Location 1: Groin, Knee  Pain Orientation 1: Right  Post Session:  1/10     Therapeutic Exercise: (35 Minutes):  Exercises per grid below to improve mobility, strength, balance and coordination. Required minimal verbal and manual cues to promote proper body alignment, promote proper body posture and promote proper body mechanics. Progressed resistance, range, repetitions and complexity of movement as indicated. Date:  5/25/2017   Activity/Exercise Parameters   Review HEP X 5 minutes   Quad sets    Straight leg raises - side lying     knee flexion with strap X 10 reps, 5 sec holds  Followed by x 10 reps AROM heelslides   Prone knee hangs    Upright bike X 5 minutes   Standing knee extension ball on wall X 10 reps, 5 sec holds     Followed by 3 minutes of heel strike ambulation, focus on terminal knee extension   Prone knee extension    Standing hip abduction, flexion, extension on firm foam 2x 15 reps each leg   Standing calf stretch 5 X 30 sec holds   Quarterback Squats    Step ups 3 X 10 reps BLE lateral step downs   Standing lateral steps with t-band    Supine hamstrings self stretch X 5 reps, 20 sec holds BLE   Shuttle X 30 reps, 8 bands squats  X 30 reps, single squats 6 bands   Single Leg Stance 3x30 sec each LE on hard blue foam     Manual Therapy (    Soft Tissue Mobilization Duration  Duration: 15 Minutes): Manual techniques to facilitate improved motion and decreased pain. · Supine right patella mobilizations in all directions  · Supine self soft tissue mobilization rolling techniques to anterior thigh as well as strumming techniques to anterior thigh/quadriceps. · Supine soft tissue mobilization and scar tissue mobilization around right knee incision. · Supine femur on tibia and tibia on femur a/p mobilizations for improved ROM.         (Used abbreviations: MET - muscle energy technique; PNF - proprioceptive neuromuscular facilitation; NMR - neuromuscular re-education; a/p - anterior to posterior; p/a - posterior to anterior, FMP - functional movement patterns)    Treatment/Session Assessment:    · Response to Treatment: patient continues to be challenged with single limb balance on unlevel surface, demonstrates improve strength in LE  · Compliance with Program/Exercises: compliant all of the time. · Recommendations/Intent for next treatment session: \"Next visit will focus on advancements to more challenging activities\".   Total Treatment Duration: 50 minutes  PT Patient Time In/Time Out  Time In: 1030  Time Out: Richy See [id number], [place]

## 2017-06-01 ENCOUNTER — APPOINTMENT (OUTPATIENT)
Dept: PHYSICAL THERAPY | Age: 62
End: 2017-06-01
Payer: COMMERCIAL

## 2017-06-05 ENCOUNTER — APPOINTMENT (OUTPATIENT)
Dept: PHYSICAL THERAPY | Age: 62
End: 2017-06-05
Payer: COMMERCIAL

## 2017-06-08 ENCOUNTER — HOSPITAL ENCOUNTER (OUTPATIENT)
Dept: PHYSICAL THERAPY | Age: 62
Discharge: HOME | End: 2017-06-08
Payer: COMMERCIAL

## 2017-06-08 PROCEDURE — 97110 THERAPEUTIC EXERCISES: CPT

## 2017-06-08 PROCEDURE — 97140 MANUAL THERAPY 1/> REGIONS: CPT

## 2017-06-08 NOTE — PROGRESS NOTES
Tab Zaldivar  : 1955 [facility] at [address] [place] [address]  [phone]   [phone]        OUTPATIENT PHYSICAL THERAPY:Daily Note 2017    ICD-10: Treatment Diagnosis: pain in joint; right knee M25.561  ICD-10: Treatment Diagnosis: stiffness in joint; right knee M25.661  ICD-10: Treatment Diagnosis: muscle weakness, generalized M62.81  Precautions/Allergies: Review of patient's allergies indicates no known allergies. Fall Risk Score: 2 (? 5 = High Risk)  MD Orders: evaluate and treat for right TKA MEDICAL/REFERRING DIAGNOSIS:  Status post total right knee replacement [Z96.651]   DATE OF ONSET: surgical date: 3/28/17  REFERRING PHYSICIAN: Radha Crocker MD  RETURN PHYSICIAN APPOINTMENT: 5/10/17     INITIAL ASSESSMENT:  Mr. Glenn Negro is a 60 y.o. male who presents to physical therapy s/p right TKA surgery on 3/28/17. He presents with limitations in right knee ROM, strength and endurance. He is challenged with gait, secondary to unable to obtain terminal knee extension. Currently using a cane with ambulation. He would benefit from physical therapy services to improve overall mobility and function to return to his ADLs and recreational activities. PROBLEM LIST (Impacting functional limitations):  1. Decreased Strength  2. Decreased ADL/Functional Activities  3. Decreased Transfer Abilities  4. Decreased Ambulation Ability/Technique  5. Decreased Balance  6. Increased Pain  7. Decreased Activity Tolerance  8. Increased Fatigue  9. Decreased Flexibility/Joint Mobility INTERVENTIONS PLANNED:  1. Balance Exercise  2. Cold  3. Gait Training  4. Home Exercise Program (HEP)  5. Manual Therapy  6. Neuromuscular Re-education/Strengthening  7. Range of Motion (ROM)  8. Therapeutic Activites  9. Therapeutic Exercise/Strengthening   TREATMENT PLAN:  Effective Dates: 17 TO 17.   Frequency/Duration: 2 times a week for 8 weeks, progress to 1x a week for 4 weeks. GOALS: (Goals have been discussed and agreed upon with patient.)  Short-Term Functional Goals: Time Frame:  6 weeks  1. Patient demonstrates independence with his HEP without verbal cueing from therapist. - GOAL MET  2. Demonstrates improve right knee ROM 0-120 degrees to perform daily activities. - GOAL MET  3. Patient able to ambulate for 15 minutes with pain no more than 0-2/10 - GOAL MET  Discharge Goals: Time Frame: 12 weeks  1. Improve LE and core strength to 4+/5 to perform ADLs - GOAL MET  2. Patient improve right knee ROM to 0-125 degrees to perform ADLs - GOAL MET  3. Patient able to ambulate for 30 minutes without onset of right knee pain. - ALMOST MET  4. Improve LEFS score from 44/80 to 60/80 to improve daily activities. - GOAL MET  Rehabilitation Potential For Stated Goals: Excellent  Regarding Xiomara Kenny's therapy, I certify that the treatment plan above will be carried out by a therapist or under their direction. Thank you for this referral,  Anamaria Awad PT   Referring Physician Signature: Randy Xavier MD              Date                    The information in this section was collected on 4/27/17 (except where otherwise noted). HISTORY:   History of Present Injury/Illness (Reason for Referral):  Chronic history of right knee pain, s/p right TKA on 3/28/17, obtained 3 weeks of home health PT, notes improvement in overall mobility and less pain levels. In 2016, he had right knee surgery to repair ITB/patella. Notes he was not able to straighten his leg completely after than procedure. Notes overall mobility around the home is fair, able to drive without challenges. He continues to be limited with amount of ambulation secondary to increased discomfort with prolonged weight bearing and ambulation. Patient denies dizziness, drop attacks, numbness/tingling, bowel/bladder dysfunction and/or unexplained weight gain/loss.     Past Medical History/Comorbidities: Mr. Mercedes Muro  has a past medical history of Arrhythmia (10 - 15 years ago); Arthritis; CAD (coronary artery disease); Cancer ([facility] [address] Depression; Diabetes ([facility] [address] (6-7 years ago); HTN (hypertension) (7/18/2014); Hypercholesterolemia; Hyperlipidemia (7/18/2014); Hypertension (diagnosed at 24years old); Knee pain (5/25/2016); Morbid obesity ([facility] [address] Psychiatric disorder; and S/P total knee arthroplasty (3/29/2017). He also has no past medical history of Difficult intubation; Malignant hyperthermia due to anesthesia; Nausea & vomiting; Other ill-defined conditions; Pseudocholinesterase deficiency; or Unspecified adverse effect of anesthesia. Mr. Mercedes Muro  has a past surgical history that includes other surgical; colonoscopy (2013); heent (1971); orthopaedic (Left, 2003); orthopaedic (Left, 2009); orthopaedic (Right, 2016); and knee replacement (Right, 03/28/2017). Social History/Living Environment:     Lives in private home with his wife, one story home, with stairs into home. Prior Level of Function/Work/Activity:  Independent, however experienced limited knee extension with gait, works full time as a .   Dominant Side:         RIGHT    Current Medications:       Current Outpatient Prescriptions:     aspirin (ASPIRIN) 325 mg tablet, Take 325 mg by mouth daily. , Disp: , Rfl:     promethazine (PHENERGAN) 25 mg tablet, Take 1 Tab by mouth every six (6) hours as needed for Nausea (and vomiting). , Disp: 12 Tab, Rfl: 0    HYDROcodone-acetaminophen (NORCO) 7.5-325 mg per tablet, 1-2 tabs by mouth every 6 hours prn, Disp: 50 Tab, Rfl: 0    docusate sodium (COLACE) 100 mg capsule, Take 1 Cap by mouth two (2) times a day for 30 days. , Disp: 60 Cap, Rfl: 1    glimepiride (AMARYL) 4 mg tablet, TAKE 1 TABLET EVERY MORNING, Disp: 90 Tab, Rfl: 1    verapamil ER (CALAN-SR) 180 mg CR tablet, Take 180 mg by mouth daily. Take / use AM day of surgery  per anesthesia protocols.   Indications: hypertension, Disp: , Rfl:    FA/NIACINAMIDE/CUPRIC OX/ZN OX (NICOTINAMIDE PO), Take  by mouth two (2) times a day., Disp: , Rfl:     coenzyme q10 10 mg cap, Take  by mouth daily. , Disp: , Rfl:     metFORMIN (GLUCOPHAGE) 1,000 mg tablet, Take 1,000 mg by mouth daily. Takes 2 tablets every morning   Indications: type 2 diabetes mellitus, Disp: , Rfl:     metoprolol succinate (TOPROL-XL) 25 mg XL tablet, Take 25 mg by mouth daily. Take / use AM day of surgery  per anesthesia protocols. Indications: hypertension, Disp: , Rfl:     acyclovir (ZOVIRAX) 200 mg capsule, Take 1 Cap by mouth daily. (Patient taking differently: Take 200 mg by mouth daily. Take / use AM day of surgery  per anesthesia protocols.), Disp: 30 Cap, Rfl: 0    flecainide (TAMBOCOR) 100 mg tablet, Take 100 mg by mouth two (2) times a day. Take / use AM day of surgery  per anesthesia protocols. Indications: PREVENTION OF RECURRENT ATRIAL FIBRILLATION, Disp: , Rfl:     simvastatin (ZOCOR) 80 mg tablet, Take 1 Tab by mouth nightly. (Patient taking differently: Take 80 mg by mouth nightly. Take / use AM day of surgery  per anesthesia protocols. Indications: hyperlipidemia), Disp: 90 Tab, Rfl: 1    lisinopril (PRINIVIL, ZESTRIL) 10 mg tablet, Take 1 Tab by mouth daily. (Patient taking differently: Take 10 mg by mouth daily. Indications: hypertension), Disp: 90 Tab, Rfl: 1    VITAMIN D-3 PO, take  by mouth daily. am , Disp: , Rfl:    Date Last Reviewed:  6/8/2017   Number of Personal Factors/Comorbidities that affect the Plan of Care: 1-2: MODERATE COMPLEXITY   EXAMINATION:   Observation/Orthostatic Postural Assessment:           Lower extremity weight bearing is slight increased left, with right knee flexed and weight shifted left. Observation of gait indicates decreased right knee extension, demonstrates antalgic gait, currently using straight cane. Patient exhibits a neutral lumbar lordosis and slight increased thoracic kyphosis.   Palpation of lower quadrant bony landmarks are left iliac crest elevated compared to right. Knee alignment is right knee flexed, bilateral femur internally rotated, right greater than left. Patellar tracking with short knee bend indicates restricted movement in right knee. Single leg stand exhibits challenged to perform, 2 second hold. Soft tissue observation indicates restrictions noted in right anterior quadriceps, iliopsoas and bilateral hamstrings. Palpation: Patient exhibits tenderness to palpation of right incision. In modified Cristiano position, muscle length of tensor fascia marilu (TFL)  is restricted bilaterally, right greater than left; muscle length of iliopsoas is restricted right greater than left; and muscle length of rectus femoris is restricted right greater than left. Hamstring flexibility tested supine with straight leg raise is restricted bilaterally, right 50 degrees, left 60 degrees. Piriformis flexibility is restricted  bilaterally. Quadriceps flexibility tested heel to buttock is restricted bilaterally. Muscle length of gastrocnemius is restricted bilaterally. Muscle length of soleus is restricted right greater than left. ROM:   AROM(PROM) Right Left   Knee flexion 3-125 1-130   Knee extension Lacks 1 degrees Lacks 1 degree   Hip flexion 85 90   Hip external rotation (ER) 20 20   Hip internal rotation (IR) 10 15   Hip extension 5 5     Strength:     Manual Muscle Test (*/5) Right Left   Knee extension 4+ 4+   Knee flexion 4+ 4+   Hip flexion 4 4   Hip ER 4+ 4+   Hip IR 4+ 4+   Hip extension 4 4   Hip abduction 4 4   Hip adduction 5 5   Ankle DF 5 5   Ankle PF 4+ 5   Core stability 4/5     Functional strength with standing heel raise is WFL. Functional strength with single leg partial squat exhibits not tested today. Special Tests:  None today    Neurological Screen:  Myotomes: Key muscle strength testing for bilateral LE is intact. Dermatomes: Sensation testing through bilateral LE for light touch is intact.    Reflexes: Patellar (L4) and achilles (S1) are not tested today. Neural tension tests: Slump test is negative. Passive straight leg raise (SLR) test is negative. Functional Mobility:  Challenged with prolonged sitting, standing and ambulation. Body Structures Involved:  1. Nerves  2. Bones  3. Joints  4. Muscles Body Functions Affected:  1. Sensory/Pain  2. Neuromusculoskeletal  3. Movement Related Activities and Participation Affected:  1. General Tasks and Demands  2. Mobility  3. Self Care  4. Community, Social and [place]   Number of elements (examined above) that affect the Plan of Care: 4+: HIGH COMPLEXITY   CLINICAL PRESENTATION:   Presentation: Evolving clinical presentation with changing clinical characteristics: MODERATE COMPLEXITY   CLINICAL DECISION MAKING:   Outcome Measure: Tool Used: Lower Extremity Functional Scale (LEFS)  Score:  Initial: 44/80 Most Recent: 74/80 (Date: 6-8-17 )   Interpretation of Score: 20 questions each scored on a 5 point scale with 0 representing \"extreme difficulty or unable to perform\" and 4 representing \"no difficulty\". The lower the score, the greater the functional disability. 80/80 represents no disability. Minimal detectable change is 9 points. Medical Necessity:   · Patient is expected to demonstrate progress in strength, range of motion, balance and coordination to increase independence with ADLs and improve endurance with ambulation. Reason for Services/Other Comments:  · Patient continues to require skilled intervention due to continues to demonstrate limited ROM in right knee, challenged with prolonged sitting and prolonged weight bearing and ambulation.    Use of outcome tool(s) and clinical judgement create a POC that gives a: Questionable prediction of patient's progress: MODERATE COMPLEXITY            TREATMENT:   (In addition to Assessment/Re-Assessment sessions the following treatments were rendered)    Pre-treatment Symptoms/Complaints:  Patient notes walking longer distances, has worked up to 2.5 miles, continues to note some right knee swelling and left lateral hip pain. Pain: Initial:   Pain Intensity 1: 2  Pain Location 1: Knee  Pain Orientation 1: Right  Post Session:  1/10     Therapeutic Exercise: (30 Minutes):  Exercises per grid below to improve mobility, strength, balance and coordination. Required minimal verbal and manual cues to promote proper body alignment, promote proper body posture and promote proper body mechanics. Progressed resistance, range, repetitions and complexity of movement as indicated. Date:  6/8/2017   Activity/Exercise Parameters   Review HEP X 5 minutes   Quad sets    Straight leg raises - side lying     knee flexion with strap X 10 reps, 5 sec holds  Followed by x 10 reps AROM heelslides   bridging X 10 reps 5 sec holds   Upright bike X 8 minutes   Standing knee extension ball on wall X 10 reps, 5 sec holds     Followed by 3 minutes of heel strike ambulation, focus on terminal knee extension   Prone knee extension    Standing hip abduction, flexion, extension on firm foam 2x 15 reps each leg   Standing calf stretch 5 X 30 sec holds   Quarterback Squats    Step ups 3 X 10 reps BLE lateral step downs   Standing lateral steps with t-band    Supine hamstrings self stretch X 5 reps, 20 sec holds BLE   Shuttle X 30 reps, 8 bands squats  X 30 reps, single squats 6 bands   Single Leg Stance 3x30 sec each LE on hard blue foam     Manual Therapy (    Soft Tissue Mobilization Duration  Duration: 15 Minutes): Manual techniques to facilitate improved motion and decreased pain. · Supine right patella mobilizations in all directions  · Supine self soft tissue mobilization rolling techniques to anterior thigh as well as strumming techniques to anterior thigh/quadriceps. · Supine soft tissue mobilization and scar tissue mobilization around right knee incision.    · Supine femur on tibia and tibia on femur a/p mobilizations for improved ROM.     (Used abbreviations: MET - muscle energy technique; PNF - proprioceptive neuromuscular facilitation; NMR - neuromuscular re-education; a/p - anterior to posterior; p/a - posterior to anterior, FMP - functional movement patterns)    Treatment/Session Assessment:    · Response to Treatment: improved right knee ROM, improved strength and balance with exercises today. · Compliance with Program/Exercises: compliant all of the time. · Recommendations/Intent for next treatment session: \"Next visit will focus on advancements to more challenging activities\". Plans to work independently over the next 3 weeks, if continues to do well, patient will be discontinued from therapy to continue to work independently.    Total Treatment Duration: 45 minutes  PT Patient Time In/Time Out  Time In: 1030  Time Out: [address] [facility] Claudine Hernández, KASSY

## 2017-06-29 NOTE — PROGRESS NOTES
Gaviota Stringer  : [facility] [address]  [phone]   [phone]        OUTPATIENT PHYSICAL THERAPY:Discontinuation Summary 2017    ICD-10: Treatment Diagnosis: pain in joint; right knee M25.561  ICD-10: Treatment Diagnosis: stiffness in joint; right knee M25.661  ICD-10: Treatment Diagnosis: muscle weakness, generalized M62.81  Precautions/Allergies: Review of patient's allergies indicates no known allergies. Fall Risk Score: 2 (? 5 = High Risk)  MD Orders: evaluate and treat for right TKA MEDICAL/REFERRING DIAGNOSIS:  Status post total right knee replacement [Z96.651]   DATE OF ONSET: surgical date: 3/28/17  REFERRING PHYSICIAN: Janell Lunsford MD  RETURN PHYSICIAN APPOINTMENT: 5/10/17     INITIAL ASSESSMENT:  Mr. Dee Leo is a 60 y.o. male who presents to physical therapy s/p right TKA surgery on 3/28/17. He presents with limitations in right knee ROM, strength and endurance. He is challenged with gait, secondary to unable to obtain terminal knee extension. Currently using a cane with ambulation. He would benefit from physical therapy services to improve overall mobility and function to return to his ADLs and recreational activities. 17: Patient has worked independently over the past twp weeks and has not called to schedule any further visits. He demonstrates independence with his ADLs and will continue to work on his walking program and HEP. He will be discontinued from therapy services at this time. TREATMENT PLAN:  GOALS: (Goals have been discussed and agreed upon with patient.)  Short-Term Functional Goals: Time Frame:  6 weeks  1. Patient demonstrates independence with his HEP without verbal cueing from therapist. - GOAL MET  2. Demonstrates improve right knee ROM 0-120 degrees to perform daily activities. - GOAL MET  3.  Patient able to ambulate for 15 minutes with pain no more than 0-2/10 - GOAL MET  Discharge Goals: Time Frame: 12 weeks  1. Improve LE and core strength to 4+/5 to perform ADLs - GOAL MET  2. Patient improve right knee ROM to 0-125 degrees to perform ADLs - GOAL MET  3. Patient able to ambulate for 30 minutes without onset of right knee pain. - ALMOST MET  4. Improve LEFS score from 44/80 to 60/80 to improve daily activities. - GOAL MET  Rehabilitation Potential For Stated Goals: Excellent  Regarding Lee Scheuermann Amick's therapy, I certify that the treatment plan above will be carried out by a therapist or under their direction. Thank you for this referral,  Ebony Che, PT               The information in this section was collected on 4/27/17 (except where otherwise noted). HISTORY:   History of Present Injury/Illness (Reason for Referral):  Chronic history of right knee pain, s/p right TKA on 3/28/17, obtained 3 weeks of home health PT, notes improvement in overall mobility and less pain levels. In 2016, he had right knee surgery to repair ITB/patella. Notes he was not able to straighten his leg completely after than procedure. Notes overall mobility around the home is fair, able to drive without challenges. He continues to be limited with amount of ambulation secondary to increased discomfort with prolonged weight bearing and ambulation. Patient denies dizziness, drop attacks, numbness/tingling, bowel/bladder dysfunction and/or unexplained weight gain/loss. Past Medical History/Comorbidities:   Mr. Glenn Negro  has a past medical history of Arrhythmia (10 - 15 years ago); Arthritis; CAD (coronary artery disease); Cancer ([facility] [address] Depression; Diabetes ([facility] [address] (6-7 years ago); HTN (hypertension) (7/18/2014); Hypercholesterolemia; Hyperlipidemia (7/18/2014); Hypertension (diagnosed at 24years old); Knee pain (5/25/2016); Morbid obesity ([facility] [address] Psychiatric disorder; and S/P total knee arthroplasty (3/29/2017).  He also has no past medical history of Difficult intubation; Malignant hyperthermia due to anesthesia; Nausea & vomiting; Other ill-defined conditions; Pseudocholinesterase deficiency; or Unspecified adverse effect of anesthesia. Mr. Lindsay Encarnacion  has a past surgical history that includes other surgical; colonoscopy (2013); heent (1971); orthopaedic (Left, 2003); orthopaedic (Left, 2009); orthopaedic (Right, 2016); knee replacement (Right, 03/28/2017); and abdomen surgery proc unlisted (Right, 2017). Social History/Living Environment:     Lives in private home with his wife, one story home, with stairs into home. Prior Level of Function/Work/Activity:  Independent, however experienced limited knee extension with gait, works full time as a .   Dominant Side:         RIGHT    Current Medications:       Current Outpatient Prescriptions:     metFORMIN ER (GLUCOPHAGE XR) 500 mg tablet, TAKE 2 TABLETS DAILY, Disp: 180 Tab, Rfl: 0    fish oil-omega-3 fatty acids (FISH OIL) 340-1,000 mg capsule, Take 1 Cap by mouth daily. , Disp: , Rfl:     HYDROcodone-homatropine (HYCODAN) 5-1.5 mg/5 mL (5 mL) syrup, Take 5 mL by mouth nightly. Max Daily Amount: 5 mL., Disp: 180 mL, Rfl: 0    aspirin (ASPIRIN) 325 mg tablet, Take 325 mg by mouth daily. , Disp: , Rfl:     glimepiride (AMARYL) 4 mg tablet, TAKE 1 TABLET EVERY MORNING, Disp: 90 Tab, Rfl: 1    verapamil ER (CALAN-SR) 180 mg CR tablet, Take 180 mg by mouth daily. Take / use AM day of surgery  per anesthesia protocols. Indications: hypertension, Disp: , Rfl:     FA/NIACINAMIDE/CUPRIC OX/ZN OX (NICOTINAMIDE PO), Take  by mouth two (2) times a day., Disp: , Rfl:     coenzyme q10 10 mg cap, Take  by mouth daily. , Disp: , Rfl:     metFORMIN (GLUCOPHAGE) 1,000 mg tablet, Take 1,000 mg by mouth daily. Takes 2 tablets every morning   Indications: type 2 diabetes mellitus, Disp: , Rfl:     metoprolol succinate (TOPROL-XL) 25 mg XL tablet, Take 25 mg by mouth daily. Take / use AM day of surgery  per anesthesia protocols.    Indications: hypertension, Disp: , Rfl:     acyclovir (ZOVIRAX) 200 mg capsule, Take 1 Cap by mouth daily. (Patient taking differently: Take 200 mg by mouth daily. Take / use AM day of surgery  per anesthesia protocols.), Disp: 30 Cap, Rfl: 0    flecainide (TAMBOCOR) 100 mg tablet, Take 100 mg by mouth two (2) times a day. Take / use AM day of surgery  per anesthesia protocols. Indications: PREVENTION OF RECURRENT ATRIAL FIBRILLATION, Disp: , Rfl:     simvastatin (ZOCOR) 80 mg tablet, Take 1 Tab by mouth nightly. (Patient taking differently: Take 80 mg by mouth nightly. Take / use AM day of surgery  per anesthesia protocols. Indications: hyperlipidemia), Disp: 90 Tab, Rfl: 1    lisinopril (PRINIVIL, ZESTRIL) 10 mg tablet, Take 1 Tab by mouth daily. (Patient taking differently: Take 10 mg by mouth daily. Indications: hypertension), Disp: 90 Tab, Rfl: 1    VITAMIN D-3 PO, take  by mouth daily. am , Disp: , Rfl:    Date Last Reviewed:  6/8/17   Number of Personal Factors/Comorbidities that affect the Plan of Care: 1-2: MODERATE COMPLEXITY   EXAMINATION:   Observation/Orthostatic Postural Assessment:           Lower extremity weight bearing is slight increased left, with right knee flexed and weight shifted left. Observation of gait indicates decreased right knee extension, demonstrates antalgic gait, currently using straight cane. Patient exhibits a neutral lumbar lordosis and slight increased thoracic kyphosis. Palpation of lower quadrant bony landmarks are left iliac crest elevated compared to right. Knee alignment is right knee flexed, bilateral femur internally rotated, right greater than left. Patellar tracking with short knee bend indicates restricted movement in right knee. Single leg stand exhibits challenged to perform, 2 second hold. Soft tissue observation indicates restrictions noted in right anterior quadriceps, iliopsoas and bilateral hamstrings.   improved    Palpation: Patient exhibits tenderness to palpation of right incision. In modified Cristiano position, muscle length of tensor fascia marilu (TFL)  is restricted bilaterally, right greater than left; muscle length of iliopsoas is restricted right greater than left; and muscle length of rectus femoris is restricted right greater than left. Hamstring flexibility tested supine with straight leg raise is restricted bilaterally, right 55 degrees, left 65 degrees. Piriformis flexibility is restricted  bilaterally. Quadriceps flexibility tested heel to buttock is restricted bilaterally. Muscle length of gastrocnemius is restricted bilaterally. Muscle length of soleus is restricted right greater than left. ROM:   AROM(PROM) Right Left   Knee flexion 3-126 1-130   Knee extension Lacks 1 degrees Lacks 1 degree   Hip flexion 85 90   Hip external rotation (ER) 20 20   Hip internal rotation (IR) 10 15   Hip extension 5 5     Strength:     Manual Muscle Test (*/5) Right Left   Knee extension 4+ 4+   Knee flexion 4+ 4+   Hip flexion 4+ 4+   Hip ER 4+ 4+   Hip IR 4+ 4+   Hip extension 4 4   Hip abduction 4 4   Hip adduction 5 5   Ankle DF 5 5   Ankle PF 4+ 5   Core stability 4/5     Functional strength with standing heel raise is WFL. Functional strength with single leg partial squat exhibits not tested today. Special Tests:  None today    Neurological Screen:  Myotomes: Key muscle strength testing for bilateral LE is intact. Dermatomes: Sensation testing through bilateral LE for light touch is intact. Reflexes: Patellar (L4) and achilles (S1) are not tested today. Neural tension tests: Slump test is negative. Passive straight leg raise (SLR) test is negative. Functional Mobility:  Challenged with prolonged sitting, standing and ambulation. Body Structures Involved:  1. Nerves  2. Bones  3. Joints  4. Muscles Body Functions Affected:  1. Sensory/Pain  2. Neuromusculoskeletal  3. Movement Related Activities and Participation Affected:  1.  General Tasks and Demands  2. Mobility  3. Self Care  4. Community, Social and [place]   Number of elements (examined above) that affect the Plan of Care: 4+: HIGH COMPLEXITY   CLINICAL PRESENTATION:   Presentation: Evolving clinical presentation with changing clinical characteristics: MODERATE COMPLEXITY   CLINICAL DECISION MAKING:   Outcome Measure: Tool Used: Lower Extremity Functional Scale (LEFS)  Score:  Initial: 44/80 Most Recent: 74/80 (Date: 6-8-17 )   Interpretation of Score: 20 questions each scored on a 5 point scale with 0 representing \"extreme difficulty or unable to perform\" and 4 representing \"no difficulty\". The lower the score, the greater the functional disability. 80/80 represents no disability. Minimal detectable change is 9 points. Medical Necessity:   · Patient is expected to demonstrate progress in strength, range of motion, balance and coordination to increase independence with ADLs and improve endurance with ambulation. Reason for Services/Other Comments:  · Patient continues to require skilled intervention due to continues to demonstrate limited ROM in right knee, challenged with prolonged sitting and prolonged weight bearing and ambulation.    Use of outcome tool(s) and clinical judgement create a POC that gives a: Questionable prediction of patient's progress: MODERATE COMPLEXITY

## 2018-01-17 PROBLEM — F33.9 RECURRENT DEPRESSION (HCC): Status: ACTIVE | Noted: 2018-01-17

## 2018-02-12 PROBLEM — K40.90 RIGHT INGUINAL HERNIA: Status: RESOLVED | Noted: 2017-05-08 | Resolved: 2018-02-12

## 2018-03-22 PROBLEM — Z86.39 HX OF DIABETES MELLITUS: Status: ACTIVE | Noted: 2018-03-22

## 2018-05-04 ENCOUNTER — APPOINTMENT (RX ONLY)
Dept: URBAN - METROPOLITAN AREA CLINIC 349 | Facility: CLINIC | Age: 63
Setting detail: DERMATOLOGY
End: 2018-05-04

## 2018-05-04 DIAGNOSIS — L82.1 OTHER SEBORRHEIC KERATOSIS: ICD-10-CM

## 2018-05-04 DIAGNOSIS — L57.0 ACTINIC KERATOSIS: ICD-10-CM

## 2018-05-04 PROBLEM — C44.519 BASAL CELL CARCINOMA OF SKIN OF OTHER PART OF TRUNK: Status: ACTIVE | Noted: 2018-05-04

## 2018-05-04 PROBLEM — C44.612 BASAL CELL CARCINOMA OF SKIN OF RIGHT UPPER LIMB, INCLUDING SHOULDER: Status: ACTIVE | Noted: 2018-05-04

## 2018-05-04 PROCEDURE — A4550 SURGICAL TRAYS: HCPCS

## 2018-05-04 PROCEDURE — 17000 DESTRUCT PREMALG LESION: CPT

## 2018-05-04 PROCEDURE — ? COUNSELING

## 2018-05-04 PROCEDURE — ? PATHOLOGY BILLING

## 2018-05-04 PROCEDURE — ? SHAVE REMOVAL AND DESTRUCTION

## 2018-05-04 PROCEDURE — ? LIQUID NITROGEN

## 2018-05-04 PROCEDURE — 17261 DSTRJ MAL LES T/A/L .6-1.0CM: CPT | Mod: 59,76

## 2018-05-04 PROCEDURE — 17261 DSTRJ MAL LES T/A/L .6-1.0CM: CPT | Mod: 59

## 2018-05-04 PROCEDURE — ? DEFER

## 2018-05-04 PROCEDURE — 88305 TISSUE EXAM BY PATHOLOGIST: CPT

## 2018-05-04 PROCEDURE — 17003 DESTRUCT PREMALG LES 2-14: CPT

## 2018-05-04 PROCEDURE — 99242 OFF/OP CONSLTJ NEW/EST SF 20: CPT | Mod: 25

## 2018-05-04 ASSESSMENT — LOCATION ZONE DERM
LOCATION ZONE: TRUNK
LOCATION ZONE: LEG
LOCATION ZONE: ARM

## 2018-05-04 ASSESSMENT — LOCATION SIMPLE DESCRIPTION DERM
LOCATION SIMPLE: RIGHT FOREARM
LOCATION SIMPLE: RIGHT ANKLE
LOCATION SIMPLE: LEFT UPPER BACK
LOCATION SIMPLE: LEFT FOREARM

## 2018-05-04 ASSESSMENT — LOCATION DETAILED DESCRIPTION DERM
LOCATION DETAILED: RIGHT POSTERIOR ANKLE
LOCATION DETAILED: LEFT PROXIMAL DORSAL FOREARM
LOCATION DETAILED: RIGHT VENTRAL DISTAL FOREARM
LOCATION DETAILED: RIGHT PROXIMAL RADIAL DORSAL FOREARM
LOCATION DETAILED: LEFT MEDIAL UPPER BACK
LOCATION DETAILED: RIGHT VENTRAL LATERAL PROXIMAL FOREARM

## 2018-05-04 NOTE — PROCEDURE: DEFER
Detail Level: Zone
Instructions (Optional): Red light treatment to arms with Ameluz and Levulon on bald area of scalp.

## 2018-05-04 NOTE — PROCEDURE: SHAVE REMOVAL AND DESTRUCTION
Anesthesia Type: 1% lidocaine with epinephrine
Post-Care Instructions: I reviewed with the patient in detail post-care instructions. Patient is to keep the biopsy site dry overnight, and then apply bacitracin twice daily until healed. Patient may apply hydrogen peroxide soaks to remove any crusting.  After the procedure, the patient was observed for 5-10 minutes and was oriented to,person, place and time and denied feeling dizzy, queasy and stated that they were not going to faint
Anesthesia Volume In Cc: 0.3
Notification Instructions: Patient will be notified of biopsy results. However, patient instructed to call the office if not contacted within 2 weeks.
Cautery Type: electrodesiccation
Was Curettage Performed?: Yes
Bill As?: Note: Bill Malignant Destruction If Path Confirms Malignant Lesion. Only Bill As Shave Removal If Path Comes Back Benign. Do Not Bill Shave Removal On Malignant Lesions.: Malignant Destruction
Detail Level: Simple
Bill [id number] For Specimen Handling/Conveyance To Laboratory?: no
Size Of Lesion In Cm: 0
Hemostasis: Electrocautery
Dressing: Band-Aid
Consent: Written consent was obtained and risks were reviewed including but not limited to scarring, infection, bleeding, scabbing, incomplete removal, nerve damage and allergy to anesthesia.
Wound Care: Vaseline
Number Of Curettages: 2
Size After Destruction (Required For Destruction Billing): 1
Accession #: [email or web address] read
Billing Type: Third-Party Bill

## 2018-05-04 NOTE — PROCEDURE: LIQUID NITROGEN
Render Post-Care Instructions In Note?: no
Detail Level: Zone
Post-Care Instructions: I reviewed with the patient in detail post-care instructions. Patient is to wear sunprotection, and avoid picking at any of the treated lesions. Pt may apply Vaseline to crusted or scabbing areas.
Number Of Freeze-Thaw Cycles: 2 freeze-thaw cycles
Consent: The patient's consent was obtained including but not limited to risks of crusting, scabbing, blistering, scarring, darker or lighter pigmentary change, recurrence, incomplete removal and infection.
Duration Of Freeze Thaw-Cycle (Seconds): 3

## 2018-05-14 PROBLEM — Z98.890 H/O RIGHT INGUINAL HERNIA REPAIR: Status: ACTIVE | Noted: 2018-05-14

## 2018-05-14 PROBLEM — Z87.19 H/O RIGHT INGUINAL HERNIA REPAIR: Status: ACTIVE | Noted: 2018-05-14

## 2018-05-17 ENCOUNTER — APPOINTMENT (RX ONLY)
Dept: URBAN - METROPOLITAN AREA CLINIC 349 | Facility: CLINIC | Age: 63
Setting detail: DERMATOLOGY
End: 2018-05-17

## 2018-05-17 DIAGNOSIS — L57.0 ACTINIC KERATOSIS: ICD-10-CM

## 2018-05-17 PROCEDURE — ? OTHER

## 2018-05-17 PROCEDURE — ? COUNSELING

## 2018-05-17 PROCEDURE — ? LEVULAN APPLICATION

## 2018-05-17 ASSESSMENT — LOCATION DETAILED DESCRIPTION DERM
LOCATION DETAILED: LEFT PROXIMAL DORSAL FOREARM
LOCATION DETAILED: MID-OCCIPITAL SCALP
LOCATION DETAILED: RIGHT PROXIMAL RADIAL DORSAL FOREARM

## 2018-05-17 ASSESSMENT — LOCATION SIMPLE DESCRIPTION DERM
LOCATION SIMPLE: LEFT FOREARM
LOCATION SIMPLE: POSTERIOR SCALP
LOCATION SIMPLE: RIGHT FOREARM

## 2018-05-17 ASSESSMENT — LOCATION ZONE DERM
LOCATION ZONE: SCALP
LOCATION ZONE: ARM

## 2018-05-17 NOTE — PROCEDURE: LEVULAN APPLICATION
Consent: Written consent obtained.  The risks were reviewed with the patient including but not limited to: pigmentary changes, pain, blistering, scabbing, redness, and the remote possibility of scarring.
Incubation End Time: 2:30
Detail Level: Zone
Illumination Time: 16:40
Light Source: Mitchel-U
Number Of Kerasticks Used: 2
Occlusion: No
Post-Care Instructions: I reviewed with the patient in detail post-care instructions. Patient is to avoid sunlight until photodynamic therapy and for 2 days following the procedure, and wear sun protection. Patients may expect sunburn like redness, discomfort and scabbing following photodynamic therapy.
Incubation Time: 1 hour
Incubation Start Time: 1:30

## 2018-05-17 NOTE — PROCEDURE: OTHER
Detail Level: Zone
Note Text (......Xxx Chief Complaint.): This diagnosis correlates with the
Other (Free Text): Forearms were included with plastic wrap to assist with penetrating Levulan

## 2018-06-19 ENCOUNTER — APPOINTMENT (RX ONLY)
Dept: URBAN - METROPOLITAN AREA CLINIC 349 | Facility: CLINIC | Age: 63
Setting detail: DERMATOLOGY
End: 2018-06-19

## 2018-06-19 DIAGNOSIS — L57.0 ACTINIC KERATOSIS: ICD-10-CM

## 2018-06-19 PROBLEM — D04.61 CARCINOMA IN SITU OF SKIN OF RIGHT UPPER LIMB, INCLUDING SHOULDER: Status: ACTIVE | Noted: 2018-06-19

## 2018-06-19 PROCEDURE — 17261 DSTRJ MAL LES T/A/L .6-1.0CM: CPT | Mod: 59

## 2018-06-19 PROCEDURE — ? LIQUID NITROGEN

## 2018-06-19 PROCEDURE — ? PATHOLOGY BILLING

## 2018-06-19 PROCEDURE — ? COUNSELING

## 2018-06-19 PROCEDURE — 88305 TISSUE EXAM BY PATHOLOGIST: CPT

## 2018-06-19 PROCEDURE — 17000 DESTRUCT PREMALG LESION: CPT

## 2018-06-19 PROCEDURE — 17003 DESTRUCT PREMALG LES 2-14: CPT

## 2018-06-19 PROCEDURE — ? SHAVE REMOVAL AND DESTRUCTION

## 2018-06-19 ASSESSMENT — LOCATION DETAILED DESCRIPTION DERM
LOCATION DETAILED: LEFT SUPERIOR OCCIPITAL SCALP
LOCATION DETAILED: MID-OCCIPITAL SCALP
LOCATION DETAILED: RIGHT SUPERIOR PARIETAL SCALP
LOCATION DETAILED: LEFT CENTRAL PARIETAL SCALP
LOCATION DETAILED: LEFT SUPERIOR PARIETAL SCALP
LOCATION DETAILED: RIGHT VENTRAL DISTAL FOREARM
LOCATION DETAILED: LEFT DISTAL DORSAL FOREARM
LOCATION DETAILED: LEFT PROXIMAL RADIAL DORSAL FOREARM
LOCATION DETAILED: RIGHT DISTAL DORSAL FOREARM
LOCATION DETAILED: RIGHT PROXIMAL DORSAL FOREARM
LOCATION DETAILED: POSTERIOR MID-PARIETAL SCALP

## 2018-06-19 ASSESSMENT — LOCATION SIMPLE DESCRIPTION DERM
LOCATION SIMPLE: RIGHT FOREARM
LOCATION SIMPLE: LEFT FOREARM
LOCATION SIMPLE: SCALP
LOCATION SIMPLE: POSTERIOR SCALP
LOCATION SIMPLE: LEFT OCCIPITAL SCALP

## 2018-06-19 ASSESSMENT — LOCATION ZONE DERM
LOCATION ZONE: ARM
LOCATION ZONE: SCALP

## 2018-06-19 NOTE — PROCEDURE: SHAVE REMOVAL AND DESTRUCTION
Size Of Lesion In Cm: 0
Consent: Written consent was obtained and risks were reviewed including but not limited to scarring, infection, bleeding, scabbing, incomplete removal, nerve damage and allergy to anesthesia.
Render Post-Care Instructions In Note?: yes
Post-Care Instructions: I reviewed with the patient in detail post-care instructions. Patient is to keep the biopsy site dry overnight, and then apply bacitracin twice daily until healed. Patient may apply hydrogen peroxide soaks to remove any crusting.  After the procedure, the patient was observed for 5-10 minutes and was oriented to,person, place and time and denied feeling dizzy, queasy and stated that they were not going to faint
Accession #: [email or web address] read
Anesthesia Volume In Cc: 0.3
Bill [id number] For Specimen Handling/Conveyance To Laboratory?: no
Dressing: Band-Aid
Wound Care: Vaseline
Bill As?: Note: Bill Malignant Destruction If Path Confirms Malignant Lesion. Only Bill As Shave Removal If Path Comes Back Benign. Do Not Bill Shave Removal On Malignant Lesions.: Malignant Destruction
Number Of Curettages: 2
Notification Instructions: Patient will be notified of biopsy results. However, patient instructed to call the office if not contacted within 2 weeks.
Size After Destruction (Required For Destruction Billing): 0.7
Hemostasis: Electrocautery
Anesthesia Type: 1% lidocaine with epinephrine
Billing Type: Third-Party Bill
Detail Level: Detailed
Cautery Type: electrodesiccation

## 2018-06-19 NOTE — PROCEDURE: PATHOLOGY BILLING
Immunohistochemistry ([id number] and [id number]) billing is not performed here. Please use the Immunohistochemistry Stain Billing plan to accomplish this. Immunohistochemistry ([id number] and [id number]) billing is not performed here. Please use the Immunohistochemistry Stain Billing plan to accomplish this.

## 2018-06-19 NOTE — PROCEDURE: LIQUID NITROGEN
Detail Level: Zone
Number Of Freeze-Thaw Cycles: 2 freeze-thaw cycles
Render Post-Care Instructions In Note?: no
Post-Care Instructions: I reviewed with the patient in detail post-care instructions. Patient is to wear sunprotection, and avoid picking at any of the treated lesions. Pt may apply Vaseline to crusted or scabbing areas.
Duration Of Freeze Thaw-Cycle (Seconds): 3
Consent: The patient's consent was obtained including but not limited to risks of crusting, scabbing, blistering, scarring, darker or lighter pigmentary change, recurrence, incomplete removal and infection.

## 2018-08-03 ENCOUNTER — APPOINTMENT (RX ONLY)
Dept: URBAN - METROPOLITAN AREA CLINIC 349 | Facility: CLINIC | Age: 63
Setting detail: DERMATOLOGY
End: 2018-08-03

## 2018-08-03 DIAGNOSIS — L57.0 ACTINIC KERATOSIS: ICD-10-CM

## 2018-08-03 DIAGNOSIS — L72.8 OTHER FOLLICULAR CYSTS OF THE SKIN AND SUBCUTANEOUS TISSUE: ICD-10-CM

## 2018-08-03 DIAGNOSIS — L82.0 INFLAMED SEBORRHEIC KERATOSIS: ICD-10-CM

## 2018-08-03 DIAGNOSIS — D22 MELANOCYTIC NEVI: ICD-10-CM

## 2018-08-03 DIAGNOSIS — D17 BENIGN LIPOMATOUS NEOPLASM: ICD-10-CM

## 2018-08-03 PROBLEM — D17.0 BENIGN LIPOMATOUS NEOPLASM OF SKIN AND SUBCUTANEOUS TISSUE OF HEAD, FACE AND NECK: Status: ACTIVE | Noted: 2018-08-03

## 2018-08-03 PROBLEM — D22.5 MELANOCYTIC NEVI OF TRUNK: Status: ACTIVE | Noted: 2018-08-03

## 2018-08-03 PROCEDURE — 17000 DESTRUCT PREMALG LESION: CPT | Mod: 59

## 2018-08-03 PROCEDURE — 17003 DESTRUCT PREMALG LES 2-14: CPT

## 2018-08-03 PROCEDURE — ? DEFER

## 2018-08-03 PROCEDURE — ? COUNSELING

## 2018-08-03 PROCEDURE — 17110 DESTRUCTION B9 LES UP TO 14: CPT

## 2018-08-03 PROCEDURE — 99213 OFFICE O/P EST LOW 20 MIN: CPT | Mod: 25

## 2018-08-03 PROCEDURE — ? LIQUID NITROGEN

## 2018-08-03 ASSESSMENT — LOCATION DETAILED DESCRIPTION DERM
LOCATION DETAILED: RIGHT INFERIOR LATERAL FOREHEAD
LOCATION DETAILED: STERNUM
LOCATION DETAILED: RIGHT PROXIMAL DORSAL FOREARM
LOCATION DETAILED: STERNAL NOTCH
LOCATION DETAILED: RIGHT DISTAL RADIAL DORSAL FOREARM
LOCATION DETAILED: SUPERIOR THORACIC SPINE
LOCATION DETAILED: LEFT INFERIOR ANTERIOR NECK
LOCATION DETAILED: LEFT SUPERIOR LATERAL UPPER BACK
LOCATION DETAILED: LEFT DISTAL DORSAL FOREARM
LOCATION DETAILED: LEFT PROXIMAL DORSAL FOREARM

## 2018-08-03 ASSESSMENT — LOCATION ZONE DERM
LOCATION ZONE: NECK
LOCATION ZONE: TRUNK
LOCATION ZONE: ARM
LOCATION ZONE: FACE

## 2018-08-03 ASSESSMENT — LOCATION SIMPLE DESCRIPTION DERM
LOCATION SIMPLE: LEFT UPPER BACK
LOCATION SIMPLE: LEFT ANTERIOR NECK
LOCATION SIMPLE: UPPER BACK
LOCATION SIMPLE: LEFT FOREARM
LOCATION SIMPLE: CHEST
LOCATION SIMPLE: RIGHT FOREARM
LOCATION SIMPLE: RIGHT FOREHEAD

## 2018-08-03 NOTE — PROCEDURE: LIQUID NITROGEN
Medical Necessity Information: It is in your best interest to select a reason for this procedure from the list below. All of these items fulfill various CMS LCD requirements except the new and changing color options.
Render Post-Care Instructions In Note?: no
Medical Necessity Clause: This procedure was medically necessary because the lesions that were treated were:
Include Z78.9 (Other Specified Conditions Influencing Health Status) As An Associated Diagnosis?: Yes
Post-Care Instructions: I reviewed with the patient in detail post-care instructions. Patient is to wear sunprotection, and avoid picking at any of the treated lesions. Pt may apply Vaseline to crusted or scabbing areas.
Consent: The patient's consent was obtained including but not limited to risks of crusting, scabbing, blistering, scarring, darker or lighter pigmentary change, recurrence, incomplete removal and infection.
Detail Level: Zone
Duration Of Freeze Thaw-Cycle (Seconds): 3
Number Of Freeze-Thaw Cycles: 1 freeze-thaw cycle
Number Of Freeze-Thaw Cycles: 2 freeze-thaw cycles
Detail Level: Detailed

## 2018-12-20 ENCOUNTER — HOSPITAL ENCOUNTER (OUTPATIENT)
Dept: PHYSICAL THERAPY | Age: 63
Discharge: HOME | End: 2018-12-20
Payer: COMMERCIAL

## 2018-12-20 DIAGNOSIS — M25.511 BILATERAL SHOULDER PAIN, UNSPECIFIED CHRONICITY: ICD-10-CM

## 2018-12-20 DIAGNOSIS — M25.512 BILATERAL SHOULDER PAIN, UNSPECIFIED CHRONICITY: ICD-10-CM

## 2018-12-20 PROCEDURE — 97162 PT EVAL MOD COMPLEX 30 MIN: CPT

## 2018-12-24 ENCOUNTER — HOSPITAL ENCOUNTER (OUTPATIENT)
Dept: PHYSICAL THERAPY | Age: 63
Discharge: HOME | End: 2018-12-24
Payer: COMMERCIAL

## 2018-12-24 PROCEDURE — 97110 THERAPEUTIC EXERCISES: CPT

## 2018-12-24 PROCEDURE — 97140 MANUAL THERAPY 1/> REGIONS: CPT

## 2018-12-24 NOTE — PROGRESS NOTES
Jay Macdonald  : 1955  Primary: [facility]  Secondary:  [facility] at [address]  [phone]   [phone]          OUTPATIENT PHYSICAL THERAPY:Daily Note 2018   ICD-10: Treatment Diagnosis: pain in joint, right shoulder M25.511  ICD-10: Treatment Diagnosis: cervicalgia M54.2  ICD-10: Treatment Diagnosis: thoracic spine pain M54.6  Precautions/Allergies:   Patient has no known allergies. MD Orders: evaluate and treat MEDICAL/REFERRING DIAGNOSIS:  Shoulder pain [M25.519]   DATE OF ONSET: 2018  REFERRING PHYSICIAN: Asiya Small MD  RETURN PHYSICIAN APPOINTMENT: as needed     INITIAL ASSESSMENT:  Mr. Yovani Milligan is a 58 y.o.male who presents to physical therapy with insidious onset of right shoulder pain since 2018. 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 He demonstrates strength and ROM deficits of his right shoulder. He would benefit from skilled physical therapy to improve overall mobility of his right shoulder. PROBLEM LIST (Impacting functional limitations):  1. Decreased Strength  2. Decreased ADL/Functional Activities  3. Increased Pain  4. Decreased Activity Tolerance  5. Decreased Flexibility/Joint Mobility INTERVENTIONS PLANNED: (Treatment may consist of any combination of the following)  1. Home Exercise Program (HEP)  2. Manual Therapy  3. Neuromuscular Re-education/Strengthening  4. Range of Motion (ROM)  5. Therapeutic Activites  6. Therapeutic Exercise/Strengthening   TREATMENT PLAN:  Effective Dates: 2018 TO 3/20/2019 (90 days). Frequency/Duration: 1 time a week for 90 Day(s)  GOALS: (Goals have been discussed and agreed upon with patient.)    Discharge Goals: Time Frame: 90 days  1.  Patient demonstrates independence of his HEP without verbal cueing from therapist.  2. Patient demonstrates full AROM of right shoulder without onset of pain to perform ADLs  3. Able to ride his bike for 30 miles without onset of right shoulder pain. 4. Improve DASH score from 20/55 to 13/55 to perform ADLs  Rehabilitation Potential For Stated Goals: Excellent  Regarding Lauren Kenny's therapy, I certify that the treatment plan above will be carried out by a therapist or under their direction. Thank you for this referral,  Sherine Myers PT                 The information in this section was collected on 12/20/18 (except where otherwise noted). HISTORY:   History of Present Injury/Illness (Reason for Referral): Insidious onset of right shoulder pain since September 2018 and progressively increasing in pain and stiffness. He is limited with reaching forward, reaching behind his back and lifting overhead. He started riding a road bike about 6 months ago, notes he had a bike fit, however feels his handle bars may be lower than they should be. Increased stiffness in right shoulder noted with most activities during the day and challenged with sleeping secondary to pain. Patient denies dizziness, drop attacks, numbness/tingling, bowel/bladder dysfunction and/or unexplained weight gain/loss. Past Medical History/Comorbidities:   Mr. Arin Bacon  has a past medical history of Arrhythmia, Arthritis, CAD (coronary artery disease), Cancer ([address] Depression, Diabetes ([address] HTN (hypertension), Hypercholesterolemia, Hyperlipidemia, Hypertension, Knee pain, Morbid obesity ([address] Psychiatric disorder, Right inguinal hernia, and S/P total knee arthroplasty.   Mr. Arin Bacon  has a past surgical history that includes hx other surgical; hx colonoscopy (2013); hx heent (1971); hx orthopaedic (Left, 2003); hx orthopaedic (Left, 2009); hx orthopaedic (Right, 2016); hx knee replacement (Right, 03/28/2017); pr abdomen surgery proc unlisted (Right, 2017); RIGHT INGUINAL HERNIA REPAIR/ PROGRIP MESH (Right, 5/16/2017); RIGHT KNEE ARTHROPLASTY TOTAL/ DEPUY WITH OCTAVIO (Right, 3/28/2017); and HIP ARTHROPLASTY TOTAL (Left, 4/14/2009). Social History/Living Environment:     lives in a private home with his wife, challenged with activities overhead, sleeping positions  Prior Level of Function/Work/Activity:  Works full time, works on a laptop and drives 6+ hours. Dominant Side:         RIGHT     Ambulatory/Rehab Services H2 Model Falls Risk Assessment 12/20/18    Risk Factors:       (1)  Gender [Male] Ability to Rise from Chair:       (0)  Ability to rise in a single movement    Falls Prevention Plan:       No modifications necessary   Total: (5 or greater = High Risk): 1    ©2010 [facility] . [facility] Patent #[id number]. Federal Law prohibits the replication, distribution or use without written permission from [facility]     Current Medications:       Current Outpatient Medications:     traZODone (DESYREL) 50 mg tablet, Take 1 Tab by mouth nightly., Disp: 30 Tab, Rfl: 5    metFORMIN ER (GLUCOPHAGE XR) 500 mg tablet, Take 2 Tabs by mouth daily (with dinner). , Disp: 180 Tab, Rfl: 1    acyclovir (ZOVIRAX) 200 mg capsule, Take 1 Cap by mouth daily. , Disp: 90 Cap, Rfl: 3    rivaroxaban (XARELTO) 20 mg tab tablet, Take 1 Tab by mouth daily (with lunch). , Disp: 90 Tab, Rfl: 3    rosuvastatin (CRESTOR) 20 mg tablet, Take 1 Tab by mouth nightly., Disp: 90 Tab, Rfl: 3    flecainide (TAMBOCOR) 100 mg tablet, Take 1 Tab by mouth two (2) times a day. Indications: PREVENTION OF RECURRENT ATRIAL FIBRILLATION, Disp: 180 Tab, Rfl: 3    verapamil ER (CALAN-SR) 180 mg CR tablet, Take 1 Tab by mouth daily. Indications: hypertension, Disp: 90 Tab, Rfl: 3    metoprolol succinate (TOPROL-XL) 25 mg XL tablet, Take 1 Tab by mouth daily. Indications: hypertension, Disp: 90 Tab, Rfl: 3    lisinopril (PRINIVIL, ZESTRIL) 10 mg tablet, Take 1 Tab by mouth daily. , Disp: 90 Tab, Rfl: 3    glimepiride (AMARYL) 4 mg tablet, Take 1/2 tab (2mg) each morning and 1 tablet at night, Disp: 180 Tab, Rfl: 1    FA/NIACINAMIDE/CUPRIC OX/ZN OX (NICOTINAMIDE PO), Take  by mouth two (2) times a day., Disp: , Rfl:     coenzyme q10 10 mg cap, Take  by mouth daily. , Disp: , Rfl:     VITAMIN D-3 PO, take  by mouth daily. am , Disp: , Rfl:    Date Last Reviewed:  12/24/2018     Number of Personal Factors/Comorbidities that affect the Plan of Care: 1-2: MODERATE COMPLEXITY   EXAMINATION:   Observation/Orthostatic Postural Assessment:           Shoulder girdles are right elevated and protracted. Scapular position is right elevated. Clavicles are right elevated. Soft tissue observations indicates restrictions in anterior chest, pectoralis major/minor, scalenes, upper trapezius, levator scapula, infraspinatus and subscapularis, right. Patient exhibits a decreased cervical lordosis,  Slight increased thoracic kyphosis. Palpation:  Myofascial assessment indicates restrictions in anterior chest. Muscle length testing levator scapulae with ipsilateral arm abduction is restrictions right. Upper trapezius length is restricted right. Pectoralis minor/major and latissimus dorsi length is restricted bilaterally. Scalene muscle length is restricted right. ROM: Gross active cervical spine rotation is 90% available bilaterally. Apley's scratch test for functional ER/IR is restrictions right, ER to C7 and IR to right iliac crest. Passive Accessory Motion: Glenohumeral mobility is restricted right for inferior and posterior glides. Acromioclavicular mobility is restricted right. Sternoclavicular mobility is restricted right.    AROM(PROM) in degrees Right Left   Shoulder flexion 110 (120) 160   Shoulder abduction (palm down) 80 140   Shoulder extension 10 20   Shoulder internal rotation (IR)  (measured at 90 degrees of abduction) 20 45   ER (measured at 90 degrees of abduction) 35 50     Strength:   Manual Muscle Test (*/5) Right Left   Shoulder flexion 4 5   Shoulder extension 4 5   Shoulder abduction 4 5   Shoulder adduction 4 5   Shoulder ER 4 5   Shoulder IR 4 5   Upper trapezius 5 5   Middle trapezius 4- 5   Lower trapezius 4- 5   Rhomboids 4- 5   Serratus Anterior 4- 5   Elbow flexion 5 5   Elbow extension 5 5   Special Tests:    Impingement tests performed on the right shoulder:  Deng-Alpine test: positive. Neer test: positive. Neurological Screen:  Myotomes: Key muscle strength testing for bilateral UE is intact. Dermatomes: Sensation testing through bilateral UE for light touch is intact. Reflexes: Biceps (C5), brachioradialis (C6), and triceps (C7) are not tested   Neural tension tests: Upper limb tension test A is negative for exacerbation  Functional Mobility:  Challenged with overhead activities, reaching forward and reaching behind his back. Body Structures Involved:  1. Thoracic Cage  2. Joints  3. Muscles Body Functions Affected:  1. Sensory/Pain  2. Neuromusculoskeletal  3. Movement Related Activities and Participation Affected:  1. General Tasks and Demands  2. Mobility  3. Self Care  4. Community, Social and [place]   Number of elements (examined above) that affect the Plan of Care: 3: MODERATE COMPLEXITY   CLINICAL PRESENTATION:   Presentation: Evolving clinical presentation with changing clinical characteristics: MODERATE COMPLEXITY   CLINICAL DECISION MAKING:   Outcome Measure: Tool Used: Disabilities of the Arm, Shoulder and Hand (DASH) Questionnaire - Quick Version  Score:  Initial: 20/55  Most Recent: X/55 (Date: -- )   Interpretation of Score: The DASH is designed to measure the activities of daily living in person's with upper extremity dysfunction or pain. Each section is scored on a 1-5 scale, 5 representing the greatest disability. The scores of each section are added together for a total score of 55.        Medical Necessity:   · Patient is expected to demonstrate progress in strength, range of motion, balance, coordination and functional technique to increase independence with ADLs . Reason for Services/Other Comments:  · Patient continues to require skilled intervention due to challenged with performing daily tasks secondary to pain and stiffness in his right shoulder. .   Use of outcome tool(s) and clinical judgement create a POC that gives a: Questionable prediction of patient's progress: MODERATE COMPLEXITY            TREATMENT:   (In addition to Assessment/Re-Assessment sessions the following treatments were rendered)  Pre-treatment Symptoms/Complaints:  Patient notes continues to have daily pain in with right shoulder, challenged with getting dressed secondary to pain. Pain: Initial:   Pain Intensity 1: 8  Pain Location 1: Shoulder  Pain Orientation 1: Right  Post Session:  7/10     Therapeutic Exercise: (25 Minutes):  Exercises per grid below to improve mobility, strength, balance and coordination. Required moderate verbal and manual cues to promote proper body alignment, promote proper body posture, promote proper body mechanics and promote proper body breathing techniques. Progressed resistance, range, repetitions and complexity of movement as indicated. Date:  12/24/2018   Activity/Exercise Parameters   Patient education X 5 minutes sleeping positions, use of ice/heat   Kinesiotaping X 5 minutes to right shoulder girdle   Standing pivot prone X 10 reps, 5 sec holds   Bilateral shoulder ER X 10 reps 5 sec holds   Side lying arm circles X 5 reps clockwise and counterclockwise             Manual Therapy (    Soft Tissue Mobilization Duration  Duration: 35 Minutes): Manual techniques to facilitate improved motion and decreased pain.  (Used abbreviations: MET - muscle energy technique; PNF - proprioceptive neuromuscular facilitation; NMR - neuromuscular re-education; a/p - anterior to posterior; p/a - posterior to anterior)   · Supine anterior chest superficial fascia, along bony contours of right clavicle and coracoid process. · Supine soft tissue mobilization to lateral right shoulder, right upper trapezius and posterior shoulder. · Supine mobilization to right shoulder with belt: posterior translation/glide with resistance, supine gapping of GH, supine caudal translation of head of humerus,  flexion adduction/posterior shear  · Side lying left for right PNF scapular patterns into anterior elevation and posterior depression, followed with prolonged holds and NMR    [facility] Portal  Treatment/Session Assessment:    · Response to Treatment:  Tenderness to palpate posterior shoulder girdle, especially infraspinatus, challenged with right shoulder positioning   · Compliance with Program/Exercises: Compliant all of the time. · Recommendations/Intent for next treatment session: \"Next visit will focus on advancements to more challenging activities\".   Total Treatment Duration: 60 minutes  PT Patient Time In/Time Out  Time In: 0930  Time Out: [address], PT    Future Appointments   Date Time Provider Roberto Dhillon   1/3/2019  8:30 AM Anika Daughters., PT [facility]   1/7/2019  4:30 PM Anika Daughters., PT [facility]   1/14/2019  3:30 PM Anika Daughters., PT [facility]   1/21/2019  3:30 PM Anika Daughters., PT [facility]   1/28/2019  4:30 PM Anika Daughters., PT [facility]

## 2019-01-03 ENCOUNTER — HOSPITAL ENCOUNTER (OUTPATIENT)
Dept: PHYSICAL THERAPY | Age: 64
Discharge: HOME | End: 2019-01-03
Payer: COMMERCIAL

## 2019-01-03 PROCEDURE — 97110 THERAPEUTIC EXERCISES: CPT

## 2019-01-03 PROCEDURE — 97140 MANUAL THERAPY 1/> REGIONS: CPT

## 2019-01-04 ENCOUNTER — APPOINTMENT (RX ONLY)
Dept: URBAN - METROPOLITAN AREA CLINIC 349 | Facility: CLINIC | Age: 64
Setting detail: DERMATOLOGY
End: 2019-01-04

## 2019-01-04 DIAGNOSIS — L82.1 OTHER SEBORRHEIC KERATOSIS: ICD-10-CM

## 2019-01-04 DIAGNOSIS — D18.0 HEMANGIOMA: ICD-10-CM

## 2019-01-04 DIAGNOSIS — L57.0 ACTINIC KERATOSIS: ICD-10-CM

## 2019-01-04 DIAGNOSIS — Z71.89 OTHER SPECIFIED COUNSELING: ICD-10-CM

## 2019-01-04 DIAGNOSIS — Z85.828 PERSONAL HISTORY OF OTHER MALIGNANT NEOPLASM OF SKIN: ICD-10-CM

## 2019-01-04 DIAGNOSIS — L81.2 FRECKLES: ICD-10-CM

## 2019-01-04 PROBLEM — D18.01 HEMANGIOMA OF SKIN AND SUBCUTANEOUS TISSUE: Status: ACTIVE | Noted: 2019-01-04

## 2019-01-04 PROCEDURE — 17000 DESTRUCT PREMALG LESION: CPT

## 2019-01-04 PROCEDURE — 17003 DESTRUCT PREMALG LES 2-14: CPT

## 2019-01-04 PROCEDURE — ? LIQUID NITROGEN

## 2019-01-04 PROCEDURE — ? COUNSELING

## 2019-01-04 PROCEDURE — 99213 OFFICE O/P EST LOW 20 MIN: CPT | Mod: 25

## 2019-01-04 ASSESSMENT — LOCATION SIMPLE DESCRIPTION DERM
LOCATION SIMPLE: LEFT PRETIBIAL REGION
LOCATION SIMPLE: LEFT SHOULDER
LOCATION SIMPLE: RIGHT LOWER BACK
LOCATION SIMPLE: RIGHT FOREARM
LOCATION SIMPLE: ABDOMEN
LOCATION SIMPLE: NECK
LOCATION SIMPLE: LEFT POSTERIOR THIGH
LOCATION SIMPLE: RIGHT PRETIBIAL REGION
LOCATION SIMPLE: RIGHT FOREHEAD
LOCATION SIMPLE: RIGHT POSTERIOR THIGH
LOCATION SIMPLE: LEFT UPPER BACK
LOCATION SIMPLE: RIGHT SHOULDER
LOCATION SIMPLE: RIGHT UPPER BACK
LOCATION SIMPLE: SCALP
LOCATION SIMPLE: RIGHT CHEEK
LOCATION SIMPLE: LEFT EAR
LOCATION SIMPLE: LEFT CHEEK
LOCATION SIMPLE: LEFT SCALP

## 2019-01-04 ASSESSMENT — LOCATION DETAILED DESCRIPTION DERM
LOCATION DETAILED: LEFT LATERAL PROXIMAL PRETIBIAL REGION
LOCATION DETAILED: LEFT POSTERIOR SHOULDER
LOCATION DETAILED: LEFT SUPERIOR UPPER BACK
LOCATION DETAILED: RIGHT VENTRAL DISTAL FOREARM
LOCATION DETAILED: RIGHT POSTERIOR SHOULDER
LOCATION DETAILED: RIGHT DISTAL POSTERIOR THIGH
LOCATION DETAILED: EPIGASTRIC SKIN
LOCATION DETAILED: LEFT CENTRAL BUCCAL CHEEK
LOCATION DETAILED: LEFT INFERIOR UPPER BACK
LOCATION DETAILED: RIGHT INFERIOR CENTRAL MALAR CHEEK
LOCATION DETAILED: LEFT MEDIAL FRONTAL SCALP
LOCATION DETAILED: LEFT EXTERNAL AUDITORY CANAL
LOCATION DETAILED: RIGHT FOREHEAD
LOCATION DETAILED: RIGHT SUPERIOR PARIETAL SCALP
LOCATION DETAILED: LEFT DISTAL POSTERIOR THIGH
LOCATION DETAILED: RIGHT SUPERIOR FOREHEAD
LOCATION DETAILED: PERIUMBILICAL SKIN
LOCATION DETAILED: LEFT SUPERIOR LATERAL NECK
LOCATION DETAILED: LEFT SUPERIOR PARIETAL SCALP
LOCATION DETAILED: RIGHT PROXIMAL PRETIBIAL REGION
LOCATION DETAILED: RIGHT SUPERIOR LATERAL MIDBACK
LOCATION DETAILED: RIGHT INFERIOR MEDIAL UPPER BACK

## 2019-01-04 ASSESSMENT — LOCATION ZONE DERM
LOCATION ZONE: EAR
LOCATION ZONE: TRUNK
LOCATION ZONE: ARM
LOCATION ZONE: NECK
LOCATION ZONE: FACE
LOCATION ZONE: LEG
LOCATION ZONE: SCALP

## 2019-01-04 NOTE — PROGRESS NOTES
Verito lFowers : 1955 Primary: [facility] Secondary:  Therapy Center at [address] [phone]   [phone] OUTPATIENT PHYSICAL THERAPY:Daily Note 1/3/2019 ICD-10: Treatment Diagnosis: pain in joint, right shoulder M25.511 ICD-10: Treatment Diagnosis: cervicalgia M54.2 ICD-10: Treatment Diagnosis: thoracic spine pain M54.6 Precautions/Allergies:  
Patient has no known allergies. MD Orders: evaluate and treat MEDICAL/REFERRING DIAGNOSIS: 
Shoulder pain [M25.519] DATE OF ONSET: 2018 REFERRING PHYSICIAN: Regis Marie MD 
RETURN PHYSICIAN APPOINTMENT: as needed INITIAL ASSESSMENT:  Mr. Jaiden Mack is a 58 y.o.male who presents to physical therapy with insidious onset of right shoulder pain since 2018. 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 He demonstrates strength and ROM deficits of his right shoulder. He would benefit from skilled physical therapy to improve overall mobility of his right shoulder. PROBLEM LIST (Impacting functional limitations): 1. Decreased Strength 2. Decreased ADL/Functional Activities 3. Increased Pain 4. Decreased Activity Tolerance 5. Decreased Flexibility/Joint Mobility INTERVENTIONS PLANNED: (Treatment may consist of any combination of the following) 1. Home Exercise Program (HEP) 2. Manual Therapy 3. Neuromuscular Re-education/Strengthening 4. Range of Motion (ROM) 5. Therapeutic Activites 6. Therapeutic Exercise/Strengthening TREATMENT PLAN: 
Effective Dates: 2018 TO 3/20/2019 (90 days). Frequency/Duration: 1 time a week for 90 Day(s) GOALS: (Goals have been discussed and agreed upon with patient.) Discharge Goals: Time Frame: 90 days 1.  Patient demonstrates independence of his HEP without verbal cueing from therapist. 
 2. Patient demonstrates full AROM of right shoulder without onset of pain to perform ADLs 3. Able to ride his bike for 30 miles without onset of right shoulder pain. 4. Improve DASH score from 20/55 to 13/55 to perform ADLs Rehabilitation Potential For Stated Goals: Excellent Regarding Von [facility]'s therapy, I certify that the treatment plan above will be carried out by a therapist or under their direction. Thank you for this referral, Genaro Ornelas PT The information in this section was collected on 12/20/18 (except where otherwise noted). HISTORY:  
History of Present Injury/Illness (Reason for Referral): Insidious onset of right shoulder pain since September 2018 and progressively increasing in pain and stiffness. He is limited with reaching forward, reaching behind his back and lifting overhead. He started riding a road bike about 6 months ago, notes he had a bike fit, however feels his handle bars may be lower than they should be. Increased stiffness in right shoulder noted with most activities during the day and challenged with sleeping secondary to pain. Patient denies dizziness, drop attacks, numbness/tingling, bowel/bladder dysfunction and/or unexplained weight gain/loss. Past Medical History/Comorbidities: Mr. Mark Jara  has a past medical history of Arrhythmia, Arthritis, CAD (coronary artery disease), Cancer ([address] Depression, Diabetes ([address] HTN (hypertension), Hypercholesterolemia, Hyperlipidemia, Hypertension, Knee pain, Morbid obesity ([address] Psychiatric disorder, Right inguinal hernia, and S/P total knee arthroplasty.   Mr. Mark Jara  has a past surgical history that includes hx other surgical; hx colonoscopy (2013); hx heent (1971); hx orthopaedic (Left, 2003); hx orthopaedic (Left, 2009); hx orthopaedic (Right, 2016); hx knee replacement (Right, 03/28/2017); pr abdomen surgery proc unlisted (Right, 2017); RIGHT INGUINAL HERNIA REPAIR/ PROGRIP MESH (Right, 5/16/2017); RIGHT KNEE ARTHROPLASTY TOTAL/ DEPUY WITH OCTAVIO (Right, 3/28/2017); and HIP ARTHROPLASTY TOTAL (Left, 4/14/2009). Social History/Living Environment:  
  lives in a private home with his wife, challenged with activities overhead, sleeping positions Prior Level of Function/Work/Activity: 
Works full time, works on a laptop and drives 6+ hours. Dominant Side:  
      RIGHT Ambulatory/Rehab Services H2 Model Falls Risk Assessment 12/20/18 Risk Factors: 
     (1)  Gender [Male] Ability to Rise from Chair: 
     (0)  Ability to rise in a single movement Falls Prevention Plan: No modifications necessary Total: (5 or greater = High Risk): 1 ©2010 [facility] [employer] . [facility] Patent #[id number]. Federal Law prohibits the replication, distribution or use without written permission from [facility] [employer] Current Medications:   
  
Current Outpatient Medications:  
  traZODone (DESYREL) 50 mg tablet, Take 1 Tab by mouth nightly., Disp: 30 Tab, Rfl: 5 
  metFORMIN ER (GLUCOPHAGE XR) 500 mg tablet, Take 2 Tabs by mouth daily (with dinner). , Disp: 180 Tab, Rfl: 1 
  acyclovir (ZOVIRAX) 200 mg capsule, Take 1 Cap by mouth daily. , Disp: 90 Cap, Rfl: 3   rivaroxaban (XARELTO) 20 mg tab tablet, Take 1 Tab by mouth daily (with lunch). , Disp: 90 Tab, Rfl: 3 
  rosuvastatin (CRESTOR) 20 mg tablet, Take 1 Tab by mouth nightly., Disp: 90 Tab, Rfl: 3 
  flecainide (TAMBOCOR) 100 mg tablet, Take 1 Tab by mouth two (2) times a day. Indications: PREVENTION OF RECURRENT ATRIAL FIBRILLATION, Disp: 180 Tab, Rfl: 3 
  verapamil ER (CALAN-SR) 180 mg CR tablet, Take 1 Tab by mouth daily. Indications: hypertension, Disp: 90 Tab, Rfl: 3 
  metoprolol succinate (TOPROL-XL) 25 mg XL tablet, Take 1 Tab by mouth daily.  Indications: hypertension, Disp: 90 Tab, Rfl: 3 
  lisinopril (PRINIVIL, ZESTRIL) 10 mg tablet, Take 1 Tab by mouth daily., Disp: 90 Tab, Rfl: 3 
  glimepiride (AMARYL) 4 mg tablet, Take 1/2 tab (2mg) each morning and 1 tablet at night, Disp: 180 Tab, Rfl: 1 
  FA/NIACINAMIDE/CUPRIC OX/ZN OX (NICOTINAMIDE PO), Take  by mouth two (2) times a day., Disp: , Rfl:  
  coenzyme q10 10 mg cap, Take  by mouth daily. , Disp: , Rfl:  
  VITAMIN D-3 PO, take  by mouth daily. am , Disp: , Rfl:   
Date Last Reviewed:  1/3/2019 Number of Personal Factors/Comorbidities that affect the Plan of Care: 1-2: MODERATE COMPLEXITY EXAMINATION:  
Observation/Orthostatic Postural Assessment:   
       Shoulder girdles are right elevated and protracted. Scapular position is right elevated. Clavicles are right elevated. Soft tissue observations indicates restrictions in anterior chest, pectoralis major/minor, scalenes, upper trapezius, levator scapula, infraspinatus and subscapularis, right. Patient exhibits a decreased cervical lordosis,  Slight increased thoracic kyphosis. Palpation:  Myofascial assessment indicates restrictions in anterior chest. Muscle length testing levator scapulae with ipsilateral arm abduction is restrictions right. Upper trapezius length is restricted right. Pectoralis minor/major and latissimus dorsi length is restricted bilaterally. Scalene muscle length is restricted right. ROM: Gross active cervical spine rotation is 90% available bilaterally. Apley's scratch test for functional ER/IR is restrictions right, ER to C7 and IR to right iliac crest. Passive Accessory Motion: Glenohumeral mobility is restricted right for inferior and posterior glides. Acromioclavicular mobility is restricted right. Sternoclavicular mobility is restricted right. AROM(PROM) in degrees Right Left Shoulder flexion 110 (120) 160 Shoulder abduction (palm down) 80 140 Shoulder extension 10 20 Shoulder internal rotation (IR) 
(measured at 90 degrees of abduction) 20 45 ER (measured at 90 degrees of abduction) 35 50 Strength:  
 Manual Muscle Test (*/5) Right Left Shoulder flexion 4 5 Shoulder extension 4 5 Shoulder abduction 4 5 Shoulder adduction 4 5 Shoulder ER 4 5 Shoulder IR 4 5 Upper trapezius 5 5 Middle trapezius 4- 5 Lower trapezius 4- 5 Rhomboids 4- 5 Serratus Anterior 4- 5 Elbow flexion 5 5 Elbow extension 5 5 Special Tests:   
Impingement tests performed on the right shoulder: 
Deng-Jonathan test: positive. Neer test: positive. Neurological Screen: Myotomes: Key muscle strength testing for bilateral UE is intact. Dermatomes: Sensation testing through bilateral UE for light touch is intact. Reflexes: Biceps (C5), brachioradialis (C6), and triceps (C7) are not tested Neural tension tests: Upper limb tension test A is negative for exacerbation Functional Mobility:  Challenged with overhead activities, reaching forward and reaching behind his back. Body Structures Involved: 1. Thoracic Cage 2. Joints 3. Muscles Body Functions Affected: 1. Sensory/Pain 2. Neuromusculoskeletal 
3. Movement Related Activities and Participation Affected: 1. General Tasks and Demands 2. Mobility 3. Self Care 4. Community, Social and [place] Number of elements (examined above) that affect the Plan of Care: 3: MODERATE COMPLEXITY CLINICAL PRESENTATION:  
Presentation: Evolving clinical presentation with changing clinical characteristics: MODERATE COMPLEXITY CLINICAL DECISION MAKING:  
Outcome Measure: Tool Used: Disabilities of the Arm, Shoulder and Hand (DASH) Questionnaire - Quick Version Score:  Initial: 20/55  Most Recent: X/55 (Date: -- ) Interpretation of Score: The DASH is designed to measure the activities of daily living in person's with upper extremity dysfunction or pain. Each section is scored on a 1-5 scale, 5 representing the greatest disability. The scores of each section are added together for a total score of 55. Medical Necessity: · Patient is expected to demonstrate progress in strength, range of motion, balance, coordination and functional technique to increase independence with ADLs . Reason for Services/Other Comments: 
· Patient continues to require skilled intervention due to challenged with performing daily tasks secondary to pain and stiffness in his right shoulder. .  
Use of outcome tool(s) and clinical judgement create a POC that gives a: Questionable prediction of patient's progress: MODERATE COMPLEXITY  
  
 
 
 
TREATMENT:  
(In addition to Assessment/Re-Assessment sessions the following treatments were rendered) Pre-treatment Symptoms/Complaints:  Patient notes continued ache and stiffness in his right shoulder, limited with overhead activities. Also notes increased stress levels this week. Pain: Initial:  
Pain Intensity 1: 8 Pain Location 1: Shoulder Pain Orientation 1: Right  Post Session:  7/10 Therapeutic Exercise: (20 Minutes):  Exercises per grid below to improve mobility, strength, balance and coordination. Required moderate verbal and manual cues to promote proper body alignment, promote proper body posture, promote proper body mechanics and promote proper body breathing techniques. Progressed resistance, range, repetitions and complexity of movement as indicated. Date: 
1/3/2019 Activity/Exercise Parameters Patient education X 5 minutes review of HEP and use of heat Kinesiotaping Standing pivot prone X 10 reps, 5 sec holds Bilateral shoulder ER X 10 reps 5 sec holds Side lying arm circles X 5 reps clockwise and counterclockwise Prone bilateral posterior depression/shoulder extension X 10 reps, 10 sec holds Standing shoulder extension X 10 reps, 5 sec hold Manual Therapy (    Soft Tissue Mobilization Duration Duration: 40 Minutes): Manual techniques to facilitate improved motion and decreased pain.  (Used abbreviations: MET - muscle energy technique; PNF - proprioceptive neuromuscular facilitation; NMR - neuromuscular re-education; a/p - anterior to posterior; p/a - posterior to anterior) · Supine anterior chest superficial fascia, along bony contours of right clavicle and coracoid process. · Supine soft tissue mobilization to lateral right shoulder, right upper trapezius and posterior shoulder. · Supine mobilization to right shoulder with belt: posterior translation/glide with resistance, supine gapping of GH, supine caudal translation of head of humerus,  flexion adduction/posterior shear · Side lying left for right PNF scapular patterns into anterior elevation and posterior depression, followed with prolonged holds and NMR · With written consent received and precautions reviewed, instrument-assisted soft tissue mobilization was performed to right shoulder (upper trapezius, supraspinatus, infraspinatus, posterior deltoid) for the purpose of trigger point release with a positive treatment effect and no complications noted. [facility] 
Treatment/Session Assessment:   
· Response to Treatment:  Improved mobility noted in right shoulder ROM at end of session, continues to have protracted right shoulder position. · Compliance with Program/Exercises: Compliant all of the time. · Recommendations/Intent for next treatment session: \"Next visit will focus on advancements to more challenging activities\". Total Treatment Duration: 60 minutes PT Patient Time In/Time Out Time In: 0830 Time Out: 0930 Santana Magallanes, PT Future Appointments Date Time Provider Roberto Dhillon 1/7/2019  4:30 PM Sagar RUGGIERO PT [facility]  
1/14/2019  3:30 PM Deniz Davidson., PT [facility]  
1/21/2019  3:30 PM Deniz Mcneal, PT [facility]  
1/28/2019  4:30 PM Deniz Davidson., PT [facility]

## 2019-01-07 ENCOUNTER — HOSPITAL ENCOUNTER (OUTPATIENT)
Dept: PHYSICAL THERAPY | Age: 64
Discharge: HOME | End: 2019-01-07
Payer: COMMERCIAL

## 2019-01-07 PROCEDURE — 97140 MANUAL THERAPY 1/> REGIONS: CPT

## 2019-01-07 PROCEDURE — 97110 THERAPEUTIC EXERCISES: CPT

## 2019-01-07 NOTE — PROGRESS NOTES
Porsche Velásquez : 1955 Primary: [facility] Secondary:  Therapy Center at [address] [phone]   [phone] OUTPATIENT PHYSICAL THERAPY:Daily Note 2019 ICD-10: Treatment Diagnosis: pain in joint, right shoulder M25.511 ICD-10: Treatment Diagnosis: cervicalgia M54.2 ICD-10: Treatment Diagnosis: thoracic spine pain M54.6 Precautions/Allergies:  
Patient has no known allergies. MD Orders: evaluate and treat MEDICAL/REFERRING DIAGNOSIS: 
Shoulder pain [M25.519] DATE OF ONSET: 2018 REFERRING PHYSICIAN: Tara Austin MD 
RETURN PHYSICIAN APPOINTMENT: as needed INITIAL ASSESSMENT:  Mr. Brenda Calderon is a 58 y.o.male who presents to physical therapy with insidious onset of right shoulder pain since 2018. 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 He demonstrates strength and ROM deficits of his right shoulder. He would benefit from skilled physical therapy to improve overall mobility of his right shoulder. PROBLEM LIST (Impacting functional limitations): 1. Decreased Strength 2. Decreased ADL/Functional Activities 3. Increased Pain 4. Decreased Activity Tolerance 5. Decreased Flexibility/Joint Mobility INTERVENTIONS PLANNED: (Treatment may consist of any combination of the following) 1. Home Exercise Program (HEP) 2. Manual Therapy 3. Neuromuscular Re-education/Strengthening 4. Range of Motion (ROM) 5. Therapeutic Activites 6. Therapeutic Exercise/Strengthening TREATMENT PLAN: 
Effective Dates: 2018 TO 3/20/2019 (90 days). Frequency/Duration: 1 time a week for 90 Day(s) GOALS: (Goals have been discussed and agreed upon with patient.) Discharge Goals: Time Frame: 90 days 1.  Patient demonstrates independence of his HEP without verbal cueing from therapist. 
 2. Patient demonstrates full AROM of right shoulder without onset of pain to perform ADLs 3. Able to ride his bike for 30 miles without onset of right shoulder pain. 4. Improve DASH score from 20/55 to 13/55 to perform ADLs Rehabilitation Potential For Stated Goals: Excellent Regarding Tomasz Kenny's therapy, I certify that the treatment plan above will be carried out by a therapist or under their direction. Thank you for this referral, Karen Bettencourt PT The information in this section was collected on 12/20/18 (except where otherwise noted). HISTORY:  
History of Present Injury/Illness (Reason for Referral): Insidious onset of right shoulder pain since September 2018 and progressively increasing in pain and stiffness. He is limited with reaching forward, reaching behind his back and lifting overhead. He started riding a road bike about 6 months ago, notes he had a bike fit, however feels his handle bars may be lower than they should be. Increased stiffness in right shoulder noted with most activities during the day and challenged with sleeping secondary to pain. Patient denies dizziness, drop attacks, numbness/tingling, bowel/bladder dysfunction and/or unexplained weight gain/loss. Past Medical History/Comorbidities: Mr. Steven Azar  has a past medical history of Arrhythmia, Arthritis, CAD (coronary artery disease), Cancer ([address] Depression, Diabetes ([address] HTN (hypertension), Hypercholesterolemia, Hyperlipidemia, Hypertension, Knee pain, Morbid obesity ([address] Psychiatric disorder, Right inguinal hernia, and S/P total knee arthroplasty.   Mr. Steven Azar  has a past surgical history that includes hx other surgical; hx colonoscopy (2013); hx heent (1971); hx orthopaedic (Left, 2003); hx orthopaedic (Left, 2009); hx orthopaedic (Right, 2016); hx knee replacement (Right, 03/28/2017); pr abdomen surgery proc unlisted (Right, 2017); RIGHT INGUINAL HERNIA REPAIR/ PROGRIP MESH (Right, 5/16/2017); RIGHT KNEE ARTHROPLASTY TOTAL/ DEPUY WITH OCTAVIO (Right, 3/28/2017); and HIP ARTHROPLASTY TOTAL (Left, 4/14/2009). Social History/Living Environment:  
  lives in a private home with his wife, challenged with activities overhead, sleeping positions Prior Level of Function/Work/Activity: 
Works full time, works on a laptop and drives 6+ hours. Dominant Side:  
      RIGHT Ambulatory/Rehab Services H2 Model Falls Risk Assessment 12/20/18 Risk Factors: 
     (1)  Gender [Male] Ability to Rise from Chair: 
     (0)  Ability to rise in a single movement Falls Prevention Plan: No modifications necessary Total: (5 or greater = High Risk): 1 ©2010 [facility] . [facility] Patent #[id number]. Federal Law prohibits the replication, distribution or use without written permission from [facility] Current Medications:   
  
Current Outpatient Medications:  
  traZODone (DESYREL) 50 mg tablet, Take 1 Tab by mouth nightly., Disp: 30 Tab, Rfl: 5 
  metFORMIN ER (GLUCOPHAGE XR) 500 mg tablet, Take 2 Tabs by mouth daily (with dinner). , Disp: 180 Tab, Rfl: 1 
  acyclovir (ZOVIRAX) 200 mg capsule, Take 1 Cap by mouth daily. , Disp: 90 Cap, Rfl: 3   rivaroxaban (XARELTO) 20 mg tab tablet, Take 1 Tab by mouth daily (with lunch). , Disp: 90 Tab, Rfl: 3 
  rosuvastatin (CRESTOR) 20 mg tablet, Take 1 Tab by mouth nightly., Disp: 90 Tab, Rfl: 3 
  flecainide (TAMBOCOR) 100 mg tablet, Take 1 Tab by mouth two (2) times a day. Indications: PREVENTION OF RECURRENT ATRIAL FIBRILLATION, Disp: 180 Tab, Rfl: 3 
  verapamil ER (CALAN-SR) 180 mg CR tablet, Take 1 Tab by mouth daily. Indications: hypertension, Disp: 90 Tab, Rfl: 3 
  metoprolol succinate (TOPROL-XL) 25 mg XL tablet, Take 1 Tab by mouth daily.  Indications: hypertension, Disp: 90 Tab, Rfl: 3 
  lisinopril (PRINIVIL, ZESTRIL) 10 mg tablet, Take 1 Tab by mouth daily., Disp: 90 Tab, Rfl: 3 
  glimepiride (AMARYL) 4 mg tablet, Take 1/2 tab (2mg) each morning and 1 tablet at night, Disp: 180 Tab, Rfl: 1 
  FA/NIACINAMIDE/CUPRIC OX/ZN OX (NICOTINAMIDE PO), Take  by mouth two (2) times a day., Disp: , Rfl:  
  coenzyme q10 10 mg cap, Take  by mouth daily. , Disp: , Rfl:  
  VITAMIN D-3 PO, take  by mouth daily. am , Disp: , Rfl:   
Date Last Reviewed:  1/7/2019 Number of Personal Factors/Comorbidities that affect the Plan of Care: 1-2: MODERATE COMPLEXITY EXAMINATION:  
Observation/Orthostatic Postural Assessment:   
       Shoulder girdles are right elevated and protracted. Scapular position is right elevated. Clavicles are right elevated. Soft tissue observations indicates restrictions in anterior chest, pectoralis major/minor, scalenes, upper trapezius, levator scapula, infraspinatus and subscapularis, right. Patient exhibits a decreased cervical lordosis,  Slight increased thoracic kyphosis. Palpation:  Myofascial assessment indicates restrictions in anterior chest. Muscle length testing levator scapulae with ipsilateral arm abduction is restrictions right. Upper trapezius length is restricted right. Pectoralis minor/major and latissimus dorsi length is restricted bilaterally. Scalene muscle length is restricted right. ROM: Gross active cervical spine rotation is 90% available bilaterally. Apley's scratch test for functional ER/IR is restrictions right, ER to C7 and IR to right iliac crest. Passive Accessory Motion: Glenohumeral mobility is restricted right for inferior and posterior glides. Acromioclavicular mobility is restricted right. Sternoclavicular mobility is restricted right. AROM(PROM) in degrees Right Left Shoulder flexion 110 (120) 160 Shoulder abduction (palm down) 80 140 Shoulder extension 10 20 Shoulder internal rotation (IR) 
(measured at 90 degrees of abduction) 20 45 ER (measured at 90 degrees of abduction) 35 50 Strength:  
 Manual Muscle Test (*/5) Right Left Shoulder flexion 4 5 Shoulder extension 4 5 Shoulder abduction 4 5 Shoulder adduction 4 5 Shoulder ER 4 5 Shoulder IR 4 5 Upper trapezius 5 5 Middle trapezius 4- 5 Lower trapezius 4- 5 Rhomboids 4- 5 Serratus Anterior 4- 5 Elbow flexion 5 5 Elbow extension 5 5 Special Tests:   
Impingement tests performed on the right shoulder: 
Deng-Jonathan test: positive. Neer test: positive. Neurological Screen: Myotomes: Key muscle strength testing for bilateral UE is intact. Dermatomes: Sensation testing through bilateral UE for light touch is intact. Reflexes: Biceps (C5), brachioradialis (C6), and triceps (C7) are not tested Neural tension tests: Upper limb tension test A is negative for exacerbation Functional Mobility:  Challenged with overhead activities, reaching forward and reaching behind his back. Body Structures Involved: 1. Thoracic Cage 2. Joints 3. Muscles Body Functions Affected: 1. Sensory/Pain 2. Neuromusculoskeletal 
3. Movement Related Activities and Participation Affected: 1. General Tasks and Demands 2. Mobility 3. Self Care 4. Community, Social and [place] Number of elements (examined above) that affect the Plan of Care: 3: MODERATE COMPLEXITY CLINICAL PRESENTATION:  
Presentation: Evolving clinical presentation with changing clinical characteristics: MODERATE COMPLEXITY CLINICAL DECISION MAKING:  
Outcome Measure: Tool Used: Disabilities of the Arm, Shoulder and Hand (DASH) Questionnaire - Quick Version Score:  Initial: 20/55  Most Recent: X/55 (Date: -- ) Interpretation of Score: The DASH is designed to measure the activities of daily living in person's with upper extremity dysfunction or pain. Each section is scored on a 1-5 scale, 5 representing the greatest disability. The scores of each section are added together for a total score of 55. Medical Necessity: · Patient is expected to demonstrate progress in strength, range of motion, balance, coordination and functional technique to increase independence with ADLs . Reason for Services/Other Comments: 
· Patient continues to require skilled intervention due to challenged with performing daily tasks secondary to pain and stiffness in his right shoulder. .  
Use of outcome tool(s) and clinical judgement create a POC that gives a: Questionable prediction of patient's progress: MODERATE COMPLEXITY  
  
 
 
 
TREATMENT:  
(In addition to Assessment/Re-Assessment sessions the following treatments were rendered) Pre-treatment Symptoms/Complaints:  Patient notes needling helped after last session and pain decreased for about 2 days. Continues to be challenged with movements using his RUE secondary to pain. Pain: Initial:  
Pain Intensity 1: 7 Pain Location 1: Shoulder Pain Orientation 1: Right  Post Session:  6/10 Therapeutic Exercise: (20 Minutes):  Exercises per grid below to improve mobility, strength, balance and coordination. Required moderate verbal and manual cues to promote proper body alignment, promote proper body posture, promote proper body mechanics and promote proper body breathing techniques. Progressed resistance, range, repetitions and complexity of movement as indicated. Date: 
1/7/2019 Activity/Exercise Parameters Patient education X 5 minutes review of HEP and use of heat Kinesiotaping Standing pivot prone X 10 reps, 5 sec holds Bilateral shoulder ER X 10 reps 5 sec holds Side lying arm circles X 5 reps clockwise and counterclockwise Prone bilateral posterior depression/shoulder extension X 10 reps, 10 sec holds Standing shoulder extension X 10 reps, 5 sec hold Prone T X 10 reps, 10 sec holds BUE Manual Therapy (    Soft Tissue Mobilization Duration Duration: 40 Minutes): Manual techniques to facilitate improved motion and decreased pain. (Used abbreviations: MET - muscle energy technique; PNF - proprioceptive neuromuscular facilitation; NMR - neuromuscular re-education; a/p - anterior to posterior; p/a - posterior to anterior) · Supine anterior chest superficial fascia, along bony contours of right clavicle and coracoid process. · Supine soft tissue mobilization to lateral right shoulder, right upper trapezius and posterior shoulder. · Supine mobilization to right shoulder with belt: posterior translation/glide with resistance, supine gapping of GH, supine caudal translation of head of humerus,  flexion adduction/posterior shear · Side lying left for right PNF scapular patterns into anterior elevation and posterior depression, followed with prolonged holds and NMR · With written consent received and precautions reviewed, instrument-assisted soft tissue mobilization was performed to right shoulder (upper trapezius, supraspinatus, infraspinatus) for the purpose of trigger point release with a positive treatment effect and no complications noted. [facility] 
Treatment/Session Assessment:   
· Response to Treatment:  Improved mobility in right shoulder, decreased soft tissue restrictions noted today. · Compliance with Program/Exercises: Compliant all of the time. · Recommendations/Intent for next treatment session: \"Next visit will focus on advancements to more challenging activities\". Total Treatment Duration: 60 minutes PT Patient Time In/Time Out Time In: 1630 Time Out: [id number] Jerry Young, PT Future Appointments Date Time Provider Roberto Dhillon 1/14/2019  3:30 PM Axel RUGGIERO PT [facility]  
1/21/2019  3:30 PM Whitman Goodpasture., PT [facility]  
1/28/2019  4:30 PM Whitman Goodpasture., PT [facility]

## 2019-01-14 ENCOUNTER — HOSPITAL ENCOUNTER (OUTPATIENT)
Dept: PHYSICAL THERAPY | Age: 64
Discharge: HOME | End: 2019-01-14
Payer: COMMERCIAL

## 2019-01-14 PROCEDURE — 97140 MANUAL THERAPY 1/> REGIONS: CPT

## 2019-01-14 PROCEDURE — 97110 THERAPEUTIC EXERCISES: CPT

## 2019-01-15 NOTE — PROGRESS NOTES
Norah Nunez : 1955 Primary: [facility] Secondary:  Therapy Center at [address] [phone]   [phone] OUTPATIENT PHYSICAL THERAPY:Daily Note 2019 ICD-10: Treatment Diagnosis: pain in joint, right shoulder M25.511 ICD-10: Treatment Diagnosis: cervicalgia M54.2 ICD-10: Treatment Diagnosis: thoracic spine pain M54.6 Precautions/Allergies:  
Patient has no known allergies. MD Orders: evaluate and treat MEDICAL/REFERRING DIAGNOSIS: 
Shoulder pain [M25.519] DATE OF ONSET: 2018 REFERRING PHYSICIAN: Brooke Dennison MD 
RETURN PHYSICIAN APPOINTMENT: as needed INITIAL ASSESSMENT:  Mr. Christin Regalado is a 58 y.o.male who presents to physical therapy with insidious onset of right shoulder pain since 2018. 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 He demonstrates strength and ROM deficits of his right shoulder. He would benefit from skilled physical therapy to improve overall mobility of his right shoulder. PROBLEM LIST (Impacting functional limitations): 1. Decreased Strength 2. Decreased ADL/Functional Activities 3. Increased Pain 4. Decreased Activity Tolerance 5. Decreased Flexibility/Joint Mobility INTERVENTIONS PLANNED: (Treatment may consist of any combination of the following) 1. Home Exercise Program (HEP) 2. Manual Therapy 3. Neuromuscular Re-education/Strengthening 4. Range of Motion (ROM) 5. Therapeutic Activites 6. Therapeutic Exercise/Strengthening TREATMENT PLAN: 
Effective Dates: 2018 TO 3/20/2019 (90 days). Frequency/Duration: 1 time a week for 90 Day(s) GOALS: (Goals have been discussed and agreed upon with patient.) Discharge Goals: Time Frame: 90 days 1.  Patient demonstrates independence of his HEP without verbal cueing from therapist. 
 2. Patient demonstrates full AROM of right shoulder without onset of pain to perform ADLs 3. Able to ride his bike for 30 miles without onset of right shoulder pain. 4. Improve DASH score from 20/55 to 13/55 to perform ADLs Rehabilitation Potential For Stated Goals: Excellent Regarding Yasmine Kenny's therapy, I certify that the treatment plan above will be carried out by a therapist or under their direction. Thank you for this referral, Belén Adams, PT The information in this section was collected on 12/20/18 (except where otherwise noted). HISTORY:  
History of Present Injury/Illness (Reason for Referral): Insidious onset of right shoulder pain since September 2018 and progressively increasing in pain and stiffness. He is limited with reaching forward, reaching behind his back and lifting overhead. He started riding a road bike about 6 months ago, notes he had a bike fit, however feels his handle bars may be lower than they should be. Increased stiffness in right shoulder noted with most activities during the day and challenged with sleeping secondary to pain. Patient denies dizziness, drop attacks, numbness/tingling, bowel/bladder dysfunction and/or unexplained weight gain/loss. Past Medical History/Comorbidities: Mr. Vanesa King  has a past medical history of Arrhythmia, Arthritis, CAD (coronary artery disease), Cancer ([address] Depression, Diabetes ([address] HTN (hypertension), Hypercholesterolemia, Hyperlipidemia, Hypertension, Knee pain, Morbid obesity ([address] Psychiatric disorder, Right inguinal hernia, and S/P total knee arthroplasty.   Mr. Vanesa King  has a past surgical history that includes hx other surgical; hx colonoscopy (2013); hx heent (1971); hx orthopaedic (Left, 2003); hx orthopaedic (Left, 2009); hx orthopaedic (Right, 2016); hx knee replacement (Right, 03/28/2017); pr abdomen surgery proc unlisted (Right, 2017); RIGHT INGUINAL HERNIA REPAIR/ PROGRIP MESH (Right, 5/16/2017); RIGHT KNEE ARTHROPLASTY TOTAL/ DEPUY WITH OCTAVIO (Right, 3/28/2017); and HIP ARTHROPLASTY TOTAL (Left, 4/14/2009). Social History/Living Environment:  
  lives in a private home with his wife, challenged with activities overhead, sleeping positions Prior Level of Function/Work/Activity: 
Works full time, works on a laptop and drives 6+ hours. Dominant Side:  
      RIGHT Ambulatory/Rehab Services H2 Model Falls Risk Assessment 12/20/18 Risk Factors: 
     (1)  Gender [Male] Ability to Rise from Chair: 
     (0)  Ability to rise in a single movement Falls Prevention Plan: No modifications necessary Total: (5 or greater = High Risk): 1 ©2010 [employer] . [facility] Patent #[id number]. Federal Law prohibits the replication, distribution or use without written permission from [employer] Current Medications:   
  
Current Outpatient Medications:  
  traZODone (DESYREL) 50 mg tablet, Take 1 Tab by mouth nightly., Disp: 30 Tab, Rfl: 5 
  metFORMIN ER (GLUCOPHAGE XR) 500 mg tablet, Take 2 Tabs by mouth daily (with dinner). , Disp: 180 Tab, Rfl: 1 
  acyclovir (ZOVIRAX) 200 mg capsule, Take 1 Cap by mouth daily. , Disp: 90 Cap, Rfl: 3   rivaroxaban (XARELTO) 20 mg tab tablet, Take 1 Tab by mouth daily (with lunch). , Disp: 90 Tab, Rfl: 3 
  rosuvastatin (CRESTOR) 20 mg tablet, Take 1 Tab by mouth nightly., Disp: 90 Tab, Rfl: 3 
  flecainide (TAMBOCOR) 100 mg tablet, Take 1 Tab by mouth two (2) times a day. Indications: PREVENTION OF RECURRENT ATRIAL FIBRILLATION, Disp: 180 Tab, Rfl: 3 
  verapamil ER (CALAN-SR) 180 mg CR tablet, Take 1 Tab by mouth daily. Indications: hypertension, Disp: 90 Tab, Rfl: 3 
  metoprolol succinate (TOPROL-XL) 25 mg XL tablet, Take 1 Tab by mouth daily.  Indications: hypertension, Disp: 90 Tab, Rfl: 3 
  lisinopril (PRINIVIL, ZESTRIL) 10 mg tablet, Take 1 Tab by mouth daily., Disp: 90 Tab, Rfl: 3 
  glimepiride (AMARYL) 4 mg tablet, Take 1/2 tab (2mg) each morning and 1 tablet at night, Disp: 180 Tab, Rfl: 1 
  FA/NIACINAMIDE/CUPRIC OX/ZN OX (NICOTINAMIDE PO), Take  by mouth two (2) times a day., Disp: , Rfl:  
  coenzyme q10 10 mg cap, Take  by mouth daily. , Disp: , Rfl:  
  VITAMIN D-3 PO, take  by mouth daily. am , Disp: , Rfl:   
Date Last Reviewed:  1/14/2019 Number of Personal Factors/Comorbidities that affect the Plan of Care: 1-2: MODERATE COMPLEXITY EXAMINATION:  
Observation/Orthostatic Postural Assessment:   
       Shoulder girdles are right elevated and protracted. Scapular position is right elevated. Clavicles are right elevated. Soft tissue observations indicates restrictions in anterior chest, pectoralis major/minor, scalenes, upper trapezius, levator scapula, infraspinatus and subscapularis, right. Patient exhibits a decreased cervical lordosis,  Slight increased thoracic kyphosis. Palpation:  Myofascial assessment indicates restrictions in anterior chest. Muscle length testing levator scapulae with ipsilateral arm abduction is restrictions right. Upper trapezius length is restricted right. Pectoralis minor/major and latissimus dorsi length is restricted bilaterally. Scalene muscle length is restricted right. ROM: Gross active cervical spine rotation is 90% available bilaterally. Apley's scratch test for functional ER/IR is restrictions right, ER to C7 and IR to right iliac crest. Passive Accessory Motion: Glenohumeral mobility is restricted right for inferior and posterior glides. Acromioclavicular mobility is restricted right. Sternoclavicular mobility is restricted right. AROM(PROM) in degrees Right Left Shoulder flexion 110 (120) 160 Shoulder abduction (palm down) 80 140 Shoulder extension 10 20 Shoulder internal rotation (IR) 
(measured at 90 degrees of abduction) 20 45 ER (measured at 90 degrees of abduction) 35 50 Strength:  
 Manual Muscle Test (*/5) Right Left Shoulder flexion 4 5 Shoulder extension 4 5 Shoulder abduction 4 5 Shoulder adduction 4 5 Shoulder ER 4 5 Shoulder IR 4 5 Upper trapezius 5 5 Middle trapezius 4- 5 Lower trapezius 4- 5 Rhomboids 4- 5 Serratus Anterior 4- 5 Elbow flexion 5 5 Elbow extension 5 5 Special Tests:   
Impingement tests performed on the right shoulder: 
Deng-Jonathan test: positive. Neer test: positive. Neurological Screen: Myotomes: Key muscle strength testing for bilateral UE is intact. Dermatomes: Sensation testing through bilateral UE for light touch is intact. Reflexes: Biceps (C5), brachioradialis (C6), and triceps (C7) are not tested Neural tension tests: Upper limb tension test A is negative for exacerbation Functional Mobility:  Challenged with overhead activities, reaching forward and reaching behind his back. Body Structures Involved: 1. Thoracic Cage 2. Joints 3. Muscles Body Functions Affected: 1. Sensory/Pain 2. Neuromusculoskeletal 
3. Movement Related Activities and Participation Affected: 1. General Tasks and Demands 2. Mobility 3. Self Care 4. Community, Social and [place] Number of elements (examined above) that affect the Plan of Care: 3: MODERATE COMPLEXITY CLINICAL PRESENTATION:  
Presentation: Evolving clinical presentation with changing clinical characteristics: MODERATE COMPLEXITY CLINICAL DECISION MAKING:  
Outcome Measure: Tool Used: Disabilities of the Arm, Shoulder and Hand (DASH) Questionnaire - Quick Version Score:  Initial: 20/55  Most Recent: X/55 (Date: -- ) Interpretation of Score: The DASH is designed to measure the activities of daily living in person's with upper extremity dysfunction or pain. Each section is scored on a 1-5 scale, 5 representing the greatest disability. The scores of each section are added together for a total score of 55. Medical Necessity: · Patient is expected to demonstrate progress in strength, range of motion, balance, coordination and functional technique to increase independence with ADLs . Reason for Services/Other Comments: 
· Patient continues to require skilled intervention due to challenged with performing daily tasks secondary to pain and stiffness in his right shoulder. .  
Use of outcome tool(s) and clinical judgement create a POC that gives a: Questionable prediction of patient's progress: MODERATE COMPLEXITY  
  
 
 
 
TREATMENT:  
(In addition to Assessment/Re-Assessment sessions the following treatments were rendered) Pre-treatment Symptoms/Complaints:  Patient notes slept on his shoulder wrong on Saturday and increased pain noted in right shoulder. Continues to note increased pain with his HEP. Pain: Initial:  
Pain Intensity 1: 7 Pain Location 1: Shoulder Pain Orientation 1: Right  Post Session:  6/10 Therapeutic Exercise: (15 Minutes):  Exercises per grid below to improve mobility, strength, balance and coordination. Required moderate verbal and manual cues to promote proper body alignment, promote proper body posture, promote proper body mechanics and promote proper body breathing techniques. Progressed resistance, range, repetitions and complexity of movement as indicated. Date: 
1/14/2019 Activity/Exercise Parameters Patient education X 5 minutes review of HEP and use of heat/ice, anti-inflammatories Kinesiotaping Standing pivot prone X 10 reps, 5 sec holds Bilateral shoulder ER X 10 reps 5 sec holds Side lying arm circles X 5 reps clockwise and counterclockwise Prone bilateral posterior depression/shoulder extension X 10 reps, 10 sec holds Standing shoulder extension X 10 reps, 5 sec hold Prone T X 10 reps, 10 sec holds BUE Manual Therapy (    Soft Tissue Mobilization Duration Duration: 45 Minutes): Manual techniques to facilitate improved motion and decreased pain. (Used abbreviations: MET - muscle energy technique; PNF - proprioceptive neuromuscular facilitation; NMR - neuromuscular re-education; a/p - anterior to posterior; p/a - posterior to anterior) · Supine anterior chest superficial fascia, along bony contours of right clavicle and coracoid process. · Supine soft tissue mobilization to lateral right shoulder, right upper trapezius and posterior shoulder. · Supine mobilization to right shoulder with belt: posterior translation/glide with resistance, supine gapping of GH, supine caudal translation of head of humerus,  flexion adduction/posterior shear · Side lying left for right PNF scapular patterns into anterior elevation and posterior depression, followed with prolonged holds and NMR · With written consent received and precautions reviewed, instrument-assisted soft tissue mobilization was performed to right shoulder (upper trapezius, supraspinatus, infraspinatus) for the purpose of trigger point release with a positive treatment effect and no complications noted. [facility] Portal 
Treatment/Session Assessment:   
· Response to Treatment:  Improved stability with scapular positions, continues to have high pain levels, would benefit from cortisone injection in right shoulder. · Compliance with Program/Exercises: Compliant all of the time. · Recommendations/Intent for next treatment session: \"Next visit will focus on advancements to more challenging activities\". Total Treatment Duration: 60 minutes PT Patient Time In/Time Out Time In: 9722 Time Out: 1630 Genaro Ornelas, KASSY Future Appointments Date Time Provider Roberto Dhillon 1/21/2019  3:30 PM Chucky Carballo, PT [facility]  
1/28/2019  4:30 PM Chucky Kang., PT [facility]  
3/25/2019  9:00 AM Maximino Raygoza MD [facility]

## 2019-01-21 ENCOUNTER — HOSPITAL ENCOUNTER (OUTPATIENT)
Dept: PHYSICAL THERAPY | Age: 64
Discharge: HOME | End: 2019-01-21
Payer: COMMERCIAL

## 2019-01-21 PROCEDURE — 97140 MANUAL THERAPY 1/> REGIONS: CPT

## 2019-01-21 PROCEDURE — 97110 THERAPEUTIC EXERCISES: CPT

## 2019-01-22 NOTE — PROGRESS NOTES
Porsche Velásquez : 1955 Primary: [facility] [employer] [facility] Secondary:  [facility] [place] [id number] [address], [address] [phone]   [phone] OUTPATIENT PHYSICAL THERAPY:Daily Note 2019 ICD-10: Treatment Diagnosis: pain in joint, right shoulder M25.511 ICD-10: Treatment Diagnosis: cervicalgia M54.2 ICD-10: Treatment Diagnosis: thoracic spine pain M54.6 Precautions/Allergies:  
Patient has no known allergies. MD Orders: evaluate and treat MEDICAL/REFERRING DIAGNOSIS: 
Shoulder pain [M25.519] DATE OF ONSET: 2018 REFERRING PHYSICIAN: Tara Austin MD 
RETURN PHYSICIAN APPOINTMENT: as needed INITIAL ASSESSMENT:  Mr. Brenda Calderon is a 58 y.o.male who presents to physical therapy with insidious onset of right shoulder pain since 2018. 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 He demonstrates strength and ROM deficits of his right shoulder. He would benefit from skilled physical therapy to improve overall mobility of his right shoulder. PROBLEM LIST (Impacting functional limitations): 1. Decreased Strength 2. Decreased ADL/Functional Activities 3. Increased Pain 4. Decreased Activity Tolerance 5. Decreased Flexibility/Joint Mobility INTERVENTIONS PLANNED: (Treatment may consist of any combination of the following) 1. Home Exercise Program (HEP) 2. Manual Therapy 3. Neuromuscular Re-education/Strengthening 4. Range of Motion (ROM) 5. Therapeutic Activites 6. Therapeutic Exercise/Strengthening TREATMENT PLAN: 
Effective Dates: 2018 TO 3/20/2019 (90 days). Frequency/Duration: 1 time a week for 90 Day(s) GOALS: (Goals have been discussed and agreed upon with patient.) Discharge Goals: Time Frame: 90 days 1.  Patient demonstrates independence of his HEP without verbal cueing from therapist. 
 2. Patient demonstrates full AROM of right shoulder without onset of pain to perform ADLs 3. Able to ride his bike for 30 miles without onset of right shoulder pain. 4. Improve DASH score from 20/55 to 13/55 to perform ADLs Rehabilitation Potential For Stated Goals: Excellent Regarding Sanket Kenny's therapy, I certify that the treatment plan above will be carried out by a therapist or under their direction. Thank you for this referral, Olivier Hoffman, PT The information in this section was collected on 12/20/18 (except where otherwise noted). HISTORY:  
History of Present Injury/Illness (Reason for Referral): Insidious onset of right shoulder pain since September 2018 and progressively increasing in pain and stiffness. He is limited with reaching forward, reaching behind his back and lifting overhead. He started riding a road bike about 6 months ago, notes he had a bike fit, however feels his handle bars may be lower than they should be. Increased stiffness in right shoulder noted with most activities during the day and challenged with sleeping secondary to pain. Patient denies dizziness, drop attacks, numbness/tingling, bowel/bladder dysfunction and/or unexplained weight gain/loss. Past Medical History/Comorbidities: Mr. Rickey Ferguson  has a past medical history of Arrhythmia (10 - 15 years ago), Arthritis, CAD (coronary artery disease), Cancer ([address] Depression, Diabetes ([address] (6-7 years ago), HTN (hypertension) (7/18/2014), Hypercholesterolemia, Hyperlipidemia (7/18/2014), Hypertension (diagnosed at 24years old), Knee pain (5/25/2016), Morbid obesity ([address] Psychiatric disorder, Right inguinal hernia (5/8/2017), and S/P total knee arthroplasty (3/29/2017).  He also has no past medical history of Difficult intubation, Malignant hyperthermia due to anesthesia, Nausea & vomiting, Other ill-defined conditions(799.89), Pseudocholinesterase deficiency, or Unspecified adverse effect of anesthesia. Mr. Lacy Hinojosa  has a past surgical history that includes hx other surgical; hx colonoscopy (2013); hx heent (1971); hx orthopaedic (Left, 2003); hx orthopaedic (Left, 2009); hx orthopaedic (Right, 2016); hx knee replacement (Right, 03/28/2017); and pr abdomen surgery proc unlisted (Right, 2017). Social History/Living Environment:  
  lives in a private home with his wife, challenged with activities overhead, sleeping positions Prior Level of Function/Work/Activity: 
Works full time, works on a laptop and drives 6+ hours. Dominant Side:  
      [facility] H2 Model Falls Risk Assessment 12/20/18 Risk Factors: 
     (1)  Gender [Male] Ability to Rise from Chair: 
     (0)  Ability to rise in a single movement Falls Prevention Plan: No modifications necessary Total: (5 or greater = High Risk): 1 ©2010 [facility] of [employer] . [facility] Patent #[id number]. Federal Law prohibits the replication, distribution or use without written permission from [facility] [place] [facility] [place] Current Medications:   
  
Current Outpatient Medications:  
  traZODone (DESYREL) 50 mg tablet, Take 1 Tab by mouth nightly., Disp: 30 Tab, Rfl: 5 
  metFORMIN ER (GLUCOPHAGE XR) 500 mg tablet, Take 2 Tabs by mouth daily (with dinner). , Disp: 180 Tab, Rfl: 1 
  acyclovir (ZOVIRAX) 200 mg capsule, Take 1 Cap by mouth daily. , Disp: 90 Cap, Rfl: 3   rivaroxaban (XARELTO) 20 mg tab tablet, Take 1 Tab by mouth daily (with lunch). , Disp: 90 Tab, Rfl: 3 
  rosuvastatin (CRESTOR) 20 mg tablet, Take 1 Tab by mouth nightly., Disp: 90 Tab, Rfl: 3 
  flecainide (TAMBOCOR) 100 mg tablet, Take 1 Tab by mouth two (2) times a day. Indications: PREVENTION OF RECURRENT ATRIAL FIBRILLATION, Disp: 180 Tab, Rfl: 3 
  verapamil ER (CALAN-SR) 180 mg CR tablet, Take 1 Tab by mouth daily. Indications: hypertension, Disp: 90 Tab, Rfl: 3   metoprolol succinate (TOPROL-XL) 25 mg XL tablet, Take 1 Tab by mouth daily. Indications: hypertension, Disp: 90 Tab, Rfl: 3 
  lisinopril (PRINIVIL, ZESTRIL) 10 mg tablet, Take 1 Tab by mouth daily. , Disp: 90 Tab, Rfl: 3 
  glimepiride (AMARYL) 4 mg tablet, Take 1/2 tab (2mg) each morning and 1 tablet at night, Disp: 180 Tab, Rfl: 1 
  FA/NIACINAMIDE/CUPRIC OX/ZN OX (NICOTINAMIDE PO), Take  by mouth two (2) times a day., Disp: , Rfl:  
  coenzyme q10 10 mg cap, Take  by mouth daily. , Disp: , Rfl:  
  VITAMIN D-3 PO, take  by mouth daily. am , Disp: , Rfl:   
Date Last Reviewed:  1/21/2019 Number of Personal Factors/Comorbidities that affect the Plan of Care: 1-2: MODERATE COMPLEXITY EXAMINATION:  
Observation/Orthostatic Postural Assessment:   
       Shoulder girdles are right elevated and protracted. Scapular position is right elevated. Clavicles are right elevated. Soft tissue observations indicates restrictions in anterior chest, pectoralis major/minor, scalenes, upper trapezius, levator scapula, infraspinatus and subscapularis, right. Patient exhibits a decreased cervical lordosis,  Slight increased thoracic kyphosis. Palpation:  Myofascial assessment indicates restrictions in anterior chest. Muscle length testing levator scapulae with ipsilateral arm abduction is restrictions right. Upper trapezius length is restricted right. Pectoralis minor/major and latissimus dorsi length is restricted bilaterally. Scalene muscle length is restricted right. ROM: Gross active cervical spine rotation is 90% available bilaterally. Apley's scratch test for functional ER/IR is restrictions right, ER to C7 and IR to right iliac crest. Passive Accessory Motion: Glenohumeral mobility is restricted right for inferior and posterior glides. Acromioclavicular mobility is restricted right. Sternoclavicular mobility is restricted right. AROM(PROM) in degrees Right Left Shoulder flexion 110 (120) 160  
 Shoulder abduction (palm down) 80 140 Shoulder extension 10 20 Shoulder internal rotation (IR) 
(measured at 90 degrees of abduction) 20 45 ER (measured at 90 degrees of abduction) 35 50 Strength:  
Manual Muscle Test (*/5) Right Left Shoulder flexion 4 5 Shoulder extension 4 5 Shoulder abduction 4 5 Shoulder adduction 4 5 Shoulder ER 4 5 Shoulder IR 4 5 Upper trapezius 5 5 Middle trapezius 4- 5 Lower trapezius 4- 5 Rhomboids 4- 5 Serratus Anterior 4- 5 Elbow flexion 5 5 Elbow extension 5 5 Special Tests:   
Impingement tests performed on the right shoulder: 
Deng-Jonathan test: positive. Neer test: positive. Neurological Screen: Myotomes: Key muscle strength testing for bilateral UE is intact. Dermatomes: Sensation testing through bilateral UE for light touch is intact. Reflexes: Biceps (C5), brachioradialis (C6), and triceps (C7) are not tested Neural tension tests: Upper limb tension test A is negative for exacerbation Functional Mobility:  Challenged with overhead activities, reaching forward and reaching behind his back. Body Structures Involved: 1. Thoracic Cage 2. Joints 3. Muscles Body Functions Affected: 1. Sensory/Pain 2. Neuromusculoskeletal 
3. Movement Related Activities and Participation Affected: 1. General Tasks and Demands 2. Mobility 3. Self Care 4. Community, Social and [place] Number of elements (examined above) that affect the Plan of Care: 3: MODERATE COMPLEXITY CLINICAL PRESENTATION:  
Presentation: Evolving clinical presentation with changing clinical characteristics: MODERATE COMPLEXITY CLINICAL DECISION MAKING:  
Outcome Measure: Tool Used: Disabilities of the Arm, Shoulder and Hand (DASH) Questionnaire - Quick Version Score:  Initial: 20/55  Most Recent: X/55 (Date: -- ) Interpretation of Score:  The DASH is designed to measure the activities of daily living in person's with upper extremity dysfunction or pain. Each section is scored on a 1-5 scale, 5 representing the greatest disability. The scores of each section are added together for a total score of 55. Medical Necessity:  
· Patient is expected to demonstrate progress in strength, range of motion, balance, coordination and functional technique to increase independence with ADLs . Reason for Services/Other Comments: 
· Patient continues to require skilled intervention due to challenged with performing daily tasks secondary to pain and stiffness in his right shoulder. .  
Use of outcome tool(s) and clinical judgement create a POC that gives a: Questionable prediction of patient's progress: MODERATE COMPLEXITY  
  
 
 
 
TREATMENT:  
(In addition to Assessment/Re-Assessment sessions the following treatments were rendered) Pre-treatment Symptoms/Complaints:  Patient notes less pain during the day and able to take shirts off with a little less pain, however continues to be challenged with sleeping positions and right shoulder ROM by end of day. Pain: Initial:  
Pain Intensity 1: 6 Pain Location 1: Shoulder Pain Orientation 1: Right  Post Session:  5/10 Therapeutic Exercise: (15 Minutes):  Exercises per grid below to improve mobility, strength, balance and coordination. Required moderate verbal and manual cues to promote proper body alignment, promote proper body posture, promote proper body mechanics and promote proper body breathing techniques. Progressed resistance, range, repetitions and complexity of movement as indicated. Date: 
1/21/2019 Activity/Exercise Parameters Patient education X 5 minutes review of HEP and use of heat/ice, anti-inflammatories Kinesiotaping Standing pivot prone X 10 reps, 5 sec holds Bilateral shoulder ER X 10 reps 5 sec holds Side lying arm circles X 5 reps clockwise and counterclockwise Prone bilateral posterior depression/shoulder extension X 10 reps, 10 sec holds Standing shoulder extension X 10 reps, 5 sec hold Prone T X 10 reps, 10 sec holds BUE Prone prop, scapular stabilization X 5 reps, 20 sec holds Manual Therapy (    Soft Tissue Mobilization Duration Duration: 45 Minutes): Manual techniques to facilitate improved motion and decreased pain. (Used abbreviations: MET - muscle energy technique; PNF - proprioceptive neuromuscular facilitation; NMR - neuromuscular re-education; a/p - anterior to posterior; p/a - posterior to anterior) · Supine anterior chest superficial fascia, along bony contours of right clavicle and coracoid process. · Supine soft tissue mobilization to lateral right shoulder, right upper trapezius and posterior shoulder. · Supine mobilization to right shoulder with belt: posterior translation/glide with resistance, supine gapping of GH, supine caudal translation of head of humerus,  flexion adduction/posterior shear · Side lying left for right PNF scapular patterns into anterior elevation and posterior depression, followed with prolonged holds and NMR · With written consent received and precautions reviewed, instrument-assisted soft tissue mobilization was performed to upper trapezius for the purpose of trigger point release with a positive treatment effect and no complications noted. [facility] 
Treatment/Session Assessment:   
· Response to Treatment: challenged with maintaining scapular stabilization in prone prop position, improved PROM and AROM of right shoulder at end of session. · Compliance with Program/Exercises: Compliant all of the time. · Recommendations/Intent for next treatment session: \"Next visit will focus on advancements to more challenging activities\". Total Treatment Duration: 60 minutes PT Patient Time In/Time Out Time In: 1500 Time Out: 1600 Viktor Miles PT Future Appointments Date Time Provider Roberto Jacquie 1/28/2019  4:30 PM Madina RUGGIERO, PT [facility]  
2/7/2019  8:30 AM Madina RUGGIERO, PT [facility]  
2/14/2019  8:30 AM Chuckyrory Kang., PT [facility]  
2/18/2019  4:30 PM Chuckyrory Kang., PT [facility]  
2/25/2019 11:30 AM Chuckyrory Kang., PT [facility]  
3/25/2019  9:00 AM Maximino Raygoza MD [facility]

## 2019-01-28 ENCOUNTER — HOSPITAL ENCOUNTER (OUTPATIENT)
Dept: PHYSICAL THERAPY | Age: 64
Discharge: HOME | End: 2019-01-28
Payer: COMMERCIAL

## 2019-01-28 PROCEDURE — 97110 THERAPEUTIC EXERCISES: CPT

## 2019-01-28 PROCEDURE — 97140 MANUAL THERAPY 1/> REGIONS: CPT

## 2019-01-28 NOTE — PROGRESS NOTES
Meghann Sauceda : 1955 Primary: [facility] Secondary:  [facility] at [address] [phone]   [phone] OUTPATIENT PHYSICAL THERAPY:Daily Note 2019 ICD-10: Treatment Diagnosis: pain in joint, right shoulder M25.511 ICD-10: Treatment Diagnosis: cervicalgia M54.2 ICD-10: Treatment Diagnosis: thoracic spine pain M54.6 Precautions/Allergies:  
Patient has no known allergies. MD Orders: evaluate and treat MEDICAL/REFERRING DIAGNOSIS: 
Shoulder pain [M25.519] DATE OF ONSET: 2018 REFERRING PHYSICIAN: Rex Perez MD 
RETURN PHYSICIAN APPOINTMENT: as needed INITIAL ASSESSMENT:  Mr. Josh Orta is a 58 y.o.male who presents to physical therapy with insidious onset of right shoulder pain since 2018. 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 He demonstrates strength and ROM deficits of his right shoulder. He would benefit from skilled physical therapy to improve overall mobility of his right shoulder. PROBLEM LIST (Impacting functional limitations): 1. Decreased Strength 2. Decreased ADL/Functional Activities 3. Increased Pain 4. Decreased Activity Tolerance 5. Decreased Flexibility/Joint Mobility INTERVENTIONS PLANNED: (Treatment may consist of any combination of the following) 1. Home Exercise Program (HEP) 2. Manual Therapy 3. Neuromuscular Re-education/Strengthening 4. Range of Motion (ROM) 5. Therapeutic Activites 6. Therapeutic Exercise/Strengthening TREATMENT PLAN: 
Effective Dates: 2018 TO 3/20/2019 (90 days). Frequency/Duration: 1 time a week for 90 Day(s) GOALS: (Goals have been discussed and agreed upon with patient.) Discharge Goals: Time Frame: 90 days 1.  Patient demonstrates independence of his HEP without verbal cueing from therapist. 
 2. Patient demonstrates full AROM of right shoulder without onset of pain to perform ADLs 3. Able to ride his bike for 30 miles without onset of right shoulder pain. 4. Improve DASH score from 20/55 to 13/55 to perform ADLs Rehabilitation Potential For Stated Goals: Excellent Regarding Antoniette Sicard Amick's therapy, I certify that the treatment plan above will be carried out by a therapist or under their direction. Thank you for this referral, Leonardo Hu PT The information in this section was collected on 12/20/18 (except where otherwise noted). HISTORY:  
History of Present Injury/Illness (Reason for Referral): Insidious onset of right shoulder pain since September 2018 and progressively increasing in pain and stiffness. He is limited with reaching forward, reaching behind his back and lifting overhead. He started riding a road bike about 6 months ago, notes he had a bike fit, however feels his handle bars may be lower than they should be. Increased stiffness in right shoulder noted with most activities during the day and challenged with sleeping secondary to pain. Patient denies dizziness, drop attacks, numbness/tingling, bowel/bladder dysfunction and/or unexplained weight gain/loss. Past Medical History/Comorbidities: Mr. Katt Hester  has a past medical history of Arrhythmia (10 - 15 years ago), Arthritis, CAD (coronary artery disease), Cancer ([address] Depression, Diabetes ([address] (6-7 years ago), HTN (hypertension) (7/18/2014), Hypercholesterolemia, Hyperlipidemia (7/18/2014), Hypertension (diagnosed at 24years old), Knee pain (5/25/2016), Morbid obesity ([address] Psychiatric disorder, Right inguinal hernia (5/8/2017), and S/P total knee arthroplasty (3/29/2017).  He also has no past medical history of Difficult intubation, Malignant hyperthermia due to anesthesia, Nausea & vomiting, Other ill-defined conditions(799.89), Pseudocholinesterase deficiency, or Unspecified adverse effect of anesthesia. Mr. Maria Luisa Phelps  has a past surgical history that includes hx other surgical; hx colonoscopy (2013); hx heent (1971); hx orthopaedic (Left, 2003); hx orthopaedic (Left, 2009); hx orthopaedic (Right, 2016); hx knee replacement (Right, 03/28/2017); and pr abdomen surgery proc unlisted (Right, 2017). Social History/Living Environment:  
  lives in a private home with his wife, challenged with activities overhead, sleeping positions Prior Level of Function/Work/Activity: 
Works full time, works on a laptop and drives 6+ hours. Dominant Side:  
      [facility] H2 Model Falls Risk Assessment 12/20/18 Risk Factors: 
     (1)  Gender [Male] Ability to Rise from Chair: 
     (0)  Ability to rise in a single movement Falls Prevention Plan: No modifications necessary Total: (5 or greater = High Risk): 1 ©2010 [facility] . [facility] Patent #[id number]. Federal Law prohibits the replication, distribution or use without written permission from [facility] Current Medications:   
  
Current Outpatient Medications:  
  traZODone (DESYREL) 50 mg tablet, Take 1 Tab by mouth nightly., Disp: 30 Tab, Rfl: 5 
  metFORMIN ER (GLUCOPHAGE XR) 500 mg tablet, Take 2 Tabs by mouth daily (with dinner). , Disp: 180 Tab, Rfl: 1 
  acyclovir (ZOVIRAX) 200 mg capsule, Take 1 Cap by mouth daily. , Disp: 90 Cap, Rfl: 3   rivaroxaban (XARELTO) 20 mg tab tablet, Take 1 Tab by mouth daily (with lunch). , Disp: 90 Tab, Rfl: 3 
  rosuvastatin (CRESTOR) 20 mg tablet, Take 1 Tab by mouth nightly., Disp: 90 Tab, Rfl: 3 
  flecainide (TAMBOCOR) 100 mg tablet, Take 1 Tab by mouth two (2) times a day. Indications: PREVENTION OF RECURRENT ATRIAL FIBRILLATION, Disp: 180 Tab, Rfl: 3 
  verapamil ER (CALAN-SR) 180 mg CR tablet, Take 1 Tab by mouth daily. Indications: hypertension, Disp: 90 Tab, Rfl: 3   metoprolol succinate (TOPROL-XL) 25 mg XL tablet, Take 1 Tab by mouth daily. Indications: hypertension, Disp: 90 Tab, Rfl: 3 
  lisinopril (PRINIVIL, ZESTRIL) 10 mg tablet, Take 1 Tab by mouth daily. , Disp: 90 Tab, Rfl: 3 
  glimepiride (AMARYL) 4 mg tablet, Take 1/2 tab (2mg) each morning and 1 tablet at night, Disp: 180 Tab, Rfl: 1 
  FA/NIACINAMIDE/CUPRIC OX/ZN OX (NICOTINAMIDE PO), Take  by mouth two (2) times a day., Disp: , Rfl:  
  coenzyme q10 10 mg cap, Take  by mouth daily. , Disp: , Rfl:  
  VITAMIN D-3 PO, take  by mouth daily. am , Disp: , Rfl:   
Date Last Reviewed:  1/28/2019 Number of Personal Factors/Comorbidities that affect the Plan of Care: 1-2: MODERATE COMPLEXITY EXAMINATION:  
Observation/Orthostatic Postural Assessment:   
       Shoulder girdles are right elevated and protracted. Scapular position is right elevated. Clavicles are right elevated. Soft tissue observations indicates restrictions in anterior chest, pectoralis major/minor, scalenes, upper trapezius, levator scapula, infraspinatus and subscapularis, right. Patient exhibits a decreased cervical lordosis,  Slight increased thoracic kyphosis. Palpation:  Myofascial assessment indicates restrictions in anterior chest. Muscle length testing levator scapulae with ipsilateral arm abduction is restrictions right. Upper trapezius length is restricted right. Pectoralis minor/major and latissimus dorsi length is restricted bilaterally. Scalene muscle length is restricted right. ROM: Gross active cervical spine rotation is 90% available bilaterally. Apley's scratch test for functional ER/IR is restrictions right, ER to C7 and IR to right iliac crest. Passive Accessory Motion: Glenohumeral mobility is restricted right for inferior and posterior glides. Acromioclavicular mobility is restricted right. Sternoclavicular mobility is restricted right. AROM(PROM) in degrees Right Left Shoulder flexion 110 (120) 160  
 Shoulder abduction (palm down) 80 140 Shoulder extension 10 20 Shoulder internal rotation (IR) 
(measured at 90 degrees of abduction) 20 45 ER (measured at 90 degrees of abduction) 35 50 Strength:  
Manual Muscle Test (*/5) Right Left Shoulder flexion 4 5 Shoulder extension 4 5 Shoulder abduction 4 5 Shoulder adduction 4 5 Shoulder ER 4 5 Shoulder IR 4 5 Upper trapezius 5 5 Middle trapezius 4- 5 Lower trapezius 4- 5 Rhomboids 4- 5 Serratus Anterior 4- 5 Elbow flexion 5 5 Elbow extension 5 5 Special Tests:   
Impingement tests performed on the right shoulder: 
Deng-Jonathan test: positive. Neer test: positive. Neurological Screen: Myotomes: Key muscle strength testing for bilateral UE is intact. Dermatomes: Sensation testing through bilateral UE for light touch is intact. Reflexes: Biceps (C5), brachioradialis (C6), and triceps (C7) are not tested Neural tension tests: Upper limb tension test A is negative for exacerbation Functional Mobility:  Challenged with overhead activities, reaching forward and reaching behind his back. Body Structures Involved: 1. Thoracic Cage 2. Joints 3. Muscles Body Functions Affected: 1. Sensory/Pain 2. Neuromusculoskeletal 
3. Movement Related Activities and Participation Affected: 1. General Tasks and Demands 2. Mobility 3. Self Care 4. Community, Social and [place] Number of elements (examined above) that affect the Plan of Care: 3: MODERATE COMPLEXITY CLINICAL PRESENTATION:  
Presentation: Evolving clinical presentation with changing clinical characteristics: MODERATE COMPLEXITY CLINICAL DECISION MAKING:  
Outcome Measure: Tool Used: Disabilities of the Arm, Shoulder and Hand (DASH) Questionnaire - Quick Version Score:  Initial: 20/55  Most Recent: X/55 (Date: -- ) Interpretation of Score:  The DASH is designed to measure the activities of daily living in person's with upper extremity dysfunction or pain. Each section is scored on a 1-5 scale, 5 representing the greatest disability. The scores of each section are added together for a total score of 55. Medical Necessity:  
· Patient is expected to demonstrate progress in strength, range of motion, balance, coordination and functional technique to increase independence with ADLs . Reason for Services/Other Comments: 
· Patient continues to require skilled intervention due to challenged with performing daily tasks secondary to pain and stiffness in his right shoulder. .  
Use of outcome tool(s) and clinical judgement create a POC that gives a: Questionable prediction of patient's progress: MODERATE COMPLEXITY  
  
 
 
 
TREATMENT:  
(In addition to Assessment/Re-Assessment sessions the following treatments were rendered) Pre-treatment Symptoms/Complaints:  Patient notes continues to have weakness in RUE and notes increased thoracic spine pain and stiffness that started earlier today. Pain: Initial:  
Pain Intensity 1: 6 Pain Location 1: Shoulder Pain Orientation 1: Right  Post Session:  5/10 Therapeutic Exercise: (30 Minutes):  Exercises per grid below to improve mobility, strength, balance and coordination. Required moderate verbal and manual cues to promote proper body alignment, promote proper body posture, promote proper body mechanics and promote proper body breathing techniques. Progressed resistance, range, repetitions and complexity of movement as indicated. Date: 
1/28/2019 Activity/Exercise Parameters Patient education X 5 minutes review of HEP and use of heat/ice, anti-inflammatories Kinesiotaping Standing pivot prone X 10 reps, 5 sec holds Bilateral shoulder ER X 10 reps 5 sec holds Side lying arm circles X 5 reps clockwise and counterclockwise Prone bilateral posterior depression/shoulder extension X 10 reps, 10 sec holds Standing shoulder extension X 10 reps, 5 sec hold Prone T X 10 reps, 10 sec holds BUE Prone prop, scapular stabilization X 5 reps, 20 sec holds Side lying shoulder ER and abduction X 15 reps each, 2lbs Standing column X 15 reps rows 7.5lbs X 15 shoulder extension 7.5lbs X 15 reps shoulder ER 5lbs X 15 reps shoulder IR 7.5lbs Quadriped arch/sag X 5 reps 15 sec holds with breathing techniques X 5 reps left and right t/s rotation Manual Therapy (    Soft Tissue Mobilization Duration Duration: 30 Minutes): Manual techniques to facilitate improved motion and decreased pain. (Used abbreviations: MET - muscle energy technique; PNF - proprioceptive neuromuscular facilitation; NMR - neuromuscular re-education; a/p - anterior to posterior; p/a - posterior to anterior) · Supine anterior chest superficial fascia, along bony contours of right clavicle and coracoid process. · Supine soft tissue mobilization to lateral right shoulder, right upper trapezius and posterior shoulder. · Supine mobilization to right shoulder with belt: posterior translation/glide with resistance, supine gapping of GH, supine caudal translation of head of humerus,  flexion adduction/posterior shear · Side lying left for right PNF scapular patterns into anterior elevation and posterior depression, followed with prolonged holds and NMR [employer] [facility] 
Treatment/Session Assessment:   
· Response to Treatment: fatigued with weighted/resisted exercises today. Decreased soft tissue restrictions noted along right upper trapezius. · Compliance with Program/Exercises: Compliant all of the time. · Recommendations/Intent for next treatment session: \"Next visit will focus on advancements to more challenging activities\". Total Treatment Duration: 60 minutes PT Patient Time In/Time Out Time In: 4739 Time Out: 375[address] [place], [place] Future Appointments Date Time Provider Roberto Dhillon 2/7/2019  8:30 AM Daniel RUGGIERO, PT [facility]  
2/14/2019  8:30 AM Marcus Kong., PT [facility]  
2/18/2019  4:30 PM Marcus Kong., PT [facility]  
2/25/2019 11:30 AM Marcus Kong., PT [facility]  
3/25/2019  9:00 AM Mitch Gifford MD [facility]

## 2019-02-07 ENCOUNTER — HOSPITAL ENCOUNTER (OUTPATIENT)
Dept: PHYSICAL THERAPY | Age: 64
Discharge: HOME | End: 2019-02-07
Payer: COMMERCIAL

## 2019-02-07 PROCEDURE — 97140 MANUAL THERAPY 1/> REGIONS: CPT

## 2019-02-07 PROCEDURE — 97110 THERAPEUTIC EXERCISES: CPT

## 2019-02-07 NOTE — PROGRESS NOTES
nAgie Vargas : 1955 Primary: [facility]* Secondary:  Therapy Center at [address] [phone]   [phone] OUTPATIENT PHYSICAL THERAPY:Daily Note 2019 ICD-10: Treatment Diagnosis: pain in joint, right shoulder M25.511 ICD-10: Treatment Diagnosis: cervicalgia M54.2 ICD-10: Treatment Diagnosis: thoracic spine pain M54.6 Precautions/Allergies:  
Patient has no known allergies. MD Orders: evaluate and treat MEDICAL/REFERRING DIAGNOSIS: 
Shoulder pain [M25.519] DATE OF ONSET: 2018 REFERRING PHYSICIAN: Lukas Carrillo MD 
RETURN PHYSICIAN APPOINTMENT: as needed INITIAL ASSESSMENT:  Mr. Jimbo Donovan is a 58 y.o.male who presents to physical therapy with insidious onset of right shoulder pain since 2018. 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 He demonstrates strength and ROM deficits of his right shoulder. He would benefit from skilled physical therapy to improve overall mobility of his right shoulder. PROBLEM LIST (Impacting functional limitations): 1. Decreased Strength 2. Decreased ADL/Functional Activities 3. Increased Pain 4. Decreased Activity Tolerance 5. Decreased Flexibility/Joint Mobility INTERVENTIONS PLANNED: (Treatment may consist of any combination of the following) 1. Home Exercise Program (HEP) 2. Manual Therapy 3. Neuromuscular Re-education/Strengthening 4. Range of Motion (ROM) 5. Therapeutic Activites 6. Therapeutic Exercise/Strengthening TREATMENT PLAN: 
Effective Dates: 2018 TO 3/20/2019 (90 days). Frequency/Duration: 1 time a week for 90 Day(s) GOALS: (Goals have been discussed and agreed upon with patient.) Discharge Goals: Time Frame: 90 days 1.  Patient demonstrates independence of his HEP without verbal cueing from therapist. 
 2. Patient demonstrates full AROM of right shoulder without onset of pain to perform ADLs 3. Able to ride his bike for 30 miles without onset of right shoulder pain. 4. Improve DASH score from 20/55 to 13/55 to perform ADLs Rehabilitation Potential For Stated Goals: Excellent Regarding Billy Kenny's therapy, I certify that the treatment plan above will be carried out by a therapist or under their direction. Thank you for this referral, Sebastian Farley PT The information in this section was collected on 12/20/18 (except where otherwise noted). HISTORY:  
History of Present Injury/Illness (Reason for Referral): Insidious onset of right shoulder pain since September 2018 and progressively increasing in pain and stiffness. He is limited with reaching forward, reaching behind his back and lifting overhead. He started riding a road bike about 6 months ago, notes he had a bike fit, however feels his handle bars may be lower than they should be. Increased stiffness in right shoulder noted with most activities during the day and challenged with sleeping secondary to pain. Patient denies dizziness, drop attacks, numbness/tingling, bowel/bladder dysfunction and/or unexplained weight gain/loss. Past Medical History/Comorbidities: Mr. Mary Cameron  has a past medical history of Arrhythmia (10 - 15 years ago), Arthritis, CAD (coronary artery disease), Cancer ([address] Depression, Diabetes ([address] (6-7 years ago), HTN (hypertension) (7/18/2014), Hypercholesterolemia, Hyperlipidemia (7/18/2014), Hypertension (diagnosed at 24years old), Knee pain (5/25/2016), Morbid obesity ([address] Psychiatric disorder, Right inguinal hernia (5/8/2017), and S/P total knee arthroplasty (3/29/2017).  He also has no past medical history of Difficult intubation, Malignant hyperthermia due to anesthesia, Nausea & vomiting, Other ill-defined conditions(799.89), Pseudocholinesterase deficiency, or Unspecified adverse effect of anesthesia. Mr. Vannesa Palacio  has a past surgical history that includes hx other surgical; hx colonoscopy (2013); hx heent (1971); hx orthopaedic (Left, 2003); hx orthopaedic (Left, 2009); hx orthopaedic (Right, 2016); hx knee replacement (Right, 03/28/2017); and pr abdomen surgery proc unlisted (Right, 2017). Social History/Living Environment:  
  lives in a private home with his wife, challenged with activities overhead, sleeping positions Prior Level of Function/Work/Activity: 
Works full time, works on a laptop and drives 6+ hours. Dominant Side:  
      [facility] H2 Model Falls Risk Assessment 12/20/18 Risk Factors: 
     (1)  Gender [Male] Ability to Rise from Chair: 
     (0)  Ability to rise in a single movement Falls Prevention Plan: No modifications necessary Total: (5 or greater = High Risk): 1 ©2010 [facility] . [facility] Patent #[id number]. Federal Law prohibits the replication, distribution or use without written permission from [facility] [employer] Current Medications:   
  
Current Outpatient Medications:  
  traZODone (DESYREL) 50 mg tablet, Take 1 Tab by mouth nightly., Disp: 30 Tab, Rfl: 5 
  metFORMIN ER (GLUCOPHAGE XR) 500 mg tablet, Take 2 Tabs by mouth daily (with dinner). , Disp: 180 Tab, Rfl: 1 
  acyclovir (ZOVIRAX) 200 mg capsule, Take 1 Cap by mouth daily. , Disp: 90 Cap, Rfl: 3   rivaroxaban (XARELTO) 20 mg tab tablet, Take 1 Tab by mouth daily (with lunch). , Disp: 90 Tab, Rfl: 3 
  rosuvastatin (CRESTOR) 20 mg tablet, Take 1 Tab by mouth nightly., Disp: 90 Tab, Rfl: 3 
  flecainide (TAMBOCOR) 100 mg tablet, Take 1 Tab by mouth two (2) times a day. Indications: PREVENTION OF RECURRENT ATRIAL FIBRILLATION, Disp: 180 Tab, Rfl: 3 
  verapamil ER (CALAN-SR) 180 mg CR tablet, Take 1 Tab by mouth daily. Indications: hypertension, Disp: 90 Tab, Rfl: 3   metoprolol succinate (TOPROL-XL) 25 mg XL tablet, Take 1 Tab by mouth daily. Indications: hypertension, Disp: 90 Tab, Rfl: 3 
  lisinopril (PRINIVIL, ZESTRIL) 10 mg tablet, Take 1 Tab by mouth daily. , Disp: 90 Tab, Rfl: 3 
  glimepiride (AMARYL) 4 mg tablet, Take 1/2 tab (2mg) each morning and 1 tablet at night, Disp: 180 Tab, Rfl: 1 
  FA/NIACINAMIDE/CUPRIC OX/ZN OX (NICOTINAMIDE PO), Take  by mouth two (2) times a day., Disp: , Rfl:  
  coenzyme q10 10 mg cap, Take  by mouth daily. , Disp: , Rfl:  
  VITAMIN D-3 PO, take  by mouth daily. am , Disp: , Rfl:   
Date Last Reviewed:  2/7/2019 Number of Personal Factors/Comorbidities that affect the Plan of Care: 1-2: MODERATE COMPLEXITY EXAMINATION:  
Observation/Orthostatic Postural Assessment:   
       Shoulder girdles are right elevated and protracted. Scapular position is right elevated. Clavicles are right elevated. Soft tissue observations indicates restrictions in anterior chest, pectoralis major/minor, scalenes, upper trapezius, levator scapula, infraspinatus and subscapularis, right. Patient exhibits a decreased cervical lordosis,  Slight increased thoracic kyphosis. Palpation:  Myofascial assessment indicates restrictions in anterior chest. Muscle length testing levator scapulae with ipsilateral arm abduction is restrictions right. Upper trapezius length is restricted right. Pectoralis minor/major and latissimus dorsi length is restricted bilaterally. Scalene muscle length is restricted right. ROM: Gross active cervical spine rotation is 90% available bilaterally. Apley's scratch test for functional ER/IR is restrictions right, ER to C7 and IR to right iliac crest. Passive Accessory Motion: Glenohumeral mobility is restricted right for inferior and posterior glides. Acromioclavicular mobility is restricted right. Sternoclavicular mobility is restricted right. AROM(PROM) in degrees Right Left Shoulder flexion 130 (150) 160  
 Shoulder abduction (palm down) 80 140 Shoulder extension 10 20 Shoulder internal rotation (IR) 
(measured at 90 degrees of abduction) 20 45 ER (measured at 90 degrees of abduction) 35 50 Strength:  
Manual Muscle Test (*/5) Right Left Shoulder flexion 4 5 Shoulder extension 4 5 Shoulder abduction 4 5 Shoulder adduction 4 5 Shoulder ER 4 5 Shoulder IR 4 5 Upper trapezius 5 5 Middle trapezius 4- 5 Lower trapezius 4- 5 Rhomboids 4- 5 Serratus Anterior 4- 5 Elbow flexion 5 5 Elbow extension 5 5 Special Tests:   
Impingement tests performed on the right shoulder: 
Deng-Jonathan test: positive. Neer test: positive. Neurological Screen: Myotomes: Key muscle strength testing for bilateral UE is intact. Dermatomes: Sensation testing through bilateral UE for light touch is intact. Reflexes: Biceps (C5), brachioradialis (C6), and triceps (C7) are not tested Neural tension tests: Upper limb tension test A is negative for exacerbation Functional Mobility:  Challenged with overhead activities, reaching forward and reaching behind his back. Body Structures Involved: 1. Thoracic Cage 2. Joints 3. Muscles Body Functions Affected: 1. Sensory/Pain 2. Neuromusculoskeletal 
3. Movement Related Activities and Participation Affected: 1. General Tasks and Demands 2. Mobility 3. Self Care 4. Community, Social and [place] Number of elements (examined above) that affect the Plan of Care: 3: MODERATE COMPLEXITY CLINICAL PRESENTATION:  
Presentation: Evolving clinical presentation with changing clinical characteristics: MODERATE COMPLEXITY CLINICAL DECISION MAKING:  
Outcome Measure: Tool Used: Disabilities of the Arm, Shoulder and Hand (DASH) Questionnaire - Quick Version Score:  Initial: 20/55  Most Recent: X/55 (Date: -- ) Interpretation of Score:  The DASH is designed to measure the activities of daily living in person's with upper extremity dysfunction or pain. Each section is scored on a 1-5 scale, 5 representing the greatest disability. The scores of each section are added together for a total score of 55. Medical Necessity:  
· Patient is expected to demonstrate progress in strength, range of motion, balance, coordination and functional technique to increase independence with ADLs . Reason for Services/Other Comments: 
· Patient continues to require skilled intervention due to challenged with performing daily tasks secondary to pain and stiffness in his right shoulder. .  
Use of outcome tool(s) and clinical judgement create a POC that gives a: Questionable prediction of patient's progress: MODERATE COMPLEXITY  
  
 
 
 
TREATMENT:  
(In addition to Assessment/Re-Assessment sessions the following treatments were rendered) Pre-treatment Symptoms/Complaints:  Patient notes continues to be challenged with ROM in right shoulder, had a massage on Sunday and noted a lot of relief in pain, however continues to be limited by pain in right shoulder with movements. Pain: Initial:  
Pain Intensity 1: 6 Pain Location 1: Shoulder Pain Orientation 1: Right  Post Session:  5/10 Therapeutic Exercise: (25 Minutes):  Exercises per grid below to improve mobility, strength, balance and coordination. Required moderate verbal and manual cues to promote proper body alignment, promote proper body posture, promote proper body mechanics and promote proper body breathing techniques. Progressed resistance, range, repetitions and complexity of movement as indicated. Date: 
2/7/2019 Activity/Exercise Parameters Patient education X 5 minutes review of HEP and use of heat/ice, anti-inflammatories Kinesiotaping Standing pivot prone X 10 reps, 5 sec holds Bilateral shoulder ER X 10 reps 5 sec holds Side lying arm circles X 5 reps clockwise and counterclockwise Prone bilateral posterior depression/shoulder extension X 10 reps, 10 sec holds Standing shoulder extension X 10 reps, 5 sec hold Prone T X 10 reps, 10 sec holds BUE Prone prop, scapular stabilization X 5 reps, 20 sec holds Side lying shoulder ER and abduction X 15 reps each, 2lbs Standing column X 15 reps rows 7.5lbs X 15 shoulder extension 7.5lbs X 15 reps shoulder ER 5lbs X 15 reps shoulder IR 7.5lbs Quadriped arch/sag X 5 reps 15 sec holds with breathing techniques X 5 reps left and right t/s rotation Manual Therapy (    Soft Tissue Mobilization Duration Duration: 30 Minutes): Manual techniques to facilitate improved motion and decreased pain. (Used abbreviations: MET - muscle energy technique; PNF - proprioceptive neuromuscular facilitation; NMR - neuromuscular re-education; a/p - anterior to posterior; p/a - posterior to anterior) · Supine anterior chest superficial fascia, along bony contours of right clavicle and coracoid process. · Supine soft tissue mobilization to lateral right shoulder, right upper trapezius and posterior shoulder. · Supine mobilization to right shoulder with belt: posterior translation/glide with resistance, supine gapping of GH, supine caudal translation of head of humerus,  flexion adduction/posterior shear · Side lying left for right PNF scapular patterns into anterior elevation and posterior depression, followed with prolonged holds and NMR [employer] [facility] 
Treatment/Session Assessment:   
· Response to Treatment: continues to fatigue easily with strengthening exercises, spoke in length today about having imaging performing on right shoulder and encouragement to perform HEP to increase strength/endurance · Compliance with Program/Exercises: Compliant all of the time. · Recommendations/Intent for next treatment session: \"Next visit will focus on advancements to more challenging activities\". Total BillableTreatment Duration: 55 minutes PT Patient Time In/Time Out Time In: 0830 Time Out: 0930 Leonardo Hu, PT Future Appointments Date Time Provider Roberto Gilberti 2/14/2019  8:30 AM Susan Rodriguez, PT [facility]  
2/18/2019  4:30 PM [facility]  
2/25/2019 11:30 AM Susan Rodriguez, PT [facility]  
3/25/2019  9:00 AM Tamara Massey MD [facility]

## 2019-02-14 ENCOUNTER — HOSPITAL ENCOUNTER (OUTPATIENT)
Dept: PHYSICAL THERAPY | Age: 64
Discharge: HOME | End: 2019-02-14
Payer: COMMERCIAL

## 2019-02-14 PROCEDURE — 97110 THERAPEUTIC EXERCISES: CPT

## 2019-02-14 PROCEDURE — 97140 MANUAL THERAPY 1/> REGIONS: CPT

## 2019-02-14 NOTE — PROGRESS NOTES
Denver Ficks : 1955 Primary: [facility] Secondary:  Therapy Center at [address] [phone]   [phone] OUTPATIENT PHYSICAL THERAPY:Daily Note 2019 ICD-10: Treatment Diagnosis: pain in joint, right shoulder M25.511 ICD-10: Treatment Diagnosis: cervicalgia M54.2 ICD-10: Treatment Diagnosis: thoracic spine pain M54.6 Precautions/Allergies:  
Patient has no known allergies. MD Orders: evaluate and treat MEDICAL/REFERRING DIAGNOSIS: 
Shoulder pain [M25.519] DATE OF ONSET: 2018 REFERRING PHYSICIAN: Andrea Murdock MD 
RETURN PHYSICIAN APPOINTMENT: as needed INITIAL ASSESSMENT:  Mr. Darwin Rose is a 58 y.o.male who presents to physical therapy with insidious onset of right shoulder pain since 2018. 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 He demonstrates strength and ROM deficits of his right shoulder. He would benefit from skilled physical therapy to improve overall mobility of his right shoulder. PROBLEM LIST (Impacting functional limitations): 1. Decreased Strength 2. Decreased ADL/Functional Activities 3. Increased Pain 4. Decreased Activity Tolerance 5. Decreased Flexibility/Joint Mobility INTERVENTIONS PLANNED: (Treatment may consist of any combination of the following) 1. Home Exercise Program (HEP) 2. Manual Therapy 3. Neuromuscular Re-education/Strengthening 4. Range of Motion (ROM) 5. Therapeutic Activites 6. Therapeutic Exercise/Strengthening TREATMENT PLAN: 
Effective Dates: 2018 TO 3/20/2019 (90 days). Frequency/Duration: 1 time a week for 90 Day(s) GOALS: (Goals have been discussed and agreed upon with patient.) Discharge Goals: Time Frame: 90 days 1.  Patient demonstrates independence of his HEP without verbal cueing from therapist. 
 2. Patient demonstrates full AROM of right shoulder without onset of pain to perform ADLs 3. Able to ride his bike for 30 miles without onset of right shoulder pain. 4. Improve DASH score from 20/55 to 13/55 to perform ADLs Rehabilitation Potential For Stated Goals: Excellent Regarding Varsha Kenny's therapy, I certify that the treatment plan above will be carried out by a therapist or under their direction. Thank you for this referral, Effie Miss PT The information in this section was collected on 12/20/18 (except where otherwise noted). HISTORY:  
History of Present Injury/Illness (Reason for Referral): Insidious onset of right shoulder pain since September 2018 and progressively increasing in pain and stiffness. He is limited with reaching forward, reaching behind his back and lifting overhead. He started riding a road bike about 6 months ago, notes he had a bike fit, however feels his handle bars may be lower than they should be. Increased stiffness in right shoulder noted with most activities during the day and challenged with sleeping secondary to pain. Patient denies dizziness, drop attacks, numbness/tingling, bowel/bladder dysfunction and/or unexplained weight gain/loss. Past Medical History/Comorbidities: Mr. Jimbo Donovan  has a past medical history of Arrhythmia (10 - 15 years ago), Arthritis, CAD (coronary artery disease), Cancer ([address] Depression, Diabetes ([address] (6-7 years ago), HTN (hypertension) (7/18/2014), Hypercholesterolemia, Hyperlipidemia (7/18/2014), Hypertension (diagnosed at 24years old), Knee pain (5/25/2016), Morbid obesity ([address] Psychiatric disorder, Right inguinal hernia (5/8/2017), and S/P total knee arthroplasty (3/29/2017).  He also has no past medical history of Difficult intubation, Malignant hyperthermia due to anesthesia, Nausea & vomiting, Other ill-defined conditions(799.89), Pseudocholinesterase deficiency, or Unspecified adverse effect of anesthesia. Mr. Darwin Rose  has a past surgical history that includes hx other surgical; hx colonoscopy (2013); hx heent (1971); hx orthopaedic (Left, 2003); hx orthopaedic (Left, 2009); hx orthopaedic (Right, 2016); hx knee replacement (Right, 03/28/2017); and pr abdomen surgery proc unlisted (Right, 2017). Social History/Living Environment:  
  lives in a private home with his wife, challenged with activities overhead, sleeping positions Prior Level of Function/Work/Activity: 
Works full time, works on a laptop and drives 6+ hours. Dominant Side:  
      RIGHT Ambulatory/Rehab Services H2 Model Falls Risk Assessment 12/20/18 Risk Factors: 
     (1)  Gender [Male] Ability to Rise from Chair: 
     (0)  Ability to rise in a single movement Falls Prevention Plan: No modifications necessary Total: (5 or greater = High Risk): 1 ©2010 [facility]. [facility] Patent #[id number]. Federal Law prohibits the replication, distribution or use without written permission from [facility] [employer] Current Medications:   
  
Current Outpatient Medications:  
  traZODone (DESYREL) 50 mg tablet, Take 1 Tab by mouth nightly., Disp: 30 Tab, Rfl: 5 
  metFORMIN ER (GLUCOPHAGE XR) 500 mg tablet, Take 2 Tabs by mouth daily (with dinner). , Disp: 180 Tab, Rfl: 1 
  acyclovir (ZOVIRAX) 200 mg capsule, Take 1 Cap by mouth daily. , Disp: 90 Cap, Rfl: 3   rivaroxaban (XARELTO) 20 mg tab tablet, Take 1 Tab by mouth daily (with lunch). , Disp: 90 Tab, Rfl: 3 
  rosuvastatin (CRESTOR) 20 mg tablet, Take 1 Tab by mouth nightly., Disp: 90 Tab, Rfl: 3 
  flecainide (TAMBOCOR) 100 mg tablet, Take 1 Tab by mouth two (2) times a day. Indications: PREVENTION OF RECURRENT ATRIAL FIBRILLATION, Disp: 180 Tab, Rfl: 3 
  verapamil ER (CALAN-SR) 180 mg CR tablet, Take 1 Tab by mouth daily. Indications: hypertension, Disp: 90 Tab, Rfl: 3   metoprolol succinate (TOPROL-XL) 25 mg XL tablet, Take 1 Tab by mouth daily. Indications: hypertension, Disp: 90 Tab, Rfl: 3 
  lisinopril (PRINIVIL, ZESTRIL) 10 mg tablet, Take 1 Tab by mouth daily. , Disp: 90 Tab, Rfl: 3 
  glimepiride (AMARYL) 4 mg tablet, Take 1/2 tab (2mg) each morning and 1 tablet at night, Disp: 180 Tab, Rfl: 1 
  FA/NIACINAMIDE/CUPRIC OX/ZN OX (NICOTINAMIDE PO), Take  by mouth two (2) times a day., Disp: , Rfl:  
  coenzyme q10 10 mg cap, Take  by mouth daily. , Disp: , Rfl:  
  VITAMIN D-3 PO, take  by mouth daily. am , Disp: , Rfl:   
Date Last Reviewed:  2/14/2019 Number of Personal Factors/Comorbidities that affect the Plan of Care: 1-2: MODERATE COMPLEXITY EXAMINATION:  
Observation/Orthostatic Postural Assessment:   
       Shoulder girdles are right elevated and protracted. Scapular position is right elevated. Clavicles are right elevated. Soft tissue observations indicates restrictions in anterior chest, pectoralis major/minor, scalenes, upper trapezius, levator scapula, infraspinatus and subscapularis, right. Patient exhibits a decreased cervical lordosis,  Slight increased thoracic kyphosis. Palpation:  Myofascial assessment indicates restrictions in anterior chest. Muscle length testing levator scapulae with ipsilateral arm abduction is restrictions right. Upper trapezius length is restricted right. Pectoralis minor/major and latissimus dorsi length is restricted bilaterally. Scalene muscle length is restricted right. ROM: Gross active cervical spine rotation is 90% available bilaterally. Apley's scratch test for functional ER/IR is restrictions right, ER to C7 and IR to right iliac crest. Passive Accessory Motion: Glenohumeral mobility is restricted right for inferior and posterior glides. Acromioclavicular mobility is restricted right. Sternoclavicular mobility is restricted right. AROM(PROM) in degrees Right Left Shoulder flexion 130 (150) 160  
 Shoulder abduction (palm down) 80 140 Shoulder extension 10 20 Shoulder internal rotation (IR) 
(measured at 90 degrees of abduction) 20 45 ER (measured at 90 degrees of abduction) 35 50 Strength:  
Manual Muscle Test (*/5) Right Left Shoulder flexion 4 5 Shoulder extension 4 5 Shoulder abduction 4 5 Shoulder adduction 4 5 Shoulder ER 4 5 Shoulder IR 4 5 Upper trapezius 5 5 Middle trapezius 4- 5 Lower trapezius 4- 5 Rhomboids 4- 5 Serratus Anterior 4- 5 Elbow flexion 5 5 Elbow extension 5 5 Special Tests:   
Impingement tests performed on the right shoulder: 
Deng-Jonathan test: positive. Neer test: positive. Neurological Screen: Myotomes: Key muscle strength testing for bilateral UE is intact. Dermatomes: Sensation testing through bilateral UE for light touch is intact. Reflexes: Biceps (C5), brachioradialis (C6), and triceps (C7) are not tested Neural tension tests: Upper limb tension test A is negative for exacerbation Functional Mobility:  Challenged with overhead activities, reaching forward and reaching behind his back. Body Structures Involved: 1. Thoracic Cage 2. Joints 3. Muscles Body Functions Affected: 1. Sensory/Pain 2. Neuromusculoskeletal 
3. Movement Related Activities and Participation Affected: 1. General Tasks and Demands 2. Mobility 3. Self Care 4. Community, Social and [place] Number of elements (examined above) that affect the Plan of Care: 3: MODERATE COMPLEXITY CLINICAL PRESENTATION:  
Presentation: Evolving clinical presentation with changing clinical characteristics: MODERATE COMPLEXITY CLINICAL DECISION MAKING:  
Outcome Measure: Tool Used: Disabilities of the Arm, Shoulder and Hand (DASH) Questionnaire - Quick Version Score:  Initial: 20/55  Most Recent: X/55 (Date: -- ) Interpretation of Score:  The DASH is designed to measure the activities of daily living in person's with upper extremity dysfunction or pain. Each section is scored on a 1-5 scale, 5 representing the greatest disability. The scores of each section are added together for a total score of 55. Medical Necessity:  
· Patient is expected to demonstrate progress in strength, range of motion, balance, coordination and functional technique to increase independence with ADLs . Reason for Services/Other Comments: 
· Patient continues to require skilled intervention due to challenged with performing daily tasks secondary to pain and stiffness in his right shoulder. .  
Use of outcome tool(s) and clinical judgement create a POC that gives a: Questionable prediction of patient's progress: MODERATE COMPLEXITY  
  
 
 
 
TREATMENT:  
(In addition to Assessment/Re-Assessment sessions the following treatments were rendered) Pre-treatment Symptoms/Complaints:  Patient notes less discomfort noted in right shoulder, decreased stress levels noted in the past week. Pain: Initial:  
Pain Intensity 1: 5 Pain Location 1: Shoulder Pain Orientation 1: Right  Post Session:  4/10 Therapeutic Exercise: (25 Minutes):  Exercises per grid below to improve mobility, strength, balance and coordination. Required moderate verbal and manual cues to promote proper body alignment, promote proper body posture, promote proper body mechanics and promote proper body breathing techniques. Progressed resistance, range, repetitions and complexity of movement as indicated. Date: 
2/14/2019 Activity/Exercise Parameters Patient education X 5 minutes review of HEP and use of heat/ice, anti-inflammatories Kinesiotaping Standing pivot prone X 10 reps, 5 sec holds Bilateral shoulder ER X 10 reps 5 sec holds Side lying arm circles X 5 reps clockwise and counterclockwise Prone bilateral posterior depression/shoulder extension X 10 reps, 10 sec holds Standing shoulder extension X 15 reps, 5 sec hold, black t-band Prone T X 10 reps, 10 sec holds BUE Prone prop, scapular stabilization X 5 reps, 20 sec holds Side lying shoulder ER and abduction X 15 reps each, 2lbs Standing column X 15 reps rows 7.5lbs X 15 shoulder extension 7.5lbs X 15 reps shoulder ER 5lbs X 15 reps shoulder IR 7.5lbs Quadriped arch/sag X 5 reps 15 sec holds with breathing techniques X 5 reps left and right t/s rotation Standing UE neutral at 90 degrees and hold X 10 reps 5 sec holds black t-band Manual Therapy (    Soft Tissue Mobilization Duration Duration: 30 Minutes): Manual techniques to facilitate improved motion and decreased pain. (Used abbreviations: MET - muscle energy technique; PNF - proprioceptive neuromuscular facilitation; NMR - neuromuscular re-education; a/p - anterior to posterior; p/a - posterior to anterior) · Supine anterior chest superficial fascia, along bony contours of right clavicle and coracoid process. · Supine soft tissue mobilization to lateral right shoulder, right upper trapezius and posterior shoulder. · Supine mobilization to right shoulder with belt: posterior translation/glide with resistance, supine gapping of GH, supine caudal translation of head of humerus,  flexion adduction/posterior shear · Prone soft tissue mobilization to right scapula, upper trapezius and levator scapula. · Side lying left for right PNF scapular patterns into anterior elevation and posterior depression, followed with prolonged holds and NMR · Seated MET left rotation T1-2, followed with manual resistance of left cervical rotation. [facility] Portal 
Treatment/Session Assessment:   
· Response to Treatment: improving overall mobility in cervical spine and upper thoracic spine, improved right shoulder ROM · Compliance with Program/Exercises: Compliant all of the time. · Recommendations/Intent for next treatment session:  \"Next visit will focus on advancements to more challenging activities\". Total BillableTreatment Duration: 55 minutes PT Patient Time In/Time Out Time In: 0830 Time Out: 0930 Olivier Hoffman, PT Future Appointments Date Time Provider Roberto Dhillon 2/18/2019  4:30 PM Oxford Loud., PT [facility]  
2/25/2019 11:30 AM Oxford Loud., PT [facility]  
3/25/2019  9:00 AM Lucius Cruz MD [facility]

## 2019-02-18 ENCOUNTER — HOSPITAL ENCOUNTER (OUTPATIENT)
Dept: PHYSICAL THERAPY | Age: 64
Discharge: HOME | End: 2019-02-18
Payer: COMMERCIAL

## 2019-02-18 PROCEDURE — 97140 MANUAL THERAPY 1/> REGIONS: CPT

## 2019-02-18 PROCEDURE — 97110 THERAPEUTIC EXERCISES: CPT

## 2019-02-18 NOTE — PROGRESS NOTES
Delfino Nam : 1955 Primary: [facility] Secondary:  Therapy Center at [address] [phone]   [phone] OUTPATIENT PHYSICAL THERAPY:Daily Note and Progress Report 2019 ICD-10: Treatment Diagnosis: pain in joint, right shoulder M25.511 ICD-10: Treatment Diagnosis: cervicalgia M54.2 ICD-10: Treatment Diagnosis: thoracic spine pain M54.6 Precautions/Allergies:  
Patient has no known allergies. MD Orders: evaluate and treat MEDICAL/REFERRING DIAGNOSIS: 
Shoulder pain [M25.519] DATE OF ONSET: 2018 REFERRING PHYSICIAN: Catina Enriquez MD 
RETURN PHYSICIAN APPOINTMENT: as needed PROGRESS NOTE: 19: Mr. Maria Luisa Phelps has attended 10 physical therapy sessions, he demonstrates improved mobility in his right shoulder and decreased soft tissue restrictions. Patient has also noted a lot of decreased stress and tension in his personal life which has decreased his overall symptoms. He feels comfortable to continue to work on his HEP independently. We have one more scheduled visit and will plan to discharge him to his home program after that visit. INITIAL ASSESSMENT:  Mr. Maria Luisa Phelps is a 58 y.o.male who presents to physical therapy with insidious onset of right shoulder pain since 2018. 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 He demonstrates strength and ROM deficits of his right shoulder. He would benefit from skilled physical therapy to improve overall mobility of his right shoulder. PROBLEM LIST (Impacting functional limitations): 1. Decreased Strength 2. Decreased ADL/Functional Activities 3. Increased Pain 4. Decreased Activity Tolerance 5. Decreased Flexibility/Joint Mobility INTERVENTIONS PLANNED: (Treatment may consist of any combination of the following) 1. Home Exercise Program (HEP) 2. Manual Therapy 3. Neuromuscular Re-education/Strengthening 4. Range of Motion (ROM) 5. Therapeutic Activites 6. Therapeutic Exercise/Strengthening TREATMENT PLAN: 
Effective Dates: 12/20/2018 TO 3/20/2019 (90 days). Frequency/Duration: 1 time a week for 90 Day(s) GOALS: (Goals have been discussed and agreed upon with patient.) Discharge Goals: Time Frame: 90 days 1. Patient demonstrates independence of his HEP without verbal cueing from therapist. GOAL MET 2. Patient demonstrates full AROM of right shoulder without onset of pain to perform ADLs ALMOST MET 3. Able to ride his bike for 30 miles without onset of right shoulder pain. ALMOST MET 4. Improve DASH score from 20/55 to 13/55 to perform ADLs - ALMOST MET Rehabilitation Potential For Stated Goals: Excellent Regarding Kary Kenny's therapy, I certify that the treatment plan above will be carried out by a therapist or under their direction. Thank you for this referral, Denis Love, PT The information in this section was collected on 12/20/18 (except where otherwise noted). HISTORY:  
History of Present Injury/Illness (Reason for Referral): Insidious onset of right shoulder pain since September 2018 and progressively increasing in pain and stiffness. He is limited with reaching forward, reaching behind his back and lifting overhead. He started riding a road bike about 6 months ago, notes he had a bike fit, however feels his handle bars may be lower than they should be. Increased stiffness in right shoulder noted with most activities during the day and challenged with sleeping secondary to pain. Patient denies dizziness, drop attacks, numbness/tingling, bowel/bladder dysfunction and/or unexplained weight gain/loss. Past Medical History/Comorbidities: Mr. Sue Villegas  has a past medical history of Arrhythmia (10 - 15 years ago), Arthritis, CAD (coronary artery disease), Cancer ([facility] [address] Depression, Diabetes ([facility] [address] [facility] (6-7 years ago), HTN (hypertension) (7/18/2014), Hypercholesterolemia, Hyperlipidemia (7/18/2014), Hypertension (diagnosed at 24years old), Knee pain (5/25/2016), Morbid obesity ([facility] [address] [facility] Psychiatric disorder, Right inguinal hernia (5/8/2017), and S/P total knee arthroplasty (3/29/2017). He also has no past medical history of Difficult intubation, Malignant hyperthermia due to anesthesia, Nausea & vomiting, Other ill-defined conditions(799.89), Pseudocholinesterase deficiency, or Unspecified adverse effect of anesthesia. Mr. Kiersten Valadez  has a past surgical history that includes hx other surgical; hx colonoscopy (2013); hx heent (1971); hx orthopaedic (Left, 2003); hx orthopaedic (Left, 2009); hx orthopaedic (Right, 2016); hx knee replacement (Right, 03/28/2017); and pr abdomen surgery proc unlisted (Right, 2017). Social History/Living Environment:  
  lives in a private home with his wife, challenged with activities overhead, sleeping positions Prior Level of Function/Work/Activity: 
Works full time, works on a laptop and drives 6+ hours. Dominant Side:  
      RIGHT Ambulatory/Rehab Services H2 Model Falls Risk Assessment 12/20/18 Risk Factors: 
     (1)  Gender [Male] Ability to Rise from Chair: 
     (0)  Ability to rise in a single movement Falls Prevention Plan: No modifications necessary Total: (5 or greater = High Risk): 1 ©2010 [facility] [address] Patent #[id number]. Federal Law prohibits the replication, distribution or use without written permission from [facility] [place] Current Medications:   
  
Current Outpatient Medications:  
  traZODone (DESYREL) 50 mg tablet, Take 1 Tab by mouth nightly., Disp: 90 Tab, Rfl: 1   valACYclovir (VALTREX) 500 mg tablet, Take 1 Tab by mouth two (2) times a day., Disp: 10 Tab, Rfl: 5   metFORMIN ER (GLUCOPHAGE XR) 500 mg tablet, Take 2 Tabs by mouth daily (with dinner). , Disp: 180 Tab, Rfl: 1   rivaroxaban (XARELTO) 20 mg tab tablet, Take 1 Tab by mouth daily (with lunch). , Disp: 90 Tab, Rfl: 3 
  rosuvastatin (CRESTOR) 20 mg tablet, Take 1 Tab by mouth nightly., Disp: 90 Tab, Rfl: 3 
  flecainide (TAMBOCOR) 100 mg tablet, Take 1 Tab by mouth two (2) times a day. Indications: PREVENTION OF RECURRENT ATRIAL FIBRILLATION, Disp: 180 Tab, Rfl: 3 
  verapamil ER (CALAN-SR) 180 mg CR tablet, Take 1 Tab by mouth daily. Indications: hypertension, Disp: 90 Tab, Rfl: 3 
  metoprolol succinate (TOPROL-XL) 25 mg XL tablet, Take 1 Tab by mouth daily. Indications: hypertension, Disp: 90 Tab, Rfl: 3 
  lisinopril (PRINIVIL, ZESTRIL) 10 mg tablet, Take 1 Tab by mouth daily. , Disp: 90 Tab, Rfl: 3 
  glimepiride (AMARYL) 4 mg tablet, Take 1/2 tab (2mg) each morning and 1 tablet at night, Disp: 180 Tab, Rfl: 1 
  FA/NIACINAMIDE/CUPRIC OX/ZN OX (NICOTINAMIDE PO), Take  by mouth two (2) times a day., Disp: , Rfl:  
  coenzyme q10 10 mg cap, Take  by mouth daily. , Disp: , Rfl:  
  VITAMIN D-3 PO, take  by mouth daily. am , Disp: , Rfl:   
Date Last Reviewed:  2/18/2019 Number of Personal Factors/Comorbidities that affect the Plan of Care: 1-2: MODERATE COMPLEXITY EXAMINATION:  
Observation/Orthostatic Postural Assessment:   
       Shoulder girdles are right elevated and protracted. Scapular position is right elevated. Clavicles are right elevated. Soft tissue observations indicates restrictions in anterior chest, pectoralis major/minor, scalenes, upper trapezius, levator scapula, infraspinatus and subscapularis, right. Patient exhibits a decreased cervical lordosis,  Slight increased thoracic kyphosis.  
Palpation:  Myofascial assessment indicates restrictions in anterior chest. Muscle length testing levator scapulae with ipsilateral arm abduction is restrictions right. Upper trapezius length is restricted right. Pectoralis minor/major and latissimus dorsi length is restricted bilaterally. Scalene muscle length is restricted right. ROM: Gross active cervical spine rotation is 90% available bilaterally. Apley's scratch test for functional ER/IR is restrictions right, ER to C7 and IR to right iliac crest. Passive Accessory Motion: Glenohumeral mobility is restricted right for inferior and posterior glides. Acromioclavicular mobility is restricted right. Sternoclavicular mobility is restricted right. AROM(PROM) in degrees Right Left Shoulder flexion 130 (150) 160 Shoulder abduction (palm down) 80 140 Shoulder extension 10 20 Shoulder internal rotation (IR) 
(measured at 90 degrees of abduction) 20 45 ER (measured at 90 degrees of abduction) 35 50 Strength:  
Manual Muscle Test (*/5) Right Left Shoulder flexion 4 5 Shoulder extension 4 5 Shoulder abduction 4 5 Shoulder adduction 4 5 Shoulder ER 4 5 Shoulder IR 4 5 Upper trapezius 5 5 Middle trapezius 4- 5 Lower trapezius 4- 5 Rhomboids 4- 5 Serratus Anterior 4- 5 Elbow flexion 5 5 Elbow extension 5 5 Special Tests:   
Impingement tests performed on the right shoulder: 
Deng-Jonathan test: positive. Neer test: positive. Neurological Screen: Myotomes: Key muscle strength testing for bilateral UE is intact. Dermatomes: Sensation testing through bilateral UE for light touch is intact. Reflexes: Biceps (C5), brachioradialis (C6), and triceps (C7) are not tested Neural tension tests: Upper limb tension test A is negative for exacerbation Functional Mobility:  Challenged with overhead activities, reaching forward and reaching behind his back. Body Structures Involved: 1. Thoracic Cage 2. Joints 3. Muscles Body Functions Affected: 1. Sensory/Pain 2. Neuromusculoskeletal 
3. Movement Related Activities and Participation Affected: 1. General Tasks and Demands 2. Mobility 3. Self Care 4. Community, Social and [place] Number of elements (examined above) that affect the Plan of Care: 3: MODERATE COMPLEXITY CLINICAL PRESENTATION:  
Presentation: Evolving clinical presentation with changing clinical characteristics: MODERATE COMPLEXITY CLINICAL DECISION MAKING:  
Outcome Measure: Tool Used: Disabilities of the Arm, Shoulder and Hand (DASH) Questionnaire - Quick Version Score:  Initial: 20/55  Most Recent: 18/55 (Date: 2-18-19 ) Interpretation of Score: The DASH is designed to measure the activities of daily living in person's with upper extremity dysfunction or pain. Each section is scored on a 1-5 scale, 5 representing the greatest disability. The scores of each section are added together for a total score of 55. Medical Necessity:  
· Patient is expected to demonstrate progress in strength, range of motion, balance, coordination and functional technique to increase independence with ADLs . Reason for Services/Other Comments: 
· Patient continues to require skilled intervention due to challenged with performing daily tasks secondary to pain and stiffness in his right shoulder. .  
Use of outcome tool(s) and clinical judgement create a POC that gives a: Questionable prediction of patient's progress: MODERATE COMPLEXITY  
  
 
 
 
TREATMENT:  
(In addition to Assessment/Re-Assessment sessions the following treatments were rendered) Pre-treatment Symptoms/Complaints:  Patient notes stress levels and shoulder pain continues to decrease. Has been getting massages regularly which has helped with tension in his neck and right shoulder. Pain: Initial:  
Pain Intensity 1: 4 Pain Location 1: Shoulder Pain Orientation 1: Right  Post Session:  2/10 Therapeutic Exercise: (25 Minutes):  Exercises per grid below to improve mobility, strength, balance and coordination.   Required moderate verbal and manual cues to promote proper body alignment, promote proper body posture, promote proper body mechanics and promote proper body breathing techniques. Progressed resistance, range, repetitions and complexity of movement as indicated. Date: 
2/18/2019 Activity/Exercise Parameters Patient education X 5 minutes review of HEP and use of heat/ice, anti-inflammatories Kinesiotaping Standing pivot prone X 10 reps, 5 sec holds Bilateral shoulder ER X 10 reps 5 sec holds Side lying arm circles X 5 reps clockwise and counterclockwise Prone bilateral posterior depression/shoulder extension X 10 reps, 10 sec holds Standing shoulder extension X 15 reps, 5 sec hold, black t-band Prone T X 10 reps, 10 sec holds BUE Prone prop, scapular stabilization X 5 reps, 20 sec holds Side lying shoulder ER and abduction X 15 reps each, 2lbs Standing column X 15 reps rows 7.5lbs X 15 shoulder extension 7.5lbs X 15 reps shoulder ER 5lbs X 15 reps shoulder IR 7.5lbs Quadriped arch/sag X 5 reps 15 sec holds with breathing techniques X 5 reps left and right t/s rotation Standing UE neutral at 90 degrees and hold X 10 reps 5 sec holds black t-band Standing flexion and abduction X 10 reps, 5 second holds Manual Therapy (    Soft Tissue Mobilization Duration Duration: 30 Minutes): Manual techniques to facilitate improved motion and decreased pain. (Used abbreviations: MET - muscle energy technique; PNF - proprioceptive neuromuscular facilitation; NMR - neuromuscular re-education; a/p - anterior to posterior; p/a - posterior to anterior) · Supine anterior chest superficial fascia, along bony contours of right clavicle and coracoid process. · Supine soft tissue mobilization to lateral right shoulder, right upper trapezius and posterior shoulder.  
· Supine mobilization to right shoulder with belt: posterior translation/glide with resistance, supine gapping of GH, supine caudal translation of head of humerus,  flexion adduction/posterior shear · Prone soft tissue mobilization to right scapula, upper trapezius and levator scapula. · Side lying left for right PNF scapular patterns into anterior elevation and posterior depression, followed with prolonged holds and NMR · Seated MET left rotation T1-2, followed with manual resistance of left cervical rotation. [facility] Portal 
Treatment/Session Assessment:   
· Response to Treatment: less fatigue noted with exercises today. Improving right shoulder ROM, plan to d/c next visit. · Compliance with Program/Exercises: Compliant all of the time. · Recommendations/Intent for next treatment session: \"Next visit will focus on advancements to more challenging activities\". Total BillableTreatment Duration: 55 minutes PT Patient Time In/Time Out Time In: 1630 Time Out: 1847 Marjorie Sofia PT Future Appointments Date Time Provider Roberto Jacquie 2/25/2019 11:30 AM KASSY Bullock [facility]  
3/25/2019  9:00 AM Katlin Mosher MD [facility]

## 2019-02-25 ENCOUNTER — HOSPITAL ENCOUNTER (OUTPATIENT)
Dept: PHYSICAL THERAPY | Age: 64
Discharge: HOME | End: 2019-02-25
Payer: COMMERCIAL

## 2019-02-25 PROCEDURE — 97140 MANUAL THERAPY 1/> REGIONS: CPT

## 2019-02-25 PROCEDURE — 97110 THERAPEUTIC EXERCISES: CPT

## 2019-02-25 NOTE — PROGRESS NOTES
Mane Browne : 1955 Primary: [facility] Secondary:  Therapy Center at [address] [phone]   [phone] OUTPATIENT PHYSICAL THERAPY:Daily Note 2019 ICD-10: Treatment Diagnosis: pain in joint, right shoulder M25.511 ICD-10: Treatment Diagnosis: cervicalgia M54.2 ICD-10: Treatment Diagnosis: thoracic spine pain M54.6 Precautions/Allergies:  
Patient has no known allergies. MD Orders: evaluate and treat MEDICAL/REFERRING DIAGNOSIS: 
Shoulder pain [M25.519] DATE OF ONSET: 2018 REFERRING PHYSICIAN: Alejandro Rivera MD 
RETURN PHYSICIAN APPOINTMENT: as needed PROGRESS NOTE: 19: Mr. Kiya Troy has attended 10 physical therapy sessions, he demonstrates improved mobility in his right shoulder and decreased soft tissue restrictions. Patient has also noted a lot of decreased stress and tension in his personal life which has decreased his overall symptoms. He feels comfortable to continue to work on his HEP independently. We have one more scheduled visit and will plan to discharge him to his home program after that visit. INITIAL ASSESSMENT:  Mr. Kiya Troy is a 58 y.o.male who presents to physical therapy with insidious onset of right shoulder pain since 2018. 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 He demonstrates strength and ROM deficits of his right shoulder. He would benefit from skilled physical therapy to improve overall mobility of his right shoulder. PROBLEM LIST (Impacting functional limitations): 1. Decreased Strength 2. Decreased ADL/Functional Activities 3. Increased Pain 4. Decreased Activity Tolerance 5. Decreased Flexibility/Joint Mobility INTERVENTIONS PLANNED: (Treatment may consist of any combination of the following) 1. Home Exercise Program (HEP) 2. Manual Therapy 3. Neuromuscular Re-education/Strengthening 4. Range of Motion (ROM) 5. Therapeutic Activites 6. Therapeutic Exercise/Strengthening TREATMENT PLAN: 
Effective Dates: 12/20/2018 TO 3/20/2019 (90 days). Frequency/Duration: 1 time a week for 90 Day(s) GOALS: (Goals have been discussed and agreed upon with patient.) Discharge Goals: Time Frame: 90 days 1. Patient demonstrates independence of his HEP without verbal cueing from therapist. GOAL MET 2. Patient demonstrates full AROM of right shoulder without onset of pain to perform ADLs ALMOST MET 3. Able to ride his bike for 30 miles without onset of right shoulder pain. ALMOST MET 4. Improve DASH score from 20/55 to 13/55 to perform ADLs - ALMOST MET Rehabilitation Potential For Stated Goals: Excellent Regarding Natalie Kenny's therapy, I certify that the treatment plan above will be carried out by a therapist or under their direction. Thank you for this referral, Pavan Rudd PT The information in this section was collected on 12/20/18 (except where otherwise noted). HISTORY:  
History of Present Injury/Illness (Reason for Referral): Insidious onset of right shoulder pain since September 2018 and progressively increasing in pain and stiffness. He is limited with reaching forward, reaching behind his back and lifting overhead. He started riding a road bike about 6 months ago, notes he had a bike fit, however feels his handle bars may be lower than they should be. Increased stiffness in right shoulder noted with most activities during the day and challenged with sleeping secondary to pain. Patient denies dizziness, drop attacks, numbness/tingling, bowel/bladder dysfunction and/or unexplained weight gain/loss. Past Medical History/Comorbidities: Mr. Delia Aguilar  has a past medical history of Arrhythmia (10 - 15 years ago), Arthritis, CAD (coronary artery disease), Cancer ([facility] [address] Depression, Diabetes ([facility] [address] (6-7 years ago), HTN (hypertension) (7/18/2014), Hypercholesterolemia, Hyperlipidemia (7/18/2014), Hypertension (diagnosed at 24years old), Knee pain (5/25/2016), Morbid obesity ([facility] [address] Psychiatric disorder, Right inguinal hernia (5/8/2017), and S/P total knee arthroplasty (3/29/2017). He also has no past medical history of Difficult intubation, Malignant hyperthermia due to anesthesia, Nausea & vomiting, Other ill-defined conditions(799.89), Pseudocholinesterase deficiency, or Unspecified adverse effect of anesthesia. Mr. Jimbo Donovan  has a past surgical history that includes hx other surgical; hx colonoscopy (2013); hx heent (1971); hx orthopaedic (Left, 2003); hx orthopaedic (Left, 2009); hx orthopaedic (Right, 2016); hx knee replacement (Right, 03/28/2017); and pr abdomen surgery proc unlisted (Right, 2017). Social History/Living Environment:  
  lives in a private home with his wife, challenged with activities overhead, sleeping positions Prior Level of Function/Work/Activity: 
Works full time, works on a laptop and drives 6+ hours. Dominant Side:  
      RIGHT Ambulatory/Rehab Services H2 Model Falls Risk Assessment 12/20/18 Risk Factors: 
     (1)  Gender [Male] Ability to Rise from Chair: 
     (0)  Ability to rise in a single movement Falls Prevention Plan: No modifications necessary Total: (5 or greater = High Risk): 1 ©2010 [facility] of [address] Patent #[id number]. Federal Law prohibits the replication, distribution or use without written permission from [facility] [employer] Current Medications:   
  
Current Outpatient Medications:  
  traZODone (DESYREL) 50 mg tablet, Take 1 Tab by mouth nightly., Disp: 90 Tab, Rfl: 1   valACYclovir (VALTREX) 500 mg tablet, Take 1 Tab by mouth two (2) times a day., Disp: 10 Tab, Rfl: 5 
  metFORMIN ER (GLUCOPHAGE XR) 500 mg tablet, Take 2 Tabs by mouth daily (with dinner). , Disp: 180 Tab, Rfl: 1   rivaroxaban (XARELTO) 20 mg tab tablet, Take 1 Tab by mouth daily (with lunch). , Disp: 90 Tab, Rfl: 3 
  rosuvastatin (CRESTOR) 20 mg tablet, Take 1 Tab by mouth nightly., Disp: 90 Tab, Rfl: 3 
  flecainide (TAMBOCOR) 100 mg tablet, Take 1 Tab by mouth two (2) times a day. Indications: PREVENTION OF RECURRENT ATRIAL FIBRILLATION, Disp: 180 Tab, Rfl: 3 
  verapamil ER (CALAN-SR) 180 mg CR tablet, Take 1 Tab by mouth daily. Indications: hypertension, Disp: 90 Tab, Rfl: 3 
  metoprolol succinate (TOPROL-XL) 25 mg XL tablet, Take 1 Tab by mouth daily. Indications: hypertension, Disp: 90 Tab, Rfl: 3 
  lisinopril (PRINIVIL, ZESTRIL) 10 mg tablet, Take 1 Tab by mouth daily. , Disp: 90 Tab, Rfl: 3 
  glimepiride (AMARYL) 4 mg tablet, Take 1/2 tab (2mg) each morning and 1 tablet at night, Disp: 180 Tab, Rfl: 1 
  FA/NIACINAMIDE/CUPRIC OX/ZN OX (NICOTINAMIDE PO), Take  by mouth two (2) times a day., Disp: , Rfl:  
  coenzyme q10 10 mg cap, Take  by mouth daily. , Disp: , Rfl:  
  VITAMIN D-3 PO, take  by mouth daily. am , Disp: , Rfl:   
Date Last Reviewed:  2/25/2019 Number of Personal Factors/Comorbidities that affect the Plan of Care: 1-2: MODERATE COMPLEXITY EXAMINATION:  
Observation/Orthostatic Postural Assessment:   
       Shoulder girdles are right elevated and protracted. Scapular position is right elevated. Clavicles are right elevated. Soft tissue observations indicates restrictions in anterior chest, pectoralis major/minor, scalenes, upper trapezius, levator scapula, infraspinatus and subscapularis, right. Patient exhibits a decreased cervical lordosis,  Slight increased thoracic kyphosis. Palpation:  Myofascial assessment indicates restrictions in anterior chest. Muscle length testing levator scapulae with ipsilateral arm abduction is restrictions right.  Upper trapezius length is restricted right. Pectoralis minor/major and latissimus dorsi length is restricted bilaterally. Scalene muscle length is restricted right. ROM: Gross active cervical spine rotation is 90% available bilaterally. Apley's scratch test for functional ER/IR is restrictions right, ER to C7 and IR to right iliac crest. Passive Accessory Motion: Glenohumeral mobility is restricted right for inferior and posterior glides. Acromioclavicular mobility is restricted right. Sternoclavicular mobility is restricted right. AROM(PROM) in degrees Right Left Shoulder flexion 130 (150) 160 Shoulder abduction (palm down) 80 140 Shoulder extension 10 20 Shoulder internal rotation (IR) 
(measured at 90 degrees of abduction) 20 45 ER (measured at 90 degrees of abduction) 35 50 Strength:  
Manual Muscle Test (*/5) Right Left Shoulder flexion 4 5 Shoulder extension 4 5 Shoulder abduction 4 5 Shoulder adduction 4 5 Shoulder ER 4 5 Shoulder IR 4 5 Upper trapezius 5 5 Middle trapezius 4- 5 Lower trapezius 4- 5 Rhomboids 4- 5 Serratus Anterior 4- 5 Elbow flexion 5 5 Elbow extension 5 5 Special Tests:   
Impingement tests performed on the right shoulder: 
Deng-Jonathan test: positive. Neer test: positive. Neurological Screen: Myotomes: Key muscle strength testing for bilateral UE is intact. Dermatomes: Sensation testing through bilateral UE for light touch is intact. Reflexes: Biceps (C5), brachioradialis (C6), and triceps (C7) are not tested Neural tension tests: Upper limb tension test A is negative for exacerbation Functional Mobility:  Challenged with overhead activities, reaching forward and reaching behind his back. Body Structures Involved: 1. Thoracic Cage 2. Joints 3. Muscles Body Functions Affected: 1. Sensory/Pain 2. Neuromusculoskeletal 
3. Movement Related Activities and Participation Affected: 1. General Tasks and Demands 2. Mobility 3. Self Care 4. Community, Social and [place] Number of elements (examined above) that affect the Plan of Care: 3: MODERATE COMPLEXITY CLINICAL PRESENTATION:  
Presentation: Evolving clinical presentation with changing clinical characteristics: MODERATE COMPLEXITY CLINICAL DECISION MAKING:  
Outcome Measure: Tool Used: Disabilities of the Arm, Shoulder and Hand (DASH) Questionnaire - Quick Version Score:  Initial: 20/55  Most Recent: 18/55 (Date: 2-18-19 ) Interpretation of Score: The DASH is designed to measure the activities of daily living in person's with upper extremity dysfunction or pain. Each section is scored on a 1-5 scale, 5 representing the greatest disability. The scores of each section are added together for a total score of 55. Medical Necessity:  
· Patient is expected to demonstrate progress in strength, range of motion, balance, coordination and functional technique to increase independence with ADLs . Reason for Services/Other Comments: 
· Patient continues to require skilled intervention due to challenged with performing daily tasks secondary to pain and stiffness in his right shoulder. .  
Use of outcome tool(s) and clinical judgement create a POC that gives a: Questionable prediction of patient's progress: MODERATE COMPLEXITY  
  
 
 
 
TREATMENT:  
(In addition to Assessment/Re-Assessment sessions the following treatments were rendered) Pre-treatment Symptoms/Complaints:  Patient notes very busy time of year and continues to deal with daily stress. Due to amount of things going on in his life, he would like to take a break from therapy. He continues to have right shoulder pain, however less intensity noted. Pain: Initial:  
Pain Intensity 1: 4 Pain Location 1: Shoulder Pain Orientation 1: Right  Post Session:  3/10 Therapeutic Exercise: (15 Minutes):  Exercises per grid below to improve mobility, strength, balance and coordination.   Required moderate verbal and manual cues to promote proper body alignment, promote proper body posture, promote proper body mechanics and promote proper body breathing techniques. Progressed resistance, range, repetitions and complexity of movement as indicated. Date: 
2/25/2019 Activity/Exercise Parameters Patient education X 5 minutes review of HEP Kinesiotaping Standing pivot prone X 10 reps, 5 sec holds Bilateral shoulder ER X 10 reps 5 sec holds Side lying arm circles X 5 reps clockwise and counterclockwise Prone bilateral posterior depression/shoulder extension X 10 reps, 10 sec holds Standing shoulder extension X 15 reps, 5 sec hold, black t-band Prone T Prone prop, scapular stabilization Side lying shoulder ER and abduction Standing column Quadriped arch/sag Standing UE neutral at 90 degrees and hold X 10 reps 5 sec holds black t-band Standing flexion and abduction X 10 reps, 5 second holds Diagonals X 20 reps, 4 diagonal PNF shoulder patterns, red band Manual Therapy (    Soft Tissue Mobilization Duration Duration: 25 Minutes): Manual techniques to facilitate improved motion and decreased pain. (Used abbreviations: MET - muscle energy technique; PNF - proprioceptive neuromuscular facilitation; NMR - neuromuscular re-education; a/p - anterior to posterior; p/a - posterior to anterior) · Supine anterior chest superficial fascia, along bony contours of right clavicle and coracoid process. · Supine soft tissue mobilization to lateral right shoulder, right upper trapezius and posterior shoulder. · Supine mobilization to right shoulder with belt: posterior translation/glide with resistance, supine gapping of GH, supine caudal translation of head of humerus,  flexion adduction/posterior shear · Prone soft tissue mobilization to right scapula, upper trapezius and levator scapula.   
· Side lying left for right PNF scapular patterns into anterior elevation and posterior depression, followed with prolonged holds and NMR · Seated MET left rotation T1-2, followed with manual resistance of left cervical rotation. [employer] Portal 
Treatment/Session Assessment:   
· Response to Treatment: decreased soft tissue restrictions and improved right shoulder ROM, continues to demonstrates weakness and fatigue with strengthening exercises. Patient will work independently over the next month. · Compliance with Program/Exercises: Compliant all of the time. · Recommendations/Intent for next treatment session: \"Next visit will focus on advancements to more challenging activities\". Will plan to discharge in one month if patient does not schedule any further visits. Total BillableTreatment Duration: 40 minutes PT Patient Time In/Time Out Time In: 1130 Time Out: 9962 Sebastian Farley, KASSY Future Appointments Date Time Provider Roberto Dhillon 3/25/2019  9:00 AM Marco Antonio Hendrix MD [facility]

## 2019-04-05 ENCOUNTER — HOSPITAL ENCOUNTER (OUTPATIENT)
Dept: GENERAL RADIOLOGY | Age: 64
Discharge: HOME | End: 2019-04-05
Payer: COMMERCIAL

## 2019-04-05 DIAGNOSIS — M25.511 ACUTE PAIN OF RIGHT SHOULDER: ICD-10-CM

## 2019-04-05 DIAGNOSIS — M25.611 DECREASED ROM OF RIGHT SHOULDER: ICD-10-CM

## 2019-04-05 PROCEDURE — 73030 X-RAY EXAM OF SHOULDER: CPT

## 2019-04-24 NOTE — PROGRESS NOTES
Kath Mace : 1955 Primary: [facility] Secondary:  Therapy Center at [address] [phone]   [phone] OUTPATIENT PHYSICAL THERAPY:Discontinuation Summary 19 ICD-10: Treatment Diagnosis: pain in joint, right shoulder M25.511 ICD-10: Treatment Diagnosis: cervicalgia M54.2 ICD-10: Treatment Diagnosis: thoracic spine pain M54.6 Precautions/Allergies:  
Patient has no known allergies. MD Orders: evaluate and treat MEDICAL/REFERRING DIAGNOSIS: 
Shoulder pain [M25.519] DATE OF ONSET: 2018 REFERRING PHYSICIAN: Gordo Cabrera MD 
RETURN PHYSICIAN APPOINTMENT: as needed DISCONTINUATION NOTE: 19:  Mr. Kiersten Valadez attended 11 physical therapy sessions between 19 - 19. He discontinued therapy services secondary to limited improvements, increased stress levels with personal matters and non-compliant with his home exercise program. He met some of his physical therapy goals, however did not meet all. Per email conversation, he is having an MRI and consult with an orthopedist. He will be discharged from therapy at this time. PROGRESS NOTE: 19: Mr. Kiersten Valadez has attended 10 physical therapy sessions, he demonstrates improved mobility in his right shoulder and decreased soft tissue restrictions. Patient has also noted a lot of decreased stress and tension in his personal life which has decreased his overall symptoms. He feels comfortable to continue to work on his HEP independently. We have one more scheduled visit and will plan to discharge him to his home program after that visit. INITIAL ASSESSMENT:  Mr. Kiersten Valadez is a 71 y. o.male who presents to physical therapy with insidious onset of right shoulder pain since 2018.  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 He demonstrates strength and ROM deficits of his right shoulder. He would benefit from skilled physical therapy to improve overall mobility of his right shoulder. TREATMENT PLAN: 
Effective Dates: 12/20/2018 TO 3/20/2019 (90 days). Frequency/Duration: 1 time a week for 90 Day(s) GOALS: (Goals have been discussed and agreed upon with patient.) Discharge Goals: Time Frame: 90 days 1. Patient demonstrates independence of his HEP without verbal cueing from therapist. GOAL MET 2. Patient demonstrates full AROM of right shoulder without onset of pain to perform ADLs ALMOST MET 3. Able to ride his bike for 30 miles without onset of right shoulder pain. ALMOST MET 4. Improve DASH score from 20/55 to 13/55 to perform ADLs - ALMOST MET Thank you for this referral, Theresa Florence, PT    
  
    
 
 
 
EXAMINATION:  
Observation/Orthostatic Postural Assessment:   
       Shoulder girdles are right elevated and protracted. Scapular position is right elevated. Clavicles are right elevated. Soft tissue observations indicates restrictions in anterior chest, pectoralis major/minor, scalenes, upper trapezius, levator scapula, infraspinatus and subscapularis, right. Patient exhibits a decreased cervical lordosis,  Slight increased thoracic kyphosis. Palpation:  Myofascial assessment indicates restrictions in anterior chest. Muscle length testing levator scapulae with ipsilateral arm abduction is restrictions right. Upper trapezius length is restricted right. Pectoralis minor/major and latissimus dorsi length is restricted bilaterally. Scalene muscle length is restricted right. ROM: Gross active cervical spine rotation is 90% available bilaterally. Apley's scratch test for functional ER/IR is restrictions right, ER to C7 and IR to right iliac crest. Passive Accessory Motion: Glenohumeral mobility is restricted right for inferior and posterior glides. Acromioclavicular mobility is restricted right. Sternoclavicular mobility is restricted right. AROM(PROM) in degrees Right Left Shoulder flexion 130 (150) 160 Shoulder abduction (palm down) 80 140 Shoulder extension 10 20 Shoulder internal rotation (IR) 
(measured at 90 degrees of abduction) 20 45 ER (measured at 90 degrees of abduction) 35 50 Strength:  
Manual Muscle Test (*/5) Right Left Shoulder flexion 4 5 Shoulder extension 4 5 Shoulder abduction 4 5 Shoulder adduction 4 5 Shoulder ER 4 5 Shoulder IR 4 5 Upper trapezius 5 5 Middle trapezius 4- 5 Lower trapezius 4- 5 Rhomboids 4- 5 Serratus Anterior 4- 5 Elbow flexion 5 5 Elbow extension 5 5 Functional Mobility:  Challenged with overhead activities, reaching forward and reaching behind his back. CLINICAL DECISION MAKING:  
Outcome Measure: Tool Used: Disabilities of the Arm, Shoulder and Hand (DASH) Questionnaire - Quick Version Score:  Initial: 20/55  Most Recent: 18/55 (Date: 2-18-19 ) Interpretation of Score: The DASH is designed to measure the activities of daily living in person's with upper extremity dysfunction or pain. Each section is scored on a 1-5 scale, 5 representing the greatest disability. The scores of each section are added together for a total score of 55. Medical Necessity:  
· Patient is expected to demonstrate progress in strength, range of motion, balance, coordination and functional technique to increase independence with ADLs . demonstrated some improvements, however continues to experience daily pain and limited ROM

## 2019-06-18 ENCOUNTER — APPOINTMENT (RX ONLY)
Dept: URBAN - METROPOLITAN AREA CLINIC 24 | Facility: CLINIC | Age: 64
Setting detail: DERMATOLOGY
End: 2019-06-18

## 2019-06-18 DIAGNOSIS — M71 OTHER BURSOPATHIES: ICD-10-CM

## 2019-06-18 DIAGNOSIS — L72.8 OTHER FOLLICULAR CYSTS OF THE SKIN AND SUBCUTANEOUS TISSUE: ICD-10-CM

## 2019-06-18 PROBLEM — M71.342 OTHER BURSAL CYST, LEFT HAND: Status: ACTIVE | Noted: 2019-06-18

## 2019-06-18 PROCEDURE — ? DEFER

## 2019-06-18 PROCEDURE — 99201: CPT

## 2019-06-18 PROCEDURE — ? COUNSELING

## 2019-06-18 ASSESSMENT — LOCATION DETAILED DESCRIPTION DERM
LOCATION DETAILED: LEFT SMALL DISTAL INTERPHALANGEAL JOINT
LOCATION DETAILED: LEFT LATERAL UPPER BACK

## 2019-06-18 ASSESSMENT — LOCATION SIMPLE DESCRIPTION DERM
LOCATION SIMPLE: LEFT SMALL FINGER
LOCATION SIMPLE: LEFT UPPER BACK

## 2019-06-18 ASSESSMENT — LOCATION ZONE DERM
LOCATION ZONE: TRUNK
LOCATION ZONE: FINGER

## 2019-07-24 ENCOUNTER — APPOINTMENT (RX ONLY)
Dept: URBAN - METROPOLITAN AREA CLINIC 23 | Facility: CLINIC | Age: 64
Setting detail: DERMATOLOGY
End: 2019-07-24

## 2019-08-06 ENCOUNTER — APPOINTMENT (RX ONLY)
Dept: URBAN - METROPOLITAN AREA CLINIC 24 | Facility: CLINIC | Age: 64
Setting detail: DERMATOLOGY
End: 2019-08-06

## 2019-08-06 DIAGNOSIS — L72.8 OTHER FOLLICULAR CYSTS OF THE SKIN AND SUBCUTANEOUS TISSUE: ICD-10-CM

## 2019-08-06 PROCEDURE — A4550 SURGICAL TRAYS: HCPCS

## 2019-08-06 PROCEDURE — ? EXCISION

## 2019-08-06 PROCEDURE — 12032 INTMD RPR S/A/T/EXT 2.6-7.5: CPT

## 2019-08-06 PROCEDURE — 11402 EXC TR-EXT B9+MARG 1.1-2 CM: CPT

## 2019-08-06 ASSESSMENT — LOCATION DETAILED DESCRIPTION DERM: LOCATION DETAILED: LEFT SUPERIOR LATERAL UPPER BACK

## 2019-08-06 ASSESSMENT — LOCATION ZONE DERM: LOCATION ZONE: TRUNK

## 2019-08-06 ASSESSMENT — LOCATION SIMPLE DESCRIPTION DERM: LOCATION SIMPLE: LEFT UPPER BACK

## 2019-08-06 NOTE — PROCEDURE: EXCISION
Lazy S Complex Repair Preamble Text (Leave Blank If You Do Not Want): Extensive wide undermining was performed.
V-Y Flap Text: The defect edges were debeveled with a #15 scalpel blade.  Given the location of the defect, shape of the defect and the proximity to free margins a V-Y flap was deemed most appropriate.  Using a sterile surgical marker, an appropriate advancement flap was drawn incorporating the defect and placing the expected incisions within the relaxed skin tension lines where possible.    The area thus outlined was incised deep to adipose tissue with a #15 scalpel blade.  The skin margins were undermined to an appropriate distance in all directions utilizing iris scissors.
Medical Necessity Clause: This procedure was medically necessary because the lesion that was treated was:
Melolabial Transposition Flap Text: The defect edges were debeveled with a #15 scalpel blade.  Given the location of the defect and the proximity to free margins a melolabial flap was deemed most appropriate.  Using a sterile surgical marker, an appropriate melolabial transposition flap was drawn incorporating the defect.    The area thus outlined was incised deep to adipose tissue with a #15 scalpel blade.  The skin margins were undermined to an appropriate distance in all directions utilizing iris scissors.
Tissue Cultured Epidermal Autograft Text: The defect edges were debeveled with a #15 scalpel blade.  Given the location of the defect, shape of the defect and the proximity to free margins a tissue cultured epidermal autograft was deemed most appropriate.  The graft was then trimmed to fit the size of the defect.  The graft was then placed in the primary defect and oriented appropriately.
Complex Repair And Split-Thickness Skin Graft Text: The defect edges were debeveled with a #15 scalpel blade.  The primary defect was closed partially with a complex linear closure.  Given the location of the defect, shape of the defect and the proximity to free margins a split thickness skin graft was deemed most appropriate to repair the remaining defect.  The graft was trimmed to fit the size of the remaining defect.  The graft was then placed in the primary defect, oriented appropriately, and sutured into place.
Rotation Flap Text: The defect edges were debeveled with a #15 scalpel blade.  Given the location of the defect, shape of the defect and the proximity to free margins a rotation flap was deemed most appropriate.  Using a sterile surgical marker, an appropriate rotation flap was drawn incorporating the defect and placing the expected incisions within the relaxed skin tension lines where possible.    The area thus outlined was incised deep to adipose tissue with a #15 scalpel blade.  The skin margins were undermined to an appropriate distance in all directions utilizing iris scissors.
O-T Advancement Flap Text: The defect edges were debeveled with a #15 scalpel blade.  Given the location of the defect, shape of the defect and the proximity to free margins an O-T advancement flap was deemed most appropriate.  Using a sterile surgical marker, an appropriate advancement flap was drawn incorporating the defect and placing the expected incisions within the relaxed skin tension lines where possible.    The area thus outlined was incised deep to adipose tissue with a #15 scalpel blade.  The skin margins were undermined to an appropriate distance in all directions utilizing iris scissors.
Spiral Flap Text: The defect edges were debeveled with a #15 scalpel blade.  Given the location of the defect, shape of the defect and the proximity to free margins a spiral flap was deemed most appropriate.  Using a sterile surgical marker, an appropriate rotation flap was drawn incorporating the defect and placing the expected incisions within the relaxed skin tension lines where possible. The area thus outlined was incised deep to adipose tissue with a #15 scalpel blade.  The skin margins were undermined to an appropriate distance in all directions utilizing iris scissors.
Render Path Notes In Note?: no
Lazy S Intermediate Repair Preamble Text (Leave Blank If You Do Not Want): Undermining was performed with blunt dissection.
Dermal Autograft Text: The defect edges were debeveled with a #15 scalpel blade.  Given the location of the defect, shape of the defect and the proximity to free margins a dermal autograft was deemed most appropriate.  Using a sterile surgical marker, the primary defect shape was transferred to the donor site. The area thus outlined was incised deep to adipose tissue with a #15 scalpel blade.  The harvested graft was then trimmed of adipose and epidermal tissue until only dermis was left.  The skin graft was then placed in the primary defect and oriented appropriately.
Composite Graft Text: The defect edges were debeveled with a #15 scalpel blade.  Given the location of the defect, shape of the defect, the proximity to free margins and the fact the defect was full thickness a composite graft was deemed most appropriate.  The defect was outline and then transferred to the donor site.  A full thickness graft was then excised from the donor site. The graft was then placed in the primary defect, oriented appropriately and then sutured into place.  The secondary defect was then repaired using a primary closure.
X Size Of Lesion In Cm (Optional): 0
Medical Necessity Information: It is in your best interest to select a reason for this procedure from the list below. All of these items fulfill various CMS LCD requirements except lesion extends to a margin.
S Plasty Text: Given the location and shape of the defect, and the orientation of relaxed skin tension lines, an S-plasty was deemed most appropriate for repair.  Using a sterile surgical marker, the appropriate outline of the S-plasty was drawn, incorporating the defect and placing the expected incisions within the relaxed skin tension lines where possible.  The area thus outlined was incised deep to adipose tissue with a #15 scalpel blade.  The skin margins were undermined to an appropriate distance in all directions utilizing iris scissors. The skin flaps were advanced over the defect.  The opposing margins were then approximated with interrupted buried subcutaneous sutures.
Accession #: [id number]
Complex Repair And Double M Plasty Text: The defect edges were debeveled with a #15 scalpel blade.  The primary defect was closed partially with a complex linear closure.  Given the location of the remaining defect, shape of the defect and the proximity to free margins a double M plasty was deemed most appropriate for complete closure of the defect.  Using a sterile surgical marker, an appropriate advancement flap was drawn incorporating the defect and placing the expected incisions within the relaxed skin tension lines where possible.    The area thus outlined was incised deep to adipose tissue with a #15 scalpel blade.  The skin margins were undermined to an appropriate distance in all directions utilizing iris scissors.
Skin Substitute Text: The defect edges were debeveled with a #15 scalpel blade.  Given the location of the defect, shape of the defect and the proximity to free margins a skin substitute graft was deemed most appropriate.  The graft material was trimmed to fit the size of the defect. The graft was then placed in the primary defect and oriented appropriately.
Complex Repair And Transposition Flap Text: The defect edges were debeveled with a #15 scalpel blade.  The primary defect was closed partially with a complex linear closure.  Given the location of the remaining defect, shape of the defect and the proximity to free margins a transposition flap was deemed most appropriate for complete closure of the defect.  Using a sterile surgical marker, an appropriate advancement flap was drawn incorporating the defect and placing the expected incisions within the relaxed skin tension lines where possible.    The area thus outlined was incised deep to adipose tissue with a #15 scalpel blade.  The skin margins were undermined to an appropriate distance in all directions utilizing iris scissors.
Hemostasis: Electrocautery
Detail Level: Detailed
Cartilage Graft Text: The defect edges were debeveled with a #15 scalpel blade.  Given the location of the defect, shape of the defect, the fact the defect involved a full thickness cartilage defect a cartilage graft was deemed most appropriate.  An appropriate donor site was identified, cleansed, and anesthetized. The cartilage graft was then harvested and transferred to the recipient site, oriented appropriately and then sutured into place.  The secondary defect was then repaired using a primary closure.
Banner Transposition Flap Text: The defect edges were debeveled with a #15 scalpel blade.  Given the location of the defect and the proximity to free margins a Banner transposition flap was deemed most appropriate.  Using a sterile surgical marker, an appropriate flap drawn around the defect. The area thus outlined was incised deep to adipose tissue with a #15 scalpel blade.  The skin margins were undermined to an appropriate distance in all directions utilizing iris scissors.
Muscle Hinge Flap Text: The defect edges were debeveled with a #15 scalpel blade.  Given the size, depth and location of the defect and the proximity to free margins a muscle hinge flap was deemed most appropriate.  Using a sterile surgical marker, an appropriate hinge flap was drawn incorporating the defect. The area thus outlined was incised with a #15 scalpel blade.  The skin margins were undermined to an appropriate distance in all directions utilizing iris scissors.
Complex Repair And Tissue Cultured Epidermal Autograft Text: The defect edges were debeveled with a #15 scalpel blade.  The primary defect was closed partially with a complex linear closure.  Given the location of the defect, shape of the defect and the proximity to free margins an tissue cultured epidermal autograft was deemed most appropriate to repair the remaining defect.  The graft was trimmed to fit the size of the remaining defect.  The graft was then placed in the primary defect, oriented appropriately, and sutured into place.
Advancement Flap (Single) Text: The defect edges were debeveled with a #15 scalpel blade.  Given the location of the defect and the proximity to free margins a single advancement flap was deemed most appropriate.  Using a sterile surgical marker, an appropriate advancement flap was drawn incorporating the defect and placing the expected incisions within the relaxed skin tension lines where possible.    The area thus outlined was incised deep to adipose tissue with a #15 scalpel blade.  The skin margins were undermined to an appropriate distance in all directions utilizing iris scissors.
Fusiform Excision Additional Text (Leave Blank If You Do Not Want): The margin was drawn around the clinically apparent lesion.  A fusiform shape was then drawn on the skin incorporating the lesion and margins.  Incisions were then made along these lines to the appropriate tissue plane and the lesion was extirpated.
Deep Sutures: 4-0 Vicryl
Rhomboid Transposition Flap Text: The defect edges were debeveled with a #15 scalpel blade.  Given the location of the defect and the proximity to free margins a rhomboid transposition flap was deemed most appropriate.  Using a sterile surgical marker, an appropriate rhomboid flap was drawn incorporating the defect.    The area thus outlined was incised deep to adipose tissue with a #15 scalpel blade.  The skin margins were undermined to an appropriate distance in all directions utilizing iris scissors.
O-Z Flap Text: The defect edges were debeveled with a #15 scalpel blade.  Given the location of the defect, shape of the defect and the proximity to free margins an O-Z flap was deemed most appropriate.  Using a sterile surgical marker, an appropriate transposition flap was drawn incorporating the defect and placing the expected incisions within the relaxed skin tension lines where possible. The area thus outlined was incised deep to adipose tissue with a #15 scalpel blade.  The skin margins were undermined to an appropriate distance in all directions utilizing iris scissors.
Mastoid Interpolation Flap Text: A decision was made to reconstruct the defect utilizing an interpolation axial flap and a staged reconstruction.  A telfa template was made of the defect.  This telfa template was then used to outline the mastoid interpolation flap.  The donor area for the pedicle flap was then injected with anesthesia.  The flap was excised through the skin and subcutaneous tissue down to the layer of the underlying musculature.  The pedicle flap was carefully excised within this deep plane to maintain its blood supply.  The edges of the donor site were undermined.   The donor site was closed in a primary fashion.  The pedicle was then rotated into position and sutured.  Once the tube was sutured into place, adequate blood supply was confirmed with blanching and refill.  The pedicle was then wrapped with xeroform gauze and dressed appropriately with a telfa and gauze bandage to ensure continued blood supply and protect the attached pedicle.
Show Primary And Secondary Defect Sizes Variable (Do Not Hide If You Perform Flaps Or Graft Closures): Yes
Bilobed Flap Text: The defect edges were debeveled with a #15 scalpel blade.  Given the location of the defect and the proximity to free margins a bilobe flap was deemed most appropriate.  Using a sterile surgical marker, an appropriate bilobe flap drawn around the defect.    The area thus outlined was incised deep to adipose tissue with a #15 scalpel blade.  The skin margins were undermined to an appropriate distance in all directions utilizing iris scissors.
Hatchet Flap Text: The defect edges were debeveled with a #15 scalpel blade.  Given the location of the defect, shape of the defect and the proximity to free margins a hatchet flap was deemed most appropriate.  Using a sterile surgical marker, an appropriate hatchet flap was drawn incorporating the defect and placing the expected incisions within the relaxed skin tension lines where possible.    The area thus outlined was incised deep to adipose tissue with a #15 scalpel blade.  The skin margins were undermined to an appropriate distance in all directions utilizing iris scissors.
Complex Repair And M Plasty Text: The defect edges were debeveled with a #15 scalpel blade.  The primary defect was closed partially with a complex linear closure.  Given the location of the remaining defect, shape of the defect and the proximity to free margins an M plasty was deemed most appropriate for complete closure of the defect.  Using a sterile surgical marker, an appropriate advancement flap was drawn incorporating the defect and placing the expected incisions within the relaxed skin tension lines where possible.    The area thus outlined was incised deep to adipose tissue with a #15 scalpel blade.  The skin margins were undermined to an appropriate distance in all directions utilizing iris scissors.
Slit Excision Additional Text (Leave Blank If You Do Not Want): A linear line was drawn on the skin overlying the lesion. An incision was made slowly until the lesion was visualized.  Once visualized, the lesion was removed with blunt dissection.
Paramedian Forehead Flap Text: A decision was made to reconstruct the defect utilizing an interpolation axial flap and a staged reconstruction.  A telfa template was made of the defect.  This telfa template was then used to outline the paramedian forehead pedicle flap.  The donor area for the pedicle flap was then injected with anesthesia.  The flap was excised through the skin and subcutaneous tissue down to the layer of the underlying musculature.  The pedicle flap was carefully excised within this deep plane to maintain its blood supply.  The edges of the donor site were undermined.   The donor site was closed in a primary fashion.  The pedicle was then rotated into position and sutured.  Once the tube was sutured into place, adequate blood supply was confirmed with blanching and refill.  The pedicle was then wrapped with xeroform gauze and dressed appropriately with a telfa and gauze bandage to ensure continued blood supply and protect the attached pedicle.
Ftsg Text: The defect edges were debeveled with a #15 scalpel blade.  Given the location of the defect, shape of the defect and the proximity to free margins a full thickness skin graft was deemed most appropriate.  Using a sterile surgical marker, the primary defect shape was transferred to the donor site. The area thus outlined was incised deep to adipose tissue with a #15 scalpel blade.  The harvested graft was then trimmed of adipose tissue until only dermis and epidermis was left.  The skin margins of the secondary defect were undermined to an appropriate distance in all directions utilizing iris scissors.  The secondary defect was closed with interrupted buried subcutaneous sutures.  The skin edges were then re-apposed with running  sutures.  The skin graft was then placed in the primary defect and oriented appropriately.
Complex Repair And Single Advancement Flap Text: The defect edges were debeveled with a #15 scalpel blade.  The primary defect was closed partially with a complex linear closure.  Given the location of the remaining defect, shape of the defect and the proximity to free margins a single advancement flap was deemed most appropriate for complete closure of the defect.  Using a sterile surgical marker, an appropriate advancement flap was drawn incorporating the defect and placing the expected incisions within the relaxed skin tension lines where possible.    The area thus outlined was incised deep to adipose tissue with a #15 scalpel blade.  The skin margins were undermined to an appropriate distance in all directions utilizing iris scissors.
A-T Advancement Flap Text: The defect edges were debeveled with a #15 scalpel blade.  Given the location of the defect, shape of the defect and the proximity to free margins an A-T advancement flap was deemed most appropriate.  Using a sterile surgical marker, an appropriate advancement flap was drawn incorporating the defect and placing the expected incisions within the relaxed skin tension lines where possible.    The area thus outlined was incised deep to adipose tissue with a #15 scalpel blade.  The skin margins were undermined to an appropriate distance in all directions utilizing iris scissors.
Complex Repair And Skin Substitute Graft Text: The defect edges were debeveled with a #15 scalpel blade.  The primary defect was closed partially with a complex linear closure.  Given the location of the remaining defect, shape of the defect and the proximity to free margins a skin substitute graft was deemed most appropriate to repair the remaining defect.  The graft was trimmed to fit the size of the remaining defect.  The graft was then placed in the primary defect, oriented appropriately, and sutured into place.
Transposition Flap Text: The defect edges were debeveled with a #15 scalpel blade.  Given the location of the defect and the proximity to free margins a transposition flap was deemed most appropriate.  Using a sterile surgical marker, an appropriate transposition flap was drawn incorporating the defect.    The area thus outlined was incised deep to adipose tissue with a #15 scalpel blade.  The skin margins were undermined to an appropriate distance in all directions utilizing iris scissors.
Double Island Pedicle Flap Text: The defect edges were debeveled with a #15 scalpel blade.  Given the location of the defect, shape of the defect and the proximity to free margins a double island pedicle advancement flap was deemed most appropriate.  Using a sterile surgical marker, an appropriate advancement flap was drawn incorporating the defect, outlining the appropriate donor tissue and placing the expected incisions within the relaxed skin tension lines where possible.    The area thus outlined was incised deep to adipose tissue with a #15 scalpel blade.  The skin margins were undermined to an appropriate distance in all directions around the primary defect and laterally outward around the island pedicle utilizing iris scissors.  There was minimal undermining beneath the pedicle flap.
Anesthesia Type: 1% lidocaine with epinephrine
Double O-Z Plasty Text: The defect edges were debeveled with a #15 scalpel blade.  Given the location of the defect, shape of the defect and the proximity to free margins a Double O-Z plasty (double transposition flap) was deemed most appropriate.  Using a sterile surgical marker, the appropriate transposition flaps were drawn incorporating the defect and placing the expected incisions within the relaxed skin tension lines where possible. The area thus outlined was incised deep to adipose tissue with a #15 scalpel blade.  The skin margins were undermined to an appropriate distance in all directions utilizing iris scissors.  Hemostasis was achieved with electrocautery.  The flaps were then transposed into place, one clockwise and the other counterclockwise, and anchored with interrupted buried subcutaneous sutures.
Consent was obtained from the patient. The risks and benefits to therapy were discussed in detail. Specifically, the risks of infection, scarring, bleeding, prolonged wound healing, incomplete removal, allergy to anesthesia, nerve injury and recurrence were addressed. Prior to the procedure, the treatment site was clearly identified and confirmed by the patient.
Complex Repair And Bilobe Flap Text: The defect edges were debeveled with a #15 scalpel blade.  The primary defect was closed partially with a complex linear closure.  Given the location of the remaining defect, shape of the defect and the proximity to free margins a bilobe flap was deemed most appropriate for complete closure of the defect.  Using a sterile surgical marker, an appropriate advancement flap was drawn incorporating the defect and placing the expected incisions within the relaxed skin tension lines where possible.    The area thus outlined was incised deep to adipose tissue with a #15 scalpel blade.  The skin margins were undermined to an appropriate distance in all directions utilizing iris scissors.
Island Pedicle Flap-Requiring Vessel Identification Text: The defect edges were debeveled with a #15 scalpel blade.  Given the location of the defect, shape of the defect and the proximity to free margins an island pedicle advancement flap was deemed most appropriate.  Using a sterile surgical marker, an appropriate advancement flap was drawn, based on the axial vessel mentioned above, incorporating the defect, outlining the appropriate donor tissue and placing the expected incisions within the relaxed skin tension lines where possible.    The area thus outlined was incised deep to adipose tissue with a #15 scalpel blade.  The skin margins were undermined to an appropriate distance in all directions around the primary defect and laterally outward around the island pedicle utilizing iris scissors.  There was minimal undermining beneath the pedicle flap.
Modified Advancement Flap Text: The defect edges were debeveled with a #15 scalpel blade.  Given the location of the defect, shape of the defect and the proximity to free margins a modified advancement flap was deemed most appropriate.  Using a sterile surgical marker, an appropriate advancement flap was drawn incorporating the defect and placing the expected incisions within the relaxed skin tension lines where possible.    The area thus outlined was incised deep to adipose tissue with a #15 scalpel blade.  The skin margins were undermined to an appropriate distance in all directions utilizing iris scissors.
Dorsal Nasal Flap Text: The defect edges were debeveled with a #15 scalpel blade.  Given the location of the defect and the proximity to free margins a dorsal nasal flap was deemed most appropriate.  Using a sterile surgical marker, an appropriate dorsal nasal flap was drawn around the defect.    The area thus outlined was incised deep to adipose tissue with a #15 scalpel blade.  The skin margins were undermined to an appropriate distance in all directions utilizing iris scissors.
Size Of Lesion In Cm: 0.9
Posterior Auricular Interpolation Flap Text: A decision was made to reconstruct the defect utilizing an interpolation axial flap and a staged reconstruction.  A telfa template was made of the defect.  This telfa template was then used to outline the posterior auricular interpolation flap.  The donor area for the pedicle flap was then injected with anesthesia.  The flap was excised through the skin and subcutaneous tissue down to the layer of the underlying musculature.  The pedicle flap was carefully excised within this deep plane to maintain its blood supply.  The edges of the donor site were undermined.   The donor site was closed in a primary fashion.  The pedicle was then rotated into position and sutured.  Once the tube was sutured into place, adequate blood supply was confirmed with blanching and refill.  The pedicle was then wrapped with xeroform gauze and dressed appropriately with a telfa and gauze bandage to ensure continued blood supply and protect the attached pedicle.
V-Y Plasty Text: The defect edges were debeveled with a #15 scalpel blade.  Given the location of the defect, shape of the defect and the proximity to free margins an V-Y advancement flap was deemed most appropriate.  Using a sterile surgical marker, an appropriate advancement flap was drawn incorporating the defect and placing the expected incisions within the relaxed skin tension lines where possible.    The area thus outlined was incised deep to adipose tissue with a #15 scalpel blade.  The skin margins were undermined to an appropriate distance in all directions utilizing iris scissors.
Complex Repair And Rotation Flap Text: The defect edges were debeveled with a #15 scalpel blade.  The primary defect was closed partially with a complex linear closure.  Given the location of the remaining defect, shape of the defect and the proximity to free margins a rotation flap was deemed most appropriate for complete closure of the defect.  Using a sterile surgical marker, an appropriate advancement flap was drawn incorporating the defect and placing the expected incisions within the relaxed skin tension lines where possible.    The area thus outlined was incised deep to adipose tissue with a #15 scalpel blade.  The skin margins were undermined to an appropriate distance in all directions utilizing iris scissors.
O-L Flap Text: The defect edges were debeveled with a #15 scalpel blade.  Given the location of the defect, shape of the defect and the proximity to free margins an O-L flap was deemed most appropriate.  Using a sterile surgical marker, an appropriate advancement flap was drawn incorporating the defect and placing the expected incisions within the relaxed skin tension lines where possible.    The area thus outlined was incised deep to adipose tissue with a #15 scalpel blade.  The skin margins were undermined to an appropriate distance in all directions utilizing iris scissors.
Complex Repair And Melolabial Flap Text: The defect edges were debeveled with a #15 scalpel blade.  The primary defect was closed partially with a complex linear closure.  Given the location of the remaining defect, shape of the defect and the proximity to free margins a melolabial flap was deemed most appropriate for complete closure of the defect.  Using a sterile surgical marker, an appropriate advancement flap was drawn incorporating the defect and placing the expected incisions within the relaxed skin tension lines where possible.    The area thus outlined was incised deep to adipose tissue with a #15 scalpel blade.  The skin margins were undermined to an appropriate distance in all directions utilizing iris scissors.
Advancement Flap (Double) Text: The defect edges were debeveled with a #15 scalpel blade.  Given the location of the defect and the proximity to free margins a double advancement flap was deemed most appropriate.  Using a sterile surgical marker, the appropriate advancement flaps were drawn incorporating the defect and placing the expected incisions within the relaxed skin tension lines where possible.    The area thus outlined was incised deep to adipose tissue with a #15 scalpel blade.  The skin margins were undermined to an appropriate distance in all directions utilizing iris scissors.
Complex Repair And Z Plasty Text: The defect edges were debeveled with a #15 scalpel blade.  The primary defect was closed partially with a complex linear closure.  Given the location of the remaining defect, shape of the defect and the proximity to free margins a Z plasty was deemed most appropriate for complete closure of the defect.  Using a sterile surgical marker, an appropriate advancement flap was drawn incorporating the defect and placing the expected incisions within the relaxed skin tension lines where possible.    The area thus outlined was incised deep to adipose tissue with a #15 scalpel blade.  The skin margins were undermined to an appropriate distance in all directions utilizing iris scissors.
Bilobed Transposition Flap Text: The defect edges were debeveled with a #15 scalpel blade.  Given the location of the defect and the proximity to free margins a bilobed transposition flap was deemed most appropriate.  Using a sterile surgical marker, an appropriate bilobe flap drawn around the defect.    The area thus outlined was incised deep to adipose tissue with a #15 scalpel blade.  The skin margins were undermined to an appropriate distance in all directions utilizing iris scissors.
Dressing: pressure dressing
Complex Repair And W Plasty Text: The defect edges were debeveled with a #15 scalpel blade.  The primary defect was closed partially with a complex linear closure.  Given the location of the remaining defect, shape of the defect and the proximity to free margins a W plasty was deemed most appropriate for complete closure of the defect.  Using a sterile surgical marker, an appropriate advancement flap was drawn incorporating the defect and placing the expected incisions within the relaxed skin tension lines where possible.    The area thus outlined was incised deep to adipose tissue with a #15 scalpel blade.  The skin margins were undermined to an appropriate distance in all directions utilizing iris scissors.
Complex Repair And Modified Advancement Flap Text: The defect edges were debeveled with a #15 scalpel blade.  The primary defect was closed partially with a complex linear closure.  Given the location of the remaining defect, shape of the defect and the proximity to free margins a modified advancement flap was deemed most appropriate for complete closure of the defect.  Using a sterile surgical marker, an appropriate advancement flap was drawn incorporating the defect and placing the expected incisions within the relaxed skin tension lines where possible.    The area thus outlined was incised deep to adipose tissue with a #15 scalpel blade.  The skin margins were undermined to an appropriate distance in all directions utilizing iris scissors.
Double O-Z Flap Text: The defect edges were debeveled with a #15 scalpel blade.  Given the location of the defect, shape of the defect and the proximity to free margins a Double O-Z flap was deemed most appropriate.  Using a sterile surgical marker, an appropriate transposition flap was drawn incorporating the defect and placing the expected incisions within the relaxed skin tension lines where possible. The area thus outlined was incised deep to adipose tissue with a #15 scalpel blade.  The skin margins were undermined to an appropriate distance in all directions utilizing iris scissors.
Complex Repair And Dermal Autograft Text: The defect edges were debeveled with a #15 scalpel blade.  The primary defect was closed partially with a complex linear closure.  Given the location of the defect, shape of the defect and the proximity to free margins an dermal autograft was deemed most appropriate to repair the remaining defect.  The graft was trimmed to fit the size of the remaining defect.  The graft was then placed in the primary defect, oriented appropriately, and sutured into place.
Graft Donor Site Bandage (Optional-Leave Blank If You Don't Want In Note): Steri-strips and a pressure bandage were applied to the donor site.
Cheek Interpolation Flap Text: A decision was made to reconstruct the defect utilizing an interpolation axial flap and a staged reconstruction.  A telfa template was made of the defect.  This telfa template was then used to outline the Cheek Interpolation flap.  The donor area for the pedicle flap was then injected with anesthesia.  The flap was excised through the skin and subcutaneous tissue down to the layer of the underlying musculature.  The interpolation flap was carefully excised within this deep plane to maintain its blood supply.  The edges of the donor site were undermined.   The donor site was closed in a primary fashion.  The pedicle was then rotated into position and sutured.  Once the tube was sutured into place, adequate blood supply was confirmed with blanching and refill.  The pedicle was then wrapped with xeroform gauze and dressed appropriately with a telfa and gauze bandage to ensure continued blood supply and protect the attached pedicle.
Mercedes Flap Text: The defect edges were debeveled with a #15 scalpel blade.  Given the location of the defect, shape of the defect and the proximity to free margins a Mercedes flap was deemed most appropriate.  Using a sterile surgical marker, an appropriate advancement flap was drawn incorporating the defect and placing the expected incisions within the relaxed skin tension lines where possible. The area thus outlined was incised deep to adipose tissue with a #15 scalpel blade.  The skin margins were undermined to an appropriate distance in all directions utilizing iris scissors.
Intermediate / Complex Repair - Final Wound Length In Cm: 3.4
Helical Rim Advancement Flap Text: The defect edges were debeveled with a #15 blade scalpel.  Given the location of the defect and the proximity to free margins (helical rim) a double helical rim advancement flap was deemed most appropriate.  Using a sterile surgical marker, the appropriate advancement flaps were drawn incorporating the defect and placing the expected incisions between the helical rim and antihelix where possible.  The area thus outlined was incised through and through with a #15 scalpel blade.  With a skin hook and iris scissors, the flaps were gently and sharply undermined and freed up.
Purse String (Simple) Text: Given the location of the defect and the characteristics of the surrounding skin a purse string simple closure was deemed most appropriate.  Undermining was performed circumferentially around the surgical defect.  A purse string suture was then placed and tightened.
Epidermal Sutures: 4-0 Prolene
Z Plasty Text: The lesion was extirpated to the level of the fat with a #15 scalpel blade.  Given the location of the defect, shape of the defect and the proximity to free margins a Z-plasty was deemed most appropriate for repair.  Using a sterile surgical marker, the appropriate transposition arms of the Z-plasty were drawn incorporating the defect and placing the expected incisions within the relaxed skin tension lines where possible.    The area thus outlined was incised deep to adipose tissue with a #15 scalpel blade.  The skin margins were undermined to an appropriate distance in all directions utilizing iris scissors.  The opposing transposition arms were then transposed into place in opposite direction and anchored with interrupted buried subcutaneous sutures.
Chonodrocutaneous Helical Advancement Flap Text: The defect edges were debeveled with a #15 scalpel blade.  Given the location of the defect and the proximity to free margins a chondrocutaneous helical advancement flap was deemed most appropriate.  Using a sterile surgical marker, the appropriate advancement flap was drawn incorporating the defect and placing the expected incisions within the relaxed skin tension lines where possible.    The area thus outlined was incised deep to adipose tissue with a #15 scalpel blade.  The skin margins were undermined to an appropriate distance in all directions utilizing iris scissors.
Bilateral Helical Rim Advancement Flap Text: The defect edges were debeveled with a #15 blade scalpel.  Given the location of the defect and the proximity to free margins (helical rim) a bilateral helical rim advancement flap was deemed most appropriate.  Using a sterile surgical marker, the appropriate advancement flaps were drawn incorporating the defect and placing the expected incisions between the helical rim and antihelix where possible.  The area thus outlined was incised through and through with a #15 scalpel blade.  With a skin hook and iris scissors, the flaps were gently and sharply undermined and freed up.
O-T Plasty Text: The defect edges were debeveled with a #15 scalpel blade.  Given the location of the defect, shape of the defect and the proximity to free margins an O-T plasty was deemed most appropriate.  Using a sterile surgical marker, an appropriate O-T plasty was drawn incorporating the defect and placing the expected incisions within the relaxed skin tension lines where possible.    The area thus outlined was incised deep to adipose tissue with a #15 scalpel blade.  The skin margins were undermined to an appropriate distance in all directions utilizing iris scissors.
Star Wedge Flap Text: The defect edges were debeveled with a #15 scalpel blade.  Given the location of the defect, shape of the defect and the proximity to free margins a star wedge flap was deemed most appropriate.  Using a sterile surgical marker, an appropriate rotation flap was drawn incorporating the defect and placing the expected incisions within the relaxed skin tension lines where possible. The area thus outlined was incised deep to adipose tissue with a #15 scalpel blade.  The skin margins were undermined to an appropriate distance in all directions utilizing iris scissors.
H Plasty Text: Given the location of the defect, shape of the defect and the proximity to free margins a H-plasty was deemed most appropriate for repair.  Using a sterile surgical marker, the appropriate advancement arms of the H-plasty were drawn incorporating the defect and placing the expected incisions within the relaxed skin tension lines where possible. The area thus outlined was incised deep to adipose tissue with a #15 scalpel blade. The skin margins were undermined to an appropriate distance in all directions utilizing iris scissors.  The opposing advancement arms were then advanced into place in opposite direction and anchored with interrupted buried subcutaneous sutures.
Wound Care: Vaseline
Complex Repair And V-Y Plasty Text: The defect edges were debeveled with a #15 scalpel blade.  The primary defect was closed partially with a complex linear closure.  Given the location of the remaining defect, shape of the defect and the proximity to free margins a V-Y plasty was deemed most appropriate for complete closure of the defect.  Using a sterile surgical marker, an appropriate advancement flap was drawn incorporating the defect and placing the expected incisions within the relaxed skin tension lines where possible.    The area thus outlined was incised deep to adipose tissue with a #15 scalpel blade.  The skin margins were undermined to an appropriate distance in all directions utilizing iris scissors.
Alar Island Pedicle Flap Text: The defect edges were debeveled with a #15 scalpel blade.  Given the location of the defect, shape of the defect and the proximity to the alar rim an island pedicle advancement flap was deemed most appropriate.  Using a sterile surgical marker, an appropriate advancement flap was drawn incorporating the defect, outlining the appropriate donor tissue and placing the expected incisions within the nasal ala running parallel to the alar rim. The area thus outlined was incised with a #15 scalpel blade.  The skin margins were undermined minimally to an appropriate distance in all directions around the primary defect and laterally outward around the island pedicle utilizing iris scissors.  There was minimal undermining beneath the pedicle flap.
Complex Repair And Double Advancement Flap Text: The defect edges were debeveled with a #15 scalpel blade.  The primary defect was closed partially with a complex linear closure.  Given the location of the remaining defect, shape of the defect and the proximity to free margins a double advancement flap was deemed most appropriate for complete closure of the defect.  Using a sterile surgical marker, an appropriate advancement flap was drawn incorporating the defect and placing the expected incisions within the relaxed skin tension lines where possible.    The area thus outlined was incised deep to adipose tissue with a #15 scalpel blade.  The skin margins were undermined to an appropriate distance in all directions utilizing iris scissors.
Excision Depth: adipose tissue
Complex Repair And A-T Advancement Flap Text: The defect edges were debeveled with a #15 scalpel blade.  The primary defect was closed partially with a complex linear closure.  Given the location of the remaining defect, shape of the defect and the proximity to free margins an A-T advancement flap was deemed most appropriate for complete closure of the defect.  Using a sterile surgical marker, an appropriate advancement flap was drawn incorporating the defect and placing the expected incisions within the relaxed skin tension lines where possible.    The area thus outlined was incised deep to adipose tissue with a #15 scalpel blade.  The skin margins were undermined to an appropriate distance in all directions utilizing iris scissors.
Excisional Biopsy Additional Text (Leave Blank If You Do Not Want): The margin was drawn around the clinically apparent lesion.  An elliptical shape was then drawn on the skin incorporating the lesion and margins.  Incisions were then made along these lines to the appropriate tissue plane and the lesion was extirpated.
Island Pedicle Flap With Canthal Suspension Text: The defect edges were debeveled with a #15 scalpel blade.  Given the location of the defect, shape of the defect and the proximity to free margins an island pedicle advancement flap was deemed most appropriate.  Using a sterile surgical marker, an appropriate advancement flap was drawn incorporating the defect, outlining the appropriate donor tissue and placing the expected incisions within the relaxed skin tension lines where possible. The area thus outlined was incised deep to adipose tissue with a #15 scalpel blade.  The skin margins were undermined to an appropriate distance in all directions around the primary defect and laterally outward around the island pedicle utilizing iris scissors.  There was minimal undermining beneath the pedicle flap. A suspension suture was placed in the canthal tendon to prevent tension and prevent ectropion.
Path Notes (To The Dermatopathologist): Please check margins
Complex Repair And Epidermal Autograft Text: The defect edges were debeveled with a #15 scalpel blade.  The primary defect was closed partially with a complex linear closure.  Given the location of the defect, shape of the defect and the proximity to free margins an epidermal autograft was deemed most appropriate to repair the remaining defect.  The graft was trimmed to fit the size of the remaining defect.  The graft was then placed in the primary defect, oriented appropriately, and sutured into place.
Crescentic Advancement Flap Text: The defect edges were debeveled with a #15 scalpel blade.  Given the location of the defect and the proximity to free margins a crescentic advancement flap was deemed most appropriate.  Using a sterile surgical marker, the appropriate advancement flap was drawn incorporating the defect and placing the expected incisions within the relaxed skin tension lines where possible.    The area thus outlined was incised deep to adipose tissue with a #15 scalpel blade.  The skin margins were undermined to an appropriate distance in all directions utilizing iris scissors.
Complex Repair And Rhombic Flap Text: The defect edges were debeveled with a #15 scalpel blade.  The primary defect was closed partially with a complex linear closure.  Given the location of the remaining defect, shape of the defect and the proximity to free margins a rhombic flap was deemed most appropriate for complete closure of the defect.  Using a sterile surgical marker, an appropriate advancement flap was drawn incorporating the defect and placing the expected incisions within the relaxed skin tension lines where possible.    The area thus outlined was incised deep to adipose tissue with a #15 scalpel blade.  The skin margins were undermined to an appropriate distance in all directions utilizing iris scissors.
O-Z Plasty Text: The defect edges were debeveled with a #15 scalpel blade.  Given the location of the defect, shape of the defect and the proximity to free margins an O-Z plasty (double transposition flap) was deemed most appropriate.  Using a sterile surgical marker, the appropriate transposition flaps were drawn incorporating the defect and placing the expected incisions within the relaxed skin tension lines where possible.    The area thus outlined was incised deep to adipose tissue with a #15 scalpel blade.  The skin margins were undermined to an appropriate distance in all directions utilizing iris scissors.  Hemostasis was achieved with electrocautery.  The flaps were then transposed into place, one clockwise and the other counterclockwise, and anchored with interrupted buried subcutaneous sutures.
Repair Performed By Another Provider Text (Leave Blank If You Do Not Want): After the tissue was excised the defect was repaired by another provider.
Split-Thickness Skin Graft Text: The defect edges were debeveled with a #15 scalpel blade.  Given the location of the defect, shape of the defect and the proximity to free margins a split thickness skin graft was deemed most appropriate.  Using a sterile surgical marker, the primary defect shape was transferred to the donor site. The split thickness graft was then harvested.  The skin graft was then placed in the primary defect and oriented appropriately.
Information: Selecting Yes will display possible errors in your note based on the variables you have selected. This validation is only offered as a suggestion for you. PLEASE NOTE THAT THE VALIDATION TEXT WILL BE REMOVED WHEN YOU FINALIZE YOUR NOTE. IF YOU WANT TO FAX A PRELIMINARY NOTE YOU WILL NEED TO TOGGLE THIS TO 'NO' IF YOU DO NOT WANT IT IN YOUR FAXED NOTE.
Interpolation Flap Text: A decision was made to reconstruct the defect utilizing an interpolation axial flap and a staged reconstruction.  A telfa template was made of the defect.  This telfa template was then used to outline the interpolation flap.  The donor area for the pedicle flap was then injected with anesthesia.  The flap was excised through the skin and subcutaneous tissue down to the layer of the underlying musculature.  The interpolation flap was carefully excised within this deep plane to maintain its blood supply.  The edges of the donor site were undermined.   The donor site was closed in a primary fashion.  The pedicle was then rotated into position and sutured.  Once the tube was sutured into place, adequate blood supply was confirmed with blanching and refill.  The pedicle was then wrapped with xeroform gauze and dressed appropriately with a telfa and gauze bandage to ensure continued blood supply and protect the attached pedicle.
Complex Repair And Dorsal Nasal Flap Text: The defect edges were debeveled with a #15 scalpel blade.  The primary defect was closed partially with a complex linear closure.  Given the location of the remaining defect, shape of the defect and the proximity to free margins a dorsal nasal flap was deemed most appropriate for complete closure of the defect.  Using a sterile surgical marker, an appropriate flap was drawn incorporating the defect and placing the expected incisions within the relaxed skin tension lines where possible.    The area thus outlined was incised deep to adipose tissue with a #15 scalpel blade.  The skin margins were undermined to an appropriate distance in all directions utilizing iris scissors.
Perilesional Excision Additional Text (Leave Blank If You Do Not Want): The margin was drawn around the clinically apparent lesion. Incisions were then made along these lines to the appropriate tissue plane and the lesion was extirpated.
Burow's Advancement Flap Text: The defect edges were debeveled with a #15 scalpel blade.  Given the location of the defect and the proximity to free margins a Burow's advancement flap was deemed most appropriate.  Using a sterile surgical marker, the appropriate advancement flap was drawn incorporating the defect and placing the expected incisions within the relaxed skin tension lines where possible.    The area thus outlined was incised deep to adipose tissue with a #15 scalpel blade.  The skin margins were undermined to an appropriate distance in all directions utilizing iris scissors.
Xenograft Text: The defect edges were debeveled with a #15 scalpel blade.  Given the location of the defect, shape of the defect and the proximity to free margins a xenograft was deemed most appropriate.  The graft was then trimmed to fit the size of the defect.  The graft was then placed in the primary defect and oriented appropriately.
Epidermal Closure: running
Keystone Flap Text: The defect edges were debeveled with a #15 scalpel blade.  Given the location of the defect, shape of the defect a keystone flap was deemed most appropriate.  Using a sterile surgical marker, an appropriate keystone flap was drawn incorporating the defect, outlining the appropriate donor tissue and placing the expected incisions within the relaxed skin tension lines where possible. The area thus outlined was incised deep to adipose tissue with a #15 scalpel blade.  The skin margins were undermined to an appropriate distance in all directions around the primary defect and laterally outward around the flap utilizing iris scissors.
Complex Repair And O-T Advancement Flap Text: The defect edges were debeveled with a #15 scalpel blade.  The primary defect was closed partially with a complex linear closure.  Given the location of the remaining defect, shape of the defect and the proximity to free margins an O-T advancement flap was deemed most appropriate for complete closure of the defect.  Using a sterile surgical marker, an appropriate advancement flap was drawn incorporating the defect and placing the expected incisions within the relaxed skin tension lines where possible.    The area thus outlined was incised deep to adipose tissue with a #15 scalpel blade.  The skin margins were undermined to an appropriate distance in all directions utilizing iris scissors.
Billing Type: Third-Party Bill
Complex Repair And O-L Flap Text: The defect edges were debeveled with a #15 scalpel blade.  The primary defect was closed partially with a complex linear closure.  Given the location of the remaining defect, shape of the defect and the proximity to free margins an O-L flap was deemed most appropriate for complete closure of the defect.  Using a sterile surgical marker, an appropriate flap was drawn incorporating the defect and placing the expected incisions within the relaxed skin tension lines where possible.    The area thus outlined was incised deep to adipose tissue with a #15 scalpel blade.  The skin margins were undermined to an appropriate distance in all directions utilizing iris scissors.
Purse String (Intermediate) Text: Given the location of the defect and the characteristics of the surrounding skin a pursestring intermediate closure was deemed most appropriate.  Undermining was performed circumfirentially around the surgical defect.  A purstring suture was then placed and tightened.
Cheek-To-Nose Interpolation Flap Text: A decision was made to reconstruct the defect utilizing an interpolation axial flap and a staged reconstruction.  A telfa template was made of the defect.  This telfa template was then used to outline the Cheek-To-Nose Interpolation flap.  The donor area for the pedicle flap was then injected with anesthesia.  The flap was excised through the skin and subcutaneous tissue down to the layer of the underlying musculature.  The interpolation flap was carefully excised within this deep plane to maintain its blood supply.  The edges of the donor site were undermined.   The donor site was closed in a primary fashion.  The pedicle was then rotated into position and sutured.  Once the tube was sutured into place, adequate blood supply was confirmed with blanching and refill.  The pedicle was then wrapped with xeroform gauze and dressed appropriately with a telfa and gauze bandage to ensure continued blood supply and protect the attached pedicle.
Bi-Rhombic Flap Text: The defect edges were debeveled with a #15 scalpel blade.  Given the location of the defect and the proximity to free margins a bi-rhombic flap was deemed most appropriate.  Using a sterile surgical marker, an appropriate rhombic flap was drawn incorporating the defect. The area thus outlined was incised deep to adipose tissue with a #15 scalpel blade.  The skin margins were undermined to an appropriate distance in all directions utilizing iris scissors.
Size Of Margin In Cm: 0.1
Saucerization Excision Additional Text (Leave Blank If You Do Not Want): The margin was drawn around the clinically apparent lesion.  Incisions were then made along these lines, in a tangential fashion, to the appropriate tissue plane and the lesion was extirpated.
Rhombic Flap Text: The defect edges were debeveled with a #15 scalpel blade.  Given the location of the defect and the proximity to free margins a rhombic flap was deemed most appropriate.  Using a sterile surgical marker, an appropriate rhombic flap was drawn incorporating the defect.    The area thus outlined was incised deep to adipose tissue with a #15 scalpel blade.  The skin margins were undermined to an appropriate distance in all directions utilizing iris scissors.
Anesthesia Volume In Cc: 5
Home Suture Removal Text: Patient was provided a home suture removal kit and will remove their sutures at home.  If they have any questions or difficulties they will call the office.
Lip Wedge Excision Repair Text: Given the location of the defect and the proximity to free margins a full thickness wedge repair was deemed most appropriate.  Using a sterile surgical marker, the appropriate repair was drawn incorporating the defect and placing the expected incisions perpendicular to the vermillion border.  The vermillion border was also meticulously outlined to ensure appropriate reapproximation during the repair.  The area thus outlined was incised through and through with a #15 scalpel blade.  The muscularis and dermis were reaproximated with deep sutures following hemostasis. Care was taken to realign the vermillion border before proceeding with the superficial closure.  Once the vermillion was realigned the superfical and mucosal closure was finished.
Excision Method: Elliptical
Island Pedicle Flap Text: The defect edges were debeveled with a #15 scalpel blade.  Given the location of the defect, shape of the defect and the proximity to free margins an island pedicle advancement flap was deemed most appropriate.  Using a sterile surgical marker, an appropriate advancement flap was drawn incorporating the defect, outlining the appropriate donor tissue and placing the expected incisions within the relaxed skin tension lines where possible.    The area thus outlined was incised deep to adipose tissue with a #15 scalpel blade.  The skin margins were undermined to an appropriate distance in all directions around the primary defect and laterally outward around the island pedicle utilizing iris scissors.  There was minimal undermining beneath the pedicle flap.
Estimated Blood Loss (Cc): minimal
W Plasty Text: The lesion was extirpated to the level of the fat with a #15 scalpel blade.  Given the location of the defect, shape of the defect and the proximity to free margins a W-plasty was deemed most appropriate for repair.  Using a sterile surgical marker, the appropriate transposition arms of the W-plasty were drawn incorporating the defect and placing the expected incisions within the relaxed skin tension lines where possible.    The area thus outlined was incised deep to adipose tissue with a #15 scalpel blade.  The skin margins were undermined to an appropriate distance in all directions utilizing iris scissors.  The opposing transposition arms were then transposed into place in opposite direction and anchored with interrupted buried subcutaneous sutures.
Repair Type: Intermediate
Suture Removal: 14 days
Epidermal Closure Graft Donor Site (Optional): simple interrupted
No Repair - Repaired With Adjacent Surgical Defect Text (Leave Blank If You Do Not Want): After the excision the defect was repaired concurrently with another surgical defect which was in close approximation.
Epidermal Autograft Text: The defect edges were debeveled with a #15 scalpel blade.  Given the location of the defect, shape of the defect and the proximity to free margins an epidermal autograft was deemed most appropriate.  Using a sterile surgical marker, the primary defect shape was transferred to the donor site. The epidermal graft was then harvested.  The skin graft was then placed in the primary defect and oriented appropriately.
Ear Star Wedge Flap Text: The defect edges were debeveled with a #15 blade scalpel.  Given the location of the defect and the proximity to free margins (helical rim) an ear star wedge flap was deemed most appropriate.  Using a sterile surgical marker, the appropriate flap was drawn incorporating the defect and placing the expected incisions between the helical rim and antihelix where possible.  The area thus outlined was incised through and through with a #15 scalpel blade.
Post-Care Instructions: I reviewed with the patient in detail post-care instructions. Patient is not to engage in any heavy lifting, exercise, or swimming for the next 14 days. Should the patient develop any fevers, chills, bleeding, severe pain patient will contact the office immediately.
Melolabial Interpolation Flap Text: A decision was made to reconstruct the defect utilizing an interpolation axial flap and a staged reconstruction.  A telfa template was made of the defect.  This telfa template was then used to outline the melolabial interpolation flap.  The donor area for the pedicle flap was then injected with anesthesia.  The flap was excised through the skin and subcutaneous tissue down to the layer of the underlying musculature.  The pedicle flap was carefully excised within this deep plane to maintain its blood supply.  The edges of the donor site were undermined.   The donor site was closed in a primary fashion.  The pedicle was then rotated into position and sutured.  Once the tube was sutured into place, adequate blood supply was confirmed with blanching and refill.  The pedicle was then wrapped with xeroform gauze and dressed appropriately with a telfa and gauze bandage to ensure continued blood supply and protect the attached pedicle.
Mucosal Advancement Flap Text: Given the location of the defect, shape of the defect and the proximity to free margins a mucosal advancement flap was deemed most appropriate. Incisions were made with a 15 blade scalpel in the appropriate fashion along the cutaneous vermillion border and the mucosal lip. The remaining actinically damaged mucosal tissue was excised.  The mucosal advancement flap was then elevated to the gingival sulcus with care taken to preserve the neurovascular structures and advanced into the primary defect. Care was taken to ensure that precise realignment of the vermillion border was achieved.
Trilobed Flap Text: The defect edges were debeveled with a #15 scalpel blade.  Given the location of the defect and the proximity to free margins a trilobed flap was deemed most appropriate.  Using a sterile surgical marker, an appropriate trilobed flap drawn around the defect.    The area thus outlined was incised deep to adipose tissue with a #15 scalpel blade.  The skin margins were undermined to an appropriate distance in all directions utilizing iris scissors.
Scalpel Size: 15 blade
Complex Repair And Ftsg Text: The defect edges were debeveled with a #15 scalpel blade.  The primary defect was closed partially with a complex linear closure.  Given the location of the defect, shape of the defect and the proximity to free margins a full thickness skin graft was deemed most appropriate to repair the remaining defect.  The graft was trimmed to fit the size of the remaining defect.  The graft was then placed in the primary defect, oriented appropriately, and sutured into place.

## 2019-08-14 ENCOUNTER — APPOINTMENT (RX ONLY)
Dept: URBAN - METROPOLITAN AREA CLINIC 349 | Facility: CLINIC | Age: 64
Setting detail: DERMATOLOGY
End: 2019-08-14

## 2019-08-14 DIAGNOSIS — L57.0 ACTINIC KERATOSIS: ICD-10-CM

## 2019-08-14 DIAGNOSIS — D18.0 HEMANGIOMA: ICD-10-CM

## 2019-08-14 DIAGNOSIS — L82.1 OTHER SEBORRHEIC KERATOSIS: ICD-10-CM

## 2019-08-14 DIAGNOSIS — Z85.828 PERSONAL HISTORY OF OTHER MALIGNANT NEOPLASM OF SKIN: ICD-10-CM

## 2019-08-14 PROBLEM — D18.01 HEMANGIOMA OF SKIN AND SUBCUTANEOUS TISSUE: Status: ACTIVE | Noted: 2019-08-14

## 2019-08-14 PROCEDURE — 17004 DESTROY PREMAL LESIONS 15/>: CPT

## 2019-08-14 PROCEDURE — 99213 OFFICE O/P EST LOW 20 MIN: CPT | Mod: 25

## 2019-08-14 PROCEDURE — ? COUNSELING

## 2019-08-14 PROCEDURE — ? LIQUID NITROGEN

## 2019-08-14 ASSESSMENT — LOCATION DETAILED DESCRIPTION DERM
LOCATION DETAILED: LEFT ULNAR DORSAL HAND
LOCATION DETAILED: RIGHT INFERIOR MEDIAL UPPER BACK
LOCATION DETAILED: PERIUMBILICAL SKIN
LOCATION DETAILED: LEFT DISTAL POSTERIOR THIGH
LOCATION DETAILED: LEFT SUPERIOR UPPER BACK
LOCATION DETAILED: LEFT DISTAL DORSAL FOREARM
LOCATION DETAILED: RIGHT VENTRAL DISTAL FOREARM
LOCATION DETAILED: LEFT PROXIMAL DORSAL FOREARM
LOCATION DETAILED: LEFT CYMBA CONCHA
LOCATION DETAILED: LEFT MEDIAL MALAR CHEEK
LOCATION DETAILED: LEFT DISTAL POSTERIOR UPPER ARM
LOCATION DETAILED: LEFT CYMBA CONCHA
LOCATION DETAILED: LEFT CLAVICULAR SKIN
LOCATION DETAILED: RIGHT SUPERIOR LATERAL MIDBACK
LOCATION DETAILED: RIGHT PROXIMAL DORSAL FOREARM
LOCATION DETAILED: RIGHT DISTAL POSTERIOR THIGH
LOCATION DETAILED: LEFT RADIAL DORSAL HAND
LOCATION DETAILED: RIGHT DISTAL DORSAL FOREARM
LOCATION DETAILED: LEFT LATERAL MANDIBULAR CHEEK
LOCATION DETAILED: RIGHT RADIAL DORSAL HAND

## 2019-08-14 ASSESSMENT — LOCATION SIMPLE DESCRIPTION DERM
LOCATION SIMPLE: LEFT EAR
LOCATION SIMPLE: LEFT CLAVICULAR SKIN
LOCATION SIMPLE: LEFT CHEEK
LOCATION SIMPLE: RIGHT UPPER BACK
LOCATION SIMPLE: RIGHT HAND
LOCATION SIMPLE: ABDOMEN
LOCATION SIMPLE: LEFT FOREARM
LOCATION SIMPLE: LEFT POSTERIOR THIGH
LOCATION SIMPLE: RIGHT LOWER BACK
LOCATION SIMPLE: LEFT HAND
LOCATION SIMPLE: LEFT UPPER ARM
LOCATION SIMPLE: RIGHT POSTERIOR THIGH
LOCATION SIMPLE: RIGHT FOREARM
LOCATION SIMPLE: LEFT EAR
LOCATION SIMPLE: LEFT UPPER BACK

## 2019-08-14 ASSESSMENT — LOCATION ZONE DERM
LOCATION ZONE: HAND
LOCATION ZONE: FACE
LOCATION ZONE: ARM
LOCATION ZONE: EAR
LOCATION ZONE: EAR
LOCATION ZONE: LEG
LOCATION ZONE: TRUNK

## 2019-08-14 NOTE — PROCEDURE: LIQUID NITROGEN
Render Post-Care Instructions In Note?: no
Post-Care Instructions: I reviewed with the patient in detail post-care instructions. Patient is to wear sunprotection, and avoid picking at any of the treated lesions. Pt may apply Vaseline to crusted or scabbing areas.
Number Of Freeze-Thaw Cycles: 2 freeze-thaw cycles
Duration Of Freeze Thaw-Cycle (Seconds): 3
Consent: The patient's consent was obtained including but not limited to risks of crusting, scabbing, blistering, scarring, darker or lighter pigmentary change, recurrence, incomplete removal and infection.
Detail Level: Detailed

## 2019-08-14 NOTE — HPI: SKIN LESIONS
Have Your Skin Lesions Been Treated?: not been treated
Is This A New Presentation, Or A Follow-Up?: Growths
[see HPI] : see HPI
[Negative] : Heme/Lymph

## 2019-08-20 ENCOUNTER — APPOINTMENT (RX ONLY)
Dept: URBAN - METROPOLITAN AREA CLINIC 24 | Facility: CLINIC | Age: 64
Setting detail: DERMATOLOGY
End: 2019-08-20

## 2019-08-20 DIAGNOSIS — Z48.01 ENCOUNTER FOR CHANGE OR REMOVAL OF SURGICAL WOUND DRESSING: ICD-10-CM

## 2019-08-20 PROCEDURE — ? SUTURE REMOVAL (GLOBAL PERIOD)

## 2019-08-20 ASSESSMENT — LOCATION ZONE DERM: LOCATION ZONE: TRUNK

## 2019-08-20 ASSESSMENT — LOCATION SIMPLE DESCRIPTION DERM: LOCATION SIMPLE: LEFT UPPER BACK

## 2019-08-20 ASSESSMENT — LOCATION DETAILED DESCRIPTION DERM: LOCATION DETAILED: LEFT SUPERIOR LATERAL UPPER BACK

## 2019-08-20 NOTE — PROCEDURE: SUTURE REMOVAL (GLOBAL PERIOD)
Add 80273 Cpt? (Important Note: In 2017 The Use Of 41296 Is Being Tracked By Cms To Determine Future Global Period Reimbursement For Global Periods): no
Detail Level: Simple

## 2019-11-15 ENCOUNTER — HOSPITAL ENCOUNTER (OUTPATIENT)
Dept: LAB | Age: 64
Discharge: HOME | End: 2019-11-15
Payer: COMMERCIAL

## 2019-11-15 LAB
BASOPHILS # BLD: 0.1 K/UL (ref 0–0.2)
BASOPHILS NFR BLD: 1 % (ref 0–2)
CRP SERPL-MCNC: <0.3 MG/DL (ref 0–0.9)
DIFFERENTIAL METHOD BLD: ABNORMAL
EOSINOPHIL # BLD: 0.1 K/UL (ref 0–0.8)
EOSINOPHIL NFR BLD: 1 % (ref 0.5–7.8)
ERYTHROCYTE [DISTWIDTH] IN BLOOD BY AUTOMATED COUNT: 12.5 % (ref 11.9–14.6)
ERYTHROCYTE [SEDIMENTATION RATE] IN BLOOD: 10 MM/HR (ref 0–20)
HCT VFR BLD AUTO: 46.4 % (ref 41.1–50.3)
HGB BLD-MCNC: 15.9 G/DL (ref 13.6–17.2)
IMM GRANULOCYTES # BLD AUTO: 0 K/UL (ref 0–0.5)
IMM GRANULOCYTES NFR BLD AUTO: 1 % (ref 0–5)
LYMPHOCYTES # BLD: 2.5 K/UL (ref 0.5–4.6)
LYMPHOCYTES NFR BLD: 30 % (ref 13–44)
MCH RBC QN AUTO: 30.6 PG (ref 26.1–32.9)
MCHC RBC AUTO-ENTMCNC: 34.3 G/DL (ref 31.4–35)
MCV RBC AUTO: 89.2 FL (ref 79.6–97.8)
MONOCYTES # BLD: 0.8 K/UL (ref 0.1–1.3)
MONOCYTES NFR BLD: 9 % (ref 4–12)
NEUTS SEG # BLD: 4.9 K/UL (ref 1.7–8.2)
NEUTS SEG NFR BLD: 58 % (ref 43–78)
NRBC # BLD: 0 K/UL (ref 0–0.2)
PLATELET # BLD AUTO: 235 K/UL (ref 150–450)
PMV BLD AUTO: 8.5 FL (ref 9.4–12.3)
RBC # BLD AUTO: 5.2 M/UL (ref 4.23–5.6)
WBC # BLD AUTO: 8.4 K/UL (ref 4.3–11.1)

## 2019-11-15 PROCEDURE — 85652 RBC SED RATE AUTOMATED: CPT

## 2019-11-15 PROCEDURE — 85025 COMPLETE CBC W/AUTO DIFF WBC: CPT

## 2019-11-15 PROCEDURE — 86140 C-REACTIVE PROTEIN: CPT

## 2019-11-15 PROCEDURE — 82495 ASSAY OF CHROMIUM: CPT

## 2019-11-15 PROCEDURE — 83018 HEAVY METAL QUAN EACH NES: CPT

## 2019-11-15 PROCEDURE — 36415 COLL VENOUS BLD VENIPUNCTURE: CPT

## 2019-11-16 ENCOUNTER — HOSPITAL ENCOUNTER (OUTPATIENT)
Dept: MRI IMAGING | Age: 64
Discharge: HOME | End: 2019-11-16
Attending: ORTHOPAEDIC SURGERY
Payer: COMMERCIAL

## 2019-11-16 DIAGNOSIS — Z96.642 PRESENCE OF LEFT ARTIFICIAL HIP JOINT: ICD-10-CM

## 2019-11-16 PROCEDURE — 73721 MRI JNT OF LWR EXTRE W/O DYE: CPT

## 2019-11-18 LAB — COBALT SERPL-MCNC: 3.4 UG/L (ref 0–0.9)

## 2019-11-20 LAB — CR SERPL-MCNC: 2.3 UG/L (ref 0.1–2.1)

## 2020-01-03 ENCOUNTER — APPOINTMENT (RX ONLY)
Dept: URBAN - METROPOLITAN AREA CLINIC 349 | Facility: CLINIC | Age: 65
Setting detail: DERMATOLOGY
End: 2020-01-03

## 2020-01-03 DIAGNOSIS — D18.0 HEMANGIOMA: ICD-10-CM

## 2020-01-03 DIAGNOSIS — Z71.89 OTHER SPECIFIED COUNSELING: ICD-10-CM

## 2020-01-03 DIAGNOSIS — L57.0 ACTINIC KERATOSIS: ICD-10-CM

## 2020-01-03 DIAGNOSIS — L82.0 INFLAMED SEBORRHEIC KERATOSIS: ICD-10-CM

## 2020-01-03 DIAGNOSIS — L82.1 OTHER SEBORRHEIC KERATOSIS: ICD-10-CM

## 2020-01-03 DIAGNOSIS — Z85.828 PERSONAL HISTORY OF OTHER MALIGNANT NEOPLASM OF SKIN: ICD-10-CM

## 2020-01-03 PROBLEM — D18.01 HEMANGIOMA OF SKIN AND SUBCUTANEOUS TISSUE: Status: ACTIVE | Noted: 2020-01-03

## 2020-01-03 PROCEDURE — ? LIQUID NITROGEN

## 2020-01-03 PROCEDURE — ? COUNSELING

## 2020-01-03 PROCEDURE — 17110 DESTRUCTION B9 LES UP TO 14: CPT

## 2020-01-03 PROCEDURE — 17000 DESTRUCT PREMALG LESION: CPT | Mod: 59

## 2020-01-03 PROCEDURE — 99213 OFFICE O/P EST LOW 20 MIN: CPT | Mod: 25

## 2020-01-03 PROCEDURE — 17003 DESTRUCT PREMALG LES 2-14: CPT | Mod: 59

## 2020-01-03 ASSESSMENT — LOCATION ZONE DERM
LOCATION ZONE: SCALP
LOCATION ZONE: LEG
LOCATION ZONE: ARM
LOCATION ZONE: TRUNK

## 2020-01-03 ASSESSMENT — LOCATION SIMPLE DESCRIPTION DERM
LOCATION SIMPLE: LEFT POSTERIOR THIGH
LOCATION SIMPLE: LEFT CALF
LOCATION SIMPLE: LEFT UPPER BACK
LOCATION SIMPLE: RIGHT LOWER BACK
LOCATION SIMPLE: CHEST
LOCATION SIMPLE: ABDOMEN
LOCATION SIMPLE: RIGHT FOREARM
LOCATION SIMPLE: SCALP
LOCATION SIMPLE: RIGHT UPPER BACK
LOCATION SIMPLE: RIGHT POSTERIOR THIGH

## 2020-01-03 ASSESSMENT — LOCATION DETAILED DESCRIPTION DERM
LOCATION DETAILED: LEFT SUPERIOR UPPER BACK
LOCATION DETAILED: RIGHT INFERIOR MEDIAL UPPER BACK
LOCATION DETAILED: RIGHT DISTAL POSTERIOR THIGH
LOCATION DETAILED: LEFT DISTAL CALF
LOCATION DETAILED: RIGHT VENTRAL DISTAL FOREARM
LOCATION DETAILED: PERIUMBILICAL SKIN
LOCATION DETAILED: LEFT SUPERIOR POSTAURICULAR SKIN
LOCATION DETAILED: RIGHT SUPERIOR LATERAL MIDBACK
LOCATION DETAILED: LEFT DISTAL POSTERIOR THIGH
LOCATION DETAILED: STERNUM
LOCATION DETAILED: LEFT INFERIOR POSTAURICULAR SKIN

## 2020-01-03 NOTE — PROCEDURE: LIQUID NITROGEN
Post-Care Instructions: I reviewed with the patient in detail post-care instructions. Patient is to wear sunprotection, and avoid picking at any of the treated lesions. Pt may apply Vaseline to crusted or scabbing areas.
Consent: The patient's consent was obtained including but not limited to risks of crusting, scabbing, blistering, scarring, darker or lighter pigmentary change, recurrence, incomplete removal and infection.
Render Note In Bullet Format When Appropriate: No
Duration Of Freeze Thaw-Cycle (Seconds): 2
Medical Necessity Clause: This procedure was medically necessary because the lesions that were treated were:
Number Of Freeze-Thaw Cycles: 3 freeze-thaw cycles
110
Detail Level: Detailed
Number Of Freeze-Thaw Cycles: 2 freeze-thaw cycles
Medical Necessity Information: It is in your best interest to select a reason for this procedure from the list below. All of these items fulfill various CMS LCD requirements except the new and changing color options.
Duration Of Freeze Thaw-Cycle (Seconds): 3
Include Z78.9 (Other Specified Conditions Influencing Health Status) As An Associated Diagnosis?: Yes

## 2020-02-23 PROBLEM — Z96.651 PRESENCE OF RIGHT ARTIFICIAL KNEE JOINT: Status: ACTIVE | Noted: 2020-02-23

## 2020-02-23 PROBLEM — M75.121 COMPLETE TEAR OF RIGHT ROTATOR CUFF: Status: ACTIVE | Noted: 2020-02-23

## 2020-02-23 PROBLEM — M75.01 ADHESIVE CAPSULITIS OF RIGHT SHOULDER: Status: ACTIVE | Noted: 2020-02-23

## 2020-02-23 PROBLEM — M76.892 OTHER SPECIFIED ENTHESOPATHIES OF LEFT LOWER LIMB, EXCLUDING FOOT: Status: ACTIVE | Noted: 2020-02-23

## 2020-02-23 PROBLEM — Z09 FOLLOW-UP EXAMINATION: Status: ACTIVE | Noted: 2020-02-23

## 2020-07-08 NOTE — THERAPY EVALUATION
Elaina West  : 1955  Primary: Lionel Rhodes*  Secondary:  [facility] at [address]  [phone]   [phone]          OUTPATIENT PHYSICAL THERAPY:Initial Assessment and Daily Note 2018   ICD-10: Treatment Diagnosis: pain in joint, right shoulder M25.511  ICD-10: Treatment Diagnosis: cervicalgia M54.2  ICD-10: Treatment Diagnosis: thoracic spine pain M54.6  Precautions/Allergies:   Patient has no known allergies. MD Orders: evaluate and treat MEDICAL/REFERRING DIAGNOSIS:  Bilateral shoulder pain, unspecified chronicity [M25.511, M25.512]   DATE OF ONSET: 2018  REFERRING PHYSICIAN: Hill Crouch MD  RETURN PHYSICIAN APPOINTMENT: as needed     INITIAL ASSESSMENT:  Mr. Santy Aguila is a 58 y.o.male who presents to physical therapy with insidious onset of right shoulder pain since 2018. 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 He demonstrates strength and ROM deficits of his right shoulder. He would benefit from skilled physical therapy to improve overall mobility of his right shoulder. PROBLEM LIST (Impacting functional limitations):  1. Decreased Strength  2. Decreased ADL/Functional Activities  3. Increased Pain  4. Decreased Activity Tolerance  5. Decreased Flexibility/Joint Mobility INTERVENTIONS PLANNED: (Treatment may consist of any combination of the following)  1. Home Exercise Program (HEP)  2. Manual Therapy  3. Neuromuscular Re-education/Strengthening  4. Range of Motion (ROM)  5. Therapeutic Activites  6. Therapeutic Exercise/Strengthening   TREATMENT PLAN:  Effective Dates: 2018 TO 3/20/2019 (90 days). Frequency/Duration: 1 time a week for 90 Day(s)  GOALS: (Goals have been discussed and agreed upon with patient.)    Discharge Goals: Time Frame: 90 days  1.  Patient demonstrates CC: No new complaints    Interval/Overnight Events:    VITAL SIGNS  T(C): 36.8 (07-08-20 @ 05:00), Max: 37.8 (07-07-20 @ 22:00)  HR: 109 (07-08-20 @ 06:15) (79 - 144)  BP: 134/96 (07-08-20 @ 06:15) (121/65 - 145/94)  ABP: --  ABP(mean): --  RR: 23 (07-08-20 @ 06:15) (16 - 30)  SpO2: 99% (07-08-20 @ 06:15) (95% - 100%)  CVP(mm Hg): --    RESPIRATORY      cyproheptadine Oral Liquid - Peds 4 milliGRAM(s) Oral at bedtime    CARDIOVASCULAR  Cardiac Rhythm:	 NSR  amLODIPine Oral Liquid - Peds 2.5 milliGRAM(s) Oral two times a day  niCARdipine Infusion - Peds 0.5 MICROgram(s)/kG/Min IV Continuous <Continuous>    FLUIDS/ELECTROLYTES/NUTRITION   I&O's Summary    07 Jul 2020 07:01  -  08 Jul 2020 07:00  --------------------------------------------------------  IN: 2170 mL / OUT: 905 mL / NET: 1265 mL      Daily Weight Gm: 29533 (06 Jul 2020 15:30)  07-08    132  |  99  |  4   ----------------------------<  207  3.6   |  20  |  0.29    Ca    9.6      08 Jul 2020 01:00  Phos  4.1     07-08  Mg     1.9     07-08      Diet:     dextrose 5% + sodium chloride 0.9%. - Pediatric 1000 milliLiter(s) IV Continuous <Continuous>  famotidine IV Intermittent - Peds 15.4 milliGRAM(s) IV Intermittent every 12 hours    HEMATOLOGIC/ONCOLOGIC      CoVID:  COVID-19 PCR: NotDetec (07-06-20 @ 01:48)      CoOVID related labs:                            12.8   15.41 )-----------( 313      ( 06 Jul 2020 01:20 )             38.0     Transfusions:	    INFECTIOUS DISEASE    NEUROLOGY  Adequacy of sedation and pain control has been assessed and adjusted  SBS:  TRISTAN-1:	  LORazepam Injection - Peds 1.5 milliGRAM(s) IV Push every 6 hours  ondansetron IV Intermittent - Peds 4.4 milliGRAM(s) IV Intermittent every 6 hours PRN          PATIENT CARE ACCESS DEVICES  Peripheral IV  Central Venous Line:  Arterial Line:  PICC:				  Urinary Catheter:  Necessity of catheters discussed    PHYSICAL EXAM  General: 	In no acute distress  Respiratory:	Lungs clear to auscultation bilaterally. Good aeration. No rales,   .		rhonchi, retractions or wheezing. Effort even and unlabored.  CV:		Regular rate and rhythm. Normal S1/S2. No murmurs, rubs, or   .		gallop. Capillary refill < 2 seconds. Distal pulses 2+ and equal.  Abdomen:	Soft, non-distended. Bowel sounds present. No palpable   .		hepatosplenomegaly.  Skin:		No rash.  Extremities:	Warm and well perfused. No gross extremity deformities.  Neurologic:	Alert and oriented. No acute change from baseline exam.    SOCIAL  Parent/Guardian is at the bedside  Patient and Parent/Guardian updated as to the progress/plan of care    The patient remains supported and requires ICU care and monitoring    The patient is improving but requires continued monitoring and adjustment of therapy    Total critical care time spent by attending physician was 35 minutes excluding procedure time. independence of his HEP without verbal cueing from therapist.  2. Patient demonstrates full AROM of right shoulder without onset of pain to perform ADLs  3. Able to ride his bike for 30 miles without onset of right shoulder pain. 4. Improve DASH score from 20/55 to 13/55 to perform ADLs  Rehabilitation Potential For Stated Goals: Excellent  Regarding Lauren Kenny's therapy, I certify that the treatment plan above will be carried out by a therapist or under their direction. Thank you for this referral,  Sherine Myers PT                 The information in this section was collected on 12/20/18 (except where otherwise noted). HISTORY:   History of Present Injury/Illness (Reason for Referral): Insidious onset of right shoulder pain since September 2018 and progressively increasing in pain and stiffness. He is limited with reaching forward, reaching behind his back and lifting overhead. He started riding a road bike about 6 months ago, notes he had a bike fit, however feels his handle bars may be lower than they should be. Increased stiffness in right shoulder noted with most activities during the day and challenged with sleeping secondary to pain. Patient denies dizziness, drop attacks, numbness/tingling, bowel/bladder dysfunction and/or unexplained weight gain/loss. Past Medical History/Comorbidities:   Mr. Arin Bacon  has a past medical history of Arrhythmia, Arthritis, CAD (coronary artery disease), Cancer ([address] Depression, Diabetes ([address] HTN (hypertension), Hypercholesterolemia, Hyperlipidemia, Hypertension, Knee pain, Morbid obesity ([address] Psychiatric disorder, Right inguinal hernia, and S/P total knee arthroplasty.   Mr. Arin Bacon  has a past surgical history that includes hx other surgical; hx colonoscopy (2013); hx heent (1971); hx orthopaedic (Left, 2003); hx orthopaedic (Left, 2009); hx orthopaedic (Right, 2016); hx knee replacement (Right, 03/28/2017); pr abdomen surgery proc unlisted (Right, 2017); RIGHT INGUINAL HERNIA REPAIR/ PROGRIP MESH (Right, 5/16/2017); RIGHT KNEE ARTHROPLASTY TOTAL/ DEPUY WITH OCTAVIO (Right, 3/28/2017); and HIP ARTHROPLASTY TOTAL (Left, 4/14/2009). Social History/Living Environment:     lives in a private home with his wife, challenged with activities overhead, sleeping positions  Prior Level of Function/Work/Activity:  Works full time, works on a laptop and drives 6+ hours. Dominant Side:         RIGHT     Ambulatory/Rehab Services H2 Model Falls Risk Assessment 12/20/18    Risk Factors:       (1)  Gender [Male] Ability to Rise from Chair:       (0)  Ability to rise in a single movement    Falls Prevention Plan:       No modifications necessary   Total: (5 or greater = High Risk): 1    ©2010 [facility] [place] . [facility] Patent #[id number]. Federal Law prohibits the replication, distribution or use without written permission from [facility] [place]      Current Medications:       Current Outpatient Medications:     traZODone (DESYREL) 50 mg tablet, Take 1 Tab by mouth nightly., Disp: 30 Tab, Rfl: 5    metFORMIN ER (GLUCOPHAGE XR) 500 mg tablet, Take 2 Tabs by mouth daily (with dinner). , Disp: 180 Tab, Rfl: 1    acyclovir (ZOVIRAX) 200 mg capsule, Take 1 Cap by mouth daily. , Disp: 90 Cap, Rfl: 3    rivaroxaban (XARELTO) 20 mg tab tablet, Take 1 Tab by mouth daily (with lunch). , Disp: 90 Tab, Rfl: 3    rosuvastatin (CRESTOR) 20 mg tablet, Take 1 Tab by mouth nightly., Disp: 90 Tab, Rfl: 3    flecainide (TAMBOCOR) 100 mg tablet, Take 1 Tab by mouth two (2) times a day. Indications: PREVENTION OF RECURRENT ATRIAL FIBRILLATION, Disp: 180 Tab, Rfl: 3    verapamil ER (CALAN-SR) 180 mg CR tablet, Take 1 Tab by mouth daily. Indications: hypertension, Disp: 90 Tab, Rfl: 3    metoprolol succinate (TOPROL-XL) 25 mg XL tablet, Take 1 Tab by mouth daily.  Indications: hypertension, Disp: 90 Tab, Rfl: 3    lisinopril (PRINIVIL, ZESTRIL) 10 mg tablet, Take 1 Tab by mouth daily. , Disp: 90 Tab, Rfl: 3    glimepiride (AMARYL) 4 mg tablet, Take 1/2 tab (2mg) each morning and 1 tablet at night, Disp: 180 Tab, Rfl: 1    FA/NIACINAMIDE/CUPRIC OX/ZN OX (NICOTINAMIDE PO), Take  by mouth two (2) times a day., Disp: , Rfl:     coenzyme q10 10 mg cap, Take  by mouth daily. , Disp: , Rfl:     VITAMIN D-3 PO, take  by mouth daily. am , Disp: , Rfl:    Date Last Reviewed:  12/20/2018     Number of Personal Factors/Comorbidities that affect the Plan of Care: 1-2: MODERATE COMPLEXITY   EXAMINATION:   Observation/Orthostatic Postural Assessment:           Shoulder girdles are right elevated and protracted. Scapular position is right elevated. Clavicles are right elevated. Soft tissue observations indicates restrictions in anterior chest, pectoralis major/minor, scalenes, upper trapezius, levator scapula, infraspinatus and subscapularis, right. Patient exhibits a decreased cervical lordosis,  Slight increased thoracic kyphosis. Palpation:  Myofascial assessment indicates restrictions in anterior chest. Muscle length testing levator scapulae with ipsilateral arm abduction is restrictions right. Upper trapezius length is restricted right. Pectoralis minor/major and latissimus dorsi length is restricted bilaterally. Scalene muscle length is restricted right. ROM: Gross active cervical spine rotation is 90% available bilaterally. Apley's scratch test for functional ER/IR is restrictions right, ER to C7 and IR to right iliac crest. Passive Accessory Motion: Glenohumeral mobility is restricted right for inferior and posterior glides. Acromioclavicular mobility is restricted right. Sternoclavicular mobility is restricted right.    AROM(PROM) in degrees Right Left   Shoulder flexion 110 (120) 160   Shoulder abduction (palm down) 80 140   Shoulder extension 10 20   Shoulder internal rotation (IR)  (measured at 90 degrees of abduction) 20 45   ER (measured at 90 degrees of abduction) 35 50     Strength:   Manual Muscle Test (*/5) Right Left   Shoulder flexion 4 5   Shoulder extension 4 5   Shoulder abduction 4 5   Shoulder adduction 4 5   Shoulder ER 4 5   Shoulder IR 4 5   Upper trapezius 5 5   Middle trapezius 4- 5   Lower trapezius 4- 5   Rhomboids 4- 5   Serratus Anterior 4- 5   Elbow flexion 5 5   Elbow extension 5 5   Special Tests:    Impingement tests performed on the right shoulder:  [place] test: positive. Neer test: positive. Neurological Screen:  Myotomes: Key muscle strength testing for bilateral UE is intact. Dermatomes: Sensation testing through bilateral UE for light touch is intact. Reflexes: Biceps (C5), brachioradialis (C6), and triceps (C7) are not tested   Neural tension tests: Upper limb tension test A is negative for exacerbation  Functional Mobility:  Challenged with overhead activities, reaching forward and reaching behind his back. Body Structures Involved:  1. Thoracic Cage  2. Joints  3. Muscles Body Functions Affected:  1. Sensory/Pain  2. Neuromusculoskeletal  3. Movement Related Activities and Participation Affected:  1. General Tasks and Demands  2. Mobility  3. Self Care  4. Community, Social and [place]   Number of elements (examined above) that affect the Plan of Care: 3: MODERATE COMPLEXITY   CLINICAL PRESENTATION:   Presentation: Evolving clinical presentation with changing clinical characteristics: MODERATE COMPLEXITY   CLINICAL DECISION MAKING:   Outcome Measure: Tool Used: Disabilities of the Arm, Shoulder and Hand (DASH) Questionnaire - Quick Version  Score:  Initial: 20/55  Most Recent: X/55 (Date: -- )   Interpretation of Score: The DASH is designed to measure the activities of daily living in person's with upper extremity dysfunction or pain. Each section is scored on a 1-5 scale, 5 representing the greatest disability. The scores of each section are added together for a total score of 55.        Medical Necessity: · Patient is expected to demonstrate progress in strength, range of motion, balance, coordination and functional technique to increase independence with ADLs . Reason for Services/Other Comments:  · Patient continues to require skilled intervention due to challenged with performing daily tasks secondary to pain and stiffness in his right shoulder. .   Use of outcome tool(s) and clinical judgement create a POC that gives a: Questionable prediction of patient's progress: MODERATE COMPLEXITY            TREATMENT:   (In addition to Assessment/Re-Assessment sessions the following treatments were rendered)  Pre-treatment Symptoms/Complaints:  Patient notes challenged with mobility of his right shoulder and limited with activities secondary to pain in his right shoulder. Pain: Initial:   Pain Intensity 1: 8  Pain Location 1: Shoulder  Pain Orientation 1: Right  Post Session:  7/10     Therapeutic Exercise: (5 Minutes):  Exercises per grid below to improve mobility, strength, balance and coordination. Required moderate verbal and manual cues to promote proper body alignment, promote proper body posture, promote proper body mechanics and promote proper body breathing techniques. Progressed resistance, range, repetitions and complexity of movement as indicated. Date:  12/20/2018   Activity/Exercise Parameters   Patient education: review sleeping position, postures X 5 minutes                             Manual Therapy (    Soft Tissue Mobilization Duration  Duration: 5 Minutes): Manual techniques to facilitate improved motion and decreased pain. (Used abbreviations: MET - muscle energy technique; PNF - proprioceptive neuromuscular facilitation; NMR - neuromuscular re-education; a/p - anterior to posterior; p/a - posterior to anterior)   · Supine anterior chest superficial fascia, along bony contours of right clavicle and coracoid process.      [facility] Portal  Treatment/Session Assessment:    · Response to Treatment: Improved understanding of sleeping positions and plan of care for physical therapy. .  · Compliance with Program/Exercises: Compliant all of the time. · Recommendations/Intent for next treatment session: \"Next visit will focus on advancements to more challenging activities\".   Total Treatment Duration: 60 minutes: 50 minutes evaluation, 10 minutes treatment   PT Patient Time In/Time Out  Time In: 1030  Time Out: [address], PT    Future Appointments   Date Time Provider Roberto Dhillon   12/24/2018  9:30 AM Alda RUGGIERO, PT [facility]   1/3/2019  8:30 AM Carlos Alt., PT [facility]   1/7/2019  4:30 PM Carlos Alt., PT [facility]   1/14/2019  3:30 PM Carlos Alt., PT [facility]   1/21/2019  3:30 PM Carlos Alt., PT [facility]   1/28/2019  4:30 PM Carlos Alt., PT [facility] CC: No new complaints    Interval/Overnight Events:    VITAL SIGNS  T(C): 36.8 (07-08-20 @ 05:00), Max: 37.8 (07-07-20 @ 22:00)  HR: 109 (07-08-20 @ 06:15) (79 - 144)  BP: 134/96 (07-08-20 @ 06:15) (121/65 - 145/94)  RR: 23 (07-08-20 @ 06:15) (16 - 30)  SpO2: 99% (07-08-20 @ 06:15) (95% - 100%)      RESPIRATORY  RA    cyproheptadine Oral Liquid - Peds 4 milliGRAM(s) Oral at bedtime    CARDIOVASCULAR  Cardiac Rhythm:	 NSR  amLODIPine Oral Liquid - Peds 2.5 milliGRAM(s) Oral two times a day  niCARdipine Infusion - Peds 0.5 MICROgram(s)/kG/Min IV Continuous <Continuous>    FLUIDS/ELECTROLYTES/NUTRITION   I&O's Summary    07 Jul 2020 07:01  -  08 Jul 2020 07:00  --------------------------------------------------------  IN: 2170 mL / OUT: 905 mL / NET: 1265 mL      Daily Weight Gm: 13319 (06 Jul 2020 15:30)  07-08    132  |  99  |  4   ----------------------------<  207  3.6   |  20  |  0.29    Ca    9.6      08 Jul 2020 01:00  Phos  4.1     07-08  Mg     1.9     07-08    Diet:     dextrose 5% + sodium chloride 0.9%. - Pediatric 1000 milliLiter(s) IV Continuous <Continuous>  famotidine IV Intermittent - Peds 15.4 milliGRAM(s) IV Intermittent every 12 hours    HEMATOLOGIC/ONCOLOGIC    CoVID:  COVID-19 PCR: NotDetec (07-06-20 @ 01:48)                          12.8   15.41 )-----------( 313      ( 06 Jul 2020 01:20 )             38.0     NEUROLOGY  Adequacy of sedation and pain control has been assessed and adjusted  SBS:  TRISTAN-1:	  LORazepam Injection - Peds 1.5 milliGRAM(s) IV Push every 6 hours  ondansetron IV Intermittent - Peds 4.4 milliGRAM(s) IV Intermittent every 6 hours PRN    PATIENT CARE ACCESS DEVICES  Peripheral IV  Central Venous Line:  Arterial Line:  PICC:				  Urinary Catheter:  Necessity of catheters discussed    PHYSICAL EXAM  General: 	In no acute distress  Respiratory:	Lungs clear to auscultation bilaterally. Good aeration. No rales,   .		rhonchi, retractions or wheezing. Effort even and unlabored.  CV:		Regular rate and rhythm. Normal S1/S2. No murmurs, rubs, or   .		gallop. Capillary refill < 2 seconds. Distal pulses 2+ and equal.  Abdomen:	Soft, non-distended. Bowel sounds present. No palpable   .		hepatosplenomegaly.  Skin:		No rash.  Extremities:	Warm and well perfused. No gross extremity deformities.  Neurologic:	Alert and oriented. No acute change from baseline exam.    SOCIAL  Parent/Guardian is at the bedside  Patient and Parent/Guardian updated as to the progress/plan of care    The patient is improving but requires continued monitoring and adjustment of therapy    Total critical care time spent by attending physician was 35 minutes excluding procedure time. CC: No new complaints    Interval/Overnight Events: BP control improved on Nicardipine drip    VITAL SIGNS  T(C): 36.8 (07-08-20 @ 05:00), Max: 37.8 (07-07-20 @ 22:00)  HR: 109 (07-08-20 @ 06:15) (79 - 144)  BP: 134/96 (07-08-20 @ 06:15) (121/65 - 145/94)  RR: 23 (07-08-20 @ 06:15) (16 - 30)  SpO2: 99% (07-08-20 @ 06:15) (95% - 100%)    RESPIRATORY  RA    cyproheptadine Oral Liquid - Peds 4 milliGRAM(s) Oral at bedtime    CARDIOVASCULAR  Cardiac Rhythm:	 NSR  amLODIPine Oral Liquid - Peds 2.5 milliGRAM(s) Oral two times a day  niCARdipine Infusion - Peds 0.5 MICROgram(s)/kG/Min IV Continuous <Continuous>    FLUIDS/ELECTROLYTES/NUTRITION   I&O's Summary    07 Jul 2020 07:01  -  08 Jul 2020 07:00  --------------------------------------------------------  IN: 2170 mL / OUT: 905 mL / NET: 1265 mL      Daily Weight Gm: 74662 (06 Jul 2020 15:30)  07-08    132  |  99  |  4   ----------------------------<  207  3.6   |  20  |  0.29    Ca    9.6      08 Jul 2020 01:00  Phos  4.1     07-08  Mg     1.9     07-08    Diet: NPO     dextrose 5% + sodium chloride 0.9%. - Pediatric 1000 milliLiter(s) IV Continuous <Continuous>  famotidine IV Intermittent - Peds 15.4 milliGRAM(s) IV Intermittent every 12 hours    HEMATOLOGIC/ONCOLOGIC    CoVID:  COVID-19 PCR: NotDetec (07-06-20 @ 01:48)                          12.8   15.41 )-----------( 313      ( 06 Jul 2020 01:20 )             38.0     NEUROLOGY  Adequacy of sedation and pain control has been assessed and adjusted  SBS:  TRISTAN-1:	  LORazepam Injection - Peds 1.5 milliGRAM(s) IV Push every 6 hours  ondansetron IV Intermittent - Peds 4.4 milliGRAM(s) IV Intermittent every 6 hours PRN    PATIENT CARE ACCESS DEVICES  Peripheral IV  Necessity of catheters discussed    PHYSICAL EXAM  General: 	In no acute distress  Respiratory:	Lungs clear to auscultation bilaterally. Good aeration. No rales,   .		rhonchi, retractions or wheezing. Effort even and unlabored.  CV:		Regular rate and rhythm. Normal S1/S2. No murmurs, rubs, or   .		gallop. Capillary refill < 2 seconds. Distal pulses 2+ and equal.  Abdomen:	Soft, non-distended. Bowel sounds present. No palpable   .		hepatosplenomegaly.  Skin:		No rash.  Extremities:	Warm and well perfused. No gross extremity deformities.  Neurologic:	Alert and oriented. No acute change from baseline exam.    SOCIAL  Parent/Guardian is at the bedside  Patient and Parent/Guardian updated as to the progress/plan of care    The patient is improving but requires continued monitoring and adjustment of therapy    Total critical care time spent by attending physician was 35 minutes excluding procedure time.

## 2020-09-01 ENCOUNTER — APPOINTMENT (RX ONLY)
Dept: URBAN - METROPOLITAN AREA CLINIC 349 | Facility: CLINIC | Age: 65
Setting detail: DERMATOLOGY
End: 2020-09-01

## 2020-09-01 DIAGNOSIS — D17 BENIGN LIPOMATOUS NEOPLASM: ICD-10-CM

## 2020-09-01 DIAGNOSIS — L82.0 INFLAMED SEBORRHEIC KERATOSIS: ICD-10-CM

## 2020-09-01 DIAGNOSIS — L57.0 ACTINIC KERATOSIS: ICD-10-CM

## 2020-09-01 DIAGNOSIS — Z85.828 PERSONAL HISTORY OF OTHER MALIGNANT NEOPLASM OF SKIN: ICD-10-CM

## 2020-09-01 PROBLEM — D17.1 BENIGN LIPOMATOUS NEOPLASM OF SKIN AND SUBCUTANEOUS TISSUE OF TRUNK: Status: ACTIVE | Noted: 2020-09-01

## 2020-09-01 PROBLEM — D17.0 BENIGN LIPOMATOUS NEOPLASM OF SKIN AND SUBCUTANEOUS TISSUE OF HEAD, FACE AND NECK: Status: ACTIVE | Noted: 2020-09-01

## 2020-09-01 PROCEDURE — 17004 DESTROY PREMAL LESIONS 15/>: CPT

## 2020-09-01 PROCEDURE — ? LIQUID NITROGEN

## 2020-09-01 PROCEDURE — ? COUNSELING

## 2020-09-01 PROCEDURE — ? DEFER

## 2020-09-01 PROCEDURE — 17110 DESTRUCTION B9 LES UP TO 14: CPT | Mod: 59

## 2020-09-01 PROCEDURE — 99213 OFFICE O/P EST LOW 20 MIN: CPT | Mod: 25

## 2020-09-01 ASSESSMENT — LOCATION DETAILED DESCRIPTION DERM
LOCATION DETAILED: LEFT INFERIOR CENTRAL MALAR CHEEK
LOCATION DETAILED: LEFT PROXIMAL DORSAL FOREARM
LOCATION DETAILED: RIGHT LATERAL DISTAL PRETIBIAL REGION
LOCATION DETAILED: RIGHT SUPERIOR PARIETAL SCALP
LOCATION DETAILED: LEFT SUPERIOR LATERAL NECK
LOCATION DETAILED: LEFT SUPERIOR LATERAL MIDBACK
LOCATION DETAILED: LEFT DISTAL DORSAL FOREARM
LOCATION DETAILED: LEFT DISTAL RADIAL DORSAL FOREARM
LOCATION DETAILED: LEFT CAVUM CONCHA
LOCATION DETAILED: LEFT ANTITRAGUS
LOCATION DETAILED: RIGHT INFERIOR CENTRAL MALAR CHEEK
LOCATION DETAILED: LEFT SUPERIOR PARIETAL SCALP
LOCATION DETAILED: LEFT LATERAL PROXIMAL PRETIBIAL REGION
LOCATION DETAILED: LEFT SUPERIOR PREAURICULAR CHEEK
LOCATION DETAILED: LEFT LATERAL TEMPLE
LOCATION DETAILED: RIGHT RADIAL DORSAL HAND
LOCATION DETAILED: LEFT INFERIOR PREAURICULAR CHEEK
LOCATION DETAILED: RIGHT INFERIOR FOREHEAD
LOCATION DETAILED: LEFT DISTAL PRETIBIAL REGION
LOCATION DETAILED: LEFT LATERAL ZYGOMA
LOCATION DETAILED: LEFT MEDIAL DISTAL PRETIBIAL REGION
LOCATION DETAILED: LEFT PROXIMAL PRETIBIAL REGION
LOCATION DETAILED: POSTERIOR MID-PARIETAL SCALP
LOCATION DETAILED: RIGHT FOREHEAD
LOCATION DETAILED: RIGHT ULNAR DORSAL HAND
LOCATION DETAILED: LEFT VENTRAL DISTAL FOREARM
LOCATION DETAILED: RIGHT CENTRAL PARIETAL SCALP
LOCATION DETAILED: RIGHT DISTAL PRETIBIAL REGION
LOCATION DETAILED: LEFT INFERIOR ANTERIOR NECK
LOCATION DETAILED: LEFT CENTRAL POSTAURICULAR SKIN
LOCATION DETAILED: LEFT LATERAL DISTAL PRETIBIAL REGION
LOCATION DETAILED: LEFT VENTRAL LATERAL PROXIMAL FOREARM
LOCATION DETAILED: RIGHT PROXIMAL RADIAL DORSAL FOREARM
LOCATION DETAILED: RIGHT MEDIAL FRONTAL SCALP

## 2020-09-01 ASSESSMENT — LOCATION ZONE DERM
LOCATION ZONE: TRUNK
LOCATION ZONE: HAND
LOCATION ZONE: FACE
LOCATION ZONE: NECK
LOCATION ZONE: ARM
LOCATION ZONE: SCALP
LOCATION ZONE: LEG
LOCATION ZONE: EAR

## 2020-09-01 ASSESSMENT — LOCATION SIMPLE DESCRIPTION DERM
LOCATION SIMPLE: LEFT PRETIBIAL REGION
LOCATION SIMPLE: SCALP
LOCATION SIMPLE: RIGHT FOREHEAD
LOCATION SIMPLE: NECK
LOCATION SIMPLE: LEFT CHEEK
LOCATION SIMPLE: RIGHT FOREARM
LOCATION SIMPLE: RIGHT PRETIBIAL REGION
LOCATION SIMPLE: LEFT FOREARM
LOCATION SIMPLE: RIGHT SCALP
LOCATION SIMPLE: LEFT ZYGOMA
LOCATION SIMPLE: RIGHT CHEEK
LOCATION SIMPLE: RIGHT HAND
LOCATION SIMPLE: LEFT EAR
LOCATION SIMPLE: LEFT ANTERIOR NECK
LOCATION SIMPLE: POSTERIOR SCALP
LOCATION SIMPLE: LEFT LOWER BACK
LOCATION SIMPLE: LEFT TEMPLE

## 2020-09-01 NOTE — PROCEDURE: DEFER
Introduction Text (Please End With A Colon): .
Instructions (Optional): 40 min sst x2 lipoma left back and right forehead
Detail Level: Generalized

## 2020-09-01 NOTE — PROCEDURE: LIQUID NITROGEN
Duration Of Freeze Thaw-Cycle (Seconds): 2
Render Post-Care Instructions In Note?: no
Medical Necessity Clause: This procedure was medically necessary because the lesions that were treated were:
Detail Level: Detailed
Post-Care Instructions: I reviewed with the patient in detail post-care instructions. Patient is to wear sunprotection, and avoid picking at any of the treated lesions. Pt may apply Vaseline to crusted or scabbing areas.
Consent: The patient's consent was obtained including but not limited to risks of crusting, scabbing, blistering, scarring, darker or lighter pigmentary change, recurrence, incomplete removal and infection.
Number Of Freeze-Thaw Cycles: 3 freeze-thaw cycles
Medical Necessity Information: It is in your best interest to select a reason for this procedure from the list below. All of these items fulfill various CMS LCD requirements except the new and changing color options.
Include Z78.9 (Other Specified Conditions Influencing Health Status) As An Associated Diagnosis?: Yes
Number Of Freeze-Thaw Cycles: 2 freeze-thaw cycles
Duration Of Freeze Thaw-Cycle (Seconds): 3

## 2020-09-21 ENCOUNTER — HOSPITAL ENCOUNTER (OUTPATIENT)
Dept: LAB | Age: 65
Discharge: HOME | End: 2020-09-21
Payer: COMMERCIAL

## 2020-09-21 DIAGNOSIS — R53.81 MALAISE AND FATIGUE: ICD-10-CM

## 2020-09-21 DIAGNOSIS — R53.83 MALAISE AND FATIGUE: ICD-10-CM

## 2020-09-21 LAB
ALBUMIN SERPL-MCNC: 3.5 G/DL (ref 3.2–4.6)
ALBUMIN/GLOB SERPL: 1 {RATIO} (ref 1.2–3.5)
ALP SERPL-CCNC: 101 U/L (ref 50–136)
ALT SERPL-CCNC: 42 U/L (ref 12–65)
ANION GAP SERPL CALC-SCNC: 4 MMOL/L (ref 7–16)
AST SERPL-CCNC: 25 U/L (ref 15–37)
BASOPHILS # BLD: 0.1 K/UL (ref 0–0.2)
BASOPHILS NFR BLD: 1 % (ref 0–2)
BILIRUB SERPL-MCNC: 0.4 MG/DL (ref 0.2–1.1)
BUN SERPL-MCNC: 17 MG/DL (ref 8–23)
CALCIUM SERPL-MCNC: 8.8 MG/DL (ref 8.3–10.4)
CHLORIDE SERPL-SCNC: 107 MMOL/L (ref 98–107)
CO2 SERPL-SCNC: 29 MMOL/L (ref 21–32)
CREAT SERPL-MCNC: 1 MG/DL (ref 0.8–1.5)
DIFFERENTIAL METHOD BLD: ABNORMAL
EOSINOPHIL # BLD: 0.2 K/UL (ref 0–0.8)
EOSINOPHIL NFR BLD: 3 % (ref 0.5–7.8)
ERYTHROCYTE [DISTWIDTH] IN BLOOD BY AUTOMATED COUNT: 12.6 % (ref 11.9–14.6)
GLOBULIN SER CALC-MCNC: 3.6 G/DL (ref 2.3–3.5)
GLUCOSE SERPL-MCNC: 135 MG/DL (ref 65–100)
HCT VFR BLD AUTO: 43.2 % (ref 41.1–50.3)
HGB BLD-MCNC: 14.7 G/DL (ref 13.6–17.2)
IMM GRANULOCYTES # BLD AUTO: 0 K/UL (ref 0–0.5)
IMM GRANULOCYTES NFR BLD AUTO: 0 % (ref 0–5)
LYMPHOCYTES # BLD: 1.5 K/UL (ref 0.5–4.6)
LYMPHOCYTES NFR BLD: 32 % (ref 13–44)
MCH RBC QN AUTO: 30.8 PG (ref 26.1–32.9)
MCHC RBC AUTO-ENTMCNC: 34 G/DL (ref 31.4–35)
MCV RBC AUTO: 90.4 FL (ref 79.6–97.8)
MONOCYTES # BLD: 0.5 K/UL (ref 0.1–1.3)
MONOCYTES NFR BLD: 10 % (ref 4–12)
NEUTS SEG # BLD: 2.6 K/UL (ref 1.7–8.2)
NEUTS SEG NFR BLD: 54 % (ref 43–78)
NRBC # BLD: 0 K/UL (ref 0–0.2)
PLATELET # BLD AUTO: 229 K/UL (ref 150–450)
PMV BLD AUTO: 9.2 FL (ref 9.4–12.3)
POTASSIUM SERPL-SCNC: 4.5 MMOL/L (ref 3.5–5.1)
PROT SERPL-MCNC: 7.1 G/DL (ref 6.3–8.2)
RBC # BLD AUTO: 4.78 M/UL (ref 4.23–5.6)
SODIUM SERPL-SCNC: 140 MMOL/L (ref 136–145)
TSH SERPL DL<=0.005 MIU/L-ACNC: 1.62 UIU/ML (ref 0.36–3.74)
WBC # BLD AUTO: 4.9 K/UL (ref 4.3–11.1)

## 2020-09-21 PROCEDURE — 85025 COMPLETE CBC W/AUTO DIFF WBC: CPT

## 2020-09-21 PROCEDURE — 84443 ASSAY THYROID STIM HORMONE: CPT

## 2020-09-21 PROCEDURE — 36415 COLL VENOUS BLD VENIPUNCTURE: CPT

## 2020-09-21 PROCEDURE — 80053 COMPREHEN METABOLIC PANEL: CPT

## 2020-10-06 ENCOUNTER — APPOINTMENT (RX ONLY)
Dept: URBAN - METROPOLITAN AREA CLINIC 349 | Facility: CLINIC | Age: 65
Setting detail: DERMATOLOGY
End: 2020-10-06

## 2020-10-24 ENCOUNTER — APPOINTMENT (OUTPATIENT)
Dept: CT IMAGING | Age: 65
End: 2020-10-24
Attending: EMERGENCY MEDICINE
Payer: COMMERCIAL

## 2020-10-24 ENCOUNTER — APPOINTMENT (OUTPATIENT)
Dept: GENERAL RADIOLOGY | Age: 65
End: 2020-10-24
Attending: EMERGENCY MEDICINE
Payer: COMMERCIAL

## 2020-10-24 ENCOUNTER — HOSPITAL ENCOUNTER (EMERGENCY)
Age: 65
Discharge: HOME | End: 2020-10-24
Attending: EMERGENCY MEDICINE
Payer: COMMERCIAL

## 2020-10-24 VITALS
SYSTOLIC BLOOD PRESSURE: 121 MMHG | HEART RATE: 74 BPM | DIASTOLIC BLOOD PRESSURE: 82 MMHG | RESPIRATION RATE: 16 BRPM | TEMPERATURE: 98 F | HEIGHT: 77 IN | BODY MASS INDEX: 31.88 KG/M2 | WEIGHT: 270 LBS | OXYGEN SATURATION: 96 %

## 2020-10-24 DIAGNOSIS — S30.1XXA HEMATOMA OF GROIN, INITIAL ENCOUNTER: Primary | ICD-10-CM

## 2020-10-24 DIAGNOSIS — T07.XXXA ABRASIONS OF MULTIPLE SITES: ICD-10-CM

## 2020-10-24 DIAGNOSIS — T07.XXXA MULTIPLE CONTUSIONS: ICD-10-CM

## 2020-10-24 LAB
ALBUMIN SERPL-MCNC: 3.5 G/DL (ref 3.2–4.6)
ALBUMIN/GLOB SERPL: 1.1 {RATIO} (ref 1.2–3.5)
ALP SERPL-CCNC: 80 U/L (ref 50–136)
ALT SERPL-CCNC: 42 U/L (ref 12–65)
ANION GAP SERPL CALC-SCNC: 6 MMOL/L (ref 7–16)
APPEARANCE UR: ABNORMAL
AST SERPL-CCNC: 24 U/L (ref 15–37)
BILIRUB SERPL-MCNC: 0.6 MG/DL (ref 0.2–1.1)
BILIRUB UR QL: NEGATIVE
BUN SERPL-MCNC: 21 MG/DL (ref 8–23)
CALCIUM SERPL-MCNC: 8.8 MG/DL (ref 8.3–10.4)
CHLORIDE SERPL-SCNC: 104 MMOL/L (ref 98–107)
CO2 SERPL-SCNC: 27 MMOL/L (ref 21–32)
COLOR UR: ABNORMAL
CREAT SERPL-MCNC: 1.21 MG/DL (ref 0.8–1.5)
ERYTHROCYTE [DISTWIDTH] IN BLOOD BY AUTOMATED COUNT: 12.6 % (ref 11.9–14.6)
GLOBULIN SER CALC-MCNC: 3.3 G/DL (ref 2.3–3.5)
GLUCOSE SERPL-MCNC: 224 MG/DL (ref 65–100)
GLUCOSE UR STRIP.AUTO-MCNC: NEGATIVE MG/DL
HCT VFR BLD AUTO: 40.5 % (ref 41.1–50.3)
HGB BLD-MCNC: 14.1 G/DL (ref 13.6–17.2)
HGB UR QL STRIP: NEGATIVE
INR PPP: 1.2
KETONES UR QL STRIP.AUTO: ABNORMAL MG/DL
LEUKOCYTE ESTERASE UR QL STRIP.AUTO: NEGATIVE
MCH RBC QN AUTO: 31.1 PG (ref 26.1–32.9)
MCHC RBC AUTO-ENTMCNC: 34.8 G/DL (ref 31.4–35)
MCV RBC AUTO: 89.4 FL (ref 79.6–97.8)
NITRITE UR QL STRIP.AUTO: NEGATIVE
NRBC # BLD: 0 K/UL (ref 0–0.2)
PH UR STRIP: 5.5 [PH] (ref 5–9)
PLATELET # BLD AUTO: 220 K/UL (ref 150–450)
PMV BLD AUTO: 9 FL (ref 9.4–12.3)
POTASSIUM SERPL-SCNC: 4.3 MMOL/L (ref 3.5–5.1)
PROT SERPL-MCNC: 6.8 G/DL (ref 6.3–8.2)
PROT UR STRIP-MCNC: NEGATIVE MG/DL
PROTHROMBIN TIME: 15.9 SEC (ref 12.5–14.7)
RBC # BLD AUTO: 4.53 M/UL (ref 4.23–5.6)
SODIUM SERPL-SCNC: 137 MMOL/L (ref 136–145)
SP GR UR REFRACTOMETRY: 1.04 (ref 1–1.02)
UROBILINOGEN UR QL STRIP.AUTO: 1 EU/DL (ref 0.2–1)
WBC # BLD AUTO: 12.8 K/UL (ref 4.3–11.1)

## 2020-10-24 PROCEDURE — 71046 X-RAY EXAM CHEST 2 VIEWS: CPT

## 2020-10-24 PROCEDURE — 72125 CT NECK SPINE W/O DYE: CPT

## 2020-10-24 PROCEDURE — 81003 URINALYSIS AUTO W/O SCOPE: CPT

## 2020-10-24 PROCEDURE — 74011000258 HC RX REV CODE- 258: Performed by: EMERGENCY MEDICINE

## 2020-10-24 PROCEDURE — 99284 EMERGENCY DEPT VISIT MOD MDM: CPT

## 2020-10-24 PROCEDURE — 74177 CT ABD & PELVIS W/CONTRAST: CPT

## 2020-10-24 PROCEDURE — 80053 COMPREHEN METABOLIC PANEL: CPT

## 2020-10-24 PROCEDURE — 70450 CT HEAD/BRAIN W/O DYE: CPT

## 2020-10-24 PROCEDURE — 85610 PROTHROMBIN TIME: CPT

## 2020-10-24 PROCEDURE — 85027 COMPLETE CBC AUTOMATED: CPT

## 2020-10-24 PROCEDURE — 74011000636 HC RX REV CODE- 636: Performed by: EMERGENCY MEDICINE

## 2020-10-24 RX ORDER — SODIUM CHLORIDE 0.9 % (FLUSH) 0.9 %
10 SYRINGE (ML) INJECTION
Status: COMPLETED | OUTPATIENT
Start: 2020-10-24 | End: 2020-10-24

## 2020-10-24 RX ORDER — MORPHINE SULFATE 15 MG/1
15 TABLET ORAL
Qty: 19 TAB | Refills: 0 | Status: SHIPPED | OUTPATIENT
Start: 2020-10-24 | End: 2020-10-29

## 2020-10-24 RX ADMIN — SODIUM CHLORIDE 100 ML: 900 INJECTION, SOLUTION INTRAVENOUS at 17:17

## 2020-10-24 RX ADMIN — Medication 10 ML: at 17:17

## 2020-10-24 RX ADMIN — IOPAMIDOL 100 ML: 755 INJECTION, SOLUTION INTRAVENOUS at 17:17

## 2020-10-24 NOTE — DISCHARGE INSTRUCTIONS
Hold your Xarelto for 4 to 5 days'  I recommend touching base with your cardiologist about when they feel you should restart the anticoagulation    Monitor the hematoma for signs of enlargement, pulsations.   Also monitor for signs of infection such as increasing pain redness of the skin or ulcerations    No lifting, bending or other strenous activities    Do not drink alcohol or drive while taking the prescription pain medications    Call your doctor/follow up doctor to set up appointment for recheck visit    Return to ER for any worsening symptoms or new problems which may arise
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
## 2020-10-27 ENCOUNTER — NURSE TRIAGE (OUTPATIENT)
Dept: OTHER | Facility: CLINIC | Age: 65
End: 2020-10-27

## 2020-10-27 NOTE — TELEPHONE ENCOUNTER
Reason for Disposition   COVID-19 Testing, questions about    Answer Assessment - Initial Assessment Questions  1. COVID-19 DIAGNOSIS: \"Who made your Coronavirus (COVID-19) diagnosis? \" \"Was it confirmed by a positive lab test?\" If not diagnosed by a HCP, ask \"Are there lots of cases (community spread) where you live? \" (See [facility] health department website, if unsure)      No diagnosis    2. ONSET: \"When did the COVID-19 symptoms start? \"       Today    3. WORST SYMPTOM: \"What is your worst symptom? \" (e.g., cough, fever, shortness of breath, muscle aches)      Low grade fever, nausea    4. COUGH: \"Do you have a cough? \" If so, ask: \"How bad is the cough? \"       Denies    5. FEVER: \"Do you have a fever? \" If so, ask: \"What is your temperature, how was it measured, and when did it start? \"      99.7, taken orally    6. RESPIRATORY STATUS: \"Describe your breathing? \" (e.g., shortness of breath, wheezing, unable to speak)      Denies    7. BETTER-SAME-WORSE: Geovanna D Hanis you getting better, staying the same or getting worse compared to yesterday? \"  If getting worse, ask, \"In what way? \"      Just started today    8. HIGH RISK DISEASE: \"Do you have any chronic medical problems? \" (e.g., asthma, heart or lung disease, weak immune system, etc.)     A-fib    9. PREGNANCY: \"Is there any chance you are pregnant? \" \"When was your last menstrual period? \"     N/a    10. OTHER SYMPTOMS: \"Do you have any other symptoms? \"  (e.g., chills, fatigue, headache, loss of smell or taste, muscle pain, sore throat)       nausea    Protocols used: CORONAVIRUS (COVID-19) DIAGNOSED OR SUSPECTED-[facility]

## 2020-11-04 ENCOUNTER — HOSPITAL ENCOUNTER (EMERGENCY)
Age: 65
Discharge: HOME | End: 2020-11-04
Attending: EMERGENCY MEDICINE
Payer: COMMERCIAL

## 2020-11-04 ENCOUNTER — APPOINTMENT (OUTPATIENT)
Dept: ULTRASOUND IMAGING | Age: 65
End: 2020-11-04
Attending: EMERGENCY MEDICINE
Payer: COMMERCIAL

## 2020-11-04 VITALS
HEIGHT: 77 IN | TEMPERATURE: 98.3 F | DIASTOLIC BLOOD PRESSURE: 86 MMHG | SYSTOLIC BLOOD PRESSURE: 153 MMHG | RESPIRATION RATE: 16 BRPM | HEART RATE: 68 BPM | BODY MASS INDEX: 31.88 KG/M2 | WEIGHT: 270 LBS | OXYGEN SATURATION: 97 %

## 2020-11-04 DIAGNOSIS — M79.89 LEG SWELLING: ICD-10-CM

## 2020-11-04 DIAGNOSIS — S30.1XXD GROIN HEMATOMA, SUBSEQUENT ENCOUNTER: Primary | ICD-10-CM

## 2020-11-04 DIAGNOSIS — M25.562 ARTHRALGIA OF LEFT LOWER LEG: ICD-10-CM

## 2020-11-04 PROCEDURE — 99282 EMERGENCY DEPT VISIT SF MDM: CPT

## 2020-11-04 PROCEDURE — 93971 EXTREMITY STUDY: CPT

## 2020-11-04 PROCEDURE — 96372 THER/PROPH/DIAG INJ SC/IM: CPT

## 2020-11-04 PROCEDURE — 93926 LOWER EXTREMITY STUDY: CPT

## 2020-11-04 RX ORDER — HYDROMORPHONE HYDROCHLORIDE 1 MG/ML
1 INJECTION, SOLUTION INTRAMUSCULAR; INTRAVENOUS; SUBCUTANEOUS ONCE
Status: DISCONTINUED | OUTPATIENT
Start: 2020-11-04 | End: 2020-11-04 | Stop reason: HOSPADM
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
## 2020-11-04 NOTE — DISCHARGE INSTRUCTIONS
The pain and swelling will decrease as the hematoma dissolves. This may take longer because you are on the blood thinner. Return to the emergency department for further evaluation should you have any concerns. Compression to the area, ice to the area, compression stocking while you are walking around can be helpful.
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
## 2020-11-04 NOTE — ED NOTES
I have reviewed discharge instructions with the patient. The patient verbalized understanding. Patient left ED via Discharge Method: ambulatory to Home with self    Opportunity for questions and clarification provided. Patient given 0 scripts. To continue your aftercare when you leave the hospital, you may receive an automated call from our care team to check in on how you are doing. This is a free service and part of our promise to provide the best care and service to meet your aftercare needs.  If you have questions, or wish to unsubscribe from this service please call [phone]. Thank you for Choosing our [facility] Emergency Department.

## 2020-11-05 ENCOUNTER — HOSPITAL ENCOUNTER (OUTPATIENT)
Dept: CT IMAGING | Age: 65
Discharge: HOME | End: 2020-11-05
Attending: FAMILY MEDICINE
Payer: COMMERCIAL

## 2020-11-05 DIAGNOSIS — S30.1XXD HEMATOMA OF GROIN, SUBSEQUENT ENCOUNTER: ICD-10-CM

## 2020-11-05 DIAGNOSIS — N28.89 RENAL MASS, LEFT: ICD-10-CM

## 2020-11-05 PROCEDURE — 74011000636 HC RX REV CODE- 636: Performed by: FAMILY MEDICINE

## 2020-11-05 PROCEDURE — 74011000258 HC RX REV CODE- 258: Performed by: FAMILY MEDICINE

## 2020-11-05 PROCEDURE — 74178 CT ABD&PLV WO CNTR FLWD CNTR: CPT

## 2020-11-05 RX ORDER — SODIUM CHLORIDE 0.9 % (FLUSH) 0.9 %
10 SYRINGE (ML) INJECTION
Status: COMPLETED | OUTPATIENT
Start: 2020-11-05 | End: 2020-11-05

## 2020-11-05 RX ADMIN — SODIUM CHLORIDE 100 ML: 900 INJECTION, SOLUTION INTRAVENOUS at 13:13

## 2020-11-05 RX ADMIN — Medication 10 ML: at 13:13

## 2020-11-05 RX ADMIN — IOPAMIDOL 100 ML: 755 INJECTION, SOLUTION INTRAVENOUS at 13:13

## 2020-11-11 ENCOUNTER — HOSPITAL ENCOUNTER (OUTPATIENT)
Dept: ULTRASOUND IMAGING | Age: 65
Discharge: HOME | End: 2020-11-11
Attending: FAMILY MEDICINE
Payer: COMMERCIAL

## 2020-11-11 DIAGNOSIS — E04.1 THYROID NODULE: ICD-10-CM

## 2020-11-11 PROCEDURE — 76536 US EXAM OF HEAD AND NECK: CPT

## 2021-01-04 ENCOUNTER — APPOINTMENT (RX ONLY)
Dept: URBAN - METROPOLITAN AREA CLINIC 349 | Facility: CLINIC | Age: 66
Setting detail: DERMATOLOGY
End: 2021-01-04

## 2021-01-04 DIAGNOSIS — L57.0 ACTINIC KERATOSIS: ICD-10-CM

## 2021-01-04 DIAGNOSIS — L82.1 OTHER SEBORRHEIC KERATOSIS: ICD-10-CM

## 2021-01-04 DIAGNOSIS — D22 MELANOCYTIC NEVI: ICD-10-CM

## 2021-01-04 PROBLEM — D04.61 CARCINOMA IN SITU OF SKIN OF RIGHT UPPER LIMB, INCLUDING SHOULDER: Status: ACTIVE | Noted: 2021-01-04

## 2021-01-04 PROBLEM — D22.5 MELANOCYTIC NEVI OF TRUNK: Status: ACTIVE | Noted: 2021-01-04

## 2021-01-04 PROCEDURE — 99213 OFFICE O/P EST LOW 20 MIN: CPT | Mod: 25

## 2021-01-04 PROCEDURE — A4550 SURGICAL TRAYS: HCPCS

## 2021-01-04 PROCEDURE — 17004 DESTROY PREMAL LESIONS 15/>: CPT | Mod: 59

## 2021-01-04 PROCEDURE — 17262 DSTRJ MAL LES T/A/L 1.1-2.0: CPT

## 2021-01-04 PROCEDURE — 88305 TISSUE EXAM BY PATHOLOGIST: CPT

## 2021-01-04 PROCEDURE — ? COUNSELING

## 2021-01-04 PROCEDURE — ? LIQUID NITROGEN

## 2021-01-04 PROCEDURE — ? SHAVE REMOVAL AND DESTRUCTION

## 2021-01-04 PROCEDURE — ? PATHOLOGY BILLING

## 2021-01-04 ASSESSMENT — LOCATION SIMPLE DESCRIPTION DERM
LOCATION SIMPLE: LEFT EAR
LOCATION SIMPLE: RIGHT FOREARM
LOCATION SIMPLE: LEFT FOREARM
LOCATION SIMPLE: NECK
LOCATION SIMPLE: RIGHT UPPER BACK
LOCATION SIMPLE: RIGHT HAND
LOCATION SIMPLE: LEFT FOREHEAD
LOCATION SIMPLE: RIGHT CHEEK
LOCATION SIMPLE: LEFT CHEEK
LOCATION SIMPLE: LEFT UPPER BACK

## 2021-01-04 ASSESSMENT — LOCATION DETAILED DESCRIPTION DERM
LOCATION DETAILED: LEFT ANTERIOR EARLOBE
LOCATION DETAILED: RIGHT DISTAL RADIAL DORSAL FOREARM
LOCATION DETAILED: LEFT SUPERIOR LATERAL NECK
LOCATION DETAILED: RIGHT LATERAL BUCCAL CHEEK
LOCATION DETAILED: RIGHT MID-UPPER BACK
LOCATION DETAILED: LEFT FOREHEAD
LOCATION DETAILED: LEFT SUPERIOR CENTRAL MALAR CHEEK
LOCATION DETAILED: LEFT INFERIOR CENTRAL MALAR CHEEK
LOCATION DETAILED: LEFT MID PREAURICULAR CHEEK
LOCATION DETAILED: RIGHT ULNAR DORSAL HAND
LOCATION DETAILED: LEFT SUPERIOR PREAURICULAR CHEEK
LOCATION DETAILED: LEFT DISTAL DORSAL FOREARM
LOCATION DETAILED: LEFT CENTRAL LATERAL NECK
LOCATION DETAILED: LEFT SUPERIOR MEDIAL UPPER BACK
LOCATION DETAILED: LEFT SUPERIOR MEDIAL FOREHEAD

## 2021-01-04 ASSESSMENT — LOCATION ZONE DERM
LOCATION ZONE: FACE
LOCATION ZONE: ARM
LOCATION ZONE: TRUNK
LOCATION ZONE: HAND
LOCATION ZONE: NECK
LOCATION ZONE: EAR

## 2021-01-04 ASSESSMENT — PAIN INTENSITY VAS: HOW INTENSE IS YOUR PAIN 0 BEING NO PAIN, 10 BEING THE MOST SEVERE PAIN POSSIBLE?: NO PAIN

## 2021-01-04 NOTE — PROCEDURE: LIQUID NITROGEN
Render Post-Care Instructions In Note?: no
Post-Care Instructions: I reviewed with the patient in detail post-care instructions. Patient is to wear sunprotection, and avoid picking at any of the treated lesions. Pt may apply Vaseline to crusted or scabbing areas.
Duration Of Freeze Thaw-Cycle (Seconds): 3
Number Of Freeze-Thaw Cycles: 2 freeze-thaw cycles
Detail Level: Detailed
Consent: The patient's consent was obtained including but not limited to risks of crusting, scabbing, blistering, scarring, darker or lighter pigmentary change, recurrence, incomplete removal and infection.

## 2021-01-04 NOTE — PROCEDURE: SHAVE REMOVAL AND DESTRUCTION
Was Curettage Performed?: Yes
Wound Care: Vaseline
Billing Type: Third-Party Bill
Render Path Notes In Note?: No
Size Of Lesion In Cm: 0
Consent: Written consent was obtained and risks were reviewed including but not limited to scarring, infection, bleeding, scabbing, incomplete removal, nerve damage and allergy to anesthesia.
Cautery Type: electrodesiccation
Post-Care Instructions: I reviewed with the patient in detail post-care instructions. Patient is to keep the biopsy site dry overnight, and then apply bacitracin twice daily until healed. Patient may apply hydrogen peroxide soaks to remove any crusting.  After the procedure, the patient was observed for 5-10 minutes and was oriented to,person, place and time and denied feeling dizzy, queasy and stated that they were not going to faint
Anesthesia Volume In Cc: 0.2
Accession #: [id number] read
Detail Level: Detailed
Size After Destruction (Required For Destruction Billing): 1.8
Number Of Curettages: 2
Hemostasis: Electrocautery
Anesthesia Type: 2% lidocaine with epinephrine
Dressing: Band-Aid
Notification Instructions: Patient will be notified of biopsy results. However, patient instructed to call the office if not contacted within 2 weeks.
Bill As?: Note: Bill Malignant Destruction If Path Confirms Malignant Lesion. Only Bill As Shave Removal If Path Comes Back Benign. Do Not Bill Shave Removal On Malignant Lesions.: Malignant Destruction

## 2021-01-04 NOTE — PROCEDURE: PATHOLOGY BILLING
Immunohistochemistry ([id number]) billing is not performed here. Please use the Immunohistochemistry Stain Billing plan to accomplish this. Immunohistochemistry ([id number] and [id number]) billing is not performed here. Please use the Immunohistochemistry Stain Billing plan to accomplish this.

## 2021-07-01 ENCOUNTER — HOSPITAL ENCOUNTER (OUTPATIENT)
Dept: CT IMAGING | Age: 66
Discharge: HOME | End: 2021-07-01
Attending: UROLOGY
Payer: COMMERCIAL

## 2021-07-01 DIAGNOSIS — R31.9 HEMATURIA, UNSPECIFIED TYPE: ICD-10-CM

## 2021-07-01 LAB — CREAT BLD-MCNC: 0.73 MG/DL (ref 0.8–1.5)

## 2021-07-01 PROCEDURE — 74178 CT ABD&PLV WO CNTR FLWD CNTR: CPT

## 2021-07-01 PROCEDURE — 82565 ASSAY OF CREATININE: CPT

## 2021-07-01 PROCEDURE — 74011000636 HC RX REV CODE- 636: Performed by: UROLOGY

## 2021-07-01 PROCEDURE — 74011000258 HC RX REV CODE- 258: Performed by: UROLOGY

## 2021-07-01 RX ORDER — SODIUM CHLORIDE 0.9 % (FLUSH) 0.9 %
10 SYRINGE (ML) INJECTION
Status: COMPLETED | OUTPATIENT
Start: 2021-07-01 | End: 2021-07-01

## 2021-07-01 RX ADMIN — Medication 10 ML: at 10:21

## 2021-07-01 RX ADMIN — SODIUM CHLORIDE 100 ML: 900 INJECTION, SOLUTION INTRAVENOUS at 10:21

## 2021-07-01 RX ADMIN — IOPAMIDOL 100 ML: 755 INJECTION, SOLUTION INTRAVENOUS at 10:20

## 2021-08-05 ENCOUNTER — APPOINTMENT (RX ONLY)
Dept: URBAN - METROPOLITAN AREA CLINIC 349 | Facility: CLINIC | Age: 66
Setting detail: DERMATOLOGY
End: 2021-08-05

## 2021-08-05 DIAGNOSIS — L85.3 XEROSIS CUTIS: ICD-10-CM

## 2021-08-05 DIAGNOSIS — Z12.83 ENCOUNTER FOR SCREENING FOR MALIGNANT NEOPLASM OF SKIN: ICD-10-CM

## 2021-08-05 DIAGNOSIS — L57.0 ACTINIC KERATOSIS: ICD-10-CM

## 2021-08-05 DIAGNOSIS — Z85.828 PERSONAL HISTORY OF OTHER MALIGNANT NEOPLASM OF SKIN: ICD-10-CM

## 2021-08-05 DIAGNOSIS — D22 MELANOCYTIC NEVI: ICD-10-CM

## 2021-08-05 DIAGNOSIS — Z80.8 FAMILY HISTORY OF MALIGNANT NEOPLASM OF OTHER ORGANS OR SYSTEMS: ICD-10-CM

## 2021-08-05 DIAGNOSIS — L82.1 OTHER SEBORRHEIC KERATOSIS: ICD-10-CM

## 2021-08-05 PROBLEM — C44.622 SQUAMOUS CELL CARCINOMA OF SKIN OF RIGHT UPPER LIMB, INCLUDING SHOULDER: Status: ACTIVE | Noted: 2021-08-05

## 2021-08-05 PROBLEM — D22.5 MELANOCYTIC NEVI OF TRUNK: Status: ACTIVE | Noted: 2021-08-05

## 2021-08-05 PROCEDURE — 17262 DSTRJ MAL LES T/A/L 1.1-2.0: CPT

## 2021-08-05 PROCEDURE — 17272 DSTR MAL LES S/N/H/F/G 1.1-2: CPT | Mod: 76

## 2021-08-05 PROCEDURE — ? COUNSELING

## 2021-08-05 PROCEDURE — ? SHAVE REMOVAL AND DESTRUCTION

## 2021-08-05 PROCEDURE — ? LIQUID NITROGEN

## 2021-08-05 PROCEDURE — 17272 DSTR MAL LES S/N/H/F/G 1.1-2: CPT

## 2021-08-05 PROCEDURE — A4550 SURGICAL TRAYS: HCPCS

## 2021-08-05 PROCEDURE — ? PATHOLOGY BILLING

## 2021-08-05 PROCEDURE — 99213 OFFICE O/P EST LOW 20 MIN: CPT | Mod: 25

## 2021-08-05 PROCEDURE — 88305 TISSUE EXAM BY PATHOLOGIST: CPT

## 2021-08-05 PROCEDURE — 17004 DESTROY PREMAL LESIONS 15/>: CPT | Mod: 59

## 2021-08-05 ASSESSMENT — LOCATION SIMPLE DESCRIPTION DERM
LOCATION SIMPLE: LEFT UPPER BACK
LOCATION SIMPLE: LEFT PRETIBIAL REGION
LOCATION SIMPLE: RIGHT PRETIBIAL REGION
LOCATION SIMPLE: RIGHT FOREARM
LOCATION SIMPLE: RIGHT WRIST
LOCATION SIMPLE: ABDOMEN
LOCATION SIMPLE: NECK
LOCATION SIMPLE: UPPER BACK
LOCATION SIMPLE: LEFT CHEEK
LOCATION SIMPLE: SCALP
LOCATION SIMPLE: CHEST
LOCATION SIMPLE: LEFT FOREARM

## 2021-08-05 ASSESSMENT — LOCATION DETAILED DESCRIPTION DERM
LOCATION DETAILED: RIGHT DISTAL DORSAL FOREARM
LOCATION DETAILED: RIGHT PROXIMAL PRETIBIAL REGION
LOCATION DETAILED: LEFT SUPERIOR UPPER BACK
LOCATION DETAILED: STERNUM
LOCATION DETAILED: LEFT INFERIOR PREAURICULAR CHEEK
LOCATION DETAILED: LEFT PROXIMAL PRETIBIAL REGION
LOCATION DETAILED: RIGHT PROXIMAL RADIAL DORSAL FOREARM
LOCATION DETAILED: LEFT PROXIMAL DORSAL FOREARM
LOCATION DETAILED: INFERIOR THORACIC SPINE
LOCATION DETAILED: LEFT INFERIOR POSTAURICULAR SKIN
LOCATION DETAILED: LEFT PROXIMAL ULNAR DORSAL FOREARM
LOCATION DETAILED: RIGHT DORSAL WRIST
LOCATION DETAILED: RIGHT PROXIMAL DORSAL FOREARM
LOCATION DETAILED: LEFT INFERIOR LATERAL MALAR CHEEK
LOCATION DETAILED: EPIGASTRIC SKIN
LOCATION DETAILED: LEFT SUPERIOR LATERAL NECK
LOCATION DETAILED: LEFT DISTAL DORSAL FOREARM
LOCATION DETAILED: RIGHT DISTAL RADIAL DORSAL FOREARM
LOCATION DETAILED: LEFT MEDIAL INFERIOR CHEST
LOCATION DETAILED: RIGHT MEDIAL INFERIOR CHEST

## 2021-08-05 ASSESSMENT — LOCATION ZONE DERM
LOCATION ZONE: NECK
LOCATION ZONE: LEG
LOCATION ZONE: ARM
LOCATION ZONE: FACE
LOCATION ZONE: TRUNK
LOCATION ZONE: SCALP

## 2021-08-05 ASSESSMENT — PAIN INTENSITY VAS: HOW INTENSE IS YOUR PAIN 0 BEING NO PAIN, 10 BEING THE MOST SEVERE PAIN POSSIBLE?: 1/10 PAIN

## 2021-08-05 NOTE — PROCEDURE: SHAVE REMOVAL AND DESTRUCTION
Post-Care Instructions: I reviewed with the patient in detail post-care instructions. Patient is to keep the biopsy site dry overnight, and then apply bacitracin twice daily until healed. Patient may apply hydrogen peroxide soaks to remove any crusting.  After the procedure, the patient was observed for 5-10 minutes and was oriented to,person, place and time and denied feeling dizzy, queasy and stated that they were not going to faint
Anesthesia Volume In Cc: 1
Accession #: Dr [id number] read
Render Path Notes In Note?: No
Dressing: Band-Aid
Render Post-Care Instructions In Note?: yes
Anesthesia Type: 2% lidocaine with epinephrine
Anesthesia Volume In Cc: 0
Consent: Written consent was obtained and risks were reviewed including but not limited to scarring, infection, bleeding, scabbing, incomplete removal, nerve damage and allergy to anesthesia.
Bill As?: Note: Bill Malignant Destruction If Path Confirms Malignant Lesion. Only Bill As Shave Removal If Path Comes Back Benign. Do Not Bill Shave Removal On Malignant Lesions.: Malignant Destruction
Cautery Type: electrodesiccation
Notification Instructions: Patient will be notified of biopsy results. However, patient instructed to call the office if not contacted within 2 weeks.
Detail Level: Detailed
Hemostasis: Electrocautery
Size After Destruction (Required For Destruction Billing): 1.4
Wound Care: Vaseline
Billing Type: Third-Party Bill
Size After Destruction (Required For Destruction Billing): 1.2
Accession #: Dr [id number] read
Size After Destruction (Required For Destruction Billing): 1.3

## 2021-08-05 NOTE — PROCEDURE: PATHOLOGY BILLING
Immunohistochemistry ([id number]) billing is not performed here. Please use the Immunohistochemistry Stain Billing plan to accomplish this.

## 2021-08-05 NOTE — PROCEDURE: LIQUID NITROGEN
Duration Of Freeze Thaw-Cycle (Seconds): 3
Show Applicator Variable?: Yes
Post-Care Instructions: I reviewed with the patient in detail post-care instructions. Patient is to wear sunprotection, and avoid picking at any of the treated lesions. Pt may apply Vaseline to crusted or scabbing areas.
Render Note In Bullet Format When Appropriate: No
Consent: The patient's consent was obtained including but not limited to risks of crusting, scabbing, blistering, scarring, darker or lighter pigmentary change, recurrence, incomplete removal and infection.
Detail Level: Detailed
Number Of Freeze-Thaw Cycles: 2 freeze-thaw cycles

## 2021-08-05 NOTE — PROCEDURE: PATHOLOGY BILLING
Immunohistochemistry ([id number]) billing is not performed here. Please use the Immunohistochemistry Stain Billing plan to accomplish this.

## 2021-08-05 NOTE — PROCEDURE: PATHOLOGY BILLING
Immunohistochemistry ([id number] and [id number]) billing is not performed here. Please use the Immunohistochemistry Stain Billing plan to accomplish this. Immunohistochemistry ([id number] and [id number]) billing is not performed here. Please use the Immunohistochemistry Stain Billing plan to accomplish this.

## 2021-10-08 ENCOUNTER — HOSPITAL ENCOUNTER (OUTPATIENT)
Dept: LAB | Age: 66
Discharge: HOME | End: 2021-10-08
Payer: COMMERCIAL

## 2021-10-08 DIAGNOSIS — Z12.5 SCREENING FOR PROSTATE CANCER: ICD-10-CM

## 2021-10-08 LAB — PSA SERPL-MCNC: 4.1 NG/ML

## 2021-10-08 PROCEDURE — 36415 COLL VENOUS BLD VENIPUNCTURE: CPT

## 2021-10-08 PROCEDURE — 84153 ASSAY OF PSA TOTAL: CPT

## 2021-11-11 ENCOUNTER — APPOINTMENT (RX ONLY)
Dept: URBAN - METROPOLITAN AREA CLINIC 349 | Facility: CLINIC | Age: 66
Setting detail: DERMATOLOGY
End: 2021-11-11

## 2021-11-11 DIAGNOSIS — L81.4 OTHER MELANIN HYPERPIGMENTATION: ICD-10-CM

## 2021-11-11 DIAGNOSIS — L57.8 OTHER SKIN CHANGES DUE TO CHRONIC EXPOSURE TO NONIONIZING RADIATION: ICD-10-CM

## 2021-11-11 DIAGNOSIS — L57.0 ACTINIC KERATOSIS: ICD-10-CM

## 2021-11-11 DIAGNOSIS — D22 MELANOCYTIC NEVI: ICD-10-CM

## 2021-11-11 DIAGNOSIS — Z12.83 ENCOUNTER FOR SCREENING FOR MALIGNANT NEOPLASM OF SKIN: ICD-10-CM

## 2021-11-11 DIAGNOSIS — Z85.828 PERSONAL HISTORY OF OTHER MALIGNANT NEOPLASM OF SKIN: ICD-10-CM

## 2021-11-11 DIAGNOSIS — Z80.8 FAMILY HISTORY OF MALIGNANT NEOPLASM OF OTHER ORGANS OR SYSTEMS: ICD-10-CM

## 2021-11-11 PROBLEM — C44.722 SQUAMOUS CELL CARCINOMA OF SKIN OF RIGHT LOWER LIMB, INCLUDING HIP: Status: ACTIVE | Noted: 2021-11-11

## 2021-11-11 PROBLEM — D22.5 MELANOCYTIC NEVI OF TRUNK: Status: ACTIVE | Noted: 2021-11-11

## 2021-11-11 PROCEDURE — 17262 DSTRJ MAL LES T/A/L 1.1-2.0: CPT

## 2021-11-11 PROCEDURE — 88305 TISSUE EXAM BY PATHOLOGIST: CPT

## 2021-11-11 PROCEDURE — ? PATHOLOGY BILLING

## 2021-11-11 PROCEDURE — ? COUNSELING

## 2021-11-11 PROCEDURE — 99213 OFFICE O/P EST LOW 20 MIN: CPT | Mod: 25

## 2021-11-11 PROCEDURE — 17000 DESTRUCT PREMALG LESION: CPT | Mod: 59

## 2021-11-11 PROCEDURE — A4550 SURGICAL TRAYS: HCPCS

## 2021-11-11 PROCEDURE — ? LIQUID NITROGEN

## 2021-11-11 PROCEDURE — 17003 DESTRUCT PREMALG LES 2-14: CPT

## 2021-11-11 PROCEDURE — ? SHAVE REMOVAL AND DESTRUCTION

## 2021-11-11 ASSESSMENT — LOCATION DETAILED DESCRIPTION DERM
LOCATION DETAILED: LEFT MEDIAL INFERIOR CHEST
LOCATION DETAILED: RIGHT DISTAL DORSAL FOREARM
LOCATION DETAILED: LEFT PROXIMAL CALF
LOCATION DETAILED: EPIGASTRIC SKIN
LOCATION DETAILED: LEFT ANTERIOR DISTAL UPPER ARM
LOCATION DETAILED: LEFT ANTERIOR PROXIMAL THIGH
LOCATION DETAILED: RIGHT PROXIMAL DORSAL FOREARM
LOCATION DETAILED: LEFT SUPERIOR LATERAL UPPER BACK
LOCATION DETAILED: RIGHT PROXIMAL CALF
LOCATION DETAILED: RIGHT PROXIMAL RADIAL DORSAL FOREARM
LOCATION DETAILED: RIGHT VENTRAL PROXIMAL FOREARM
LOCATION DETAILED: LEFT DISTAL LATERAL CALF
LOCATION DETAILED: STERNUM
LOCATION DETAILED: RIGHT DISTAL CALF
LOCATION DETAILED: LEFT PROXIMAL PRETIBIAL REGION
LOCATION DETAILED: LEFT INFERIOR LATERAL NECK

## 2021-11-11 ASSESSMENT — LOCATION SIMPLE DESCRIPTION DERM
LOCATION SIMPLE: ABDOMEN
LOCATION SIMPLE: LEFT ANTERIOR NECK
LOCATION SIMPLE: CHEST
LOCATION SIMPLE: LEFT UPPER BACK
LOCATION SIMPLE: LEFT THIGH
LOCATION SIMPLE: RIGHT CALF
LOCATION SIMPLE: LEFT CALF
LOCATION SIMPLE: LEFT PRETIBIAL REGION
LOCATION SIMPLE: RIGHT FOREARM
LOCATION SIMPLE: LEFT UPPER ARM

## 2021-11-11 ASSESSMENT — LOCATION ZONE DERM
LOCATION ZONE: NECK
LOCATION ZONE: LEG
LOCATION ZONE: ARM
LOCATION ZONE: TRUNK

## 2021-11-11 NOTE — PROCEDURE: LIQUID NITROGEN
Detail Level: Detailed
Post-Care Instructions: I reviewed with the patient in detail post-care instructions. Patient is to wear sunprotection, and avoid picking at any of the treated lesions. Pt may apply Vaseline to crusted or scabbing areas.
Consent: The patient's consent was obtained including but not limited to risks of crusting, scabbing, blistering, scarring, darker or lighter pigmentary change, recurrence, incomplete removal and infection.
Render Post-Care Instructions In Note?: no
Show Applicator Variable?: Yes
Number Of Freeze-Thaw Cycles: 2 freeze-thaw cycles
Duration Of Freeze Thaw-Cycle (Seconds): 3

## 2021-11-11 NOTE — PROCEDURE: PATHOLOGY BILLING
Immunohistochemistry ([id number]) billing is not performed here. Please use the Immunohistochemistry Stain Billing plan to accomplish this. Immunohistochemistry ([id number] and [id number]) billing is not performed here. Please use the Immunohistochemistry Stain Billing plan to accomplish this.

## 2021-11-11 NOTE — PROCEDURE: SHAVE REMOVAL AND DESTRUCTION
Size After Destruction (Required For Destruction Billing): 1.6
Was Curettage Performed?: Yes
Detail Level: Detailed
Wound Care: Vaseline
Billing Type: Third-Party Bill
Render Path Notes In Note?: No
Consent: Written consent was obtained and risks were reviewed including but not limited to scarring, infection, bleeding, scabbing, incomplete removal, nerve damage and allergy to anesthesia.
Cautery Type: electrodesiccation
Post-Care Instructions: I reviewed with the patient in detail post-care instructions. Patient is to keep the biopsy site dry overnight, and then apply bacitracin twice daily until healed. Patient may apply hydrogen peroxide soaks to remove any crusting.  After the procedure, the patient was observed for 5-10 minutes and was oriented to,person, place and time and denied feeling dizzy, queasy and stated that they were not going to faint
Accession #: [id number]
Anesthesia Volume In Cc: 1
Size Of Lesion In Cm: 1.1
Anesthesia Volume In Cc: 0
Hemostasis: Electrocautery
Dressing: Band-Aid
Notification Instructions: Patient will be notified of biopsy results. However, patient instructed to call the office if not contacted within 2 weeks.
Anesthesia Type: 2% lidocaine with epinephrine
Bill As?: Note: Bill Malignant Destruction If Path Confirms Malignant Lesion. Only Bill As Shave Removal If Path Comes Back Benign. Do Not Bill Shave Removal On Malignant Lesions.: Malignant Destruction

## 2022-03-18 PROBLEM — M19.90 OSTEOARTHRITIS: Status: ACTIVE | Noted: 2017-03-28

## 2022-03-18 PROBLEM — I34.0 MITRAL VALVE INSUFFICIENCY: Status: ACTIVE | Noted: 2017-04-24

## 2022-03-19 PROBLEM — M76.892 OTHER SPECIFIED ENTHESOPATHIES OF LEFT LOWER LIMB, EXCLUDING FOOT: Status: ACTIVE | Noted: 2020-02-23

## 2022-03-19 PROBLEM — I47.29 RVOT VENTRICULAR TACHYCARDIA: Status: ACTIVE | Noted: 2017-04-24

## 2022-03-19 PROBLEM — E78.5 DYSLIPIDEMIA: Status: ACTIVE | Noted: 2017-04-24

## 2022-03-19 PROBLEM — F33.9 RECURRENT DEPRESSION (HCC): Status: ACTIVE | Noted: 2018-01-17

## 2022-03-19 PROBLEM — Z09 FOLLOW-UP EXAMINATION: Status: ACTIVE | Noted: 2020-02-23

## 2022-03-19 PROBLEM — Z87.19 H/O RIGHT INGUINAL HERNIA REPAIR: Status: ACTIVE | Noted: 2018-05-14

## 2022-03-19 PROBLEM — I48.0 PAROXYSMAL ATRIAL FIBRILLATION (HCC): Status: ACTIVE | Noted: 2017-04-24

## 2022-03-19 PROBLEM — E66.9 OBESITY, CLASS I, BMI 30-34.9: Status: ACTIVE | Noted: 2017-05-08

## 2022-03-19 PROBLEM — Z98.890 H/O RIGHT INGUINAL HERNIA REPAIR: Status: ACTIVE | Noted: 2018-05-14

## 2022-03-19 PROBLEM — M75.01 ADHESIVE CAPSULITIS OF RIGHT SHOULDER: Status: ACTIVE | Noted: 2020-02-23

## 2022-03-19 PROBLEM — M75.121 COMPLETE TEAR OF RIGHT ROTATOR CUFF: Status: ACTIVE | Noted: 2020-02-23

## 2022-03-19 PROBLEM — Z96.651 PRESENCE OF RIGHT ARTIFICIAL KNEE JOINT: Status: ACTIVE | Noted: 2020-02-23

## 2022-03-19 PROBLEM — E66.811 OBESITY, CLASS I, BMI 30-34.9: Status: ACTIVE | Noted: 2017-05-08

## 2022-03-19 PROBLEM — Z86.39 HX OF DIABETES MELLITUS: Status: ACTIVE | Noted: 2018-03-22

## 2022-04-08 ENCOUNTER — HOSPITAL ENCOUNTER (OUTPATIENT)
Dept: LAB | Age: 67
Discharge: HOME | End: 2022-04-08
Payer: COMMERCIAL

## 2022-04-08 DIAGNOSIS — R97.20 ELEVATED PSA: ICD-10-CM

## 2022-04-08 LAB — PSA SERPL-MCNC: 6 NG/ML

## 2022-04-08 PROCEDURE — 84153 ASSAY OF PSA TOTAL: CPT

## 2022-04-08 PROCEDURE — 36415 COLL VENOUS BLD VENIPUNCTURE: CPT

## 2022-04-08 NOTE — PROGRESS NOTES
Pls call pt and let him know his psa is again up to 6. I think we should get a prostate MRI in the next month or so and see me back with another psa. If he does not want to get an MRI, then let's recheck his psa in 3-4 months.   Shakeel, w

## 2022-05-13 ENCOUNTER — HOSPITAL ENCOUNTER (OUTPATIENT)
Dept: MRI IMAGING | Age: 67
Discharge: HOME | End: 2022-05-13
Attending: UROLOGY
Payer: COMMERCIAL

## 2022-05-13 ENCOUNTER — APPOINTMENT (RX ONLY)
Dept: URBAN - METROPOLITAN AREA CLINIC 329 | Facility: CLINIC | Age: 67
Setting detail: DERMATOLOGY
End: 2022-05-13

## 2022-05-13 DIAGNOSIS — L57.8 OTHER SKIN CHANGES DUE TO CHRONIC EXPOSURE TO NONIONIZING RADIATION: ICD-10-CM

## 2022-05-13 DIAGNOSIS — L57.0 ACTINIC KERATOSIS: ICD-10-CM

## 2022-05-13 DIAGNOSIS — L82.1 OTHER SEBORRHEIC KERATOSIS: ICD-10-CM

## 2022-05-13 DIAGNOSIS — D18.0 HEMANGIOMA: ICD-10-CM

## 2022-05-13 DIAGNOSIS — D22 MELANOCYTIC NEVI: ICD-10-CM

## 2022-05-13 DIAGNOSIS — Z85.828 PERSONAL HISTORY OF OTHER MALIGNANT NEOPLASM OF SKIN: ICD-10-CM

## 2022-05-13 DIAGNOSIS — D485 NEOPLASM OF UNCERTAIN BEHAVIOR OF SKIN: ICD-10-CM

## 2022-05-13 DIAGNOSIS — R97.20 ELEVATED PSA: ICD-10-CM

## 2022-05-13 PROBLEM — D22.5 MELANOCYTIC NEVI OF TRUNK: Status: ACTIVE | Noted: 2022-05-13

## 2022-05-13 PROBLEM — D18.01 HEMANGIOMA OF SKIN AND SUBCUTANEOUS TISSUE: Status: ACTIVE | Noted: 2022-05-13

## 2022-05-13 PROBLEM — D48.5 NEOPLASM OF UNCERTAIN BEHAVIOR OF SKIN: Status: ACTIVE | Noted: 2022-05-13

## 2022-05-13 PROCEDURE — 17000 DESTRUCT PREMALG LESION: CPT | Mod: 59

## 2022-05-13 PROCEDURE — 74011000250 HC RX REV CODE- 250: Performed by: UROLOGY

## 2022-05-13 PROCEDURE — 17003 DESTRUCT PREMALG LES 2-14: CPT

## 2022-05-13 PROCEDURE — ? BIOPSY BY SHAVE METHOD

## 2022-05-13 PROCEDURE — ? LIQUID NITROGEN

## 2022-05-13 PROCEDURE — 72197 MRI PELVIS W/O & W/DYE: CPT

## 2022-05-13 PROCEDURE — A9576 INJ PROHANCE MULTIPACK: HCPCS | Performed by: UROLOGY

## 2022-05-13 PROCEDURE — 99213 OFFICE O/P EST LOW 20 MIN: CPT | Mod: 25

## 2022-05-13 PROCEDURE — ? ADDITIONAL NOTES

## 2022-05-13 PROCEDURE — 74011250636 HC RX REV CODE- 250/636: Performed by: UROLOGY

## 2022-05-13 PROCEDURE — ? COUNSELING

## 2022-05-13 PROCEDURE — ? SUNSCREEN RECOMMENDATIONS

## 2022-05-13 PROCEDURE — 11103 TANGNTL BX SKIN EA SEP/ADDL: CPT

## 2022-05-13 PROCEDURE — 74011000258 HC RX REV CODE- 258: Performed by: UROLOGY

## 2022-05-13 PROCEDURE — 11102 TANGNTL BX SKIN SINGLE LES: CPT

## 2022-05-13 PROCEDURE — ? FULL BODY SKIN EXAM

## 2022-05-13 RX ORDER — SODIUM CHLORIDE 0.9 % (FLUSH) 0.9 %
10 SYRINGE (ML) INJECTION
Status: COMPLETED | OUTPATIENT
Start: 2022-05-13 | End: 2022-05-13

## 2022-05-13 RX ADMIN — GADOTERIDOL 25 ML: 279.3 INJECTION, SOLUTION INTRAVENOUS at 08:01

## 2022-05-13 RX ADMIN — SODIUM CHLORIDE, PRESERVATIVE FREE 10 ML: 5 INJECTION INTRAVENOUS at 08:02

## 2022-05-13 RX ADMIN — SODIUM CHLORIDE 100 ML: 900 INJECTION, SOLUTION INTRAVENOUS at 08:02

## 2022-05-13 ASSESSMENT — LOCATION DETAILED DESCRIPTION DERM
LOCATION DETAILED: LEFT CENTRAL MALAR CHEEK
LOCATION DETAILED: RIGHT LATERAL FOREHEAD
LOCATION DETAILED: RIGHT LATERAL FOREHEAD
LOCATION DETAILED: LEFT SUPERIOR MEDIAL FOREHEAD
LOCATION DETAILED: LEFT ANTERIOR SHOULDER
LOCATION DETAILED: LEFT PROXIMAL DORSAL FOREARM
LOCATION DETAILED: LEFT MEDIAL SUPERIOR CHEST
LOCATION DETAILED: RIGHT MID-UPPER BACK
LOCATION DETAILED: RIGHT INFERIOR UPPER BACK
LOCATION DETAILED: LEFT SUPERIOR MEDIAL MIDBACK
LOCATION DETAILED: LEFT SUPERIOR LATERAL NECK
LOCATION DETAILED: RIGHT PROXIMAL DORSAL FOREARM
LOCATION DETAILED: RIGHT ANTERIOR EARLOBE
LOCATION DETAILED: LEFT LATERAL UPPER BACK
LOCATION DETAILED: EPIGASTRIC SKIN
LOCATION DETAILED: LEFT CLAVICULAR SKIN
LOCATION DETAILED: LEFT ULNAR DORSAL HAND
LOCATION DETAILED: RIGHT SUPERIOR MEDIAL UPPER BACK
LOCATION DETAILED: RIGHT MEDIAL UPPER BACK
LOCATION DETAILED: LEFT SUPERIOR LATERAL NECK
LOCATION DETAILED: RIGHT MEDIAL SUPERIOR CHEST
LOCATION DETAILED: RIGHT SUPERIOR HELIX
LOCATION DETAILED: RIGHT SUPERIOR UPPER BACK
LOCATION DETAILED: LEFT INFERIOR UPPER BACK
LOCATION DETAILED: PERIUMBILICAL SKIN
LOCATION DETAILED: RIGHT SUPERIOR MEDIAL UPPER BACK
LOCATION DETAILED: LEFT CLAVICULAR SKIN
LOCATION DETAILED: RIGHT INFERIOR CENTRAL MALAR CHEEK
LOCATION DETAILED: RIGHT FOREHEAD
LOCATION DETAILED: LEFT INFERIOR UPPER BACK
LOCATION DETAILED: EPIGASTRIC SKIN
LOCATION DETAILED: LEFT INFERIOR CENTRAL MALAR CHEEK
LOCATION DETAILED: RIGHT SUPERIOR LATERAL BUCCAL CHEEK
LOCATION DETAILED: LEFT FOREHEAD
LOCATION DETAILED: XIPHOID
LOCATION DETAILED: RIGHT SCAPHA
LOCATION DETAILED: LEFT MEDIAL INFERIOR CHEST
LOCATION DETAILED: LEFT ANTERIOR SHOULDER
LOCATION DETAILED: RIGHT INFERIOR UPPER BACK
LOCATION DETAILED: LEFT FOREHEAD

## 2022-05-13 ASSESSMENT — LOCATION SIMPLE DESCRIPTION DERM
LOCATION SIMPLE: LEFT CLAVICULAR SKIN
LOCATION SIMPLE: CHEST
LOCATION SIMPLE: LEFT HAND
LOCATION SIMPLE: LEFT CLAVICULAR SKIN
LOCATION SIMPLE: RIGHT UPPER BACK
LOCATION SIMPLE: CHEST
LOCATION SIMPLE: LEFT FOREHEAD
LOCATION SIMPLE: RIGHT CHEEK
LOCATION SIMPLE: LEFT SHOULDER
LOCATION SIMPLE: LEFT ANTERIOR NECK
LOCATION SIMPLE: LEFT UPPER BACK
LOCATION SIMPLE: LEFT LOWER BACK
LOCATION SIMPLE: LEFT FOREARM
LOCATION SIMPLE: RIGHT EAR
LOCATION SIMPLE: RIGHT FOREHEAD
LOCATION SIMPLE: LEFT ANTERIOR NECK
LOCATION SIMPLE: LEFT UPPER BACK
LOCATION SIMPLE: RIGHT FOREHEAD
LOCATION SIMPLE: ABDOMEN
LOCATION SIMPLE: ABDOMEN
LOCATION SIMPLE: LEFT SHOULDER
LOCATION SIMPLE: RIGHT UPPER BACK
LOCATION SIMPLE: LEFT FOREHEAD
LOCATION SIMPLE: RIGHT FOREARM
LOCATION SIMPLE: LEFT CHEEK

## 2022-05-13 ASSESSMENT — LOCATION ZONE DERM
LOCATION ZONE: NECK
LOCATION ZONE: EAR
LOCATION ZONE: HAND
LOCATION ZONE: TRUNK
LOCATION ZONE: NECK
LOCATION ZONE: ARM
LOCATION ZONE: FACE
LOCATION ZONE: TRUNK
LOCATION ZONE: FACE
LOCATION ZONE: ARM

## 2022-05-13 NOTE — PROCEDURE: BIOPSY BY SHAVE METHOD
Detail Level: Detailed
Depth Of Biopsy: dermis
Was A Bandage Applied: Yes
Size Of Lesion In Cm: 0
X Size Of Lesion In Cm: 0.6
Biopsy Type: H and E
Biopsy Method: Dermablade
Anesthesia Type: 1% lidocaine with epinephrine
Anesthesia Volume In Cc (Will Not Render If 0): 0.5
Hemostasis: Drysol
Wound Care: Petrolatum
Dressing: bandage
Destruction After The Procedure: No
Type Of Destruction Used: Curettage
Curettage Text: The wound bed was treated with curettage after the biopsy was performed.
Cryotherapy Text: The wound bed was treated with cryotherapy after the biopsy was performed.
Electrodesiccation Text: The wound bed was treated with electrodesiccation after the biopsy was performed.
Electrodesiccation And Curettage Text: The wound bed was treated with electrodesiccation and curettage after the biopsy was performed.
Silver Nitrate Text: The wound bed was treated with silver nitrate after the biopsy was performed.
Lab: 6
Lab Facility: 3
Consent was obtained and risks were reviewed including but not limited to scarring, infection, bleeding, scabbing, incomplete removal, nerve damage and allergy to anesthesia.
Post-Care Instructions: I reviewed with the patient in detail post-care instructions. Patient is to keep the biopsy site dry overnight, and then apply bacitracin twice daily until healed. Patient may apply hydrogen peroxide soaks to remove any crusting.
Notification Instructions: Patient will be notified of biopsy results. However, patient instructed to call the office if not contacted within 2 weeks.
Billing Type: Third-Party Bill
Information: Selecting Yes will display possible errors in your note based on the variables you have selected. This validation is only offered as a suggestion for you. PLEASE NOTE THAT THE VALIDATION TEXT WILL BE REMOVED WHEN YOU FINALIZE YOUR NOTE. IF YOU WANT TO FAX A PRELIMINARY NOTE YOU WILL NEED TO TOGGLE THIS TO 'NO' IF YOU DO NOT WANT IT IN YOUR FAXED NOTE.
Size Of Lesion In Cm: 0.7

## 2022-05-24 RX ORDER — LISINOPRIL 10 MG/1
TABLET ORAL
Qty: 90 TABLET | Refills: 3 | Status: SHIPPED | OUTPATIENT
Start: 2022-05-24

## 2022-05-24 NOTE — TELEPHONE ENCOUNTER
Requested Prescriptions     Pending Prescriptions Disp Refills    lisinopril (PRINIVIL;ZESTRIL) 10 MG tablet [Pharmacy Med Name: LISINOPRIL 10 MG TAB[*]] 90 tablet 3     Sig: TAKE ONE TABLET BY MOUTH ONE TIME DAILY    verapamil (CALAN SR) 180 MG extended release tablet [Pharmacy Med Name: VERAPAMIL  MG TAB[*]] 90 tablet 3     Sig: TAKE ONE TABLET BY MOUTH ONE TIME DAILY FOR HYPERTENSION

## 2022-05-26 RX ORDER — TRAZODONE HYDROCHLORIDE 50 MG/1
TABLET ORAL
Qty: 90 TABLET | Refills: 1 | Status: SHIPPED | OUTPATIENT
Start: 2022-05-26

## 2022-06-06 ENCOUNTER — ANESTHESIA EVENT (OUTPATIENT)
Dept: SURGERY | Age: 67
End: 2022-06-06
Payer: COMMERCIAL

## 2022-06-06 RX ORDER — CIPROFLOXACIN 500 MG/1
TABLET, FILM COATED ORAL 2 TIMES DAILY
COMMUNITY
Start: 2022-05-20 | End: 2022-06-16 | Stop reason: ALTCHOICE

## 2022-06-06 ASSESSMENT — LIFESTYLE VARIABLES: SMOKING_STATUS: 1

## 2022-06-06 NOTE — PERIOP NOTE
Patient verified name and . Order for consent NOT found in EHR at time of PAT visit. Unable to verify case posting against order; surgery verified by patient. Type 1A surgery, phone assessment complete. Orders not received. Labs per surgeon: none ordered  Labs per anesthesia protocol: SQBS s/h for DOS    Patient answered medical/surgical history questions at their best of ability. All prior to admission medications documented in [facility]. Patient instructed to take the following medications the day of surgery according to anesthesia guidelines with a small sip of water: flecainide, valacyclovir, metoprolol, verapamil, cipro as instructed by surgeon, omeprazole. Hold all vitamins and supplements now for surgery and NSAIDS (aleve, advil) now for surgery. Prescription meds to hold: xarelto as instructed by surgeon and prescribing doctor. Hold glimepiride, metformin, lisinopril the morning of surgery      Patient instructed on the following:    > Arrive at main Entrance, time of arrival to be called the day before by 1700  > NPO after midnight including gum, mints, and ice chips  > Responsible adult must drive patient to the hospital, stay during surgery, and patient will need supervision 24 hours after anesthesia  > Use antibacterial soap in shower the night before surgery and on the morning of surgery  > All piercings must be removed prior to arrival.    > Leave all valuables (money and jewelry) at home but bring insurance card and ID on DOS.   > You may be required to pay a deductible or co-pay on the day of your procedure. You can pre-pay by calling [phone] if your surgery is at the [facility] or [phone] if your surgery is at the [facility]. > Do not wear make-up, nail polish, lotions, cologne, perfumes, powders, or oil on skin.      Patient verbalized understanding and denied questions

## 2022-06-06 NOTE — ANESTHESIA PRE PROCEDURE
Department of Anesthesiology  Preprocedure Note       Name:  Luciano Floyd   Age:  75 y.o.  :  1955                                          MRN:  [id number]         Date:  2022      Surgeon: Bhupinder Manning): Rosa Maria Bello MD    Procedure: Procedure(s):  MRI FUSION GUIDED PROSTATE BIOPSY    Medications prior to admission:   Prior to Admission medications    Medication Sig Start Date End Date Taking?  Authorizing Provider   Multiple Vitamins-Minerals (MULTIVITAMIN ADULTS PO) Take by mouth daily   Yes Historical Provider, MD   Omega-3 Fatty Acids (FISH OIL PO) Take by mouth daily   Yes Historical Provider, MD   NIACIN PO Take by mouth daily   Yes Historical Provider, MD   ciprofloxacin (CIPRO) 500 mg tablet in the morning and at bedtime As instructed 22   Historical Provider, MD   traZODone (DESYREL) 50 MG tablet TAKE ONE TABLET BY MOUTH AT BEDTIME 22   Shauna Livingston MD   lisinopril (PRINIVIL;ZESTRIL) 10 MG tablet TAKE ONE TABLET BY MOUTH ONE TIME DAILY 22   Jaden Meo MD   verapamil (CALAN SR) 180 MG extended release tablet TAKE ONE TABLET BY MOUTH ONE TIME DAILY FOR HYPERTENSION 22   Jaden Moe MD   atorvastatin (LIPITOR) 40 MG tablet nightly TAKE ONE TABLET BY MOUTH ONE TIME DAILY 21   Ar Automatic Reconciliation   Coenzyme Q10 10 MG CAPS Take by mouth at bedtime     Ar Automatic Reconciliation   flecainide (TAMBOCOR) 100 MG tablet TAKE ONE TABLET BY MOUTH TWICE A DAY TO PREVENT RECURRENT AFIB 10/25/21   Ar Automatic Reconciliation   glimepiride (AMARYL) 4 MG tablet Take 1/2 tablet in the AM and 1/2 tablet PM 21   Ar Automatic Reconciliation   metFORMIN (GLUCOPHAGE-XR) 500 MG extended release tablet Take 500 mg by mouth 2 times daily 21   Ar Automatic Reconciliation   metoprolol succinate (TOPROL XL) 25 MG extended release tablet TAKE ONE TABLET BY MOUTH ONE TIME DAILY FOR HIGH BLOOD PRESSURE 21   Ar Automatic Reconciliation   omeprazole (PRILOSEC) 40 MG delayed release capsule Take 40 mg by mouth daily 3/24/22 4/23/22  Ar Automatic Reconciliation   rivaroxaban (XARELTO) 20 MG TABS tablet TAKE ONE TABLET BY MOUTH ONE TIME DAILY WITH LUNCH 11/1/21   Ar Automatic Reconciliation   valACYclovir (VALTREX) 500 MG tablet TAKE ONE TABLET BY MOUTH ONE TIME DAILY 12/20/21   Ar Automatic Reconciliation       Current medications:    No current facility-administered medications for this encounter.      Current Outpatient Medications   Medication Sig Dispense Refill    Multiple Vitamins-Minerals (MULTIVITAMIN ADULTS PO) Take by mouth daily      Omega-3 Fatty Acids (FISH OIL PO) Take by mouth daily      NIACIN PO Take by mouth daily      ciprofloxacin (CIPRO) 500 mg tablet in the morning and at bedtime As instructed      traZODone (DESYREL) 50 MG tablet TAKE ONE TABLET BY MOUTH AT BEDTIME 90 tablet 1    lisinopril (PRINIVIL;ZESTRIL) 10 MG tablet TAKE ONE TABLET BY MOUTH ONE TIME DAILY 90 tablet 3    verapamil (CALAN SR) 180 MG extended release tablet TAKE ONE TABLET BY MOUTH ONE TIME DAILY FOR HYPERTENSION 90 tablet 3    atorvastatin (LIPITOR) 40 MG tablet nightly TAKE ONE TABLET BY MOUTH ONE TIME DAILY      Coenzyme Q10 10 MG CAPS Take by mouth at bedtime       flecainide (TAMBOCOR) 100 MG tablet TAKE ONE TABLET BY MOUTH TWICE A DAY TO PREVENT RECURRENT AFIB      glimepiride (AMARYL) 4 MG tablet Take 1/2 tablet in the AM and 1/2 tablet PM      metFORMIN (GLUCOPHAGE-XR) 500 MG extended release tablet Take 500 mg by mouth 2 times daily      metoprolol succinate (TOPROL XL) 25 MG extended release tablet TAKE ONE TABLET BY MOUTH ONE TIME DAILY FOR HIGH BLOOD PRESSURE      omeprazole (PRILOSEC) 40 MG delayed release capsule Take 40 mg by mouth daily      rivaroxaban (XARELTO) 20 MG TABS tablet TAKE ONE TABLET BY MOUTH ONE TIME DAILY WITH LUNCH      valACYclovir (VALTREX) 500 MG tablet TAKE ONE TABLET BY MOUTH ONE TIME DAILY         Allergies:  No Known Allergies    Problem List:    Patient Active Problem List   Diagnosis Code    Multiple thyroid nodules E04.2    Osteoarthritis M19.90    Mitral valve insufficiency I34.0    Osteoarthritis of knee M17.10    Hx of diabetes mellitus Z86.39    Recurrent depression (HCC) F33.9    CAD (coronary artery disease) I25.10    Dyslipidemia E78.5    Depression F32. A    Follow-up examination Z09    Complete tear of right rotator cuff M75.121    Adhesive capsulitis of right shoulder M75.01    Presence of right artificial knee joint Z96.651    Diabetes ([address] E11.9    Essential (primary) hypertension I10    Cardiac dysrhythmia I49.9    H/O right inguinal hernia repair Z98.890, Z87.19    RVOT ventricular tachycardia (HCC) I47.2    Obesity, Class I, BMI 30-34.9 E66.9    Paroxysmal atrial fibrillation (HCC) I48.0    Other specified enthesopathies of left lower limb, excluding foot M76.892       Past Medical History:        Diagnosis Date    Coronary artery disease     COVID-19 2021    History of atrial fibrillation     Hypercholesterolemia     Hypertension     medication    Major depression     Morbid obesity ([address]     Multiple thyroid nodules     Right inguinal hernia     Skin cancer     Staph infection     while in high school    Type 2 diabetes mellitus ([address]     oral agents, denies hypoglycemia       Past Surgical History:        Procedure Laterality Date    COLONOSCOPY  2013    HEMORRHOID SURGERY  1992    HERNIA REPAIR Right 2017    ORTHOPEDIC SURGERY Right 2016    bone spur removal; arthroscopy    ORTHOPEDIC SURGERY Left      for broken 5th metatarsal    TONSILLECTOMY  1971    TOTAL HIP ARTHROPLASTY Left 2009    TOTAL KNEE ARTHROPLASTY Right 2017       Social History:    Social History     Tobacco Use    Smoking status: Former Smoker     Packs/day: 2.00     Quit date: 1992     Years since quittin.4    Smokeless tobacco: Former User   Substance Use Topics    Alcohol use: No     Alcohol/week: 0.0 standard drinks                                Counseling given: Not Answered      Vital Signs (Current):   Vitals:    06/06/22 1112   Weight: 265 lb (120.2 kg)   Height: 6' 5\" (1.956 m)                                              BP Readings from Last 3 Encounters:   05/20/22 139/81   03/24/22 (!) 140/78   12/16/21 138/88       NPO Status:                                                                                 BMI:   Wt Readings from Last 3 Encounters:   05/20/22 275 lb (124.7 kg)   03/24/22 276 lb (125.2 kg)   12/16/21 268 lb 3.2 oz (121.7 kg)     Body mass index is 31.42 kg/m². CBC:   Lab Results   Component Value Date    WBC 6.1 12/10/2021    RBC 5.00 12/10/2021    HGB 15.2 12/10/2021    HCT 44.9 12/10/2021    MCV 90 12/10/2021    RDW 12.7 12/10/2021     12/10/2021       CMP:   Lab Results   Component Value Date     12/10/2021    K 4.5 12/10/2021    CL 98 12/10/2021    CO2 26 12/10/2021    BUN 12 12/10/2021    CREATININE 0.99 12/10/2021    GFRAA 91 12/10/2021    AGRATIO 1.8 12/10/2021    LABGLOM >60 07/01/2021    GLUCOSE 245 12/10/2021    PROT 6.5 12/10/2021    CALCIUM 8.9 12/10/2021    BILITOT 0.5 12/10/2021    ALKPHOS 109 12/10/2021    AST 16 12/10/2021    ALT 33 12/10/2021       POC Tests: No results for input(s): POCGLU, POCNA, POCK, POCCL, POCBUN, POCHEMO, POCHCT in the last 72 hours.     Coags:   Lab Results   Component Value Date    PROTIME 15.9 10/24/2020    INR 1.2 10/24/2020       HCG (If Applicable): No results found for: PREGTESTUR, PREGSERUM, HCG, HCGQUANT     ABGs: No results found for: PHART, PO2ART, RAQ3NVG, RNU5UCD, BEART, R7QSKHMX     Type & Screen (If Applicable):  No results found for: LABABO, LABRH    Drug/Infectious Status (If Applicable):  No results found for: HIV, HEPCAB    COVID-19 Screening (If Applicable): No results found for: COVID19        Anesthesia Evaluation  Patient summary reviewed and Nursing notes reviewed  Airway: Mallampati: II  TM distance: >3 FB   Neck ROM: limited  Mouth opening: > = 3 FB   Dental: normal exam         Pulmonary: breath sounds clear to auscultation  (+) current smoker (Former)                           Cardiovascular:    (+) hypertension:, CAD:, dysrhythmias (PAF): atrial fibrillation, hyperlipidemia               ROS comment: NST 9/2020:    CONCLUSION:   1. Stress EKG: Normal.   2. SPECT Perfusion Imaging: Fixed defect likely related to diaphragmatic   attenuation - probably normal.   3. LV Systolic Function is normal.   4. Risk Assessment: Low Risk Scan. Neuro/Psych:   (+) psychiatric history (Major depression):            GI/Hepatic/Renal:   (+) GERD: well controlled,          ROS comment: obesity. Endo/Other:    (+) DiabetesType II DM, , .                  ROS comment: Hx COVID-19 Abdominal:             Vascular: Other Findings:           Anesthesia Plan      TIVA     ASA 3             Anesthetic plan and risks discussed with patient and spouse.         Attending anesthesiologist reviewed and agrees with Jesús Marquez MD   6/6/2022

## 2022-06-07 ENCOUNTER — ANESTHESIA (OUTPATIENT)
Dept: SURGERY | Age: 67
End: 2022-06-07
Payer: COMMERCIAL

## 2022-06-07 ENCOUNTER — HOSPITAL ENCOUNTER (OUTPATIENT)
Age: 67
Setting detail: OUTPATIENT SURGERY
Discharge: HOME | End: 2022-06-07
Attending: UROLOGY | Admitting: UROLOGY
Payer: COMMERCIAL

## 2022-06-07 VITALS
OXYGEN SATURATION: 95 % | BODY MASS INDEX: 32.37 KG/M2 | DIASTOLIC BLOOD PRESSURE: 70 MMHG | TEMPERATURE: 98 F | WEIGHT: 274.2 LBS | RESPIRATION RATE: 15 BRPM | SYSTOLIC BLOOD PRESSURE: 119 MMHG | HEIGHT: 77 IN | HEART RATE: 50 BPM

## 2022-06-07 LAB
APPEARANCE UR: CLEAR
BILIRUB UR QL: NEGATIVE
COLOR UR: YELLOW
GLUCOSE BLD STRIP.AUTO-MCNC: 108 MG/DL (ref 65–100)
GLUCOSE UR STRIP.AUTO-MCNC: NEGATIVE MG/DL
HGB UR QL STRIP: NEGATIVE
KETONES UR QL STRIP.AUTO: NEGATIVE MG/DL
LEUKOCYTE ESTERASE UR QL STRIP.AUTO: NEGATIVE
NITRITE UR QL STRIP.AUTO: NEGATIVE
PH UR STRIP: 6 [PH] (ref 5–9)
PROT UR STRIP-MCNC: NEGATIVE MG/DL
SERVICE CMNT-IMP: ABNORMAL
SP GR UR REFRACTOMETRY: 1.02 (ref 1–1.02)
UROBILINOGEN UR QL STRIP.AUTO: 0.2 EU/DL (ref 0.2–1)

## 2022-06-07 PROCEDURE — 2580000003 HC RX 258: Performed by: ANESTHESIOLOGY

## 2022-06-07 PROCEDURE — 76942 ECHO GUIDE FOR BIOPSY: CPT | Performed by: UROLOGY

## 2022-06-07 PROCEDURE — 88305 TISSUE EXAM BY PATHOLOGIST: CPT

## 2022-06-07 PROCEDURE — 6360000002 HC RX W HCPCS: Performed by: UROLOGY

## 2022-06-07 PROCEDURE — 7100000001 HC PACU RECOVERY - ADDTL 15 MIN: Performed by: UROLOGY

## 2022-06-07 PROCEDURE — 7100000000 HC PACU RECOVERY - FIRST 15 MIN: Performed by: UROLOGY

## 2022-06-07 PROCEDURE — 3700000001 HC ADD 15 MINUTES (ANESTHESIA): Performed by: UROLOGY

## 2022-06-07 PROCEDURE — 2580000003 HC RX 258: Performed by: UROLOGY

## 2022-06-07 PROCEDURE — 6360000002 HC RX W HCPCS: Performed by: REGISTERED NURSE

## 2022-06-07 PROCEDURE — 81003 URINALYSIS AUTO W/O SCOPE: CPT

## 2022-06-07 PROCEDURE — 2709999900 HC NON-CHARGEABLE SUPPLY: Performed by: UROLOGY

## 2022-06-07 PROCEDURE — 2500000003 HC RX 250 WO HCPCS: Performed by: REGISTERED NURSE

## 2022-06-07 PROCEDURE — 82962 GLUCOSE BLOOD TEST: CPT

## 2022-06-07 PROCEDURE — 3600000012 HC SURGERY LEVEL 2 ADDTL 15MIN: Performed by: UROLOGY

## 2022-06-07 PROCEDURE — 3600000002 HC SURGERY LEVEL 2 BASE: Performed by: UROLOGY

## 2022-06-07 PROCEDURE — 55700 PR BIOPSY OF PROSTATE,NEEDLE/PUNCH: CPT | Performed by: UROLOGY

## 2022-06-07 PROCEDURE — 3700000000 HC ANESTHESIA ATTENDED CARE: Performed by: UROLOGY

## 2022-06-07 PROCEDURE — 7100000010 HC PHASE II RECOVERY - FIRST 15 MIN: Performed by: UROLOGY

## 2022-06-07 RX ORDER — SODIUM CHLORIDE 9 MG/ML
INJECTION, SOLUTION INTRAVENOUS CONTINUOUS
Status: DISCONTINUED | OUTPATIENT
Start: 2022-06-07 | End: 2022-06-07 | Stop reason: HOSPADM

## 2022-06-07 RX ORDER — PROPOFOL 10 MG/ML
INJECTION, EMULSION INTRAVENOUS PRN
Status: DISCONTINUED | OUTPATIENT
Start: 2022-06-07 | End: 2022-06-07 | Stop reason: SDUPTHER

## 2022-06-07 RX ORDER — SODIUM CHLORIDE 0.9 % (FLUSH) 0.9 %
5-40 SYRINGE (ML) INJECTION PRN
Status: DISCONTINUED | OUTPATIENT
Start: 2022-06-07 | End: 2022-06-07 | Stop reason: HOSPADM

## 2022-06-07 RX ORDER — SODIUM CHLORIDE, SODIUM LACTATE, POTASSIUM CHLORIDE, CALCIUM CHLORIDE 600; 310; 30; 20 MG/100ML; MG/100ML; MG/100ML; MG/100ML
INJECTION, SOLUTION INTRAVENOUS CONTINUOUS
Status: DISCONTINUED | OUTPATIENT
Start: 2022-06-07 | End: 2022-06-07 | Stop reason: HOSPADM

## 2022-06-07 RX ORDER — SODIUM CHLORIDE 0.9 % (FLUSH) 0.9 %
5-40 SYRINGE (ML) INJECTION EVERY 12 HOURS SCHEDULED
Status: DISCONTINUED | OUTPATIENT
Start: 2022-06-07 | End: 2022-06-07 | Stop reason: HOSPADM

## 2022-06-07 RX ORDER — SODIUM CHLORIDE 9 MG/ML
INJECTION, SOLUTION INTRAVENOUS PRN
Status: DISCONTINUED | OUTPATIENT
Start: 2022-06-07 | End: 2022-06-07 | Stop reason: HOSPADM

## 2022-06-07 RX ORDER — LIDOCAINE HYDROCHLORIDE 20 MG/ML
INJECTION, SOLUTION EPIDURAL; INFILTRATION; INTRACAUDAL; PERINEURAL PRN
Status: DISCONTINUED | OUTPATIENT
Start: 2022-06-07 | End: 2022-06-07 | Stop reason: SDUPTHER

## 2022-06-07 RX ORDER — LIDOCAINE HYDROCHLORIDE 10 MG/ML
1 INJECTION, SOLUTION EPIDURAL; INFILTRATION; INTRACAUDAL; PERINEURAL
Status: DISCONTINUED | OUTPATIENT
Start: 2022-06-07 | End: 2022-06-07 | Stop reason: HOSPADM

## 2022-06-07 RX ADMIN — CEFTRIAXONE 2000 MG: 2 INJECTION, POWDER, FOR SOLUTION INTRAMUSCULAR; INTRAVENOUS at 08:08

## 2022-06-07 RX ADMIN — SODIUM CHLORIDE, SODIUM LACTATE, POTASSIUM CHLORIDE, AND CALCIUM CHLORIDE: 600; 310; 30; 20 INJECTION, SOLUTION INTRAVENOUS at 08:03

## 2022-06-07 RX ADMIN — PROPOFOL 70 MG: 10 INJECTION, EMULSION INTRAVENOUS at 08:08

## 2022-06-07 RX ADMIN — PROPOFOL 30 MG: 10 INJECTION, EMULSION INTRAVENOUS at 08:10

## 2022-06-07 RX ADMIN — LIDOCAINE HYDROCHLORIDE 60 MG: 20 INJECTION, SOLUTION EPIDURAL; INFILTRATION; INTRACAUDAL; PERINEURAL at 08:08

## 2022-06-07 RX ADMIN — PROPOFOL 140 MCG/KG/MIN: 10 INJECTION, EMULSION INTRAVENOUS at 08:08

## 2022-06-07 ASSESSMENT — PAIN - FUNCTIONAL ASSESSMENT
PAIN_FUNCTIONAL_ASSESSMENT: 0-10
PAIN_FUNCTIONAL_ASSESSMENT: 0-10
PAIN_FUNCTIONAL_ASSESSMENT: NONE - DENIES PAIN

## 2022-06-07 NOTE — ANESTHESIA POSTPROCEDURE EVALUATION
Department of Anesthesiology  Postprocedure Note    Patient: Linda Lenz  MRN: [id number]  YOB: 1955  Date of evaluation: 6/7/2022  Time:  10:23 AM     Procedure Summary     Date: 06/07/22 Room / Location: [facility] MAIN OR [address] [place] [address] MAIN OR    Anesthesia Start: 0803 Anesthesia Stop: 1662    Procedure: MRI FUSION GUIDED PROSTATE BIOPSY (N/A Rectum) Diagnosis:       Elevated PSA      ()    Providers: Lexie Walter MD Responsible Provider: Maldonado Morrow MD    Anesthesia Type: TIVA ASA Status: 3          Anesthesia Type: TIVA    Ernesto Phase I: Ernesto Score: 9    Ernesto Phase II: Ernesto Score: 10    Last vitals: Reviewed and per EMR flowsheets.        Anesthesia Post Evaluation    Patient location during evaluation: PACU  Patient participation: complete - patient participated  Level of consciousness: awake  Airway patency: patent  Nausea & Vomiting: no nausea  Complications: no  Cardiovascular status: blood pressure returned to baseline and hemodynamically stable  Respiratory status: acceptable  Hydration status: stable  Multimodal analgesia pain management approach

## 2022-06-07 NOTE — OP NOTE
Operative Note        Patient: Josie Marley [id number]    Date of Surgery: 06/07/22    Preoperative Diagnosis: Elevated PSA    Postoperative Diagnosis:  same    Surgeon(s) and Role:     * Shashi Priest MD - Primary     Anesthesia:  MAC     Procedure: Procedure(s):  Kandi Kim MRI fused TRUS prostate biopsy     Indications:     Discussed the risk of surgery including infection, hematoma, bleeding, recurrence or persistence of symptoms or stone, and the risks of general anesthetic. The patient understands the risks, any and all questions were answered to the patient's satisfaction and they freely signed the consent for operation. URONAV MRI FUSED TRANSRECTAL ULTRASOUND GUIDED BIOPSY OF THE PROSTATE    All risks, benefits and alternatives were again reviewed and he is willing to proceed at this time. The patient was placed in the left lateral decubitus position. Rocephin was given as a prophylactic antibiotic. A betadine swab was used to prep the anus/rectum. I then inserted the transrectal ultrasound probe into the rectum. The prostate was visualized. The prostate appeared homogenous in appearance. Ultrasonographic sweep of the prostate was performed to obtain images to link to MRI via URONAV. There were 2 regions of interest (both were right TZ)  based upon MRI imaging. 3 biopsies of regions of interest were then obtained using the ultrasound images for guidance that had been linked to the previous MRI using Uronav. I then performed 12 needle core biopsies using a standard sextant biopsy format with traditional ultrasound images for guidance. The ultrasound probe was removed. The patient tolerated the procedure well. PROSTATE VOLUME:  54 cc    KAREN: No induration or nodule     Estimated Blood Loss:  minimal    Specimens: prostate biopsies             Drains: none                 Implants: * No implants in log *    Michelle Go M.D.     [facility]  [address] [place] [facility]

## 2022-06-16 ENCOUNTER — OFFICE VISIT (OUTPATIENT)
Dept: FAMILY MEDICINE CLINIC | Facility: CLINIC | Age: 67
End: 2022-06-16
Payer: COMMERCIAL

## 2022-06-16 VITALS
SYSTOLIC BLOOD PRESSURE: 140 MMHG | WEIGHT: 276.2 LBS | OXYGEN SATURATION: 97 % | HEART RATE: 82 BPM | BODY MASS INDEX: 32.75 KG/M2 | RESPIRATION RATE: 17 BRPM | DIASTOLIC BLOOD PRESSURE: 84 MMHG

## 2022-06-16 DIAGNOSIS — E13.69 OTHER SPECIFIED DIABETES MELLITUS WITH OTHER SPECIFIED COMPLICATION, UNSPECIFIED WHETHER LONG TERM INSULIN USE (HCC): Primary | ICD-10-CM

## 2022-06-16 DIAGNOSIS — I10 ESSENTIAL (PRIMARY) HYPERTENSION: ICD-10-CM

## 2022-06-16 DIAGNOSIS — E78.5 DYSLIPIDEMIA: ICD-10-CM

## 2022-06-16 DIAGNOSIS — C61 PROSTATE CANCER (HCC): ICD-10-CM

## 2022-06-16 PROCEDURE — 1123F ACP DISCUSS/DSCN MKR DOCD: CPT | Performed by: FAMILY MEDICINE

## 2022-06-16 PROCEDURE — 99214 OFFICE O/P EST MOD 30 MIN: CPT | Performed by: FAMILY MEDICINE

## 2022-06-16 PROCEDURE — 3044F HG A1C LEVEL LT 7.0%: CPT | Performed by: FAMILY MEDICINE

## 2022-06-16 RX ORDER — GLIMEPIRIDE 4 MG/1
TABLET ORAL
Qty: 90 TABLET | Refills: 5 | Status: SHIPPED | OUTPATIENT
Start: 2022-06-16

## 2022-06-16 ASSESSMENT — PATIENT HEALTH QUESTIONNAIRE - PHQ9
1. LITTLE INTEREST OR PLEASURE IN DOING THINGS: 0
2. FEELING DOWN, DEPRESSED OR HOPELESS: 0
6. FEELING BAD ABOUT YOURSELF - OR THAT YOU ARE A FAILURE OR HAVE LET YOURSELF OR YOUR FAMILY DOWN: 0
6. FEELING BAD ABOUT YOURSELF - OR THAT YOU ARE A FAILURE OR HAVE LET YOURSELF OR YOUR FAMILY DOWN: 0
SUM OF ALL RESPONSES TO PHQ QUESTIONS 1-9: 0
3. TROUBLE FALLING OR STAYING ASLEEP: 0
7. TROUBLE CONCENTRATING ON THINGS, SUCH AS READING THE NEWSPAPER OR WATCHING TELEVISION: 0
SUM OF ALL RESPONSES TO PHQ QUESTIONS 1-9: 0
SUM OF ALL RESPONSES TO PHQ QUESTIONS 1-9: 0
5. POOR APPETITE OR OVEREATING: 0
SUM OF ALL RESPONSES TO PHQ QUESTIONS 1-9: 0
4. FEELING TIRED OR HAVING LITTLE ENERGY: 0
SUM OF ALL RESPONSES TO PHQ QUESTIONS 1-9: 0
1. LITTLE INTEREST OR PLEASURE IN DOING THINGS: 0
SUM OF ALL RESPONSES TO PHQ9 QUESTIONS 1 & 2: 0
SUM OF ALL RESPONSES TO PHQ QUESTIONS 1-9: 0
5. POOR APPETITE OR OVEREATING: 0
SUM OF ALL RESPONSES TO PHQ QUESTIONS 1-9: 0
8. MOVING OR SPEAKING SO SLOWLY THAT OTHER PEOPLE COULD HAVE NOTICED. OR THE OPPOSITE, BEING SO FIGETY OR RESTLESS THAT YOU HAVE BEEN MOVING AROUND A LOT MORE THAN USUAL: 0
2. FEELING DOWN, DEPRESSED OR HOPELESS: 0
10. IF YOU CHECKED OFF ANY PROBLEMS, HOW DIFFICULT HAVE THESE PROBLEMS MADE IT FOR YOU TO DO YOUR WORK, TAKE CARE OF THINGS AT HOME, OR GET ALONG WITH OTHER PEOPLE: 0
3. TROUBLE FALLING OR STAYING ASLEEP: 0
4. FEELING TIRED OR HAVING LITTLE ENERGY: 0
7. TROUBLE CONCENTRATING ON THINGS, SUCH AS READING THE NEWSPAPER OR WATCHING TELEVISION: 0
SUM OF ALL RESPONSES TO PHQ9 QUESTIONS 1 & 2: 0
10. IF YOU CHECKED OFF ANY PROBLEMS, HOW DIFFICULT HAVE THESE PROBLEMS MADE IT FOR YOU TO DO YOUR WORK, TAKE CARE OF THINGS AT HOME, OR GET ALONG WITH OTHER PEOPLE: 0
8. MOVING OR SPEAKING SO SLOWLY THAT OTHER PEOPLE COULD HAVE NOTICED. OR THE OPPOSITE, BEING SO FIGETY OR RESTLESS THAT YOU HAVE BEEN MOVING AROUND A LOT MORE THAN USUAL: 0
9. THOUGHTS THAT YOU WOULD BE BETTER OFF DEAD, OR OF HURTING YOURSELF: 0
SUM OF ALL RESPONSES TO PHQ QUESTIONS 1-9: 0
9. THOUGHTS THAT YOU WOULD BE BETTER OFF DEAD, OR OF HURTING YOURSELF: 0

## 2022-06-16 NOTE — PROGRESS NOTES
year.     His PSA was up to 9 at a time when he was thought to have a urinary tract infection.  It was rechecked on 8/27/2021 and was down to 5.5.  It then decreased to 4.1 on 10/8/2021. Mindy Zapata was last checked on 12/10/2021 and was down to 3.9.     He has no complaints today. Waldemar Carpenter denies any dysuria urgency frequency. Waldemar Carpenter has not had any gross hematuria.  His PSA is pending today.     His hematuria evaluation last year was unremarkable.  He remains on Xarelto.     Patient is here for follow-up today from his elevated PSA and history of gross hematuria. Waldemar Carpenter has had no more gross hematuria since I last saw him.  Of note when initially saw him his PSA was up to nine and he had had symptoms of a UTI.  He was also taking Xarelto for A. fib.  His hematuria evaluation has been unremarkable.  His CT scan was relatively normal except for a hyperdense cyst.  On cystoscopy he had some petechiae in the floor of his bladder that were thought to be postinflammatory changes from a possible UTI.  We rechecked his PSA on August 27, 2021 it was down to 5.5.     Her thinking was that he had an infection or some inflammatory event that bumped his PSA and likely caused his hematuria.     He has had no lower urinary tract symptoms he has had no dysuria.  He has had no more gross hematuria.  Overall he is felt quite good.        Past medical, family and social histories, as well as medications and allergies, were reviewed and updated in the medical record as appropriate.       Past medical, family and social histories, as well as medications and allergies, were reviewed and updated in the medical record as appropriate.     PMH:     Past Medical History:   Diagnosis Date    Coronary artery disease     COVID-19 09/2021    History of atrial fibrillation     Hypercholesterolemia     Hypertension     medication    Major depression     Morbid obesity ([address]     Multiple thyroid nodules     Right inguinal hernia     Skin cancer     Staph infection     while in high school    Type 2 diabetes mellitus (HCC)     oral agents, denies hypoglycemia       MEDs:     atorvastatin  ciprofloxacin  Coenzyme Q10 Caps  FISH OIL PO  flecainide  glimepiride  lisinopril  metFORMIN  metoprolol succinate  MULTIVITAMIN ADULTS PO  NIACIN PO  traZODone  valACYclovir  verapamil     ALLERGIES:    No Known Allergies    ROS:     Review of Systems   Constitutional: Negative. Negative for chills, fatigue and fever. Respiratory: Negative. Cardiovascular: Negative. Gastrointestinal: Negative. Genitourinary: Negative. Negative for difficulty urinating, dysuria, flank pain, frequency, hematuria and urgency. Musculoskeletal: Negative. All other systems reviewed and are negative. PHYSICAL EXAMINATION    There were no vitals taken for this visit. General: well dressed, well nourished, no acute distress  Skin: no rashes  HEENT: Sclera are clear,normocephalic, atraumatic. no external lesions   Cardiovascular: Reg. Normal perfusion  Respiratory: normal respiratory effort, no JVD, no audible wheezing. Musculoskeletal: unremarkable with normal function. No embolic signs or cyanosis. Neurologic exam: intact, no focal deficits, moves all 4 extremities  Psych: normal mood and affect, alert, oriented x 3  LE:  no edema  GI: soft, nontender, no masses, no CVA tenderness  : Previously was a T1c prostate exam      LABORATORY RESULTS:    Lab Results   Component Value Date    PSA 6.0 04/08/2022    PSA 3.9 12/10/2021    PSA 4.1 10/08/2021    PSA 5.5 08/27/2021    PSA 9.4 06/25/2021            IMAGING:    XR Results:    === 04/05/19 ===    XR SHOULDER RIGHT (MIN 2 VIEWS)    - Narrative -  Right shoulder 3 view dated 4/5/2019    CLINICAL INFORMATION: Moderate pain with limited range of motion for one year    Views of the shoulder show no fracture, dislocation or destructive changes.   There is degenerative change at the [facility] joint.    - Impression -  IMPRESSION: Mild degenerative change right AC joint      CT Results:    === 10/11/17 ===    CT CARDIAC CALCIUM SCORING      MRI Results:    === 05/13/22 ===    MRI PELVIS W WO CONTRAST    - Narrative -  MRI PELVIS WITHOUT AND WITH CONTRAST, 5/13/2022    History: Elevated PSA. Technique: Axial, sagittal, coronal T2; axial diffusion-weighted with calculated  ADC map; axial fat-saturated T1 images were followed by followed by 25 cc  intravenous ProHance, following which multiplanar fat-saturated T1 weighted  imaging to include multiphase axial images were obtained. Findings:  The prostate gland measures: 5.2 cm transverse by 5 cm AP by 6.1 cm  craniocaudal.    Peripheral Zone: Peripheral zone assessment is somewhat limited by significant  susceptibility artifact on diffusion-weighted imaging. By T2-weighted imaging,  the only distinct lesion occurs in the right anterior mid gland/apex where an  elongated area of focal T2 signal loss is seen measuring 9 mm x 3 mm in maximal  dimensions. No early focal enhancement is seen at this level. Central Gland: There is a lentiform area of T2 signal loss seen in the right  anterior and posterior mid gland transition zone best appreciated on axial  T2-weighted image 12 measuring 17 mm x 8 mm in size. Once again,  diffusion-weighted imaging assessment at this level is limited although some  moderate increased signal is suggested at this level on diffusion-weighted  images. Enhancement is seen at this level of uncertain clinical significance  although this does exclude potential residual benign Central gland tissue. No  suspicious central gland lesion is otherwise seen. Prostate capsule: No suspicious focal contour change, or gross extracapsular  enhancing tumor. Seminal vesicles: No evidence for involvement. Neurovascular bundles: No clear evidence for involvement. Lymph nodes: Left pelvic hollie chains suboptimally visualized.  No suspicious  adenopathy seen in the visualized pelvis. Bones: Limited assessment of the left pelvis particularly about the hip. No  suspicious enhancing osseous lesion. Mild endplate enhancement is seen at L4-5  favored to represent benign degenerative change. Other: Mild to moderate diverticulosis of the partially visualized sigmoid  colon. - Impression -  1. Peripheral zone changes as follows:  -9 mm x 3 mm right anterior mid gland/apex peripheral zone lesion: PIRADS 2    2. Central gland changes as follows:  -1.7 cm x 0.8 cm right anterior and posterior mid gland transition zone lesion:  PIRADS 5    3. Limited assessment for metastatic disease due to significant susceptibility  artifact from the left metallic total hip replacement. No evidence for  metastatic disease is seen in the visualized pelvis. ASSESSMENT:    Mr. Harpreet Alonzo is a 75 y.o. male with a diagnosis of PCa. S/p TRUS fusion prostate bx, Walnut score 3+3=6. NCCN low risk. We discussed various treatment options including active surveillance, radical prostatectomy, and definitive radiation therapy. I recommended he consider active surveillance. We discussed the meaning of the perineural invasion and the fact that he had 60% involvement of the biopsy core. We discussed possibly obtaining a Polaris or decipher test.  We will going to obtain a Polaris test and see him back after those results. After a full discussion with the patient regarding the natural history of localized prostate cancer, a review of his records, including his biopsy pathology report and entire clinical picture, we discussed the treatment options for localized prostate cancer. We discussed active surveillance, radical prostatectomy (open and robotic surgery), and both forms of radiation therapy (external beam/brachytherapy and proton beam). The pros and cons as well as side-effects and potential outcomes of the various treatments were discussed in detail.      This patient is a candidate for all treatment options. We expressed concerns of overtreatment of low-risk prostate cancer and the importance of accurate up front clinical staging. We explained the rationale for this approach and explained how we typically follow our patients on active surveillance (KAREN and PSA every 6 months with periodic repeat prostate biopsies and MRI's). We also outlined some of the possible risks of prostate biopsy. We discussed the typical active surveillance inclusion criteria used by several major cancer centers. We also discussed possibly obtaining a genetic/molecular risk assessment test-- he would like a prolaris test and then will RTC for further discussion. PLAN:   -review pathology  -ipss and xavi today  -PCa information included in AVS  -discuss tx options, rec'd AS.  prolaris testing to be sent off   rtc in 3 months to go over prolaris results   -  ________________________________________      I have seen and examined this patient. I have reviewed and edited the note started by the MA and agree with the outlined plan. Part of this note was written by using a voice dictation software. The note has been proof read but may still contain some grammatical/other typographical errors.       Zulema Philippe  Urology

## 2022-06-17 ENCOUNTER — OFFICE VISIT (OUTPATIENT)
Dept: ONCOLOGY | Age: 67
End: 2022-06-17
Payer: COMMERCIAL

## 2022-06-17 ENCOUNTER — TELEPHONE (OUTPATIENT)
Dept: FAMILY MEDICINE CLINIC | Facility: CLINIC | Age: 67
End: 2022-06-17

## 2022-06-17 VITALS
SYSTOLIC BLOOD PRESSURE: 132 MMHG | HEART RATE: 71 BPM | HEIGHT: 77 IN | DIASTOLIC BLOOD PRESSURE: 81 MMHG | WEIGHT: 275.8 LBS | OXYGEN SATURATION: 97 % | RESPIRATION RATE: 21 BRPM | TEMPERATURE: 98.4 F | BODY MASS INDEX: 32.56 KG/M2

## 2022-06-17 DIAGNOSIS — E13.69 OTHER SPECIFIED DIABETES MELLITUS WITH OTHER SPECIFIED COMPLICATION, UNSPECIFIED WHETHER LONG TERM INSULIN USE (HCC): ICD-10-CM

## 2022-06-17 DIAGNOSIS — C61 PROSTATE CANCER (HCC): Primary | ICD-10-CM

## 2022-06-17 LAB
ALBUMIN SERPL-MCNC: 4.2 G/DL (ref 3.2–4.6)
ALBUMIN/GLOB SERPL: 1.4 {RATIO} (ref 1.2–3.5)
ALP SERPL-CCNC: 93 U/L (ref 50–136)
ALT SERPL-CCNC: 36 U/L (ref 12–65)
ANION GAP SERPL CALC-SCNC: 9 MMOL/L (ref 7–16)
APPEARANCE UR: CLEAR
AST SERPL-CCNC: 21 U/L (ref 15–37)
BACTERIA URNS QL MICRO: NEGATIVE /HPF
BILIRUB SERPL-MCNC: 1.2 MG/DL (ref 0.2–1.1)
BILIRUB UR QL: NEGATIVE
BUN SERPL-MCNC: 11 MG/DL (ref 8–23)
CALCIUM SERPL-MCNC: 9.3 MG/DL (ref 8.3–10.4)
CASTS URNS QL MICRO: ABNORMAL /LPF
CHLORIDE SERPL-SCNC: 103 MMOL/L (ref 98–107)
CO2 SERPL-SCNC: 25 MMOL/L (ref 21–32)
COLOR UR: ABNORMAL
CREAT SERPL-MCNC: 1 MG/DL (ref 0.8–1.5)
EPI CELLS #/AREA URNS HPF: ABNORMAL /HPF
ERYTHROCYTE [DISTWIDTH] IN BLOOD BY AUTOMATED COUNT: 13 % (ref 11.9–14.6)
EST. AVERAGE GLUCOSE BLD GHB EST-MCNC: 148 MG/DL
GLOBULIN SER CALC-MCNC: 2.9 G/DL (ref 2.3–3.5)
GLUCOSE SERPL-MCNC: 159 MG/DL (ref 65–100)
GLUCOSE UR STRIP.AUTO-MCNC: NEGATIVE MG/DL
HBA1C MFR BLD: 6.8 % (ref 4.2–6.3)
HCT VFR BLD AUTO: 45.2 % (ref 41.1–50.3)
HGB BLD-MCNC: 15.3 G/DL (ref 13.6–17.2)
HGB UR QL STRIP: ABNORMAL
KETONES UR QL STRIP.AUTO: NEGATIVE MG/DL
LEUKOCYTE ESTERASE UR QL STRIP.AUTO: ABNORMAL
MCH RBC QN AUTO: 30.1 PG (ref 26.1–32.9)
MCHC RBC AUTO-ENTMCNC: 33.8 G/DL (ref 31.4–35)
MCV RBC AUTO: 88.8 FL (ref 79.6–97.8)
NITRITE UR QL STRIP.AUTO: NEGATIVE
NRBC # BLD: 0 K/UL (ref 0–0.2)
PH UR STRIP: 7 [PH] (ref 5–9)
PLATELET # BLD AUTO: 257 K/UL (ref 150–450)
PMV BLD AUTO: 9.5 FL (ref 9.4–12.3)
POTASSIUM SERPL-SCNC: 4 MMOL/L (ref 3.5–5.1)
PROT SERPL-MCNC: 7.1 G/DL (ref 6.3–8.2)
PROT UR STRIP-MCNC: NEGATIVE MG/DL
RBC # BLD AUTO: 5.09 M/UL (ref 4.23–5.6)
RBC #/AREA URNS HPF: ABNORMAL /HPF
SODIUM SERPL-SCNC: 137 MMOL/L (ref 136–145)
SP GR UR REFRACTOMETRY: 1.01 (ref 1–1.02)
TSH, 3RD GENERATION: 1.58 UIU/ML (ref 0.36–3.74)
UROBILINOGEN UR QL STRIP.AUTO: 0.2 EU/DL (ref 0.2–1)
WBC # BLD AUTO: 7.6 K/UL (ref 4.3–11.1)
WBC URNS QL MICRO: ABNORMAL /HPF

## 2022-06-17 PROCEDURE — 99215 OFFICE O/P EST HI 40 MIN: CPT | Performed by: UROLOGY

## 2022-06-17 PROCEDURE — 1123F ACP DISCUSS/DSCN MKR DOCD: CPT | Performed by: UROLOGY

## 2022-06-17 ASSESSMENT — PATIENT HEALTH QUESTIONNAIRE - PHQ9
SUM OF ALL RESPONSES TO PHQ QUESTIONS 1-9: 0
SUM OF ALL RESPONSES TO PHQ QUESTIONS 1-9: 0
2. FEELING DOWN, DEPRESSED OR HOPELESS: 0
SUM OF ALL RESPONSES TO PHQ QUESTIONS 1-9: 0
SUM OF ALL RESPONSES TO PHQ QUESTIONS 1-9: 0
SUM OF ALL RESPONSES TO PHQ9 QUESTIONS 1 & 2: 0
1. LITTLE INTEREST OR PLEASURE IN DOING THINGS: 0

## 2022-06-17 ASSESSMENT — ENCOUNTER SYMPTOMS
GASTROINTESTINAL NEGATIVE: 1
RESPIRATORY NEGATIVE: 1

## 2022-06-17 NOTE — PATIENT INSTRUCTIONS
Patient Instructions from Today's Visit    Reason for Visit:  Follow up-prostate biopsy pathology     Diagnosis Information:  [email or web address]      Plan: Your pathology did show prostate cancer, however it was given the lowest grade (anoop score 3+3=6) there was the tiniest amount involved with perineal invasion. This does slightly elevate your risk factor however not enough to warrant necessary intervention. Information on prostate cancer is included in this printout    suggests active surveillance at this time, we just monitor your psa every six months or so. You can always opt to move forward with treatment options and that would be justified. Follow Up: We are sending off for prolaris (genetic) testing. Please be aware results from this can take a few months  We would like to see you back in about 3 months to go over results     Recent Lab Results:  n/a    Treatment Summary has been discussed and given to patient: n/a        -------------------------------------------------------------------------------------------------------------------  Please call our office at [phone] if you have any  of the following symptoms:   · Fever of 100.5 or greater  · Chills  · Shortness of breath  · Swelling or pain in one leg    After office hours an answering service is available and will contact a provider for emergencies or if you are experiencing any of the above symptoms.  Patient does express an interest in My Chart. My Chart log in information explained on the after visit summary printout at the [address] desk. [facility]            Patient Education        Prostate Cancer: Care Instructions  Your Care Instructions     The prostate gland is a small, walnut-shaped organ that lies just below a man's bladder.  It surrounds the urethra, the tube that carries urine from the bladder out of the body through the penis. Prostate cancer is the abnormal growth of cells in the prostate gland. Prostate cancer cells can spread within the prostate, to nearby lymph nodes and other tissues, and to other parts of thebody. When the cancer hasn't spread outside the prostate, it is called localized prostate cancer. With localized prostate cancer, your options depend on how likely it is that your cancer will grow. Tests will show if your cancer islikely to grow.  Low-risk cancer isn't likely to grow right away. If your cancer is low-risk, you can choose active surveillance. This means your cancer will be watched closely by your doctor with regular checkups and tests to see if the cancer grows. This choice allows you to delay having surgery or radiation, often for many years. If the cancer grows very slowly, you may never need treatment.  Medium-risk cancer is more likely to grow. Some men with this type of cancer may be able to choose active surveillance. Others may need to choose surgery or radiation.  High-risk cancer is most likely to grow. If you have high-risk cancer, you will likely need to choose surgery or radiation. If your cancer has already spread outside the prostate or to other parts of thebody, then you may have other treatments, like chemotherapy or hormone therapy. If you are over age [de-identified] or have other serious health problems, like heart disease, you may choose not to have treatments to cure your cancer. Instead, you can just have treatments to manage your symptoms. This is called watchfulwaiting. Finding out that you have cancer is scary. You may feel many emotions and may need some help coping. Seek out family, friends, and counselors for support. You also can do things at home to make yourself feel better while you go through treatment. Call the [facility] (9-962.729.1000) or visitits website at [address] [employer] for more information.   Follow-up care is a key part of your treatment and safety. Be sure to make and go to all appointments, and call your doctor if you are having problems. It's also a good idea to know your test results and keep alist of the medicines you take. How can you care for yourself at home?  Your doctor will talk to you about your treatment options. You may need to learn more about each of them before you can decide which treatment is best for you.  Take your medicines exactly as prescribed. Call your doctor if you think you are having a problem with your medicine. You will get more details on the specific medicines your doctor prescribes.  Eat healthy food. If you do not feel like eating, try to eat food that has protein and extra calories to keep up your strength and prevent weight loss. Drink liquid meal replacements for extra calories and protein. Try to eat your main meal early.  Take steps to control your stress and workload. Learn relaxation techniques. ? Share your feelings. Stress and tension affect our emotions. By expressing your feelings to others, you may be able to understand and cope with them. ? Consider joining a support group. Talking about a problem with your spouse, a good friend, or other people with similar problems is a good way to reduce tension and stress. ? Express yourself through art. Try writing, crafts, dance, or art to relieve stress. Some dance, writing, or art groups may be available just for people who have cancer. ? Be kind to your body and mind. Getting enough sleep, eating a healthy diet, and taking time to do things you enjoy can contribute to an overall feeling of balance in your life and can help reduce stress. ? Get help if you need it. Discuss your concerns with your doctor or counselor.  Get some physical activity every day, but do not get too tired. Keep doing the hobbies you enjoy as your energy allows.  If you are vomiting or have diarrhea:  ? Drink plenty of fluids to prevent dehydration.  Choose water and other clear liquids. If you have kidney, heart, or liver disease and have to limit fluids, talk with your doctor before you increase the amount of fluids you drink. ? When you are able to eat, try clear soups, mild foods, and liquids until all symptoms are gone for 12 to 48 hours. Jell-O, dry toast, crackers, and cooked cereal are also good choices.  If you have not already done so, prepare a list of advance directives. Advance directives are instructions to your doctor and family members about what kind of care you want if you become unable to speak or express yourself. When should you call for help? Call 911 anytime you think you may need emergency care. For example, call if:     You passed out (lost consciousness). Call your doctor now or seek immediate medical care if:     You have new or worse pain.      You have new symptoms, such as a cough, belly pain, vomiting, diarrhea, or a rash.      You have symptoms of a urinary tract infection. For example:  ? You have blood or pus in your urine. ? You have pain in your back just below your rib cage. This is called flank pain. ? You have a fever, chills, or body aches. ? It hurts to urinate. ? You have groin or belly pain. Watch closely for changes in your health, and be sure to contact your doctor if:     You have swollen glands in your armpits, groin, or neck.      You have trouble controlling your urine.      You do not get better as expected. Where can you learn more? Go to [email or web address].[email or web address] org and sign in to your [employer] account. Enter V141 in the [facility] box to learn more about \"Prostate Cancer: Care Instructions. \"     If you do not have an account, please click on the \"Sign Up Now\" link. Current as of: September 8, 2021               Content Version: 13.2  © 6608-6135 Healthwise, Incorporated. Care instructions adapted under license by [facility] ([facility]).  If you have questions about a medical condition or this instruction, always ask your healthcare professional. [place] [address] any warranty or liability for your use of this information.

## 2022-06-17 NOTE — TELEPHONE ENCOUNTER
----- Message from Shay Hennessy MD sent at 6/17/2022 11:06 AM EDT -----  Labs look suprisingly good , A1C 6.8 ,  thyroid and everything else good , have lab add a vit D

## 2022-06-24 RX ORDER — GLIMEPIRIDE 4 MG/1
TABLET ORAL
Qty: 30 TABLET | Refills: 5 | OUTPATIENT
Start: 2022-06-24

## 2022-06-26 ASSESSMENT — ENCOUNTER SYMPTOMS
WHEEZING: 0
SHORTNESS OF BREATH: 0
EYE REDNESS: 0
EYES NEGATIVE: 1
GASTROINTESTINAL NEGATIVE: 1
SORE THROAT: 0
BACK PAIN: 0
ABDOMINAL DISTENTION: 0
RESPIRATORY NEGATIVE: 1
RHINORRHEA: 0
VOMITING: 0

## 2022-06-26 NOTE — PROGRESS NOTES
HISTORY OF PRESENT ILLNESS  Lisa Kerr is a 75 y.o. y.o. male    Hypertension  This is a recurrent problem. The problem is unchanged. Pertinent negatives include no chest pain, palpitations or shortness of breath. Diabetes  He presents for his follow-up diabetic visit. He has type 2 diabetes mellitus. His disease course has been improving. Pertinent negatives for diabetes include no chest pain and no fatigue. Urinary Frequency   This is a new (recent dx of prostate cancer ) problem. The problem has been unchanged. Associated symptoms include frequency. Pertinent negatives include no chills or vomiting. No Known Allergies     Current Outpatient Medications   Medication Sig    glimepiride (AMARYL) 4 MG tablet Take 1/2 tablet in the AM and 1/2 tablet PM    Multiple Vitamins-Minerals (MULTIVITAMIN ADULTS PO) Take by mouth daily    Omega-3 Fatty Acids (FISH OIL PO) Take by mouth daily    NIACIN PO Take by mouth daily    traZODone (DESYREL) 50 MG tablet TAKE ONE TABLET BY MOUTH AT BEDTIME    lisinopril (PRINIVIL;ZESTRIL) 10 MG tablet TAKE ONE TABLET BY MOUTH ONE TIME DAILY    verapamil (CALAN SR) 180 MG extended release tablet TAKE ONE TABLET BY MOUTH ONE TIME DAILY FOR HYPERTENSION    atorvastatin (LIPITOR) 40 MG tablet nightly TAKE ONE TABLET BY MOUTH ONE TIME DAILY    Coenzyme Q10 10 MG CAPS Take by mouth at bedtime     flecainide (TAMBOCOR) 100 MG tablet TAKE ONE TABLET BY MOUTH TWICE A DAY TO PREVENT RECURRENT AFIB    metFORMIN (GLUCOPHAGE-XR) 500 MG extended release tablet Take 500 mg by mouth 2 times daily    metoprolol succinate (TOPROL XL) 25 MG extended release tablet TAKE ONE TABLET BY MOUTH ONE TIME DAILY FOR HIGH BLOOD PRESSURE    valACYclovir (VALTREX) 500 MG tablet TAKE ONE TABLET BY MOUTH ONE TIME DAILY    omeprazole (PRILOSEC) 40 MG delayed release capsule Take 40 mg by mouth daily     No current facility-administered medications for this visit.         Past Medical History:   Diagnosis Date    Coronary artery disease     COVID-19 2021    History of atrial fibrillation     Hypercholesterolemia     Hypertension     medication    Major depression     [facility] [facility]     Multiple thyroid nodules     Right inguinal hernia     Skin cancer     Staph infection     while in high school    Type 2 diabetes mellitus ([facility] [address] [facility]     oral agents, denies hypoglycemia        Past Surgical History:   Procedure Laterality Date    [address]    HERNIA REPAIR Right 2017    ORTHOPEDIC SURGERY Right 2016    bone spur removal; arthroscopy    ORTHOPEDIC SURGERY Left      for broken 5th metatarsal    PROSTATE SURGERY N/A 2022    MRI FUSION GUIDED PROSTATE BIOPSY performed by Yesi Carolina MD at [place] [address] ARTHROPLASTY Left 2009    TOTAL KNEE ARTHROPLASTY Right 2017        Social History     Socioeconomic History    Marital status:      Spouse name: Not on file    Number of children: Not on file    Years of education: Not on file    Highest education level: Not on file   Occupational History    Not on file   Tobacco Use    Smoking status: Former Smoker     Packs/day: 2.00     Quit date: 1992     Years since quittin.5    Smokeless tobacco: Former User   Vaping Use    Vaping Use: Never used   Substance and Sexual Activity    Alcohol use: No     Alcohol/week: 0.0 standard drinks    Drug use: Never    Sexual activity: Not on file   Other Topics Concern    Not on file   Social History Narrative    Not on file     Social Determinants of Health     Financial Resource Strain:     Difficulty of Paying Living Expenses: Not on file   Food Insecurity:     Worried About Running Out of Food in the Last Year: Not on file    Tera of Food in the Last Year: Not on file   Transportation Needs:     Lack of Transportation (Medical):  Not on file    Lack of Transportation (Non-Medical): Not on file   Physical Activity:     Days of Exercise per Week: Not on file    Minutes of Exercise per Session: Not on file   Stress:     Feeling of Stress : Not on file   Social Connections:     Frequency of Communication with Friends and Family: Not on file    Frequency of Social Gatherings with Friends and Family: Not on file    Attends Rastafarian Services: Not on file    Active Member of [address] [place] [address] or Organizations: Not on file    Attends Club or Organization Meetings: Not on file    Marital Status: Not on file   Intimate Partner Violence:     Fear of Current or Ex-Partner: Not on file    Emotionally Abused: Not on file    Physically Abused: Not on file    Sexually Abused: Not on file   Housing Stability:     Unable to Pay for Housing in the Last Year: Not on file    Number of [place] in the Last Year: Not on file    Unstable Housing in the Last Year: Not on file        Review of Systems   Constitutional: Negative for activity change, appetite change, chills, fatigue and unexpected weight change. HENT: Negative. Negative for rhinorrhea and sore throat. Eyes: Negative. Negative for redness and visual disturbance. Respiratory: Negative. Negative for shortness of breath and wheezing. Cardiovascular: Negative. Negative for chest pain and palpitations. Gastrointestinal: Negative. Negative for abdominal distention and vomiting. Endocrine: Negative. Genitourinary: Positive for frequency. Negative for difficulty urinating. Musculoskeletal: Negative. Negative for arthralgias and back pain. Skin: Negative. Negative for rash. Neurological: Negative. Psychiatric/Behavioral: Negative. Negative for agitation and behavioral problems. BP (!) 140/84   Pulse 82   Resp 17   Wt 276 lb 3.2 oz (125.3 kg)   SpO2 97%   BMI 32.75 kg/m²      Physical Exam  Constitutional:       General: He is not in acute distress. Appearance: Normal appearance.    HENT: Head: Normocephalic. Nose: Nose normal.   Eyes:      Conjunctiva/sclera: Conjunctivae normal.   Cardiovascular:      Rate and Rhythm: Normal rate. Pulmonary:      Effort: Pulmonary effort is normal.      Breath sounds: Normal breath sounds. Abdominal:      General: Abdomen is flat. Bowel sounds are normal.      Palpations: Abdomen is soft. Musculoskeletal:         General: Normal range of motion. Cervical back: Normal range of motion. Skin:     General: Skin is warm and dry. Neurological:      General: No focal deficit present. Mental Status: He is alert. Psychiatric:         Mood and Affect: Mood normal.         Admission on 06/07/2022, Discharged on 06/07/2022   Component Date Value Ref Range Status    Color, UA 06/07/2022 YELLOW    Final    Appearance 06/07/2022 CLEAR    Final    Specific Gravity, UA 06/07/2022 1.025* 1.001 - 1.023   Final    pH, Urine 06/07/2022 6.0  5.0 - 9.0   Final    Protein, UA 06/07/2022 Negative  NEG mg/dL Final    Glucose, UA 06/07/2022 Negative  mg/dL Final    Ketones, Urine 06/07/2022 Negative  NEG mg/dL Final    Bilirubin Urine 06/07/2022 Negative  NEG   Final    Blood, Urine 06/07/2022 Negative  NEG   Final    Urobilinogen, Urine 06/07/2022 0.2  0.2 - 1.0 EU/dL Final    Nitrite, Urine 06/07/2022 Negative  NEG   Final    Leukocyte Esterase, Urine 06/07/2022 Negative  NEG   Final    POC Glucose 06/07/2022 108* 65 - 100 mg/dL Final    Comment: 47 - 60 mg/dl Consistent with, but not fully diagnostic of hypoglycemia. 101 - 125 mg/dl Impaired fasting glucose/consistent with pre-diabetes mellitus  > 126 mg/dl Fasting glucose consistent with overt diabetes mellitus      Performed by: 06/07/2022 Gena   Final       ASSESSMENT and PLAN    Other specified diabetes mellitus with other specified complication, unspecified whether long term insulin use (HCC)  -     glimepiride (AMARYL) 4 MG tablet;  Take 1/2 tablet in the AM and 1/2 tablet PM, Disp-90 tablet, R-5Normal  Dyslipidemia  -     Comprehensive Metabolic Panel; Future  Essential (primary) hypertension  -     CBC; Future  -     Urinalysis; Future  -     TSH; Future  Prostate cancer [facility])      Current treatment plan is effective. no changes in therapy  reviewed diet, exercise and weight control   Cardiovascular risk and specific lipid/ LDL goals reviewed    No follow-ups on file.      Ni Stuart MD

## 2022-07-18 ENCOUNTER — APPOINTMENT (RX ONLY)
Dept: URBAN - METROPOLITAN AREA CLINIC 329 | Facility: CLINIC | Age: 67
Setting detail: DERMATOLOGY
End: 2022-07-18

## 2022-07-18 PROBLEM — C44.529 SQUAMOUS CELL CARCINOMA OF SKIN OF OTHER PART OF TRUNK: Status: ACTIVE | Noted: 2022-07-18

## 2022-07-18 PROCEDURE — 17262 DSTRJ MAL LES T/A/L 1.1-2.0: CPT

## 2022-07-18 PROCEDURE — ? CURETTAGE AND DESTRUCTION

## 2022-07-18 NOTE — PROCEDURE: CURETTAGE AND DESTRUCTION
Detail Level: Detailed
Number Of Curettages: 3
Size Of Lesion In Cm: 1.5
Size Of Lesion After Curettage: 1.9
Add Intralesional Injection: No
Concentration (Mg/Ml Or Millions Of Plaque Forming Units/Cc): 0.01
Total Volume (Ccs): 1
Anesthesia Type: 1% lidocaine with epinephrine
Cautery Type: electrodesiccation
What Was Performed First?: Curettage
Final Size Statement: The size of the lesion after curettage was
Additional Information: (Optional): The wound was cleaned, and a pressure dressing was applied.  The patient received detailed post-op instructions.
Consent was obtained from the patient. The risks, benefits and alternatives to therapy were discussed in detail. Specifically, the risks of infection, scarring, bleeding, prolonged wound healing, nerve injury, incomplete removal, allergy to anesthesia and recurrence were addressed. Alternatives to ED&C, such as: surgical removal and XRT were also discussed.  Prior to the procedure, the treatment site was clearly identified and confirmed by the patient.
Post-Care Instructions: I reviewed with the patient in detail post-care instructions. Patient is to keep the area dry for 48 hours, and not to engage in any swimming until the area is healed. Should the patient develop any fevers, chills, bleeding, severe pain patient will contact the office immediately.
Bill As A Line Item Or As Units: Line Item

## 2022-08-19 ENCOUNTER — APPOINTMENT (RX ONLY)
Dept: URBAN - METROPOLITAN AREA CLINIC 329 | Facility: CLINIC | Age: 67
Setting detail: DERMATOLOGY
End: 2022-08-19

## 2022-08-19 DIAGNOSIS — D22 MELANOCYTIC NEVI: ICD-10-CM

## 2022-08-19 DIAGNOSIS — L81.4 OTHER MELANIN HYPERPIGMENTATION: ICD-10-CM

## 2022-08-19 DIAGNOSIS — L57.0 ACTINIC KERATOSIS: ICD-10-CM

## 2022-08-19 DIAGNOSIS — Z85.828 PERSONAL HISTORY OF OTHER MALIGNANT NEOPLASM OF SKIN: ICD-10-CM

## 2022-08-19 PROBLEM — D22.5 MELANOCYTIC NEVI OF TRUNK: Status: ACTIVE | Noted: 2022-08-19

## 2022-08-19 PROBLEM — D22.61 MELANOCYTIC NEVI OF RIGHT UPPER LIMB, INCLUDING SHOULDER: Status: ACTIVE | Noted: 2022-08-19

## 2022-08-19 PROBLEM — D22.62 MELANOCYTIC NEVI OF LEFT UPPER LIMB, INCLUDING SHOULDER: Status: ACTIVE | Noted: 2022-08-19

## 2022-08-19 PROCEDURE — ? SUNSCREEN RECOMMENDATIONS

## 2022-08-19 PROCEDURE — 99213 OFFICE O/P EST LOW 20 MIN: CPT | Mod: 25

## 2022-08-19 PROCEDURE — 17003 DESTRUCT PREMALG LES 2-14: CPT

## 2022-08-19 PROCEDURE — ? LIQUID NITROGEN

## 2022-08-19 PROCEDURE — ? FULL BODY SKIN EXAM - DECLINED

## 2022-08-19 PROCEDURE — ? ADDITIONAL NOTES

## 2022-08-19 PROCEDURE — ? COUNSELING

## 2022-08-19 PROCEDURE — 17000 DESTRUCT PREMALG LESION: CPT

## 2022-08-19 ASSESSMENT — LOCATION DETAILED DESCRIPTION DERM
LOCATION DETAILED: RIGHT CENTRAL MALAR CHEEK
LOCATION DETAILED: LEFT INFERIOR LATERAL BUCCAL CHEEK
LOCATION DETAILED: LEFT DISTAL POSTERIOR UPPER ARM
LOCATION DETAILED: RIGHT ANTECUBITAL SKIN
LOCATION DETAILED: LEFT INFERIOR CENTRAL MALAR CHEEK
LOCATION DETAILED: LEFT SUPERIOR LATERAL NECK
LOCATION DETAILED: RIGHT PROXIMAL DORSAL FOREARM
LOCATION DETAILED: PERIUMBILICAL SKIN
LOCATION DETAILED: STERNUM
LOCATION DETAILED: RIGHT SUPERIOR MEDIAL UPPER BACK

## 2022-08-19 ASSESSMENT — LOCATION SIMPLE DESCRIPTION DERM
LOCATION SIMPLE: RIGHT UPPER BACK
LOCATION SIMPLE: RIGHT CHEEK
LOCATION SIMPLE: RIGHT ELBOW
LOCATION SIMPLE: LEFT UPPER ARM
LOCATION SIMPLE: CHEST
LOCATION SIMPLE: LEFT CHEEK
LOCATION SIMPLE: ABDOMEN
LOCATION SIMPLE: NECK
LOCATION SIMPLE: RIGHT FOREARM

## 2022-08-19 ASSESSMENT — LOCATION ZONE DERM
LOCATION ZONE: TRUNK
LOCATION ZONE: FACE
LOCATION ZONE: NECK
LOCATION ZONE: ARM

## 2022-08-19 NOTE — PROCEDURE: MIPS QUALITY
Detail Level: Detailed
Quality 130: Documentation Of Current Medications In The Medical Record: Current Medications Documented
Quality 111:Pneumonia Vaccination Status For Older Adults: Pneumococcal vaccine administered on or after patient’s 60th birthday and before the end of the measurement period
Quality 47: Advance Care Plan: Advance Care Planning discussed and documented in the medical record; patient did not wish or was not able to name a surrogate decision maker or provide an advance care plan.

## 2022-09-22 NOTE — PROGRESS NOTES
[address] [facility] Hematology & Oncology  [address] [facility] [address]  [phone]        Mr. Roderick Yu is a 75 y.o. male with a diagnosis of  PCa. S/p TRUS fusion prostate bx, [place] score 3+3=6    . INTERVAL HISTORY: Patient is here today for follow-up. He was diagnosed with Jose Enrique 3+3 equal 6 adenocarcinoma the prostate on 6/7/2022. He had 60% involvement of 1 core. His last PSA on 4/8/2022 was 6.0. His prostate size is 54 cc. He had a prostate MRI on 5/13/2022 which showed a PI-RADS 2 lesion in the right mid peripheral zone measuring just under 1 cm. He also had a 1.7 cm right anterior and posterior mid transition zone lesion that was graded a PI-RADS 5 lesion. He has been doing a considerable amount of research on his treatment options. He also underwent a Polaris test.  His score was 3.4. Active surveillance was the recommended treatment. His disease specific mortality at 10 years on active surveillance is 1.7%. We reviewed those results in detail today. He has a number of questions he came with involving other treatments, including focal therapy and various systemic therapies. From previous note(s):  Patient is here today for follow-up after his MRI guided fusion prostate biopsy on 6/7/2022. He had 2 regions of interest that were both negative. He had a single core of Naples 3+3 equal 6 (grade group 1) that was positive from the left lateral mid portion of the prostate. Approximately 60% of the core was involved and there was evidence of perineural invasion. The rest of the biopsies were negative. Of note his last PSA from 4/8/2022 was 6.0. His digital rectal exam was unremarkable. His prostate measured 54 cc. He did have a PI-RADS 5 lesion in the transition zone on his prostate MRI but again those biopsies were negative     He is a healthy 70-year-old who has already done quite a bit of research about various treatment options. He has minimal lower urinary tract symptoms and good erectile function. IPSS:14 QOL: 0 delighted  KEMAR: 24       From previous note(s):  Patient is here today discussed the findings on his recent prostate MRI. This was done on 5/13/2022. His gland measured 5.2 x 5.0 x 6.1 cm. He was found to have a PI-RADS 5 lesion involving the right anterior and posterior mid transition zone. It measured 1.7 x 0.8 cm. It was scored a PI-RADS 5 lesion. He also had a PI-RADS 2 lesion in the right anterior mid peripheral zone measuring 0.9 x 0.3 cm. His PSA on 4/8/2022 was up to 6.0. Its been as high as 9.4 on 6/25/2021. It typically has been in the upper threes or low fours. He has not undergone a prostate biopsy previously. He is on Xarelto but routinely stops it if he has procedures. Patient is here today for 6-month follow-up. He has history of an elevated PSA and had an episode of gross hematuria last year. His PSA was up to 9 at a time when he was thought to have a urinary tract infection. It was rechecked on 8/27/2021 and was down to 5.5. It then decreased to 4.1 on 10/8/2021. It was last checked on 12/10/2021 and was down to 3.9. He has no complaints today. He denies any dysuria urgency frequency. He has not had any gross hematuria. His PSA is pending today. His hematuria evaluation last year was unremarkable. He remains on Xarelto. Patient is here for follow-up today from his elevated PSA and history of gross hematuria. He has had no more gross hematuria since I last saw him. Of note when initially saw him his PSA was up to nine and he had had symptoms of a UTI. He was also taking Xarelto for A. fib. His hematuria evaluation has been unremarkable. His CT scan was relatively normal except for a hyperdense cyst.  On cystoscopy he had some petechiae in the floor of his bladder that were thought to be postinflammatory changes from a possible UTI.   We rechecked his PSA on August 27, 2021 it was down to 5.5. Her thinking was that he had an infection or some inflammatory event that bumped his PSA and likely caused his hematuria. He has had no lower urinary tract symptoms he has had no dysuria. He has had no more gross hematuria. Overall he is felt quite good    Past medical, family and social histories, as well as medications and allergies, were reviewed and updated in the medical record as appropriate. PMH:     Past Medical History:   Diagnosis Date    Coronary artery disease     COVID-19 09/2021    History of atrial fibrillation     Hypercholesterolemia     Hypertension     medication    Major depression     Morbid obesity ([facility] [address]     Multiple thyroid nodules     Right inguinal hernia     Skin cancer     Staph infection     while in high school    Type 2 diabetes mellitus ([facility] [address]     oral agents, denies hypoglycemia       MEDs:     atorvastatin  Coenzyme Q10 Caps  FISH OIL PO  flecainide  glimepiride  lisinopril  metFORMIN  metoprolol succinate  MULTIVITAMIN ADULTS PO  NIACIN PO  traZODone  valACYclovir  verapamil     ALLERGIES:    No Known Allergies    ROS:     Review of Systems   Constitutional: Negative. Negative for chills, fatigue and fever. Respiratory: Negative. Cardiovascular: Negative. Gastrointestinal: Negative. Genitourinary: Negative. Negative for difficulty urinating, dysuria, flank pain, frequency, hematuria and urgency. Musculoskeletal: Negative. All other systems reviewed and are negative. PHYSICAL EXAMINATION    BP (!) 143/72   Pulse 76   Temp 98.1 °F (36.7 °C)   Resp 16   Ht 6' 5\" (1.956 m)   Wt 283 lb (128.4 kg)   SpO2 97%   BMI 33.56 kg/m²   General: well dressed, well nourished, no acute distress  Skin: no rashes  HEENT: Sclera are clear,normocephalic, atraumatic. no external lesions   Cardiovascular: Reg. Normal perfusion  Respiratory: normal respiratory effort, no JVD, no audible wheezing.   Musculoskeletal: unremarkable with normal function. No embolic signs or cyanosis. Neurologic exam: intact, no focal deficits, moves all 4 extremities  Psych: normal mood and affect, alert, oriented x 3  LE:  no edema  GI: soft, nontender, no masses, no CVA tenderness  : DEFERRED       LABORATORY RESULTS:    Lab Results   Component Value Date/Time    PSA 6.0 04/08/2022 08:55 AM    PSA 3.9 12/10/2021 08:10 AM    PSA 4.1 10/08/2021 12:39 PM    PSA 5.5 08/27/2021 01:51 PM    PSA 9.4 06/25/2021 12:00 PM          IMAGING:    XR Results:    === 04/05/19 ===    XR SHOULDER RIGHT (MIN 2 VIEWS)    - Narrative -  Right shoulder 3 view dated 4/5/2019    CLINICAL INFORMATION: Moderate pain with limited range of motion for one year    Views of the shoulder show no fracture, dislocation or destructive changes. There is degenerative change at the [facility] joint.    - Impression -  IMPRESSION: Mild degenerative change right AC joint      CT Results:    === 10/11/17 ===    CT CARDIAC CALCIUM SCORING      MRI Results:    === 05/13/22 ===    MRI PELVIS W WO CONTRAST    - Narrative -  MRI PELVIS WITHOUT AND WITH CONTRAST, 5/13/2022    History: Elevated PSA. Technique: Axial, sagittal, coronal T2; axial diffusion-weighted with calculated  ADC map; axial fat-saturated T1 images were followed by followed by 25 cc  intravenous ProHance, following which multiplanar fat-saturated T1 weighted  imaging to include multiphase axial images were obtained. Findings:  The prostate gland measures: 5.2 cm transverse by 5 cm AP by 6.1 cm  craniocaudal.    Peripheral Zone: Peripheral zone assessment is somewhat limited by significant  susceptibility artifact on diffusion-weighted imaging. By T2-weighted imaging,  the only distinct lesion occurs in the right anterior mid gland/apex where an  elongated area of focal T2 signal loss is seen measuring 9 mm x 3 mm in maximal  dimensions. No early focal enhancement is seen at this level. Central Gland:  There is a lentiform area of T2 signal loss seen in the right  anterior and posterior mid gland transition zone best appreciated on axial  T2-weighted image 12 measuring 17 mm x 8 mm in size. Once again,  diffusion-weighted imaging assessment at this level is limited although some  moderate increased signal is suggested at this level on diffusion-weighted  images. Enhancement is seen at this level of uncertain clinical significance  although this does exclude potential residual benign Central gland tissue. No  suspicious central gland lesion is otherwise seen. Prostate capsule: No suspicious focal contour change, or gross extracapsular  enhancing tumor. Seminal vesicles: No evidence for involvement. Neurovascular bundles: No clear evidence for involvement. Lymph nodes: Left pelvic hollie chains suboptimally visualized. No suspicious  adenopathy seen in the visualized pelvis. Bones: Limited assessment of the left pelvis particularly about the hip. No  suspicious enhancing osseous lesion. Mild endplate enhancement is seen at L4-5  favored to represent benign degenerative change. Other: Mild to moderate diverticulosis of the partially visualized sigmoid  colon. - Impression -  1. Peripheral zone changes as follows:  -9 mm x 3 mm right anterior mid gland/apex peripheral zone lesion: PIRADS 2    2. Central gland changes as follows:  -1.7 cm x 0.8 cm right anterior and posterior mid gland transition zone lesion:  PIRADS 5    3. Limited assessment for metastatic disease due to significant susceptibility  artifact from the left metallic total hip replacement. No evidence for  metastatic disease is seen in the visualized pelvis. ASSESSMENT:    Mr. Dorthea Castleman is a 75 y.o. male with a diagnosis of  PCa. S/p TRUS fusion prostate bx, Jose Enrique score 3+3=6. Again we discussed treatment options. Again I recommended active surveillance, and that is what he would like to pursue at this time.   We discussed our typical schedule. I would like to check a PSA today and then plan to see him back in 6 months with a PSA and KAREN. We also discussed the likely need of repeating his MRI and a prostate biopsy at either the 1 year or 1-1/2-year nazia. He is agreeable to this and he understands the risks and benefits of an active surveillance approach. Farnaz Christine PLAN:   -review prolaris results   -psa today   -as scheduled in April, psa prior and KAREN     ________________________________________      I have seen and examined this patient. I have reviewed and edited the note started by the MA and agree with the outlined plan. Part of this note was written by using a voice dictation software. The note has been proof read but may still contain some grammatical/other typographical errors.       Oneal Capone, [address] [facility]rology

## 2022-09-23 ENCOUNTER — HOSPITAL ENCOUNTER (OUTPATIENT)
Dept: LAB | Age: 67
Discharge: HOME | End: 2022-09-26
Payer: COMMERCIAL

## 2022-09-23 ENCOUNTER — OFFICE VISIT (OUTPATIENT)
Dept: ONCOLOGY | Age: 67
End: 2022-09-23
Payer: COMMERCIAL

## 2022-09-23 VITALS
DIASTOLIC BLOOD PRESSURE: 72 MMHG | HEART RATE: 76 BPM | BODY MASS INDEX: 33.42 KG/M2 | WEIGHT: 283 LBS | RESPIRATION RATE: 16 BRPM | HEIGHT: 77 IN | TEMPERATURE: 98.1 F | SYSTOLIC BLOOD PRESSURE: 143 MMHG | OXYGEN SATURATION: 97 %

## 2022-09-23 DIAGNOSIS — C61 PROSTATE CANCER (HCC): ICD-10-CM

## 2022-09-23 DIAGNOSIS — C61 PROSTATE CANCER (HCC): Primary | ICD-10-CM

## 2022-09-23 LAB — PSA SERPL-MCNC: 4.7 NG/ML

## 2022-09-23 PROCEDURE — 1123F ACP DISCUSS/DSCN MKR DOCD: CPT | Performed by: UROLOGY

## 2022-09-23 PROCEDURE — 84153 ASSAY OF PSA TOTAL: CPT

## 2022-09-23 PROCEDURE — 36415 COLL VENOUS BLD VENIPUNCTURE: CPT

## 2022-09-23 PROCEDURE — 99213 OFFICE O/P EST LOW 20 MIN: CPT | Performed by: UROLOGY

## 2022-09-23 ASSESSMENT — ENCOUNTER SYMPTOMS
RESPIRATORY NEGATIVE: 1
GASTROINTESTINAL NEGATIVE: 1

## 2022-09-23 NOTE — PATIENT INSTRUCTIONS
Patient Instructions from Today's Visit    Reason for Visit:  Prolaris results    Diagnosis Information:  [email or web address]      Plan: With the prolaris results, it does suggest that active surveillance is a adequate route. We would like to check a psa on your way out today. Please stop by the lab. HIFU has a promising approach as long as it is focal.    Follow Up:      Recent Lab Results:  N/a    Treatment Summary has been discussed and given to patient:   N/a      -------------------------------------------------------------------------------------------------------------------    Patient does express an interest in My Chart. My Chart log in information explained on the after visit summary printout at the [facility] [address] desk.     Elinda Alpers, NCMA

## 2022-10-19 RX ORDER — FLECAINIDE ACETATE 100 MG/1
TABLET ORAL
Qty: 180 TABLET | Refills: 0 | Status: SHIPPED | OUTPATIENT
Start: 2022-10-19

## 2022-11-16 ENCOUNTER — OFFICE VISIT (OUTPATIENT)
Dept: CARDIOLOGY CLINIC | Age: 67
End: 2022-11-16
Payer: COMMERCIAL

## 2022-11-16 VITALS
WEIGHT: 282 LBS | SYSTOLIC BLOOD PRESSURE: 140 MMHG | BODY MASS INDEX: 33.3 KG/M2 | HEIGHT: 77 IN | DIASTOLIC BLOOD PRESSURE: 72 MMHG | HEART RATE: 72 BPM

## 2022-11-16 DIAGNOSIS — I48.0 PAROXYSMAL ATRIAL FIBRILLATION (HCC): Primary | ICD-10-CM

## 2022-11-16 DIAGNOSIS — E11.65 TYPE 2 DIABETES MELLITUS WITH HYPERGLYCEMIA, WITHOUT LONG-TERM CURRENT USE OF INSULIN (HCC): ICD-10-CM

## 2022-11-16 DIAGNOSIS — I10 ESSENTIAL (PRIMARY) HYPERTENSION: ICD-10-CM

## 2022-11-16 PROCEDURE — 3074F SYST BP LT 130 MM HG: CPT | Performed by: INTERNAL MEDICINE

## 2022-11-16 PROCEDURE — 99214 OFFICE O/P EST MOD 30 MIN: CPT | Performed by: INTERNAL MEDICINE

## 2022-11-16 PROCEDURE — 93000 ELECTROCARDIOGRAM COMPLETE: CPT | Performed by: INTERNAL MEDICINE

## 2022-11-16 PROCEDURE — 3078F DIAST BP <80 MM HG: CPT | Performed by: INTERNAL MEDICINE

## 2022-11-16 PROCEDURE — 1123F ACP DISCUSS/DSCN MKR DOCD: CPT | Performed by: INTERNAL MEDICINE

## 2022-11-16 PROCEDURE — 3044F HG A1C LEVEL LT 7.0%: CPT | Performed by: INTERNAL MEDICINE

## 2022-11-16 RX ORDER — METOPROLOL SUCCINATE 25 MG/1
25 TABLET, EXTENDED RELEASE ORAL DAILY
Qty: 90 TABLET | Refills: 3 | Status: SHIPPED | OUTPATIENT
Start: 2022-11-16 | End: 2022-11-28

## 2022-11-16 RX ORDER — RIVAROXABAN 20 MG/1
TABLET, FILM COATED ORAL
COMMUNITY
Start: 2022-09-23

## 2022-11-16 RX ORDER — FLECAINIDE ACETATE 100 MG/1
TABLET ORAL
Qty: 180 TABLET | Refills: 1 | Status: SHIPPED | OUTPATIENT
Start: 2022-11-16

## 2022-11-16 NOTE — PROGRESS NOTES
[facility] CARDIOLOGY  [address] [facility], [address]  PHONE: [phone]        22        NAME:  Mimi Heredia  : 1955  MRN: [id number]     CHIEF COMPLAINT:    Atrial Fibrillation         SUBJECTIVE:     No chest pain or palpitation or dizziness. Medications were all reviewed with the patient today and updated as necessary. Current Outpatient Medications   Medication Sig    XARELTO 20 MG TABS tablet     flecainide (TAMBOCOR) 100 MG tablet TAKE ONE TABLET BY MOUTH TWICE A DAY TO PREVENT RECURRENT AFIB    glimepiride (AMARYL) 4 MG tablet Take 1/2 tablet in the AM and 1/2 tablet PM    Multiple Vitamins-Minerals (MULTIVITAMIN ADULTS PO) Take by mouth daily    Omega-3 Fatty Acids (FISH OIL PO) Take by mouth daily    NIACIN PO Take by mouth daily    traZODone (DESYREL) 50 MG tablet TAKE ONE TABLET BY MOUTH AT BEDTIME    lisinopril (PRINIVIL;ZESTRIL) 10 MG tablet TAKE ONE TABLET BY MOUTH ONE TIME DAILY    verapamil (CALAN SR) 180 MG extended release tablet TAKE ONE TABLET BY MOUTH ONE TIME DAILY FOR HYPERTENSION    atorvastatin (LIPITOR) 40 MG tablet nightly TAKE ONE TABLET BY MOUTH ONE TIME DAILY    Coenzyme Q10 10 MG CAPS Take by mouth at bedtime     metFORMIN (GLUCOPHAGE-XR) 500 MG extended release tablet Take 500 mg by mouth 2 times daily    metoprolol succinate (TOPROL XL) 25 MG extended release tablet TAKE ONE TABLET BY MOUTH ONE TIME DAILY FOR HIGH BLOOD PRESSURE    valACYclovir (VALTREX) 500 MG tablet TAKE ONE TABLET BY MOUTH ONE TIME DAILY     No current facility-administered medications for this visit.         No Known Allergies        PHYSICAL EXAM:     Wt Readings from Last 3 Encounters:   22 282 lb (127.9 kg)   22 283 lb (128.4 kg)   22 275 lb 12.8 oz (125.1 kg)     BP Readings from Last 3 Encounters:   22 (!) 140/72   22 (!) 143/72   22 132/81       BP (!) 140/72   Pulse 72   Ht 6' 5\" (1.956 m)   Wt 282 lb (127.9 kg)   BMI 33.44 kg/m²     Physical Exam  Vitals reviewed. HENT:      Head: Normocephalic and atraumatic. Eyes:      Extraocular Movements: Extraocular movements intact. Pupils: Pupils are equal, round, and reactive to light. Cardiovascular:      Rate and Rhythm: Normal rate. Heart sounds: Normal heart sounds. Pulmonary:      Effort: Pulmonary effort is normal.      Breath sounds: Normal breath sounds. Abdominal:      General: Abdomen is flat. Palpations: Abdomen is soft. There is no mass. Musculoskeletal:         General: Normal range of motion. Cervical back: Normal range of motion. Skin:     General: Skin is warm and dry. Neurological:      General: No focal deficit present. Mental Status: He is alert and oriented to person, place, and time. Psychiatric:         Mood and Affect: Mood normal.         RECENT LABS AND RECORDS REVIEW    A1c 6.8    EKG; Nml      ASSESSMENT and PLAN  2. Paroxysmal atrial fibrillation (HCC)  3. Essential hypertension  4. Type 2 diabetes mellitus without complication, without long-term current use of insulin ([address]  5. Dyslipidemia    Altamese Degree was seen today for atrial fibrillation. Diagnoses and all orders for this visit:    Paroxysmal atrial fibrillation ([address]  -     EKG 12 lead    Essential (primary) hypertension    Type 2 diabetes mellitus with hyperglycemia, without long-term current use of insulin (HCC)     ////    CV status is stable. Medications and most recent labs reviewed. Diet and exercise are encouraged. Greater  than 50% of today's visit was devoted to counseling the patient, explaining disease concepts and offering advice and suggestions for optimal care. Return in about 1 year (around 11/16/2023).        Daryle Gourd, MD  11/16/2022  9:17 AM

## 2022-11-21 RX ORDER — TRAZODONE HYDROCHLORIDE 50 MG/1
TABLET ORAL
Qty: 90 TABLET | Refills: 1 | Status: SHIPPED | OUTPATIENT
Start: 2022-11-21

## 2022-11-28 RX ORDER — METOPROLOL SUCCINATE 25 MG/1
TABLET, EXTENDED RELEASE ORAL
Qty: 90 TABLET | Refills: 3 | Status: SHIPPED | OUTPATIENT
Start: 2022-11-28

## 2022-11-28 NOTE — TELEPHONE ENCOUNTER
Requested Prescriptions     Pending Prescriptions Disp Refills    metoprolol succinate (TOPROL XL) 25 MG extended release tablet [Pharmacy Med Name: METOPROLOL SUCC ER 25 MG TAB[*]] 90 tablet 3     Sig: TAKE ONE TABLET BY MOUTH ONE TIME DAILY FOR HIGH BLOOD PRESSURE

## 2022-12-19 ENCOUNTER — NURSE ONLY (OUTPATIENT)
Dept: FAMILY MEDICINE CLINIC | Facility: CLINIC | Age: 67
End: 2022-12-19

## 2022-12-19 DIAGNOSIS — I10 ESSENTIAL (PRIMARY) HYPERTENSION: ICD-10-CM

## 2022-12-19 DIAGNOSIS — Z79.4 TYPE 2 DIABETES MELLITUS WITHOUT COMPLICATION, WITH LONG-TERM CURRENT USE OF INSULIN (HCC): ICD-10-CM

## 2022-12-19 DIAGNOSIS — E11.9 TYPE 2 DIABETES MELLITUS WITHOUT COMPLICATION, WITH LONG-TERM CURRENT USE OF INSULIN (HCC): ICD-10-CM

## 2022-12-19 DIAGNOSIS — Z00.00 ENCOUNTER FOR ANNUAL PHYSICAL EXAM: ICD-10-CM

## 2022-12-19 DIAGNOSIS — Z12.5 SCREENING PSA (PROSTATE SPECIFIC ANTIGEN): ICD-10-CM

## 2022-12-19 DIAGNOSIS — Z00.00 ENCOUNTER FOR ANNUAL PHYSICAL EXAM: Primary | ICD-10-CM

## 2022-12-19 LAB
ALBUMIN SERPL-MCNC: 3.9 G/DL (ref 3.2–4.6)
ALBUMIN/GLOB SERPL: 1.2 {RATIO} (ref 0.4–1.6)
ALP SERPL-CCNC: 97 U/L (ref 50–136)
ALT SERPL-CCNC: 37 U/L (ref 12–65)
ANION GAP SERPL CALC-SCNC: 6 MMOL/L (ref 2–11)
APPEARANCE UR: CLEAR
AST SERPL-CCNC: 20 U/L (ref 15–37)
BACTERIA URNS QL MICRO: 0 /HPF
BASOPHILS # BLD: 0.1 K/UL (ref 0–0.2)
BASOPHILS NFR BLD: 1 % (ref 0–2)
BILIRUB SERPL-MCNC: 0.6 MG/DL (ref 0.2–1.1)
BILIRUB UR QL: NEGATIVE
BUN SERPL-MCNC: 16 MG/DL (ref 8–23)
CALCIUM SERPL-MCNC: 9 MG/DL (ref 8.3–10.4)
CASTS URNS QL MICRO: 0 /LPF
CHLORIDE SERPL-SCNC: 104 MMOL/L (ref 101–110)
CHOLEST SERPL-MCNC: 127 MG/DL
CO2 SERPL-SCNC: 29 MMOL/L (ref 21–32)
COLOR UR: ABNORMAL
CREAT SERPL-MCNC: 1 MG/DL (ref 0.8–1.5)
CRYSTALS URNS QL MICRO: 0 /LPF
DIFFERENTIAL METHOD BLD: ABNORMAL
EOSINOPHIL # BLD: 0.2 K/UL (ref 0–0.8)
EOSINOPHIL NFR BLD: 3 % (ref 0.5–7.8)
EPI CELLS #/AREA URNS HPF: 0 /HPF
ERYTHROCYTE [DISTWIDTH] IN BLOOD BY AUTOMATED COUNT: 12.5 % (ref 11.9–14.6)
GLOBULIN SER CALC-MCNC: 3.2 G/DL (ref 2.8–4.5)
GLUCOSE SERPL-MCNC: 162 MG/DL (ref 65–100)
GLUCOSE UR STRIP.AUTO-MCNC: NEGATIVE MG/DL
HCT VFR BLD AUTO: 45.6 % (ref 41.1–50.3)
HDLC SERPL-MCNC: 55 MG/DL (ref 40–60)
HDLC SERPL: 2.3 {RATIO}
HGB BLD-MCNC: 15.2 G/DL (ref 13.6–17.2)
HGB UR QL STRIP: NEGATIVE
IMM GRANULOCYTES # BLD AUTO: 0 K/UL (ref 0–0.5)
IMM GRANULOCYTES NFR BLD AUTO: 1 % (ref 0–5)
KETONES UR QL STRIP.AUTO: ABNORMAL MG/DL
LDLC SERPL CALC-MCNC: 53.2 MG/DL
LEUKOCYTE ESTERASE UR QL STRIP.AUTO: NEGATIVE
LYMPHOCYTES # BLD: 2.1 K/UL (ref 0.5–4.6)
LYMPHOCYTES NFR BLD: 35 % (ref 13–44)
MCH RBC QN AUTO: 29.9 PG (ref 26.1–32.9)
MCHC RBC AUTO-ENTMCNC: 33.3 G/DL (ref 31.4–35)
MCV RBC AUTO: 89.8 FL (ref 82–102)
MONOCYTES # BLD: 0.6 K/UL (ref 0.1–1.3)
MONOCYTES NFR BLD: 11 % (ref 4–12)
MUCOUS THREADS URNS QL MICRO: 0 /LPF
NEUTS SEG # BLD: 2.9 K/UL (ref 1.7–8.2)
NEUTS SEG NFR BLD: 49 % (ref 43–78)
NITRITE UR QL STRIP.AUTO: NEGATIVE
NRBC # BLD: 0 K/UL (ref 0–0.2)
PH UR STRIP: 5 [PH] (ref 5–9)
PLATELET # BLD AUTO: 237 K/UL (ref 150–450)
PMV BLD AUTO: 9.3 FL (ref 9.4–12.3)
POTASSIUM SERPL-SCNC: 4.3 MMOL/L (ref 3.5–5.1)
PROT SERPL-MCNC: 7.1 G/DL (ref 6.3–8.2)
PROT UR STRIP-MCNC: NEGATIVE MG/DL
RBC # BLD AUTO: 5.08 M/UL (ref 4.23–5.6)
RBC #/AREA URNS HPF: 0 /HPF
SODIUM SERPL-SCNC: 139 MMOL/L (ref 133–143)
SP GR UR REFRACTOMETRY: 1.03 (ref 1–1.02)
TRIGL SERPL-MCNC: 94 MG/DL (ref 35–150)
TSH, 3RD GENERATION: 1.51 UIU/ML (ref 0.36–3.74)
URINE CULTURE IF INDICATED: ABNORMAL
UROBILINOGEN UR QL STRIP.AUTO: 0.2 EU/DL (ref 0.2–1)
VLDLC SERPL CALC-MCNC: 18.8 MG/DL (ref 6–23)
WBC # BLD AUTO: 5.9 K/UL (ref 4.3–11.1)
WBC URNS QL MICRO: 0 /HPF

## 2022-12-20 LAB — PSA SERPL-MCNC: 5.2 NG/ML

## 2022-12-22 RX ORDER — VALACYCLOVIR HYDROCHLORIDE 500 MG/1
TABLET, FILM COATED ORAL
Qty: 90 TABLET | Refills: 3 | OUTPATIENT
Start: 2022-12-22

## 2022-12-22 RX ORDER — METFORMIN HYDROCHLORIDE 500 MG/1
TABLET, EXTENDED RELEASE ORAL
Qty: 180 TABLET | Refills: 3 | OUTPATIENT
Start: 2022-12-22

## 2022-12-23 NOTE — TELEPHONE ENCOUNTER
Requested Prescriptions     Pending Prescriptions Disp Refills    atorvastatin (LIPITOR) 40 MG tablet [Pharmacy Med Name: ATORVASTATIN 40 MG TAB] 90 tablet 3     Sig: TAKE ONE TABLET BY MOUTH ONE TIME DAILY    XARELTO 20 MG TABS tablet [Pharmacy Med Name: Darnella Hunger 20 MG TAB[*$]] 90 tablet 3     Sig: TAKE ONE TABLET BY MOUTH ONE TIME DAILY WITH LUNCH

## 2022-12-26 RX ORDER — ATORVASTATIN CALCIUM 40 MG/1
TABLET, FILM COATED ORAL
Qty: 90 TABLET | Refills: 3 | Status: SHIPPED | OUTPATIENT
Start: 2022-12-26

## 2022-12-26 RX ORDER — RIVAROXABAN 20 MG/1
TABLET, FILM COATED ORAL
Qty: 90 TABLET | Refills: 3 | Status: SHIPPED | OUTPATIENT
Start: 2022-12-26

## 2022-12-27 RX ORDER — METFORMIN HYDROCHLORIDE 500 MG/1
TABLET, EXTENDED RELEASE ORAL
Qty: 180 TABLET | Refills: 3 | OUTPATIENT
Start: 2022-12-27

## 2022-12-29 ENCOUNTER — OFFICE VISIT (OUTPATIENT)
Dept: FAMILY MEDICINE CLINIC | Facility: CLINIC | Age: 67
End: 2022-12-29
Payer: COMMERCIAL

## 2022-12-29 VITALS
WEIGHT: 286 LBS | DIASTOLIC BLOOD PRESSURE: 70 MMHG | BODY MASS INDEX: 33.91 KG/M2 | SYSTOLIC BLOOD PRESSURE: 138 MMHG | TEMPERATURE: 98.1 F | OXYGEN SATURATION: 97 % | HEART RATE: 71 BPM

## 2022-12-29 DIAGNOSIS — Z79.4 TYPE 2 DIABETES MELLITUS WITHOUT COMPLICATION, WITH LONG-TERM CURRENT USE OF INSULIN (HCC): ICD-10-CM

## 2022-12-29 DIAGNOSIS — B00.9 HSV INFECTION: ICD-10-CM

## 2022-12-29 DIAGNOSIS — I48.0 PAF (PAROXYSMAL ATRIAL FIBRILLATION) (HCC): ICD-10-CM

## 2022-12-29 DIAGNOSIS — E78.5 DYSLIPIDEMIA: ICD-10-CM

## 2022-12-29 DIAGNOSIS — E04.2 MULTIPLE THYROID NODULES: ICD-10-CM

## 2022-12-29 DIAGNOSIS — I10 ESSENTIAL HYPERTENSION: ICD-10-CM

## 2022-12-29 DIAGNOSIS — Z00.00 PREVENTATIVE HEALTH CARE: Primary | ICD-10-CM

## 2022-12-29 DIAGNOSIS — E11.9 TYPE 2 DIABETES MELLITUS WITHOUT COMPLICATION, WITH LONG-TERM CURRENT USE OF INSULIN (HCC): ICD-10-CM

## 2022-12-29 DIAGNOSIS — G47.00 INSOMNIA, UNSPECIFIED TYPE: ICD-10-CM

## 2022-12-29 DIAGNOSIS — C61 PROSTATE CA (HCC): ICD-10-CM

## 2022-12-29 PROCEDURE — 3074F SYST BP LT 130 MM HG: CPT | Performed by: NURSE PRACTITIONER

## 2022-12-29 PROCEDURE — 3078F DIAST BP <80 MM HG: CPT | Performed by: NURSE PRACTITIONER

## 2022-12-29 PROCEDURE — 99397 PER PM REEVAL EST PAT 65+ YR: CPT | Performed by: NURSE PRACTITIONER

## 2022-12-29 RX ORDER — VALACYCLOVIR HYDROCHLORIDE 500 MG/1
TABLET, FILM COATED ORAL
Qty: 90 TABLET | Refills: 1 | Status: SHIPPED | OUTPATIENT
Start: 2022-12-29

## 2022-12-29 RX ORDER — METFORMIN HYDROCHLORIDE 500 MG/1
500 TABLET, EXTENDED RELEASE ORAL 2 TIMES DAILY
Qty: 180 TABLET | Refills: 1 | Status: SHIPPED | OUTPATIENT
Start: 2022-12-29

## 2022-12-29 ASSESSMENT — PATIENT HEALTH QUESTIONNAIRE - PHQ9
SUM OF ALL RESPONSES TO PHQ QUESTIONS 1-9: 0
5. POOR APPETITE OR OVEREATING: 0
10. IF YOU CHECKED OFF ANY PROBLEMS, HOW DIFFICULT HAVE THESE PROBLEMS MADE IT FOR YOU TO DO YOUR WORK, TAKE CARE OF THINGS AT HOME, OR GET ALONG WITH OTHER PEOPLE: 0
SUM OF ALL RESPONSES TO PHQ QUESTIONS 1-9: 0
SUM OF ALL RESPONSES TO PHQ9 QUESTIONS 1 & 2: 0
2. FEELING DOWN, DEPRESSED OR HOPELESS: 0
SUM OF ALL RESPONSES TO PHQ QUESTIONS 1-9: 0
8. MOVING OR SPEAKING SO SLOWLY THAT OTHER PEOPLE COULD HAVE NOTICED. OR THE OPPOSITE, BEING SO FIGETY OR RESTLESS THAT YOU HAVE BEEN MOVING AROUND A LOT MORE THAN USUAL: 0
3. TROUBLE FALLING OR STAYING ASLEEP: 0
4. FEELING TIRED OR HAVING LITTLE ENERGY: 0
7. TROUBLE CONCENTRATING ON THINGS, SUCH AS READING THE NEWSPAPER OR WATCHING TELEVISION: 0
SUM OF ALL RESPONSES TO PHQ QUESTIONS 1-9: 0
6. FEELING BAD ABOUT YOURSELF - OR THAT YOU ARE A FAILURE OR HAVE LET YOURSELF OR YOUR FAMILY DOWN: 0
9. THOUGHTS THAT YOU WOULD BE BETTER OFF DEAD, OR OF HURTING YOURSELF: 0
1. LITTLE INTEREST OR PLEASURE IN DOING THINGS: 0

## 2022-12-29 ASSESSMENT — ENCOUNTER SYMPTOMS
BLOOD IN STOOL: 0
VOMITING: 0
BACK PAIN: 0
EYE PAIN: 0
COUGH: 0
SHORTNESS OF BREATH: 0
SORE THROAT: 0
NAUSEA: 0
DIARRHEA: 0
EYE REDNESS: 0
SINUS PAIN: 0
CONSTIPATION: 0

## 2022-12-29 NOTE — PROGRESS NOTES
2022    Bettina Lozano (:  1955) is a 75 y.o. male, here for a preventive medicine evaluation. He is feeling well. Insomnia responds to Trazodone. Mood is good. Hx of CAD, PAF, hypertension and dyslipidemia. No CP, SOB, HA or edema. On statin. No myalgias. DM stable. Denies increased thirst/urination, nausea, visual changes and neuropathy. No new GI/ complaints. Prostate ca followed by oncology. PSA 4.7 2022, 5.2 2022. Has appointment upcoming in April. No skin concerns. HSV responds to Valtrex. Discussed HM due. [place] 2018 - repeat in 5 years. Vaccination recommended. No other new concerns today. Patient Active Problem List   Diagnosis    Multiple thyroid nodules    Osteoarthritis    Mitral valve insufficiency    Osteoarthritis of knee    Hx of diabetes mellitus    Recurrent depression (HCC)    CAD (coronary artery disease)    Dyslipidemia    Depression    Follow-up examination    Complete tear of right rotator cuff    Adhesive capsulitis of right shoulder    Presence of right artificial knee joint    Diabetes ([facility]    Essential (primary) hypertension    Cardiac dysrhythmia    H/O right inguinal hernia repair    RVOT ventricular tachycardia    Obesity, Class I, BMI 30-34.9    Paroxysmal atrial fibrillation (HCC)    Other specified enthesopathies of left lower limb, excluding foot    Elevated PSA       Review of Systems   Constitutional:  Negative for chills and fever. HENT:  Negative for congestion, ear pain, sinus pain and sore throat. Eyes:  Negative for pain and redness. Respiratory:  Negative for cough and shortness of breath. Cardiovascular:  Negative for chest pain and palpitations. Gastrointestinal:  Negative for blood in stool, constipation, diarrhea, nausea and vomiting. Endocrine: Negative for polydipsia. Genitourinary:  Negative for dysuria, frequency and urgency. Musculoskeletal:  Negative for arthralgias, back pain and myalgias. Skin:  Negative for rash. Allergic/Immunologic: Negative for environmental allergies. Neurological:  Negative for dizziness and headaches. Psychiatric/Behavioral:  Negative for hallucinations and suicidal ideas. Prior to Visit Medications    Medication Sig Taking?  Authorizing Provider   metFORMIN (GLUCOPHAGE-XR) 500 MG extended release tablet Take 1 tablet by mouth 2 times daily Yes ALIYAH Sofia CNP   valACYclovir (VALTREX) 500 MG tablet TAKE ONE TABLET BY MOUTH ONE TIME DAILY Yes ALIYAH Sofia CNP   atorvastatin (LIPITOR) 40 MG tablet TAKE ONE TABLET BY MOUTH ONE TIME DAILY Yes Guzman Campa MD   XARELTO 20 MG TABS tablet TAKE ONE TABLET BY MOUTH ONE TIME DAILY WITH LUNCH Yes Guzman Campa MD   metoprolol succinate (TOPROL XL) 25 MG extended release tablet TAKE ONE TABLET BY MOUTH ONE TIME DAILY FOR HIGH BLOOD PRESSURE Yes Guzman Campa MD   traZODone (DESYREL) 50 MG tablet TAKE ONE TABLET BY MOUTH AT BEDTIME Yes Andrew Rao MD   flecainide (TAMBOCOR) 100 MG tablet TAKE ONE TABLET BY MOUTH TWICE A DAY TO PREVENT RECURRENT AFIB Yes Guzman Campa MD   glimepiride (AMARYL) 4 MG tablet Take 1/2 tablet in the AM and 1/2 tablet PM Yes Andrew Rao MD   Multiple Vitamins-Minerals (MULTIVITAMIN ADULTS PO) Take by mouth daily Yes Historical Provider, MD   Omega-3 Fatty Acids (FISH OIL PO) Take by mouth daily Yes Historical Provider, MD   NIACIN PO Take by mouth daily Yes Historical Provider, MD   lisinopril (PRINIVIL;ZESTRIL) 10 MG tablet TAKE ONE TABLET BY MOUTH ONE TIME DAILY Yes Guzman Campa MD   verapamil (CALAN SR) 180 MG extended release tablet TAKE ONE TABLET BY MOUTH ONE TIME DAILY FOR HYPERTENSION Yes Guzman Campa MD   Coenzyme Q10 10 MG CAPS Take by mouth at bedtime  Yes Ar Automatic Reconciliation        No Known Allergies    Past Medical History:   Diagnosis Date    Coronary artery disease     COVID-19 09/2021    History of atrial fibrillation     Hypercholesterolemia     Hypertension Awakening Neg Hx     Thyroid Cancer Neg Hx     Other Neg Hx     Post-op Cognitive Dysfunction Neg Hx     Emergence Delirium Neg Hx     Pseudochol. Deficiency Neg Hx     Malig Hypertherm Neg Hx     Thyroid Disease Brother         hypothyroidism    Hypertension Brother     Coronary Art Dis Father        ADVANCE DIRECTIVE: N, <no information>    Vitals:    12/29/22 1309   BP: 138/70   Site: Left Upper Arm   Position: Sitting   Cuff Size: Large Adult   Pulse: 71   Temp: 98.1 °F (36.7 °C)   TempSrc: Temporal   SpO2: 97%   Weight: 286 lb (129.7 kg)     Estimated body mass index is 33.91 kg/m² as calculated from the following:    Height as of 11/16/22: 6' 5\" (1.956 m). Weight as of this encounter: 286 lb (129.7 kg). Physical Exam  Vitals and nursing note reviewed. Constitutional:       General: He is not in acute distress. Appearance: Normal appearance. HENT:      Head: Normocephalic. Right Ear: Tympanic membrane, ear canal and external ear normal.      Left Ear: Tympanic membrane, ear canal and external ear normal.      Nose: Nose normal.      Mouth/Throat:      Lips: Pink. Mouth: Mucous membranes are moist.      Pharynx: Oropharynx is clear. Uvula midline. No oropharyngeal exudate or posterior oropharyngeal erythema. Eyes:      Conjunctiva/sclera: Conjunctivae normal.      Pupils: Pupils are equal, round, and reactive to light. Cardiovascular:      Rate and Rhythm: Normal rate and regular rhythm. Pulmonary:      Effort: Pulmonary effort is normal.      Breath sounds: Normal breath sounds. No wheezing, rhonchi or rales. Abdominal:      General: Abdomen is flat. Bowel sounds are normal. There is no distension. Palpations: Abdomen is soft. There is no mass. Tenderness: There is no abdominal tenderness. There is no right CVA tenderness, left CVA tenderness, guarding or rebound. Hernia: No hernia is present.    Genitourinary:     Comments:  exam deferred to oncology  Musculoskeletal:         General: Normal range of motion. Cervical back: Normal range of motion. Right lower leg: No edema. Left lower leg: No edema. Lymphadenopathy:      Cervical: No cervical adenopathy. Skin:     General: Skin is warm and dry. Capillary Refill: Capillary refill takes less than 2 seconds. Findings: No rash. Neurological:      General: No focal deficit present. Mental Status: He is alert and oriented to person, place, and time. Mental status is at baseline. Psychiatric:         Mood and Affect: Mood normal.         Behavior: Behavior normal.         Thought Content: Thought content normal.         Judgment: Judgment normal.       No flowsheet data found. Lab Results   Component Value Date/Time    CHOL 127 12/19/2022 08:53 AM    CHOL 131 12/10/2021 08:10 AM    CHOL 125 06/02/2021 11:35 AM    TRIG 94 12/19/2022 08:53 AM    TRIG 85 12/10/2021 08:10 AM    TRIG 108 06/02/2021 11:35 AM    HDL 55 12/19/2022 08:53 AM    HDL 48 12/10/2021 08:10 AM    HDL 45 06/02/2021 11:35 AM    LDLCALC 53.2 12/19/2022 08:53 AM    LDLCALC 67 12/10/2021 08:10 AM    LDLCALC 60 06/02/2021 11:35 AM    GLUCOSE 162 12/19/2022 08:53 AM    LABA1C 6.8 06/16/2022 03:29 PM    LABA1C 7.9 03/15/2022 08:46 AM    LABA1C 9.7 12/10/2021 08:10 AM       The ASCVD Risk score (Erinn HAYNES, et al., 2019) failed to calculate for the following reasons:     The valid total cholesterol range is 130 to 320 mg/dL    Immunization History   Administered Date(s) Administered    COVID-19, MODERNA BLUE border, Primary or Immunocompromised, (age 12y+), IM, 100 mcg/0.5mL 02/26/2021, 03/26/2021, 10/29/2021    Influenza Virus Vaccine 09/18/2020, 09/18/2020    Influenza, FLUAD, (age 72 y+), Adjuvanted, 0.5mL 09/18/2020    Influenza, FLUCELVAX, (age 10 mo+), MDCK, PF, 0.5mL 10/11/2018    Influenza, Triv, inactivated, subunit, adjuvanted, IM (Fluad 65 yrs and older) 08/15/2019    PPD Test 03/28/2017    Pneumococcal Conjugate 13-valent (Fspciok22) 10/11/2019    Pneumococcal Polysaccharide ([id number]) 09/18/2020    Zoster Recombinant (Shingrix) 03/23/2018, 05/31/2018       Health Maintenance   Topic Date Due    Diabetic foot exam  10/11/2020    Diabetic retinal exam  12/14/2021    COVID-19 Vaccine (5 - Booster for Moderna series) 05/27/2022    Flu vaccine (1) 08/01/2022    Diabetic Alb to Cr ratio (uACR) test  03/15/2023    A1C test (Diabetic or Prediabetic)  06/16/2023    Depression Monitoring  06/17/2023    Colorectal Cancer Screen  09/17/2023    Lipids  12/19/2023    GFR test (Diabetes, CKD 3-4, OR last GFR 15-59)  12/19/2023    Prostate Specific Antigen (PSA) Screening or Monitoring  12/19/2023    DTaP/Tdap/Td vaccine (2 - Td or Tdap) 04/01/2032    Shingles vaccine  Completed    Pneumococcal 65+ years Vaccine  Completed    AAA screen  Completed    Hepatitis C screen  Completed    Hepatitis A vaccine  Aged Out    Hib vaccine  Aged Out    Meningococcal (ACWY) vaccine  Aged Out       Assessment & Plan   Preventative health care  Prostate CA ([facility]  PAF (paroxysmal atrial fibrillation) ([facility]  Essential hypertension  Type 2 diabetes mellitus without complication, with long-term current use of insulin (HCC)  -     AMB POC HEMOGLOBIN A1C  -     metFORMIN (GLUCOPHAGE-XR) 500 MG extended release tablet; Take 1 tablet by mouth 2 times daily, Disp-180 tablet, R-1Normal  Dyslipidemia  Insomnia, unspecified type  Multiple thyroid nodules  HSV infection  -     valACYclovir (VALTREX) 500 MG tablet; TAKE ONE TABLET BY MOUTH ONE TIME DAILY, Disp-90 tablet, R-1Normal    A1c 7.5 today. Reviewed labs in detail. Encouraged healthy diet and exercise. Valtrex 500 mg daily and Metformin  mg BID refills provided. Continue f/up with specialists.     Return in about 3 months (around 3/29/2023), or if symptoms worsen or fail to improve, for DM.         --Kizzy Santizo, APRN - CNP

## 2023-01-16 ENCOUNTER — APPOINTMENT (RX ONLY)
Dept: URBAN - METROPOLITAN AREA CLINIC 329 | Facility: CLINIC | Age: 68
Setting detail: DERMATOLOGY
End: 2023-01-16

## 2023-01-16 DIAGNOSIS — L57.8 OTHER SKIN CHANGES DUE TO CHRONIC EXPOSURE TO NONIONIZING RADIATION: ICD-10-CM

## 2023-01-16 DIAGNOSIS — Z85.828 PERSONAL HISTORY OF OTHER MALIGNANT NEOPLASM OF SKIN: ICD-10-CM

## 2023-01-16 DIAGNOSIS — D22 MELANOCYTIC NEVI: ICD-10-CM

## 2023-01-16 DIAGNOSIS — L82.1 OTHER SEBORRHEIC KERATOSIS: ICD-10-CM

## 2023-01-16 DIAGNOSIS — L57.0 ACTINIC KERATOSIS: ICD-10-CM

## 2023-01-16 PROBLEM — C44.41 BASAL CELL CARCINOMA OF SKIN OF SCALP AND NECK: Status: ACTIVE | Noted: 2023-01-16

## 2023-01-16 PROBLEM — D22.5 MELANOCYTIC NEVI OF TRUNK: Status: ACTIVE | Noted: 2023-01-16

## 2023-01-16 PROBLEM — D48.5 NEOPLASM OF UNCERTAIN BEHAVIOR OF SKIN: Status: ACTIVE | Noted: 2023-01-16

## 2023-01-16 PROBLEM — D22.61 MELANOCYTIC NEVI OF RIGHT UPPER LIMB, INCLUDING SHOULDER: Status: ACTIVE | Noted: 2023-01-16

## 2023-01-16 PROCEDURE — 17004 DESTROY PREMAL LESIONS 15/>: CPT

## 2023-01-16 PROCEDURE — 99213 OFFICE O/P EST LOW 20 MIN: CPT | Mod: 25

## 2023-01-16 PROCEDURE — ? FULL BODY SKIN EXAM

## 2023-01-16 PROCEDURE — ? COUNSELING

## 2023-01-16 PROCEDURE — 11102 TANGNTL BX SKIN SINGLE LES: CPT | Mod: 59

## 2023-01-16 PROCEDURE — ? BIOPSY BY SHAVE METHOD

## 2023-01-16 PROCEDURE — ? LIQUID NITROGEN

## 2023-01-16 PROCEDURE — 11103 TANGNTL BX SKIN EA SEP/ADDL: CPT

## 2023-01-16 ASSESSMENT — LOCATION DETAILED DESCRIPTION DERM
LOCATION DETAILED: LEFT SUPERIOR UPPER BACK
LOCATION DETAILED: LEFT ANTERIOR DISTAL UPPER ARM
LOCATION DETAILED: RIGHT INFERIOR MEDIAL UPPER BACK
LOCATION DETAILED: LEFT RADIAL DORSAL HAND
LOCATION DETAILED: RIGHT INFERIOR CENTRAL MALAR CHEEK
LOCATION DETAILED: LEFT MID-UPPER BACK
LOCATION DETAILED: RIGHT MID-UPPER BACK
LOCATION DETAILED: LEFT ULNAR DORSAL HAND
LOCATION DETAILED: LEFT MEDIAL UPPER BACK
LOCATION DETAILED: NASAL DORSUM
LOCATION DETAILED: RIGHT DISTAL CALF
LOCATION DETAILED: LEFT ANTITRAGUS
LOCATION DETAILED: LEFT FOREHEAD
LOCATION DETAILED: LEFT SUPERIOR CRUS OF ANTIHELIX
LOCATION DETAILED: LEFT CLAVICULAR SKIN
LOCATION DETAILED: LEFT MEDIAL FOREHEAD
LOCATION DETAILED: LEFT ANTERIOR PROXIMAL THIGH
LOCATION DETAILED: RIGHT SUPERIOR UPPER BACK
LOCATION DETAILED: LEFT PROXIMAL POSTERIOR UPPER ARM
LOCATION DETAILED: LEFT ANTITRAGUS
LOCATION DETAILED: LEFT INFERIOR UPPER BACK
LOCATION DETAILED: LEFT LATERAL INFERIOR CHEST
LOCATION DETAILED: RIGHT DISTAL POSTERIOR UPPER ARM
LOCATION DETAILED: RIGHT DISTAL POSTERIOR UPPER ARM
LOCATION DETAILED: LEFT MEDIAL UPPER BACK
LOCATION DETAILED: RIGHT PROXIMAL PRETIBIAL REGION
LOCATION DETAILED: LEFT CENTRAL MALAR CHEEK
LOCATION DETAILED: LEFT SUPERIOR MEDIAL UPPER BACK
LOCATION DETAILED: RIGHT INFERIOR CENTRAL MALAR CHEEK
LOCATION DETAILED: EPIGASTRIC SKIN
LOCATION DETAILED: LEFT PROXIMAL DORSAL FOREARM
LOCATION DETAILED: LEFT PROXIMAL DORSAL FOREARM
LOCATION DETAILED: RIGHT PROXIMAL POSTERIOR UPPER ARM
LOCATION DETAILED: LEFT MEDIAL INFERIOR CHEST
LOCATION DETAILED: RIGHT SUPERIOR CENTRAL MALAR CHEEK
LOCATION DETAILED: LEFT SUPERIOR UPPER BACK
LOCATION DETAILED: LEFT MEDIAL FOREHEAD
LOCATION DETAILED: LEFT CENTRAL MALAR CHEEK
LOCATION DETAILED: PERIUMBILICAL SKIN
LOCATION DETAILED: LEFT PROXIMAL POSTERIOR UPPER ARM
LOCATION DETAILED: RIGHT LATERAL UPPER BACK

## 2023-01-16 ASSESSMENT — LOCATION SIMPLE DESCRIPTION DERM
LOCATION SIMPLE: LEFT CHEEK
LOCATION SIMPLE: LEFT HAND
LOCATION SIMPLE: ABDOMEN
LOCATION SIMPLE: LEFT FOREHEAD
LOCATION SIMPLE: NOSE
LOCATION SIMPLE: LEFT THIGH
LOCATION SIMPLE: RIGHT CALF
LOCATION SIMPLE: RIGHT CHEEK
LOCATION SIMPLE: LEFT UPPER ARM
LOCATION SIMPLE: RIGHT UPPER ARM
LOCATION SIMPLE: LEFT FOREARM
LOCATION SIMPLE: LEFT CLAVICULAR SKIN
LOCATION SIMPLE: LEFT EAR
LOCATION SIMPLE: RIGHT PRETIBIAL REGION
LOCATION SIMPLE: LEFT EAR
LOCATION SIMPLE: LEFT UPPER BACK
LOCATION SIMPLE: LEFT CHEEK
LOCATION SIMPLE: ABDOMEN
LOCATION SIMPLE: LEFT FOREHEAD
LOCATION SIMPLE: RIGHT UPPER ARM
LOCATION SIMPLE: LEFT UPPER BACK
LOCATION SIMPLE: LEFT FOREARM
LOCATION SIMPLE: RIGHT UPPER BACK
LOCATION SIMPLE: RIGHT CHEEK
LOCATION SIMPLE: LEFT UPPER ARM
LOCATION SIMPLE: CHEST

## 2023-01-16 ASSESSMENT — LOCATION ZONE DERM
LOCATION ZONE: LEG
LOCATION ZONE: ARM
LOCATION ZONE: ARM
LOCATION ZONE: EAR
LOCATION ZONE: TRUNK
LOCATION ZONE: FACE
LOCATION ZONE: FACE
LOCATION ZONE: NOSE
LOCATION ZONE: EAR
LOCATION ZONE: TRUNK
LOCATION ZONE: LEG
LOCATION ZONE: HAND

## 2023-01-16 NOTE — PROCEDURE: BIOPSY BY SHAVE METHOD
Detail Level: Detailed
Depth Of Biopsy: dermis
Was A Bandage Applied: Yes
Size Of Lesion In Cm: 1
X Size Of Lesion In Cm: 0
Biopsy Type: H and E
Biopsy Method: Dermablade
Anesthesia Type: 1% lidocaine with epinephrine
Anesthesia Volume In Cc (Will Not Render If 0): 0.5
Hemostasis: Drysol
Wound Care: Petrolatum
Dressing: bandage
Destruction After The Procedure: No
Type Of Destruction Used: Curettage
Curettage Text: The wound bed was treated with curettage after the biopsy was performed.
Cryotherapy Text: The wound bed was treated with cryotherapy after the biopsy was performed.
Electrodesiccation Text: The wound bed was treated with electrodesiccation after the biopsy was performed.
Electrodesiccation And Curettage Text: The wound bed was treated with electrodesiccation and curettage after the biopsy was performed.
Silver Nitrate Text: The wound bed was treated with silver nitrate after the biopsy was performed.
Lab: 6
Lab Facility: 3
Consent was obtained and risks were reviewed including but not limited to scarring, infection, bleeding, scabbing, incomplete removal, nerve damage and allergy to anesthesia.
Post-Care Instructions: I reviewed with the patient in detail post-care instructions. Patient is to keep the biopsy site dry overnight, and then apply bacitracin twice daily until healed. Patient may apply hydrogen peroxide soaks to remove any crusting.
Notification Instructions: Patient will be notified of biopsy results. However, patient instructed to call the office if not contacted within 2 weeks.
Billing Type: Third-Party Bill
Information: Selecting Yes will display possible errors in your note based on the variables you have selected. This validation is only offered as a suggestion for you. PLEASE NOTE THAT THE VALIDATION TEXT WILL BE REMOVED WHEN YOU FINALIZE YOUR NOTE. IF YOU WANT TO FAX A PRELIMINARY NOTE YOU WILL NEED TO TOGGLE THIS TO 'NO' IF YOU DO NOT WANT IT IN YOUR FAXED NOTE.
Size Of Lesion In Cm: 0.8
Size Of Lesion In Cm: 0.9

## 2023-02-14 ENCOUNTER — APPOINTMENT (RX ONLY)
Dept: URBAN - METROPOLITAN AREA CLINIC 329 | Facility: CLINIC | Age: 68
Setting detail: DERMATOLOGY
End: 2023-02-14

## 2023-02-14 PROBLEM — C44.42 SQUAMOUS CELL CARCINOMA OF SKIN OF SCALP AND NECK: Status: ACTIVE | Noted: 2023-02-14

## 2023-02-14 PROBLEM — C44.729 SQUAMOUS CELL CARCINOMA OF SKIN OF LEFT LOWER LIMB, INCLUDING HIP: Status: ACTIVE | Noted: 2023-02-14

## 2023-02-14 PROCEDURE — 17271 DSTR MAL LES S/N/H/F/G 0.6-1: CPT

## 2023-02-14 PROCEDURE — ? COUNSELING

## 2023-02-14 PROCEDURE — ? ADDITIONAL NOTES

## 2023-02-14 PROCEDURE — ? CURETTAGE AND DESTRUCTION

## 2023-02-14 PROCEDURE — 17261 DSTRJ MAL LES T/A/L .6-1.0CM: CPT

## 2023-02-14 NOTE — PROCEDURE: CURETTAGE AND DESTRUCTION
Body Location Override (Optional - Billing Will Still Be Based On Selected Body Map Location If Applicable): medial distal pretibial region

## 2023-04-20 DIAGNOSIS — C61 PROSTATE CANCER (HCC): Primary | ICD-10-CM

## 2023-04-24 ENCOUNTER — OFFICE VISIT (OUTPATIENT)
Dept: ONCOLOGY | Age: 68
End: 2023-04-24
Payer: COMMERCIAL

## 2023-04-24 ENCOUNTER — HOSPITAL ENCOUNTER (OUTPATIENT)
Dept: LAB | Age: 68
Discharge: HOME | End: 2023-04-27
Payer: COMMERCIAL

## 2023-04-24 VITALS
BODY MASS INDEX: 33.2 KG/M2 | WEIGHT: 281.2 LBS | OXYGEN SATURATION: 97 % | RESPIRATION RATE: 16 BRPM | HEART RATE: 66 BPM | DIASTOLIC BLOOD PRESSURE: 78 MMHG | HEIGHT: 77 IN | TEMPERATURE: 98.7 F | SYSTOLIC BLOOD PRESSURE: 135 MMHG

## 2023-04-24 DIAGNOSIS — C61 PROSTATE CANCER (HCC): Primary | ICD-10-CM

## 2023-04-24 DIAGNOSIS — C61 PROSTATE CANCER (HCC): ICD-10-CM

## 2023-04-24 LAB — PSA SERPL-MCNC: 5.7 NG/ML

## 2023-04-24 PROCEDURE — 99213 OFFICE O/P EST LOW 20 MIN: CPT | Performed by: UROLOGY

## 2023-04-24 PROCEDURE — 1123F ACP DISCUSS/DSCN MKR DOCD: CPT | Performed by: UROLOGY

## 2023-04-24 PROCEDURE — 36415 COLL VENOUS BLD VENIPUNCTURE: CPT

## 2023-04-24 PROCEDURE — 84153 ASSAY OF PSA TOTAL: CPT

## 2023-04-24 PROCEDURE — 3075F SYST BP GE 130 - 139MM HG: CPT | Performed by: UROLOGY

## 2023-04-24 PROCEDURE — 3078F DIAST BP <80 MM HG: CPT | Performed by: UROLOGY

## 2023-04-24 ASSESSMENT — ENCOUNTER SYMPTOMS
RESPIRATORY NEGATIVE: 1
GASTROINTESTINAL NEGATIVE: 1

## 2023-04-24 NOTE — PATIENT INSTRUCTIONS
Patient Instructions from Today's Visit    Reason for Visit:  Follow up     Diagnosis Information:  [email or web address]      Plan: We will plan to repeat your MRI in about 4 months  If you psa from today is considerably elevated, we will move the MRI up. Follow Up: In about 4 months with mri and psa prior     Recent Lab Results:  Not resulted as of this note    Treatment Summary has been discussed and given to patient:   N/a      -------------------------------------------------------------------------------------------------------------------    Patient does express an interest in My Chart. My Chart log in information explained on the after visit summary printout at the [facility] desk.     JORGITO ParsonsMA

## 2023-05-20 DIAGNOSIS — I49.9 CARDIAC DYSRHYTHMIA: Primary | ICD-10-CM

## 2023-05-20 DIAGNOSIS — I10 ESSENTIAL (PRIMARY) HYPERTENSION: ICD-10-CM

## 2023-05-22 RX ORDER — TRAZODONE HYDROCHLORIDE 50 MG/1
TABLET ORAL
Qty: 90 TABLET | Refills: 1 | OUTPATIENT
Start: 2023-05-22

## 2023-05-23 RX ORDER — FLECAINIDE ACETATE 100 MG/1
TABLET ORAL
Qty: 180 TABLET | Refills: 1 | Status: SHIPPED | OUTPATIENT
Start: 2023-05-23

## 2023-05-23 RX ORDER — LISINOPRIL 10 MG/1
TABLET ORAL
Qty: 90 TABLET | Refills: 1 | Status: SHIPPED | OUTPATIENT
Start: 2023-05-23

## 2023-05-23 NOTE — TELEPHONE ENCOUNTER
Requested Prescriptions     Pending Prescriptions Disp Refills    flecainide (TAMBOCOR) 100 MG tablet [Pharmacy Med Name: FLECAINIDE ACETATE 100 MG TAB] 180 tablet 1     Sig: TAKE ONE TABLET BY MOUTH TWICE A DAY TO PREVENT RECURRENT AFIB    lisinopril (PRINIVIL;ZESTRIL) 10 MG tablet [Pharmacy Med Name: LISINOPRIL 10 MG TAB[*]] 90 tablet 1     Sig: TAKE ONE TABLET BY MOUTH ONE TIME DAILY    verapamil (CALAN SR) 180 MG extended release tablet [Pharmacy Med Name: VERAPAMIL  MG TAB[*]] 90 tablet 1     Sig: TAKE ONE TABLET BY MOUTH ONE TIME DAILY FOR HIGH BLOOD PRESSURE

## 2023-06-23 DIAGNOSIS — E11.9 TYPE 2 DIABETES MELLITUS WITHOUT COMPLICATION, WITH LONG-TERM CURRENT USE OF INSULIN (HCC): ICD-10-CM

## 2023-06-23 DIAGNOSIS — Z79.4 TYPE 2 DIABETES MELLITUS WITHOUT COMPLICATION, WITH LONG-TERM CURRENT USE OF INSULIN (HCC): ICD-10-CM

## 2023-06-23 DIAGNOSIS — B00.9 HSV INFECTION: ICD-10-CM

## 2023-06-23 RX ORDER — VALACYCLOVIR HYDROCHLORIDE 500 MG/1
TABLET, FILM COATED ORAL
Qty: 90 TABLET | Refills: 1 | OUTPATIENT
Start: 2023-06-23

## 2023-06-23 RX ORDER — METFORMIN HYDROCHLORIDE 500 MG/1
TABLET, EXTENDED RELEASE ORAL
Qty: 180 TABLET | Refills: 1 | OUTPATIENT
Start: 2023-06-23

## 2023-06-25 DIAGNOSIS — Z79.4 TYPE 2 DIABETES MELLITUS WITHOUT COMPLICATION, WITH LONG-TERM CURRENT USE OF INSULIN (HCC): ICD-10-CM

## 2023-06-25 DIAGNOSIS — E11.9 TYPE 2 DIABETES MELLITUS WITHOUT COMPLICATION, WITH LONG-TERM CURRENT USE OF INSULIN (HCC): ICD-10-CM

## 2023-06-26 RX ORDER — METFORMIN HYDROCHLORIDE 500 MG/1
TABLET, EXTENDED RELEASE ORAL
Qty: 180 TABLET | Refills: 1 | OUTPATIENT
Start: 2023-06-26

## 2023-07-01 DIAGNOSIS — Z79.4 TYPE 2 DIABETES MELLITUS WITHOUT COMPLICATION, WITH LONG-TERM CURRENT USE OF INSULIN (HCC): ICD-10-CM

## 2023-07-01 DIAGNOSIS — E11.9 TYPE 2 DIABETES MELLITUS WITHOUT COMPLICATION, WITH LONG-TERM CURRENT USE OF INSULIN (HCC): ICD-10-CM

## 2023-07-01 DIAGNOSIS — B00.9 HSV INFECTION: ICD-10-CM

## 2023-07-04 DIAGNOSIS — E13.69 OTHER SPECIFIED DIABETES MELLITUS WITH OTHER SPECIFIED COMPLICATION, UNSPECIFIED WHETHER LONG TERM INSULIN USE (HCC): ICD-10-CM

## 2023-07-05 DIAGNOSIS — E11.9 TYPE 2 DIABETES MELLITUS WITHOUT COMPLICATION, WITH LONG-TERM CURRENT USE OF INSULIN (HCC): ICD-10-CM

## 2023-07-05 DIAGNOSIS — E13.69 OTHER SPECIFIED DIABETES MELLITUS WITH OTHER SPECIFIED COMPLICATION, UNSPECIFIED WHETHER LONG TERM INSULIN USE (HCC): ICD-10-CM

## 2023-07-05 DIAGNOSIS — Z79.4 TYPE 2 DIABETES MELLITUS WITHOUT COMPLICATION, WITH LONG-TERM CURRENT USE OF INSULIN (HCC): ICD-10-CM

## 2023-07-05 DIAGNOSIS — B00.9 HSV INFECTION: ICD-10-CM

## 2023-07-05 NOTE — TELEPHONE ENCOUNTER
Refill:  Metformin  Dosage: 500 mg  Freq: bid  To: [facility]    Refill: Glimepiride  Dosage: 4 mg  Freq:   To: [facility] [place]    Refill: Valacyclovir  Dosage: 500 mg  Freq: qd  To: [facility]

## 2023-07-06 RX ORDER — GLIMEPIRIDE 4 MG/1
TABLET ORAL
Qty: 30 TABLET | Refills: 0 | Status: SHIPPED | OUTPATIENT
Start: 2023-07-06

## 2023-07-06 RX ORDER — VALACYCLOVIR HYDROCHLORIDE 500 MG/1
TABLET, FILM COATED ORAL
Qty: 30 TABLET | Refills: 0 | Status: SHIPPED | OUTPATIENT
Start: 2023-07-06

## 2023-07-06 RX ORDER — METFORMIN HYDROCHLORIDE 500 MG/1
500 TABLET, EXTENDED RELEASE ORAL 2 TIMES DAILY
Qty: 60 TABLET | Refills: 0 | Status: SHIPPED | OUTPATIENT
Start: 2023-07-06

## 2023-07-07 RX ORDER — TRAZODONE HYDROCHLORIDE 50 MG/1
TABLET ORAL
Qty: 90 TABLET | Refills: 1 | Status: SHIPPED | OUTPATIENT
Start: 2023-07-07

## 2023-07-07 RX ORDER — GLIMEPIRIDE 4 MG/1
TABLET ORAL
Qty: 90 TABLET | Refills: 5 | Status: SHIPPED | OUTPATIENT
Start: 2023-07-07

## 2023-07-12 RX ORDER — VALACYCLOVIR HYDROCHLORIDE 500 MG/1
TABLET, FILM COATED ORAL
Qty: 90 TABLET | Refills: 1 | OUTPATIENT
Start: 2023-07-12

## 2023-07-12 RX ORDER — METFORMIN HYDROCHLORIDE 500 MG/1
TABLET, EXTENDED RELEASE ORAL
Qty: 180 TABLET | Refills: 1 | OUTPATIENT
Start: 2023-07-12

## 2023-07-21 DIAGNOSIS — E13.69 OTHER SPECIFIED DIABETES MELLITUS WITH OTHER SPECIFIED COMPLICATION, UNSPECIFIED WHETHER LONG TERM INSULIN USE (HCC): ICD-10-CM

## 2023-07-21 DIAGNOSIS — B00.9 HSV INFECTION: ICD-10-CM

## 2023-07-21 RX ORDER — GLIMEPIRIDE 4 MG/1
TABLET ORAL
Qty: 30 TABLET | Refills: 0 | OUTPATIENT
Start: 2023-07-21

## 2023-07-21 RX ORDER — VALACYCLOVIR HYDROCHLORIDE 500 MG/1
TABLET, FILM COATED ORAL
Qty: 30 TABLET | Refills: 0 | OUTPATIENT
Start: 2023-07-21

## 2023-07-28 DIAGNOSIS — E13.69 OTHER SPECIFIED DIABETES MELLITUS WITH OTHER SPECIFIED COMPLICATION, UNSPECIFIED WHETHER LONG TERM INSULIN USE (HCC): ICD-10-CM

## 2023-07-31 RX ORDER — GLIMEPIRIDE 4 MG/1
TABLET ORAL
Qty: 90 TABLET | Refills: 5 | OUTPATIENT
Start: 2023-07-31

## 2023-08-04 DIAGNOSIS — B00.9 HSV INFECTION: ICD-10-CM

## 2023-08-04 RX ORDER — VALACYCLOVIR HYDROCHLORIDE 500 MG/1
TABLET, FILM COATED ORAL
Qty: 30 TABLET | Refills: 0 | OUTPATIENT
Start: 2023-08-04

## 2023-08-08 ENCOUNTER — OFFICE VISIT (OUTPATIENT)
Dept: FAMILY MEDICINE CLINIC | Facility: CLINIC | Age: 68
End: 2023-08-08
Payer: COMMERCIAL

## 2023-08-08 VITALS
TEMPERATURE: 97.1 F | WEIGHT: 279 LBS | OXYGEN SATURATION: 96 % | BODY MASS INDEX: 33.08 KG/M2 | HEART RATE: 65 BPM | DIASTOLIC BLOOD PRESSURE: 82 MMHG | SYSTOLIC BLOOD PRESSURE: 136 MMHG

## 2023-08-08 DIAGNOSIS — Z79.4 TYPE 2 DIABETES MELLITUS WITHOUT COMPLICATION, WITH LONG-TERM CURRENT USE OF INSULIN (HCC): Primary | ICD-10-CM

## 2023-08-08 DIAGNOSIS — B00.9 HSV INFECTION: ICD-10-CM

## 2023-08-08 DIAGNOSIS — G47.00 INSOMNIA, UNSPECIFIED TYPE: ICD-10-CM

## 2023-08-08 DIAGNOSIS — E11.9 TYPE 2 DIABETES MELLITUS WITHOUT COMPLICATION, WITH LONG-TERM CURRENT USE OF INSULIN (HCC): Primary | ICD-10-CM

## 2023-08-08 LAB — HBA1C MFR BLD: 7.7 %

## 2023-08-08 PROCEDURE — 99213 OFFICE O/P EST LOW 20 MIN: CPT | Performed by: NURSE PRACTITIONER

## 2023-08-08 PROCEDURE — 3075F SYST BP GE 130 - 139MM HG: CPT | Performed by: NURSE PRACTITIONER

## 2023-08-08 PROCEDURE — 1123F ACP DISCUSS/DSCN MKR DOCD: CPT | Performed by: NURSE PRACTITIONER

## 2023-08-08 PROCEDURE — 83036 HEMOGLOBIN GLYCOSYLATED A1C: CPT | Performed by: NURSE PRACTITIONER

## 2023-08-08 PROCEDURE — 3079F DIAST BP 80-89 MM HG: CPT | Performed by: NURSE PRACTITIONER

## 2023-08-08 RX ORDER — GLIMEPIRIDE 4 MG/1
TABLET ORAL
Qty: 90 TABLET | Refills: 1 | Status: SHIPPED | OUTPATIENT
Start: 2023-08-08

## 2023-08-08 RX ORDER — TRAZODONE HYDROCHLORIDE 50 MG/1
50 TABLET ORAL NIGHTLY
Qty: 90 TABLET | Refills: 1 | Status: SHIPPED | OUTPATIENT
Start: 2023-08-08

## 2023-08-08 RX ORDER — METFORMIN HYDROCHLORIDE 500 MG/1
500 TABLET, EXTENDED RELEASE ORAL 2 TIMES DAILY
Qty: 180 TABLET | Refills: 3 | Status: SHIPPED | OUTPATIENT
Start: 2023-08-08

## 2023-08-08 RX ORDER — VALACYCLOVIR HYDROCHLORIDE 500 MG/1
TABLET, FILM COATED ORAL
Qty: 90 TABLET | Refills: 1 | Status: SHIPPED | OUTPATIENT
Start: 2023-08-08

## 2023-08-08 SDOH — ECONOMIC STABILITY: INCOME INSECURITY: HOW HARD IS IT FOR YOU TO PAY FOR THE VERY BASICS LIKE FOOD, HOUSING, MEDICAL CARE, AND HEATING?: NOT VERY HARD

## 2023-08-08 SDOH — ECONOMIC STABILITY: HOUSING INSECURITY
IN THE LAST 12 MONTHS, WAS THERE A TIME WHEN YOU DID NOT HAVE A STEADY PLACE TO SLEEP OR SLEPT IN A SHELTER (INCLUDING NOW)?: NO

## 2023-08-08 SDOH — ECONOMIC STABILITY: FOOD INSECURITY: WITHIN THE PAST 12 MONTHS, THE FOOD YOU BOUGHT JUST DIDN'T LAST AND YOU DIDN'T HAVE MONEY TO GET MORE.: NEVER TRUE

## 2023-08-08 SDOH — ECONOMIC STABILITY: FOOD INSECURITY: WITHIN THE PAST 12 MONTHS, YOU WORRIED THAT YOUR FOOD WOULD RUN OUT BEFORE YOU GOT MONEY TO BUY MORE.: NEVER TRUE

## 2023-08-08 ASSESSMENT — ENCOUNTER SYMPTOMS
CONSTIPATION: 0
BLOOD IN STOOL: 0
DIARRHEA: 0
SORE THROAT: 0
BACK PAIN: 0
EYE REDNESS: 0
EYE PAIN: 0
COUGH: 0
SHORTNESS OF BREATH: 0
VOMITING: 0
NAUSEA: 0
SINUS PAIN: 0

## 2023-08-08 ASSESSMENT — PATIENT HEALTH QUESTIONNAIRE - PHQ9
SUM OF ALL RESPONSES TO PHQ QUESTIONS 1-9: 0
10. IF YOU CHECKED OFF ANY PROBLEMS, HOW DIFFICULT HAVE THESE PROBLEMS MADE IT FOR YOU TO DO YOUR WORK, TAKE CARE OF THINGS AT HOME, OR GET ALONG WITH OTHER PEOPLE: 0
9. THOUGHTS THAT YOU WOULD BE BETTER OFF DEAD, OR OF HURTING YOURSELF: 0
5. POOR APPETITE OR OVEREATING: 0
4. FEELING TIRED OR HAVING LITTLE ENERGY: 0
8. MOVING OR SPEAKING SO SLOWLY THAT OTHER PEOPLE COULD HAVE NOTICED. OR THE OPPOSITE, BEING SO FIGETY OR RESTLESS THAT YOU HAVE BEEN MOVING AROUND A LOT MORE THAN USUAL: 0
SUM OF ALL RESPONSES TO PHQ QUESTIONS 1-9: 0
7. TROUBLE CONCENTRATING ON THINGS, SUCH AS READING THE NEWSPAPER OR WATCHING TELEVISION: 0
SUM OF ALL RESPONSES TO PHQ QUESTIONS 1-9: 0
2. FEELING DOWN, DEPRESSED OR HOPELESS: 0
SUM OF ALL RESPONSES TO PHQ QUESTIONS 1-9: 0
6. FEELING BAD ABOUT YOURSELF - OR THAT YOU ARE A FAILURE OR HAVE LET YOURSELF OR YOUR FAMILY DOWN: 0
3. TROUBLE FALLING OR STAYING ASLEEP: 0

## 2023-08-08 ASSESSMENT — COLUMBIA-SUICIDE SEVERITY RATING SCALE - C-SSRS
1. WITHIN THE PAST MONTH, HAVE YOU WISHED YOU WERE DEAD OR WISHED YOU COULD GO TO SLEEP AND NOT WAKE UP?: NO
6. HAVE YOU EVER DONE ANYTHING, STARTED TO DO ANYTHING, OR PREPARED TO DO ANYTHING TO END YOUR LIFE?: NO
2. HAVE YOU ACTUALLY HAD ANY THOUGHTS OF KILLING YOURSELF?: NO

## 2023-08-08 NOTE — PROGRESS NOTES
Bibi Zavala (:  1955) is a 70 y.o. male,Established patient, here for evaluation of the following chief complaint(s):  Diabetes         ASSESSMENT/PLAN:  1. Type 2 diabetes mellitus without complication, with long-term current use of insulin (HCC)  -     glimepiride (AMARYL) 4 MG tablet; TAKE ONE-HALF TABLET BY MOUTH EVERY MORNING AND TAKE ONE-HALF TABLET BY MOUTH EVERY EVENING, Disp-90 tablet, R-1Normal  -     metFORMIN (GLUCOPHAGE-XR) 500 MG extended release tablet; Take 1 tablet by mouth 2 times daily, Disp-180 tablet, R-3Normal  -     AMB POC HEMOGLOBIN A1C  -     Microalbumin / Creatinine Urine Ratio; Future  2. HSV infection  -     valACYclovir (VALTREX) 500 MG tablet; TAKE ONE TABLET BY MOUTH ONE TIME DAILY, Disp-90 tablet, R-1Normal  3. Insomnia, unspecified type  -     traZODone (DESYREL) 50 MG tablet; Take 1 tablet by mouth nightly, Disp-90 tablet, R-1Normal    A1c 7.7 today. Healthy diet, exercise and weight management encouraged. Maintenance medication refills provided. Has AWV scheduled for January. Return in about 3 months (around 2023), or if symptoms worsen or fail to improve, for DM. Subjective   SUBJECTIVE/OBJECTIVE:  HPI  Recheck of DM. No increased urination or thirst.  Fasting BS have been between 140 and  160. No numbness in feet. No CP or SOB. Has been exercising. Weight has been about the same. States he knows he is doing poorly with diet. No nausea or vomiting. Last A1C was 7.5 2022. Review of Systems   Constitutional:  Negative for chills and fever. HENT:  Negative for congestion, ear pain, sinus pain and sore throat. Eyes:  Negative for pain and redness. Respiratory:  Negative for cough and shortness of breath. Cardiovascular:  Negative for chest pain and palpitations. Gastrointestinal:  Negative for blood in stool, constipation, diarrhea, nausea and vomiting. Endocrine: Negative for polydipsia.    Genitourinary:  Negative

## 2023-09-07 ENCOUNTER — TELEPHONE (OUTPATIENT)
Dept: ONCOLOGY | Age: 68
End: 2023-09-07

## 2023-09-08 ENCOUNTER — APPOINTMENT (RX ONLY)
Dept: URBAN - METROPOLITAN AREA CLINIC 329 | Facility: CLINIC | Age: 68
Setting detail: DERMATOLOGY
End: 2023-09-08

## 2023-09-08 DIAGNOSIS — D22 MELANOCYTIC NEVI: ICD-10-CM

## 2023-09-08 DIAGNOSIS — L81.4 OTHER MELANIN HYPERPIGMENTATION: ICD-10-CM

## 2023-09-08 DIAGNOSIS — L57.8 OTHER SKIN CHANGES DUE TO CHRONIC EXPOSURE TO NONIONIZING RADIATION: ICD-10-CM | Status: STABLE

## 2023-09-08 DIAGNOSIS — L57.0 ACTINIC KERATOSIS: ICD-10-CM

## 2023-09-08 DIAGNOSIS — D18.0 HEMANGIOMA: ICD-10-CM

## 2023-09-08 DIAGNOSIS — Z85.828 PERSONAL HISTORY OF OTHER MALIGNANT NEOPLASM OF SKIN: ICD-10-CM

## 2023-09-08 DIAGNOSIS — L82.1 OTHER SEBORRHEIC KERATOSIS: ICD-10-CM

## 2023-09-08 PROBLEM — D22.61 MELANOCYTIC NEVI OF RIGHT UPPER LIMB, INCLUDING SHOULDER: Status: ACTIVE | Noted: 2023-09-08

## 2023-09-08 PROBLEM — D48.5 NEOPLASM OF UNCERTAIN BEHAVIOR OF SKIN: Status: ACTIVE | Noted: 2023-09-08

## 2023-09-08 PROBLEM — D18.01 HEMANGIOMA OF SKIN AND SUBCUTANEOUS TISSUE: Status: ACTIVE | Noted: 2023-09-08

## 2023-09-08 PROBLEM — D22.5 MELANOCYTIC NEVI OF TRUNK: Status: ACTIVE | Noted: 2023-09-08

## 2023-09-08 PROBLEM — D22.71 MELANOCYTIC NEVI OF RIGHT LOWER LIMB, INCLUDING HIP: Status: ACTIVE | Noted: 2023-09-08

## 2023-09-08 PROCEDURE — 99213 OFFICE O/P EST LOW 20 MIN: CPT | Mod: 25

## 2023-09-08 PROCEDURE — ? COUNSELING

## 2023-09-08 PROCEDURE — ? BIOPSY BY SHAVE METHOD

## 2023-09-08 PROCEDURE — ? DERMATOSCOPIC EVALUATION

## 2023-09-08 PROCEDURE — ? ADDITIONAL NOTES

## 2023-09-08 PROCEDURE — ? LIQUID NITROGEN

## 2023-09-08 PROCEDURE — ? TREATMENT REGIMEN

## 2023-09-08 PROCEDURE — 11103 TANGNTL BX SKIN EA SEP/ADDL: CPT

## 2023-09-08 PROCEDURE — 11102 TANGNTL BX SKIN SINGLE LES: CPT | Mod: 59

## 2023-09-08 PROCEDURE — 17004 DESTROY PREMAL LESIONS 15/>: CPT

## 2023-09-08 ASSESSMENT — LOCATION DETAILED DESCRIPTION DERM
LOCATION DETAILED: PERIUMBILICAL SKIN
LOCATION DETAILED: LEFT INFERIOR CENTRAL MALAR CHEEK
LOCATION DETAILED: RIGHT ANTERIOR PROXIMAL UPPER ARM
LOCATION DETAILED: RIGHT MID PREAURICULAR CHEEK
LOCATION DETAILED: RIGHT LATERAL CANTHUS
LOCATION DETAILED: RIGHT SUPERIOR LATERAL MALAR CHEEK
LOCATION DETAILED: RIGHT FOREHEAD
LOCATION DETAILED: RIGHT ANTERIOR DISTAL THIGH
LOCATION DETAILED: RIGHT SUPERIOR MEDIAL UPPER BACK
LOCATION DETAILED: LEFT DISTAL PRETIBIAL REGION
LOCATION DETAILED: RIGHT DISTAL POSTERIOR THIGH
LOCATION DETAILED: RIGHT CYMBA CONCHA
LOCATION DETAILED: RIGHT MEDIAL FOREHEAD
LOCATION DETAILED: LEFT FOREHEAD
LOCATION DETAILED: RIGHT MID TEMPLE
LOCATION DETAILED: RIGHT INFERIOR LATERAL MALAR CHEEK
LOCATION DETAILED: LEFT INFERIOR ANTERIOR NECK
LOCATION DETAILED: GLABELLA
LOCATION DETAILED: RIGHT INFERIOR MEDIAL UPPER BACK
LOCATION DETAILED: MIDDLE STERNUM
LOCATION DETAILED: LEFT SUPERIOR CENTRAL MALAR CHEEK
LOCATION DETAILED: RIGHT PROXIMAL DORSAL FOREARM
LOCATION DETAILED: RIGHT CENTRAL MALAR CHEEK
LOCATION DETAILED: LEFT INFERIOR CENTRAL MALAR CHEEK
LOCATION DETAILED: LEFT MEDIAL FOREHEAD
LOCATION DETAILED: LEFT SUPERIOR FOREHEAD
LOCATION DETAILED: RIGHT INFERIOR CENTRAL MALAR CHEEK
LOCATION DETAILED: EPIGASTRIC SKIN
LOCATION DETAILED: LEFT PROXIMAL PRETIBIAL REGION
LOCATION DETAILED: RIGHT DISTAL PRETIBIAL REGION
LOCATION DETAILED: LEFT LATERAL FOREHEAD
LOCATION DETAILED: RIGHT POSTERIOR ANKLE
LOCATION DETAILED: RIGHT PROXIMAL POSTERIOR UPPER ARM
LOCATION DETAILED: LEFT MEDIAL UPPER BACK
LOCATION DETAILED: RIGHT INFERIOR CENTRAL MALAR CHEEK
LOCATION DETAILED: RIGHT INFERIOR TEMPLE
LOCATION DETAILED: RIGHT ANTERIOR PROXIMAL THIGH

## 2023-09-08 ASSESSMENT — LOCATION SIMPLE DESCRIPTION DERM
LOCATION SIMPLE: RIGHT UPPER BACK
LOCATION SIMPLE: RIGHT CHEEK
LOCATION SIMPLE: RIGHT PRETIBIAL REGION
LOCATION SIMPLE: RIGHT FOREHEAD
LOCATION SIMPLE: GLABELLA
LOCATION SIMPLE: RIGHT EAR
LOCATION SIMPLE: RIGHT POSTERIOR THIGH
LOCATION SIMPLE: RIGHT POSTERIOR UPPER ARM
LOCATION SIMPLE: RIGHT UPPER ARM
LOCATION SIMPLE: LEFT FOREHEAD
LOCATION SIMPLE: RIGHT FOREARM
LOCATION SIMPLE: LEFT PRETIBIAL REGION
LOCATION SIMPLE: LEFT ANTERIOR NECK
LOCATION SIMPLE: ABDOMEN
LOCATION SIMPLE: RIGHT ANKLE
LOCATION SIMPLE: LEFT CHEEK
LOCATION SIMPLE: RIGHT CHEEK
LOCATION SIMPLE: RIGHT TEMPLE
LOCATION SIMPLE: LEFT UPPER BACK
LOCATION SIMPLE: LEFT FOREHEAD
LOCATION SIMPLE: RIGHT EYELID
LOCATION SIMPLE: RIGHT THIGH
LOCATION SIMPLE: CHEST
LOCATION SIMPLE: LEFT CHEEK

## 2023-09-08 ASSESSMENT — LOCATION ZONE DERM
LOCATION ZONE: TRUNK
LOCATION ZONE: FACE
LOCATION ZONE: EAR
LOCATION ZONE: NECK
LOCATION ZONE: LEG
LOCATION ZONE: FACE
LOCATION ZONE: EYELID
LOCATION ZONE: ARM

## 2023-09-08 NOTE — PROCEDURE: BIOPSY BY SHAVE METHOD
Detail Level: Detailed
Depth Of Biopsy: dermis
Was A Bandage Applied: Yes
Size Of Lesion In Cm: 0.8
X Size Of Lesion In Cm: 0
Biopsy Type: H and E
Biopsy Method: Dermablade
Anesthesia Type: 1% lidocaine with epinephrine
Anesthesia Volume In Cc (Will Not Render If 0): 0.5
Hemostasis: Drysol
Wound Care: Petrolatum
Dressing: bandage
Destruction After The Procedure: No
Type Of Destruction Used: Curettage
Curettage Text: The wound bed was treated with curettage after the biopsy was performed.
Cryotherapy Text: The wound bed was treated with cryotherapy after the biopsy was performed.
Electrodesiccation Text: The wound bed was treated with electrodesiccation after the biopsy was performed.
Electrodesiccation And Curettage Text: The wound bed was treated with electrodesiccation and curettage after the biopsy was performed.
Silver Nitrate Text: The wound bed was treated with silver nitrate after the biopsy was performed.
Lab: 6
Lab Facility: 3
Consent was obtained and risks were reviewed including but not limited to scarring, infection, bleeding, scabbing, incomplete removal, nerve damage and allergy to anesthesia.
Post-Care Instructions: I reviewed with the patient in detail post-care instructions. Patient is to keep the biopsy site dry overnight, and then apply bacitracin twice daily until healed. Patient may apply hydrogen peroxide soaks to remove any crusting.
Notification Instructions: Patient will be notified of biopsy results. However, patient instructed to call the office if not contacted within 2 weeks.
Billing Type: Third-Party Bill
Information: Selecting Yes will display possible errors in your note based on the variables you have selected. This validation is only offered as a suggestion for you. PLEASE NOTE THAT THE VALIDATION TEXT WILL BE REMOVED WHEN YOU FINALIZE YOUR NOTE. IF YOU WANT TO FAX A PRELIMINARY NOTE YOU WILL NEED TO TOGGLE THIS TO 'NO' IF YOU DO NOT WANT IT IN YOUR FAXED NOTE.
Size Of Lesion In Cm: 1.1
Size Of Lesion In Cm: 1.4

## 2023-09-08 NOTE — PROCEDURE: LIQUID NITROGEN
Post-Care Instructions: I reviewed with the patient in detail post-care instructions. Patient is to wear sunprotection, and avoid picking at any of the treated lesions. Pt may apply Vaseline to crusted or scabbing areas.
Render Post-Care Instructions In Note?: no
Consent: The patient's consent was obtained including but not limited to risks of crusting, scabbing, blistering, scarring, darker or lighter pigmentary change, recurrence, incomplete removal and infection.
Duration Of Freeze Thaw-Cycle (Seconds): 1
Show Aperture Variable?: Yes
Detail Level: Detailed

## 2023-09-11 ENCOUNTER — HOSPITAL ENCOUNTER (OUTPATIENT)
Dept: MRI IMAGING | Age: 68
Discharge: HOME | End: 2023-09-14
Attending: UROLOGY
Payer: COMMERCIAL

## 2023-09-11 DIAGNOSIS — C61 PROSTATE CANCER (HCC): ICD-10-CM

## 2023-09-11 PROCEDURE — A9579 GAD-BASE MR CONTRAST NOS,1ML: HCPCS | Performed by: UROLOGY

## 2023-09-11 PROCEDURE — 6360000004 HC RX CONTRAST MEDICATION: Performed by: UROLOGY

## 2023-09-11 PROCEDURE — 72197 MRI PELVIS W/O & W/DYE: CPT

## 2023-09-11 RX ORDER — SODIUM CHLORIDE 0.9 % (FLUSH) 0.9 %
40 SYRINGE (ML) INJECTION AS NEEDED
Status: DISCONTINUED | OUTPATIENT
Start: 2023-09-11 | End: 2023-09-15 | Stop reason: HOSPADM

## 2023-09-11 RX ADMIN — GADOTERIDOL 24 ML: 279.3 INJECTION, SOLUTION INTRAVENOUS at 17:53

## 2023-09-23 DIAGNOSIS — I10 ESSENTIAL (PRIMARY) HYPERTENSION: ICD-10-CM

## 2023-09-28 DIAGNOSIS — C61 PROSTATE CANCER (HCC): Primary | ICD-10-CM

## 2023-10-02 ENCOUNTER — OFFICE VISIT (OUTPATIENT)
Dept: ONCOLOGY | Age: 68
End: 2023-10-02
Payer: COMMERCIAL

## 2023-10-02 ENCOUNTER — HOSPITAL ENCOUNTER (OUTPATIENT)
Dept: LAB | Age: 68
Discharge: HOME | End: 2023-10-05
Payer: COMMERCIAL

## 2023-10-02 VITALS
TEMPERATURE: 98.3 F | WEIGHT: 283 LBS | HEIGHT: 77 IN | RESPIRATION RATE: 12 BRPM | SYSTOLIC BLOOD PRESSURE: 127 MMHG | BODY MASS INDEX: 33.42 KG/M2 | DIASTOLIC BLOOD PRESSURE: 75 MMHG | HEART RATE: 66 BPM

## 2023-10-02 DIAGNOSIS — C61 PROSTATE CANCER (HCC): ICD-10-CM

## 2023-10-02 DIAGNOSIS — C61 PROSTATE CANCER (HCC): Primary | ICD-10-CM

## 2023-10-02 LAB — PSA SERPL-MCNC: 7.1 NG/ML

## 2023-10-02 PROCEDURE — 3074F SYST BP LT 130 MM HG: CPT | Performed by: UROLOGY

## 2023-10-02 PROCEDURE — 84153 ASSAY OF PSA TOTAL: CPT

## 2023-10-02 PROCEDURE — 3078F DIAST BP <80 MM HG: CPT | Performed by: UROLOGY

## 2023-10-02 PROCEDURE — 1123F ACP DISCUSS/DSCN MKR DOCD: CPT | Performed by: UROLOGY

## 2023-10-02 PROCEDURE — 36415 COLL VENOUS BLD VENIPUNCTURE: CPT

## 2023-10-02 PROCEDURE — 99214 OFFICE O/P EST MOD 30 MIN: CPT | Performed by: UROLOGY

## 2023-10-02 ASSESSMENT — ENCOUNTER SYMPTOMS
GASTROINTESTINAL NEGATIVE: 1
RESPIRATORY NEGATIVE: 1

## 2023-10-02 ASSESSMENT — PATIENT HEALTH QUESTIONNAIRE - PHQ9
SUM OF ALL RESPONSES TO PHQ QUESTIONS 1-9: 0
1. LITTLE INTEREST OR PLEASURE IN DOING THINGS: 0
2. FEELING DOWN, DEPRESSED OR HOPELESS: 0
SUM OF ALL RESPONSES TO PHQ9 QUESTIONS 1 & 2: 0

## 2023-10-02 NOTE — PATIENT INSTRUCTIONS
Patient Instructions from Today's Visit    Reason for Visit:  Follow up    Diagnosis Information:  [email or web address] [email or web address]-us/[email or web address]-answers-patient-education-materials/qova-gaatjab-[email or web address]-sheets      Plan:  MRI looks good. PSA is not resulted yet. Currently you are at a favorable risk based on your past biopsy Anoop score of 6. Option 1: We can continue to check PSA every 6 months and MRI at 2 years; option 2 is repeat a biopsy- this is the standard recommendation-these are done trans-peroneal which lowers the risk of infections. The goal would be to see if the anoop score is different and see if you are on the best course of treatment. Recommend follow up in 6 months and check PSA if your PSA today is ok. If it is greater than 9 we will call and get you back in sooner. We can plan to repeat a biopsy in 2 years (from your original biopsy in June 2022)    Follow Up:  Office visit in 6 months with PSA. Recommend repeat biopsy June 2024    Recent Lab Results:  Not resulted    Treatment Summary has been discussed and given to patient:     na    -------------------------------------------------------------------------------------------------------------------    Patient does express an interest in My Chart. My Chart log in information explained on the after visit summary printout at the [employer]  desk.     Moe Jones, RN, BSN

## 2023-10-02 NOTE — PROGRESS NOTES
[address] [facility] On[facility]  [address] [place] [address], [address] [place] [address]  [phone]        Mr. Dulce Levin is a 70 y.o. male with a diagnosis of  PCa. S/p TRUS fusion prostate bx, [place] score 3+3=6, on Active Surveillance. 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 He has no complaints today. He states he been very busy at work. His PSA in past has been as high as 9.4. More recently it was 5.7. He also had a favorable Polaris score of 3.4. His original MRI in May 2022 showed a PI-RADS 5 lesion. That area was biopsied and was negative. He had a repeat prostate MRI recently on 9/12/2023 which showed his gland measured 5.6 x 3.9 x 5.4 cm. There was an 8 mm area of concern that corresponded with his prior biopsy. There was not a PI-RADS score but it appears to be a 3-4 in my estimation. The large PI-RADS 5 area previously I was not able to appreciate. He has no significant lower urinary tract symptoms. He denies any dysuria hematuria. Prior note:Patient is here to follow-up. He has Jose Enrique 3 posterior equal 6 adenocarcinoma prostate and is on active surveillance. His PSA on 12/19/2022 was 5.2. His previous 1 was 4.7. Prior to that in June 2021 he was a size 9.4. When I last saw him he also had a Prolaris score that was favorable at 3.4. He has no complaints today he denies any hematuria or dysuria. He has had an episode of hematospermia that has totally resolved. He denies any UTI or prostatitis symptoms. Of note his prostate MRI was on 5/13/2022. It did show a 1.7 cm x 0.8 central gland lesion that was thought to be PI-RADS 5. The biopsy that was negative. He also had a smaller PI-RADS 2 lesion that was also negative on biopsy. His PSA from today is back and is 5.7. From previous note:  Patient is here today for follow-up.   He was diagnosed with Jose Enrique 3+3 equal 6

## 2023-10-31 ENCOUNTER — APPOINTMENT (RX ONLY)
Dept: URBAN - METROPOLITAN AREA CLINIC 329 | Facility: CLINIC | Age: 68
Setting detail: DERMATOLOGY
End: 2023-10-31

## 2023-10-31 PROBLEM — C44.722 SQUAMOUS CELL CARCINOMA OF SKIN OF RIGHT LOWER LIMB, INCLUDING HIP: Status: ACTIVE | Noted: 2023-10-31

## 2023-10-31 PROBLEM — C44.319 BASAL CELL CARCINOMA OF SKIN OF OTHER PARTS OF FACE: Status: ACTIVE | Noted: 2023-10-31

## 2023-10-31 PROCEDURE — 17264 DSTRJ MAL LES T/A/L 3.1-4.0: CPT

## 2023-10-31 PROCEDURE — ? CURETTAGE AND DESTRUCTION

## 2023-10-31 PROCEDURE — ? EXCISION

## 2023-10-31 PROCEDURE — 12052 INTMD RPR FACE/MM 2.6-5.0 CM: CPT | Mod: 59

## 2023-10-31 PROCEDURE — 11642 EXC F/E/E/N/L MAL+MRG 1.1-2: CPT | Mod: 59

## 2023-10-31 PROCEDURE — ? COUNSELING

## 2023-10-31 NOTE — PROCEDURE: EXCISION
Body Location Override (Optional - Billing Will Still Be Based On Selected Body Map Location If Applicable): right inferior central buccal cheek
Size Of Lesion In Cm: 1
X Size Of Lesion In Cm (Optional): 0
Size Of Margin In Cm: 0.1
Was An Eye Clamp Used?: No
Eye Clamp Note Details: An eye clamp was used during the procedure.
Excision Method: Elliptical
Saucerization Depth: dermis and superficial adipose tissue
Repair Type: Intermediate
Suturegard Retention Suture: 2-0 Nylon
Retention Suture Bite Size: 3 mm
Length To Time In Minutes Device Was In Place: 10
Intermediate / Complex Repair - Final Wound Length In Cm: 3
Undermining Type: Entire Wound
Debridement Text: The wound edges were debrided prior to proceeding with the closure to facilitate wound healing.
Helical Rim Text: The closure involved the helical rim.
Vermilion Border Text: The closure involved the vermilion border.
Nostril Rim Text: The closure involved the nostril rim.
Retention Suture Text: Retention sutures were placed to support the closure and prevent dehiscence.
Suture Removal: 14 days
Lab: 6
Graft Donor Site Bandage (Optional-Leave Blank If You Don't Want In Note): Steri-strips and a pressure bandage were applied to the donor site.
Epidermal Closure Graft Donor Site (Optional): simple interrupted
Billing Type: Third-Party Bill
Excision Depth: adipose tissue
Scalpel Size: 15 blade
Anesthesia Type: 1% lidocaine with epinephrine
Hemostasis: Electrocautery
Estimated Blood Loss (Cc): minimal
Detail Level: Detailed
Deep Sutures: 5-0 Vicryl
Epidermal Sutures: 4-0 Ethilon
Wound Care: Petrolatum
Dressing: dry sterile dressing
Suturegard Intro: Intraoperative tissue expansion was performed, utilizing the SUTUREGARD device, in order to reduce wound tension.
Suturegard Body: The suture ends were repeatedly re-tightened and re-clamped to achieve the desired tissue expansion.
Hemigard Intro: Due to skin fragility and wound tension, it was decided to use HEMIGARD adhesive retention suture devices to permit a linear closure. The skin was cleaned and dried for a 6cm distance away from the wound. Excessive hair, if present, was removed to allow for adhesion.
Hemigard Postcare Instructions: The HEMIGARD strips are to remain completely dry for at least 5-7 days.
Positioning (Leave Blank If You Do Not Want): The patient was placed in a comfortable position exposing the surgical site.
Pre-Excision Curettage Text (Leave Blank If You Do Not Want): Prior to drawing the surgical margin the visible lesion was removed with electrodesiccation and curettage to clearly define the lesion size.
Complex Repair Preamble Text (Leave Blank If You Do Not Want): Extensive wide undermining was performed.
Intermediate Repair Preamble Text (Leave Blank If You Do Not Want): Undermining was performed with blunt dissection.
Curvilinear Excision Additional Text (Leave Blank If You Do Not Want): The margin was drawn around the clinically apparent lesion.  A curvilinear shape was then drawn on the skin incorporating the lesion and margins.  Incisions were then made along these lines to the appropriate tissue plane and the lesion was extirpated.
Fusiform Excision Additional Text (Leave Blank If You Do Not Want): The margin was drawn around the clinically apparent lesion.  A fusiform shape was then drawn on the skin incorporating the lesion and margins.  Incisions were then made along these lines to the appropriate tissue plane and the lesion was extirpated.
Elliptical Excision Additional Text (Leave Blank If You Do Not Want): The margin was drawn around the clinically apparent lesion.  An elliptical shape was then drawn on the skin incorporating the lesion and margins.  Incisions were then made along these lines to the appropriate tissue plane and the lesion was extirpated.
Saucerization Excision Additional Text (Leave Blank If You Do Not Want): The margin was drawn around the clinically apparent lesion.  Incisions were then made along these lines, in a tangential fashion, to the appropriate tissue plane and the lesion was extirpated.
Slit Excision Additional Text (Leave Blank If You Do Not Want): A linear line was drawn on the skin overlying the lesion. An incision was made slowly until the lesion was visualized.  Once visualized, the lesion was removed with blunt dissection.
Excisional Biopsy Additional Text (Leave Blank If You Do Not Want): The margin was drawn around the clinically apparent lesion. An elliptical shape was then drawn on the skin incorporating the lesion and margins.  Incisions were then made along these lines to the appropriate tissue plane and the lesion was extirpated.
Perilesional Excision Additional Text (Leave Blank If You Do Not Want): The margin was drawn around the clinically apparent lesion. Incisions were then made along these lines to the appropriate tissue plane and the lesion was extirpated.
Repair Performed By Another Provider Text (Leave Blank If You Do Not Want): After the tissue was excised the defect was repaired by another provider.
No Repair - Repaired With Adjacent Surgical Defect Text (Leave Blank If You Do Not Want): After the excision the defect was repaired concurrently with another surgical defect which was in close approximation.
Adjacent Tissue Transfer Text: The defect edges were debeveled with a #15 scalpel blade.  Given the location of the defect and the proximity to free margins an adjacent tissue transfer was deemed most appropriate.  Using a sterile surgical marker, an appropriate flap was drawn incorporating the defect and placing the expected incisions within the relaxed skin tension lines where possible.    The area thus outlined was incised deep to adipose tissue with a #15 scalpel blade.  The skin margins were undermined to an appropriate distance in all directions utilizing iris scissors.
Advancement Flap (Single) Text: The defect edges were debeveled with a #15 scalpel blade.  Given the location of the defect and the proximity to free margins a single advancement flap was deemed most appropriate.  Using a sterile surgical marker, an appropriate advancement flap was drawn incorporating the defect and placing the expected incisions within the relaxed skin tension lines where possible.    The area thus outlined was incised deep to adipose tissue with a #15 scalpel blade.  The skin margins were undermined to an appropriate distance in all directions utilizing iris scissors.
Advancement Flap (Double) Text: The defect edges were debeveled with a #15 scalpel blade.  Given the location of the defect and the proximity to free margins a double advancement flap was deemed most appropriate.  Using a sterile surgical marker, the appropriate advancement flaps were drawn incorporating the defect and placing the expected incisions within the relaxed skin tension lines where possible.    The area thus outlined was incised deep to adipose tissue with a #15 scalpel blade.  The skin margins were undermined to an appropriate distance in all directions utilizing iris scissors.
Burow's Advancement Flap Text: The defect edges were debeveled with a #15 scalpel blade.  Given the location of the defect and the proximity to free margins a Burow's advancement flap was deemed most appropriate.  Using a sterile surgical marker, the appropriate advancement flap was drawn incorporating the defect and placing the expected incisions within the relaxed skin tension lines where possible.    The area thus outlined was incised deep to adipose tissue with a #15 scalpel blade.  The skin margins were undermined to an appropriate distance in all directions utilizing iris scissors.
Chonodrocutaneous Helical Advancement Flap Text: The defect edges were debeveled with a #15 scalpel blade.  Given the location of the defect and the proximity to free margins a chondrocutaneous helical advancement flap was deemed most appropriate.  Using a sterile surgical marker, the appropriate advancement flap was drawn incorporating the defect and placing the expected incisions within the relaxed skin tension lines where possible.    The area thus outlined was incised deep to adipose tissue with a #15 scalpel blade.  The skin margins were undermined to an appropriate distance in all directions utilizing iris scissors.
Crescentic Advancement Flap Text: The defect edges were debeveled with a #15 scalpel blade.  Given the location of the defect and the proximity to free margins a crescentic advancement flap was deemed most appropriate.  Using a sterile surgical marker, the appropriate advancement flap was drawn incorporating the defect and placing the expected incisions within the relaxed skin tension lines where possible.    The area thus outlined was incised deep to adipose tissue with a #15 scalpel blade.  The skin margins were undermined to an appropriate distance in all directions utilizing iris scissors.
A-T Advancement Flap Text: The defect edges were debeveled with a #15 scalpel blade.  Given the location of the defect, shape of the defect and the proximity to free margins an A-T advancement flap was deemed most appropriate.  Using a sterile surgical marker, an appropriate advancement flap was drawn incorporating the defect and placing the expected incisions within the relaxed skin tension lines where possible.    The area thus outlined was incised deep to adipose tissue with a #15 scalpel blade.  The skin margins were undermined to an appropriate distance in all directions utilizing iris scissors.
O-T Advancement Flap Text: The defect edges were debeveled with a #15 scalpel blade.  Given the location of the defect, shape of the defect and the proximity to free margins an O-T advancement flap was deemed most appropriate.  Using a sterile surgical marker, an appropriate advancement flap was drawn incorporating the defect and placing the expected incisions within the relaxed skin tension lines where possible.    The area thus outlined was incised deep to adipose tissue with a #15 scalpel blade.  The skin margins were undermined to an appropriate distance in all directions utilizing iris scissors.
O-L Flap Text: The defect edges were debeveled with a #15 scalpel blade.  Given the location of the defect, shape of the defect and the proximity to free margins an O-L flap was deemed most appropriate.  Using a sterile surgical marker, an appropriate advancement flap was drawn incorporating the defect and placing the expected incisions within the relaxed skin tension lines where possible.    The area thus outlined was incised deep to adipose tissue with a #15 scalpel blade.  The skin margins were undermined to an appropriate distance in all directions utilizing iris scissors.
O-Z Flap Text: The defect edges were debeveled with a #15 scalpel blade.  Given the location of the defect, shape of the defect and the proximity to free margins an O-Z flap was deemed most appropriate.  Using a sterile surgical marker, an appropriate transposition flap was drawn incorporating the defect and placing the expected incisions within the relaxed skin tension lines where possible. The area thus outlined was incised deep to adipose tissue with a #15 scalpel blade.  The skin margins were undermined to an appropriate distance in all directions utilizing iris scissors.
Double O-Z Flap Text: The defect edges were debeveled with a #15 scalpel blade.  Given the location of the defect, shape of the defect and the proximity to free margins a Double O-Z flap was deemed most appropriate.  Using a sterile surgical marker, an appropriate transposition flap was drawn incorporating the defect and placing the expected incisions within the relaxed skin tension lines where possible. The area thus outlined was incised deep to adipose tissue with a #15 scalpel blade.  The skin margins were undermined to an appropriate distance in all directions utilizing iris scissors.
V-Y Flap Text: The defect edges were debeveled with a #15 scalpel blade.  Given the location of the defect, shape of the defect and the proximity to free margins a V-Y flap was deemed most appropriate.  Using a sterile surgical marker, an appropriate advancement flap was drawn incorporating the defect and placing the expected incisions within the relaxed skin tension lines where possible.    The area thus outlined was incised deep to adipose tissue with a #15 scalpel blade.  The skin margins were undermined to an appropriate distance in all directions utilizing iris scissors.
Advancement-Rotation Flap Text: The defect edges were debeveled with a #15 scalpel blade.  Given the location of the defect, shape of the defect and the proximity to free margins an advancement-rotation flap was deemed most appropriate.  Using a sterile surgical marker, an appropriate flap was drawn incorporating the defect and placing the expected incisions within the relaxed skin tension lines where possible. The area thus outlined was incised deep to adipose tissue with a #15 scalpel blade.  The skin margins were undermined to an appropriate distance in all directions utilizing iris scissors.
Mercedes Flap Text: The defect edges were debeveled with a #15 scalpel blade.  Given the location of the defect, shape of the defect and the proximity to free margins a Mercedes flap was deemed most appropriate.  Using a sterile surgical marker, an appropriate advancement flap was drawn incorporating the defect and placing the expected incisions within the relaxed skin tension lines where possible. The area thus outlined was incised deep to adipose tissue with a #15 scalpel blade.  The skin margins were undermined to an appropriate distance in all directions utilizing iris scissors.
Modified Advancement Flap Text: The defect edges were debeveled with a #15 scalpel blade.  Given the location of the defect, shape of the defect and the proximity to free margins a modified advancement flap was deemed most appropriate.  Using a sterile surgical marker, an appropriate advancement flap was drawn incorporating the defect and placing the expected incisions within the relaxed skin tension lines where possible.    The area thus outlined was incised deep to adipose tissue with a #15 scalpel blade.  The skin margins were undermined to an appropriate distance in all directions utilizing iris scissors.
Mucosal Advancement Flap Text: Given the location of the defect, shape of the defect and the proximity to free margins a mucosal advancement flap was deemed most appropriate. Incisions were made with a 15 blade scalpel in the appropriate fashion along the cutaneous vermilion border and the mucosal lip. The remaining actinically damaged mucosal tissue was excised.  The mucosal advancement flap was then elevated to the gingival sulcus with care taken to preserve the neurovascular structures and advanced into the primary defect. Care was taken to ensure that precise realignment of the vermilion border was achieved.
Peng Advancement Flap Text: The defect edges were debeveled with a #15 scalpel blade.  Given the location of the defect, shape of the defect and the proximity to free margins a Peng advancement flap was deemed most appropriate.  Using a sterile surgical marker, an appropriate advancement flap was drawn incorporating the defect and placing the expected incisions within the relaxed skin tension lines where possible. The area thus outlined was incised deep to adipose tissue with a #15 scalpel blade.  The skin margins were undermined to an appropriate distance in all directions utilizing iris scissors.
Hatchet Flap Text: The defect edges were debeveled with a #15 scalpel blade.  Given the location of the defect, shape of the defect and the proximity to free margins a hatchet flap was deemed most appropriate.  Using a sterile surgical marker, an appropriate hatchet flap was drawn incorporating the defect and placing the expected incisions within the relaxed skin tension lines where possible.    The area thus outlined was incised deep to adipose tissue with a #15 scalpel blade.  The skin margins were undermined to an appropriate distance in all directions utilizing iris scissors.
Rotation Flap Text: The defect edges were debeveled with a #15 scalpel blade.  Given the location of the defect, shape of the defect and the proximity to free margins a rotation flap was deemed most appropriate.  Using a sterile surgical marker, an appropriate rotation flap was drawn incorporating the defect and placing the expected incisions within the relaxed skin tension lines where possible.    The area thus outlined was incised deep to adipose tissue with a #15 scalpel blade.  The skin margins were undermined to an appropriate distance in all directions utilizing iris scissors.
Bilateral Rotation Flap Text: The defect edges were debeveled with a #15 scalpel blade. Given the location of the defect, shape of the defect and the proximity to free margins a bilateral rotation flap was deemed most appropriate. Using a sterile surgical marker, an appropriate rotation flap was drawn incorporating the defect and placing the expected incisions within the relaxed skin tension lines where possible. The area thus outlined was incised deep to adipose tissue with a #15 scalpel blade. The skin margins were undermined to an appropriate distance in all directions utilizing iris scissors. Following this, the designed flap was carried over into the primary defect and sutured into place.
Spiral Flap Text: The defect edges were debeveled with a #15 scalpel blade.  Given the location of the defect, shape of the defect and the proximity to free margins a spiral flap was deemed most appropriate.  Using a sterile surgical marker, an appropriate rotation flap was drawn incorporating the defect and placing the expected incisions within the relaxed skin tension lines where possible. The area thus outlined was incised deep to adipose tissue with a #15 scalpel blade.  The skin margins were undermined to an appropriate distance in all directions utilizing iris scissors.
Staged Advancement Flap Text: The defect edges were debeveled with a #15 scalpel blade.  Given the location of the defect, shape of the defect and the proximity to free margins a staged advancement flap was deemed most appropriate.  Using a sterile surgical marker, an appropriate advancement flap was drawn incorporating the defect and placing the expected incisions within the relaxed skin tension lines where possible. The area thus outlined was incised deep to adipose tissue with a #15 scalpel blade.  The skin margins were undermined to an appropriate distance in all directions utilizing iris scissors.
Star Wedge Flap Text: The defect edges were debeveled with a #15 scalpel blade.  Given the location of the defect, shape of the defect and the proximity to free margins a star wedge flap was deemed most appropriate.  Using a sterile surgical marker, an appropriate rotation flap was drawn incorporating the defect and placing the expected incisions within the relaxed skin tension lines where possible. The area thus outlined was incised deep to adipose tissue with a #15 scalpel blade.  The skin margins were undermined to an appropriate distance in all directions utilizing iris scissors.
Transposition Flap Text: The defect edges were debeveled with a #15 scalpel blade.  Given the location of the defect and the proximity to free margins a transposition flap was deemed most appropriate.  Using a sterile surgical marker, an appropriate transposition flap was drawn incorporating the defect.    The area thus outlined was incised deep to adipose tissue with a #15 scalpel blade.  The skin margins were undermined to an appropriate distance in all directions utilizing iris scissors.
Muscle Hinge Flap Text: The defect edges were debeveled with a #15 scalpel blade.  Given the size, depth and location of the defect and the proximity to free margins a muscle hinge flap was deemed most appropriate.  Using a sterile surgical marker, an appropriate hinge flap was drawn incorporating the defect. The area thus outlined was incised with a #15 scalpel blade.  The skin margins were undermined to an appropriate distance in all directions utilizing iris scissors.
Mustarde Flap Text: The defect edges were debeveled with a #15 scalpel blade.  Given the size, depth and location of the defect and the proximity to free margins a Mustarde flap was deemed most appropriate.  Using a sterile surgical marker, an appropriate flap was drawn incorporating the defect. The area thus outlined was incised with a #15 scalpel blade.  The skin margins were undermined to an appropriate distance in all directions utilizing iris scissors.
Nasal Turnover Hinge Flap Text: The defect edges were debeveled with a #15 scalpel blade.  Given the size, depth, location of the defect and the defect being full thickness a nasal turnover hinge flap was deemed most appropriate.  Using a sterile surgical marker, an appropriate hinge flap was drawn incorporating the defect. The area thus outlined was incised with a #15 scalpel blade. The flap was designed to recreate the nasal mucosal lining and the alar rim. The skin margins were undermined to an appropriate distance in all directions utilizing iris scissors.
Nasalis-Muscle-Based Myocutaneous Island Pedicle Flap Text: Using a #15 blade, an incision was made around the donor flap to the level of the nasalis muscle. Wide lateral undermining was then performed in both the subcutaneous plane above the nasalis muscle, and in a submuscular plane just above periosteum. This allowed the formation of a free nasalis muscle axial pedicle (based on the angular artery) which was still attached to the actual cutaneous flap, increasing its mobility and vascular viability. Hemostasis was obtained with pinpoint electrocoagulation. The flap was mobilized into position and the pivotal anchor points positioned and stabilized with buried interrupted sutures. Subcutaneous and dermal tissues were closed in a multilayered fashion with sutures. Tissue redundancies were excised, and the epidermal edges were apposed without significant tension and sutured with sutures.
Orbicularis Oris Muscle Flap Text: The defect edges were debeveled with a #15 scalpel blade.  Given that the defect affected the competency of the oral sphincter an orbicularis oris muscle flap was deemed most appropriate to restore this competency and normal muscle function.  Using a sterile surgical marker, an appropriate flap was drawn incorporating the defect. The area thus outlined was incised with a #15 scalpel blade.
Melolabial Transposition Flap Text: The defect edges were debeveled with a #15 scalpel blade.  Given the location of the defect and the proximity to free margins a melolabial flap was deemed most appropriate.  Using a sterile surgical marker, an appropriate melolabial transposition flap was drawn incorporating the defect.    The area thus outlined was incised deep to adipose tissue with a #15 scalpel blade.  The skin margins were undermined to an appropriate distance in all directions utilizing iris scissors.
Rhombic Flap Text: The defect edges were debeveled with a #15 scalpel blade.  Given the location of the defect and the proximity to free margins a rhombic flap was deemed most appropriate.  Using a sterile surgical marker, an appropriate rhombic flap was drawn incorporating the defect.    The area thus outlined was incised deep to adipose tissue with a #15 scalpel blade.  The skin margins were undermined to an appropriate distance in all directions utilizing iris scissors.
Rhomboid Transposition Flap Text: The defect edges were debeveled with a #15 scalpel blade.  Given the location of the defect and the proximity to free margins a rhomboid transposition flap was deemed most appropriate.  Using a sterile surgical marker, an appropriate rhomboid flap was drawn incorporating the defect.    The area thus outlined was incised deep to adipose tissue with a #15 scalpel blade.  The skin margins were undermined to an appropriate distance in all directions utilizing iris scissors.
Bi-Rhombic Flap Text: The defect edges were debeveled with a #15 scalpel blade.  Given the location of the defect and the proximity to free margins a bi-rhombic flap was deemed most appropriate.  Using a sterile surgical marker, an appropriate rhombic flap was drawn incorporating the defect. The area thus outlined was incised deep to adipose tissue with a #15 scalpel blade.  The skin margins were undermined to an appropriate distance in all directions utilizing iris scissors.
Helical Rim Advancement Flap Text: The defect edges were debeveled with a #15 blade scalpel.  Given the location of the defect and the proximity to free margins (helical rim) a double helical rim advancement flap was deemed most appropriate.  Using a sterile surgical marker, the appropriate advancement flaps were drawn incorporating the defect and placing the expected incisions between the helical rim and antihelix where possible.  The area thus outlined was incised through and through with a #15 scalpel blade.  With a skin hook and iris scissors, the flaps were gently and sharply undermined and freed up.
Bilateral Helical Rim Advancement Flap Text: The defect edges were debeveled with a #15 blade scalpel.  Given the location of the defect and the proximity to free margins (helical rim) a bilateral helical rim advancement flap was deemed most appropriate.  Using a sterile surgical marker, the appropriate advancement flaps were drawn incorporating the defect and placing the expected incisions between the helical rim and antihelix where possible.  The area thus outlined was incised through and through with a #15 scalpel blade.  With a skin hook and iris scissors, the flaps were gently and sharply undermined and freed up.
Ear Star Wedge Flap Text: The defect edges were debeveled with a #15 blade scalpel.  Given the location of the defect and the proximity to free margins (helical rim) an ear star wedge flap was deemed most appropriate.  Using a sterile surgical marker, the appropriate flap was drawn incorporating the defect and placing the expected incisions between the helical rim and antihelix where possible.  The area thus outlined was incised through and through with a #15 scalpel blade.
Banner Transposition Flap Text: The defect edges were debeveled with a #15 scalpel blade.  Given the location of the defect and the proximity to free margins a Banner transposition flap was deemed most appropriate.  Using a sterile surgical marker, an appropriate flap drawn around the defect. The area thus outlined was incised deep to adipose tissue with a #15 scalpel blade.  The skin margins were undermined to an appropriate distance in all directions utilizing iris scissors.
Bilobed Flap Text: The defect edges were debeveled with a #15 scalpel blade.  Given the location of the defect and the proximity to free margins a bilobe flap was deemed most appropriate.  Using a sterile surgical marker, an appropriate bilobe flap drawn around the defect.    The area thus outlined was incised deep to adipose tissue with a #15 scalpel blade.  The skin margins were undermined to an appropriate distance in all directions utilizing iris scissors.
Bilobed Transposition Flap Text: The defect edges were debeveled with a #15 scalpel blade.  Given the location of the defect and the proximity to free margins a bilobed transposition flap was deemed most appropriate.  Using a sterile surgical marker, an appropriate bilobe flap drawn around the defect.    The area thus outlined was incised deep to adipose tissue with a #15 scalpel blade.  The skin margins were undermined to an appropriate distance in all directions utilizing iris scissors.
Trilobed Flap Text: The defect edges were debeveled with a #15 scalpel blade.  Given the location of the defect and the proximity to free margins a trilobed flap was deemed most appropriate.  Using a sterile surgical marker, an appropriate trilobed flap drawn around the defect.    The area thus outlined was incised deep to adipose tissue with a #15 scalpel blade.  The skin margins were undermined to an appropriate distance in all directions utilizing iris scissors.
Dorsal Nasal Flap Text: The defect edges were debeveled with a #15 scalpel blade.  Given the location of the defect and the proximity to free margins a dorsal nasal flap was deemed most appropriate.  Using a sterile surgical marker, an appropriate dorsal nasal flap was drawn around the defect.    The area thus outlined was incised deep to adipose tissue with a #15 scalpel blade.  The skin margins were undermined to an appropriate distance in all directions utilizing iris scissors.
Island Pedicle Flap Text: The defect edges were debeveled with a #15 scalpel blade.  Given the location of the defect, shape of the defect and the proximity to free margins an island pedicle advancement flap was deemed most appropriate.  Using a sterile surgical marker, an appropriate advancement flap was drawn incorporating the defect, outlining the appropriate donor tissue and placing the expected incisions within the relaxed skin tension lines where possible.    The area thus outlined was incised deep to adipose tissue with a #15 scalpel blade.  The skin margins were undermined to an appropriate distance in all directions around the primary defect and laterally outward around the island pedicle utilizing iris scissors.  There was minimal undermining beneath the pedicle flap.
Island Pedicle Flap With Canthal Suspension Text: The defect edges were debeveled with a #15 scalpel blade.  Given the location of the defect, shape of the defect and the proximity to free margins an island pedicle advancement flap was deemed most appropriate.  Using a sterile surgical marker, an appropriate advancement flap was drawn incorporating the defect, outlining the appropriate donor tissue and placing the expected incisions within the relaxed skin tension lines where possible. The area thus outlined was incised deep to adipose tissue with a #15 scalpel blade.  The skin margins were undermined to an appropriate distance in all directions around the primary defect and laterally outward around the island pedicle utilizing iris scissors.  There was minimal undermining beneath the pedicle flap. A suspension suture was placed in the canthal tendon to prevent tension and prevent ectropion.
Alar Island Pedicle Flap Text: The defect edges were debeveled with a #15 scalpel blade.  Given the location of the defect, shape of the defect and the proximity to the alar rim an island pedicle advancement flap was deemed most appropriate.  Using a sterile surgical marker, an appropriate advancement flap was drawn incorporating the defect, outlining the appropriate donor tissue and placing the expected incisions within the nasal ala running parallel to the alar rim. The area thus outlined was incised with a #15 scalpel blade.  The skin margins were undermined minimally to an appropriate distance in all directions around the primary defect and laterally outward around the island pedicle utilizing iris scissors.  There was minimal undermining beneath the pedicle flap.
Double Island Pedicle Flap Text: The defect edges were debeveled with a #15 scalpel blade.  Given the location of the defect, shape of the defect and the proximity to free margins a double island pedicle advancement flap was deemed most appropriate.  Using a sterile surgical marker, an appropriate advancement flap was drawn incorporating the defect, outlining the appropriate donor tissue and placing the expected incisions within the relaxed skin tension lines where possible.    The area thus outlined was incised deep to adipose tissue with a #15 scalpel blade.  The skin margins were undermined to an appropriate distance in all directions around the primary defect and laterally outward around the island pedicle utilizing iris scissors.  There was minimal undermining beneath the pedicle flap.
Island Pedicle Flap-Requiring Vessel Identification Text: The defect edges were debeveled with a #15 scalpel blade.  Given the location of the defect, shape of the defect and the proximity to free margins an island pedicle advancement flap was deemed most appropriate.  Using a sterile surgical marker, an appropriate advancement flap was drawn, based on the axial vessel mentioned above, incorporating the defect, outlining the appropriate donor tissue and placing the expected incisions within the relaxed skin tension lines where possible.    The area thus outlined was incised deep to adipose tissue with a #15 scalpel blade.  The skin margins were undermined to an appropriate distance in all directions around the primary defect and laterally outward around the island pedicle utilizing iris scissors.  There was minimal undermining beneath the pedicle flap.
Keystone Flap Text: The defect edges were debeveled with a #15 scalpel blade.  Given the location of the defect, shape of the defect a keystone flap was deemed most appropriate.  Using a sterile surgical marker, an appropriate keystone flap was drawn incorporating the defect, outlining the appropriate donor tissue and placing the expected incisions within the relaxed skin tension lines where possible. The area thus outlined was incised deep to adipose tissue with a #15 scalpel blade.  The skin margins were undermined to an appropriate distance in all directions around the primary defect and laterally outward around the flap utilizing iris scissors.
O-T Plasty Text: The defect edges were debeveled with a #15 scalpel blade.  Given the location of the defect, shape of the defect and the proximity to free margins an O-T plasty was deemed most appropriate.  Using a sterile surgical marker, an appropriate O-T plasty was drawn incorporating the defect and placing the expected incisions within the relaxed skin tension lines where possible.    The area thus outlined was incised deep to adipose tissue with a #15 scalpel blade.  The skin margins were undermined to an appropriate distance in all directions utilizing iris scissors.
O-Z Plasty Text: The defect edges were debeveled with a #15 scalpel blade.  Given the location of the defect, shape of the defect and the proximity to free margins an O-Z plasty (double transposition flap) was deemed most appropriate.  Using a sterile surgical marker, the appropriate transposition flaps were drawn incorporating the defect and placing the expected incisions within the relaxed skin tension lines where possible.    The area thus outlined was incised deep to adipose tissue with a #15 scalpel blade.  The skin margins were undermined to an appropriate distance in all directions utilizing iris scissors.  Hemostasis was achieved with electrocautery.  The flaps were then transposed into place, one clockwise and the other counterclockwise, and anchored with interrupted buried subcutaneous sutures.
Double O-Z Plasty Text: The defect edges were debeveled with a #15 scalpel blade.  Given the location of the defect, shape of the defect and the proximity to free margins a Double O-Z plasty (double transposition flap) was deemed most appropriate.  Using a sterile surgical marker, the appropriate transposition flaps were drawn incorporating the defect and placing the expected incisions within the relaxed skin tension lines where possible. The area thus outlined was incised deep to adipose tissue with a #15 scalpel blade.  The skin margins were undermined to an appropriate distance in all directions utilizing iris scissors.  Hemostasis was achieved with electrocautery.  The flaps were then transposed into place, one clockwise and the other counterclockwise, and anchored with interrupted buried subcutaneous sutures.
V-Y Plasty Text: The defect edges were debeveled with a #15 scalpel blade.  Given the location of the defect, shape of the defect and the proximity to free margins an V-Y advancement flap was deemed most appropriate.  Using a sterile surgical marker, an appropriate advancement flap was drawn incorporating the defect and placing the expected incisions within the relaxed skin tension lines where possible.    The area thus outlined was incised deep to adipose tissue with a #15 scalpel blade.  The skin margins were undermined to an appropriate distance in all directions utilizing iris scissors.
H Plasty Text: Given the location of the defect, shape of the defect and the proximity to free margins a H-plasty was deemed most appropriate for repair.  Using a sterile surgical marker, the appropriate advancement arms of the H-plasty were drawn incorporating the defect and placing the expected incisions within the relaxed skin tension lines where possible. The area thus outlined was incised deep to adipose tissue with a #15 scalpel blade. The skin margins were undermined to an appropriate distance in all directions utilizing iris scissors.  The opposing advancement arms were then advanced into place in opposite direction and anchored with interrupted buried subcutaneous sutures.
W Plasty Text: The lesion was extirpated to the level of the fat with a #15 scalpel blade.  Given the location of the defect, shape of the defect and the proximity to free margins a W-plasty was deemed most appropriate for repair.  Using a sterile surgical marker, the appropriate transposition arms of the W-plasty were drawn incorporating the defect and placing the expected incisions within the relaxed skin tension lines where possible.    The area thus outlined was incised deep to adipose tissue with a #15 scalpel blade.  The skin margins were undermined to an appropriate distance in all directions utilizing iris scissors.  The opposing transposition arms were then transposed into place in opposite direction and anchored with interrupted buried subcutaneous sutures.
Z Plasty Text: The lesion was extirpated to the level of the fat with a #15 scalpel blade.  Given the location of the defect, shape of the defect and the proximity to free margins a Z-plasty was deemed most appropriate for repair.  Using a sterile surgical marker, the appropriate transposition arms of the Z-plasty were drawn incorporating the defect and placing the expected incisions within the relaxed skin tension lines where possible.    The area thus outlined was incised deep to adipose tissue with a #15 scalpel blade.  The skin margins were undermined to an appropriate distance in all directions utilizing iris scissors.  The opposing transposition arms were then transposed into place in opposite direction and anchored with interrupted buried subcutaneous sutures.
Double Z Plasty Text: The lesion was extirpated to the level of the fat with a #15 scalpel blade. Given the location of the defect, shape of the defect and the proximity to free margins a double Z-plasty was deemed most appropriate for repair. Using a sterile surgical marker, the appropriate transposition arms of the double Z-plasty were drawn incorporating the defect and placing the expected incisions within the relaxed skin tension lines where possible. The area thus outlined was incised deep to adipose tissue with a #15 scalpel blade. The skin margins were undermined to an appropriate distance in all directions utilizing iris scissors. The opposing transposition arms were then transposed and carried over into place in opposite direction and anchored with interrupted buried subcutaneous sutures.
Zygomaticofacial Flap Text: Given the location of the defect, shape of the defect and the proximity to free margins a zygomaticofacial flap was deemed most appropriate for repair.  Using a sterile surgical marker, the appropriate flap was drawn incorporating the defect and placing the expected incisions within the relaxed skin tension lines where possible. The area thus outlined was incised deep to adipose tissue with a #15 scalpel blade with preservation of a vascular pedicle.  The skin margins were undermined to an appropriate distance in all directions utilizing iris scissors.  The flap was then placed into the defect and anchored with interrupted buried subcutaneous sutures.
Cheek Interpolation Flap Text: A decision was made to reconstruct the defect utilizing an interpolation axial flap and a staged reconstruction.  A telfa template was made of the defect.  This telfa template was then used to outline the Cheek Interpolation flap.  The donor area for the pedicle flap was then injected with anesthesia.  The flap was excised through the skin and subcutaneous tissue down to the layer of the underlying musculature.  The interpolation flap was carefully excised within this deep plane to maintain its blood supply.  The edges of the donor site were undermined.   The donor site was closed in a primary fashion.  The pedicle was then rotated into position and sutured.  Once the tube was sutured into place, adequate blood supply was confirmed with blanching and refill.  The pedicle was then wrapped with xeroform gauze and dressed appropriately with a telfa and gauze bandage to ensure continued blood supply and protect the attached pedicle.
Cheek-To-Nose Interpolation Flap Text: A decision was made to reconstruct the defect utilizing an interpolation axial flap and a staged reconstruction.  A telfa template was made of the defect.  This telfa template was then used to outline the Cheek-To-Nose Interpolation flap.  The donor area for the pedicle flap was then injected with anesthesia.  The flap was excised through the skin and subcutaneous tissue down to the layer of the underlying musculature.  The interpolation flap was carefully excised within this deep plane to maintain its blood supply.  The edges of the donor site were undermined.   The donor site was closed in a primary fashion.  The pedicle was then rotated into position and sutured.  Once the tube was sutured into place, adequate blood supply was confirmed with blanching and refill.  The pedicle was then wrapped with xeroform gauze and dressed appropriately with a telfa and gauze bandage to ensure continued blood supply and protect the attached pedicle.
Interpolation Flap Text: A decision was made to reconstruct the defect utilizing an interpolation axial flap and a staged reconstruction.  A telfa template was made of the defect.  This telfa template was then used to outline the interpolation flap.  The donor area for the pedicle flap was then injected with anesthesia.  The flap was excised through the skin and subcutaneous tissue down to the layer of the underlying musculature.  The interpolation flap was carefully excised within this deep plane to maintain its blood supply.  The edges of the donor site were undermined.   The donor site was closed in a primary fashion.  The pedicle was then rotated into position and sutured.  Once the tube was sutured into place, adequate blood supply was confirmed with blanching and refill.  The pedicle was then wrapped with xeroform gauze and dressed appropriately with a telfa and gauze bandage to ensure continued blood supply and protect the attached pedicle.
Melolabial Interpolation Flap Text: A decision was made to reconstruct the defect utilizing an interpolation axial flap and a staged reconstruction.  A telfa template was made of the defect.  This telfa template was then used to outline the melolabial interpolation flap.  The donor area for the pedicle flap was then injected with anesthesia.  The flap was excised through the skin and subcutaneous tissue down to the layer of the underlying musculature.  The pedicle flap was carefully excised within this deep plane to maintain its blood supply.  The edges of the donor site were undermined.   The donor site was closed in a primary fashion.  The pedicle was then rotated into position and sutured.  Once the tube was sutured into place, adequate blood supply was confirmed with blanching and refill.  The pedicle was then wrapped with xeroform gauze and dressed appropriately with a telfa and gauze bandage to ensure continued blood supply and protect the attached pedicle.
Mastoid Interpolation Flap Text: A decision was made to reconstruct the defect utilizing an interpolation axial flap and a staged reconstruction.  A telfa template was made of the defect.  This telfa template was then used to outline the mastoid interpolation flap.  The donor area for the pedicle flap was then injected with anesthesia.  The flap was excised through the skin and subcutaneous tissue down to the layer of the underlying musculature.  The pedicle flap was carefully excised within this deep plane to maintain its blood supply.  The edges of the donor site were undermined.   The donor site was closed in a primary fashion.  The pedicle was then rotated into position and sutured.  Once the tube was sutured into place, adequate blood supply was confirmed with blanching and refill.  The pedicle was then wrapped with xeroform gauze and dressed appropriately with a telfa and gauze bandage to ensure continued blood supply and protect the attached pedicle.
Posterior Auricular Interpolation Flap Text: A decision was made to reconstruct the defect utilizing an interpolation axial flap and a staged reconstruction.  A telfa template was made of the defect.  This telfa template was then used to outline the posterior auricular interpolation flap.  The donor area for the pedicle flap was then injected with anesthesia.  The flap was excised through the skin and subcutaneous tissue down to the layer of the underlying musculature.  The pedicle flap was carefully excised within this deep plane to maintain its blood supply.  The edges of the donor site were undermined.   The donor site was closed in a primary fashion.  The pedicle was then rotated into position and sutured.  Once the tube was sutured into place, adequate blood supply was confirmed with blanching and refill.  The pedicle was then wrapped with xeroform gauze and dressed appropriately with a telfa and gauze bandage to ensure continued blood supply and protect the attached pedicle.
Paramedian Forehead Flap Text: A decision was made to reconstruct the defect utilizing an interpolation axial flap and a staged reconstruction.  A telfa template was made of the defect.  This telfa template was then used to outline the paramedian forehead pedicle flap.  The donor area for the pedicle flap was then injected with anesthesia.  The flap was excised through the skin and subcutaneous tissue down to the layer of the underlying musculature.  The pedicle flap was carefully excised within this deep plane to maintain its blood supply.  The edges of the donor site were undermined.   The donor site was closed in a primary fashion.  The pedicle was then rotated into position and sutured.  Once the tube was sutured into place, adequate blood supply was confirmed with blanching and refill.  The pedicle was then wrapped with xeroform gauze and dressed appropriately with a telfa and gauze bandage to ensure continued blood supply and protect the attached pedicle.
Abbe Flap (Upper To Lower Lip) Text: The defect of the lower lip was assessed and measured.  Given the location and size of the defect, an Abbe flap was deemed most appropriate. Using a sterile surgical marker, an appropriate Abbe flap was measured and drawn on the upper lip. Local anesthesia was then infiltrated.  A scalpel was then used to incise the upper lip through and through the skin, vermilion, muscle and mucosa, leaving the flap pedicled on the opposite side.  The flap was then rotated and transferred to the lower lip defect.  The flap was then sutured into place with a three layer technique, closing the orbicularis oris muscle layer with subcutaneous buried sutures, followed by a mucosal layer and an epidermal layer.
Abbe Flap (Lower To Upper Lip) Text: The defect of the upper lip was assessed and measured.  Given the location and size of the defect, an Abbe flap was deemed most appropriate. Using a sterile surgical marker, an appropriate Abbe flap was measured and drawn on the lower lip. Local anesthesia was then infiltrated. A scalpel was then used to incise the upper lip through and through the skin, vermilion, muscle and mucosa, leaving the flap pedicled on the opposite side.  The flap was then rotated and transferred to the lower lip defect.  The flap was then sutured into place with a three layer technique, closing the orbicularis oris muscle layer with subcutaneous buried sutures, followed by a mucosal layer and an epidermal layer.
Estlander Flap (Upper To Lower Lip) Text: The defect of the lower lip was assessed and measured.  Given the location and size of the defect, an Estlander flap was deemed most appropriate. Using a sterile surgical marker, an appropriate Estlander flap was measured and drawn on the upper lip. Local anesthesia was then infiltrated. A scalpel was then used to incise the lateral aspect of the flap, through skin, muscle and mucosa, leaving the flap pedicled medially.  The flap was then rotated and positioned to fill the lower lip defect.  The flap was then sutured into place with a three layer technique, closing the orbicularis oris muscle layer with subcutaneous buried sutures, followed by a mucosal layer and an epidermal layer.
Lip Wedge Excision Repair Text: Given the location of the defect and the proximity to free margins a full thickness wedge repair was deemed most appropriate.  Using a sterile surgical marker, the appropriate repair was drawn incorporating the defect and placing the expected incisions perpendicular to the vermilion border.  The vermilion border was also meticulously outlined to ensure appropriate reapproximation during the repair.  The area thus outlined was incised through and through with a #15 scalpel blade.  The muscularis and dermis were reaproximated with deep sutures following hemostasis. Care was taken to realign the vermilion border before proceeding with the superficial closure.  Once the vermilion was realigned the superfical and mucosal closure was finished.
Ftsg Text: The defect edges were debeveled with a #15 scalpel blade.  Given the location of the defect, shape of the defect and the proximity to free margins a full thickness skin graft was deemed most appropriate.  Using a sterile surgical marker, the primary defect shape was transferred to the donor site. The area thus outlined was incised deep to adipose tissue with a #15 scalpel blade.  The harvested graft was then trimmed of adipose tissue until only dermis and epidermis was left.  The skin margins of the secondary defect were undermined to an appropriate distance in all directions utilizing iris scissors.  The secondary defect was closed with interrupted buried subcutaneous sutures.  The skin edges were then re-apposed with running  sutures.  The skin graft was then placed in the primary defect and oriented appropriately.
Split-Thickness Skin Graft Text: The defect edges were debeveled with a #15 scalpel blade.  Given the location of the defect, shape of the defect and the proximity to free margins a split thickness skin graft was deemed most appropriate.  Using a sterile surgical marker, the primary defect shape was transferred to the donor site. The split thickness graft was then harvested.  The skin graft was then placed in the primary defect and oriented appropriately.
Pinch Graft Text: The defect edges were debeveled with a #15 scalpel blade. Given the location of the defect, shape of the defect and the proximity to free margins a pinch graft was deemed most appropriate. Using a sterile surgical marker, the primary defect shape was transferred to the donor site. The area thus outlined was incised deep to adipose tissue with a #15 scalpel blade.  The harvested graft was then trimmed of adipose tissue until only dermis and epidermis was left. The skin margins of the secondary defect were undermined to an appropriate distance in all directions utilizing iris scissors.  The secondary defect was closed with interrupted buried subcutaneous sutures.  The skin edges were then re-apposed with running  sutures.  The skin graft was then placed in the primary defect and oriented appropriately.
Burow's Graft Text: The defect edges were debeveled with a #15 scalpel blade.  Given the location of the defect, shape of the defect, the proximity to free margins and the presence of a standing cone deformity a Burow's skin graft was deemed most appropriate. The standing cone was removed and this tissue was then trimmed to the shape of the primary defect. The adipose tissue was also removed until only dermis and epidermis were left.  The skin margins of the secondary defect were undermined to an appropriate distance in all directions utilizing iris scissors.  The secondary defect was closed with interrupted buried subcutaneous sutures.  The skin edges were then re-apposed with running  sutures.  The skin graft was then placed in the primary defect and oriented appropriately.
Cartilage Graft Text: The defect edges were debeveled with a #15 scalpel blade.  Given the location of the defect, shape of the defect, the fact the defect involved a full thickness cartilage defect a cartilage graft was deemed most appropriate.  An appropriate donor site was identified, cleansed, and anesthetized. The cartilage graft was then harvested and transferred to the recipient site, oriented appropriately and then sutured into place.  The secondary defect was then repaired using a primary closure.
Composite Graft Text: The defect edges were debeveled with a #15 scalpel blade.  Given the location of the defect, shape of the defect, the proximity to free margins and the fact the defect was full thickness a composite graft was deemed most appropriate.  The defect was outline and then transferred to the donor site.  A full thickness graft was then excised from the donor site. The graft was then placed in the primary defect, oriented appropriately and then sutured into place.  The secondary defect was then repaired using a primary closure.
Epidermal Autograft Text: The defect edges were debeveled with a #15 scalpel blade.  Given the location of the defect, shape of the defect and the proximity to free margins an epidermal autograft was deemed most appropriate.  Using a sterile surgical marker, the primary defect shape was transferred to the donor site. The epidermal graft was then harvested.  The skin graft was then placed in the primary defect and oriented appropriately.
Dermal Autograft Text: The defect edges were debeveled with a #15 scalpel blade.  Given the location of the defect, shape of the defect and the proximity to free margins a dermal autograft was deemed most appropriate.  Using a sterile surgical marker, the primary defect shape was transferred to the donor site. The area thus outlined was incised deep to adipose tissue with a #15 scalpel blade.  The harvested graft was then trimmed of adipose and epidermal tissue until only dermis was left.  The skin graft was then placed in the primary defect and oriented appropriately.
Skin Substitute Text: The defect edges were debeveled with a #15 scalpel blade.  Given the location of the defect, shape of the defect and the proximity to free margins a skin substitute graft was deemed most appropriate.  The graft material was trimmed to fit the size of the defect. The graft was then placed in the primary defect and oriented appropriately.
Tissue Cultured Epidermal Autograft Text: The defect edges were debeveled with a #15 scalpel blade.  Given the location of the defect, shape of the defect and the proximity to free margins a tissue cultured epidermal autograft was deemed most appropriate.  The graft was then trimmed to fit the size of the defect.  The graft was then placed in the primary defect and oriented appropriately.
Xenograft Text: The defect edges were debeveled with a #15 scalpel blade.  Given the location of the defect, shape of the defect and the proximity to free margins a xenograft was deemed most appropriate.  The graft was then trimmed to fit the size of the defect.  The graft was then placed in the primary defect and oriented appropriately.
Purse String (Intermediate) Text: Given the location of the defect and the characteristics of the surrounding skin a purse string intermediate closure was deemed most appropriate.  Undermining was performed circumfirentially around the surgical defect.  A purse string suture was then placed and tightened.
Purse String (Simple) Text: Given the location of the defect and the characteristics of the surrounding skin a purse string simple closure was deemed most appropriate.  Undermining was performed circumferentially around the surgical defect.  A purse string suture was then placed and tightened.
Partial Purse String (Intermediate) Text: Given the location of the defect and the characteristics of the surrounding skin an intermediate purse string closure was deemed most appropriate.  Undermining was performed circumferentially around the surgical defect.  A purse string suture was then placed and tightened. Wound tension of the circular defect prevented complete closure of the wound.
Partial Purse String (Simple) Text: Given the location of the defect and the characteristics of the surrounding skin a simple purse string closure was deemed most appropriate.  Undermining was performed circumferentially around the surgical defect.  A purse string suture was then placed and tightened. Wound tension of the circular defect prevented complete closure of the wound.
Complex Repair And Single Advancement Flap Text: The defect edges were debeveled with a #15 scalpel blade.  The primary defect was closed partially with a complex linear closure.  Given the location of the remaining defect, shape of the defect and the proximity to free margins a single advancement flap was deemed most appropriate for complete closure of the defect.  Using a sterile surgical marker, an appropriate advancement flap was drawn incorporating the defect and placing the expected incisions within the relaxed skin tension lines where possible.    The area thus outlined was incised deep to adipose tissue with a #15 scalpel blade.  The skin margins were undermined to an appropriate distance in all directions utilizing iris scissors.
Complex Repair And Double Advancement Flap Text: The defect edges were debeveled with a #15 scalpel blade.  The primary defect was closed partially with a complex linear closure.  Given the location of the remaining defect, shape of the defect and the proximity to free margins a double advancement flap was deemed most appropriate for complete closure of the defect.  Using a sterile surgical marker, an appropriate advancement flap was drawn incorporating the defect and placing the expected incisions within the relaxed skin tension lines where possible.    The area thus outlined was incised deep to adipose tissue with a #15 scalpel blade.  The skin margins were undermined to an appropriate distance in all directions utilizing iris scissors.
Complex Repair And Modified Advancement Flap Text: The defect edges were debeveled with a #15 scalpel blade.  The primary defect was closed partially with a complex linear closure.  Given the location of the remaining defect, shape of the defect and the proximity to free margins a modified advancement flap was deemed most appropriate for complete closure of the defect.  Using a sterile surgical marker, an appropriate advancement flap was drawn incorporating the defect and placing the expected incisions within the relaxed skin tension lines where possible.    The area thus outlined was incised deep to adipose tissue with a #15 scalpel blade.  The skin margins were undermined to an appropriate distance in all directions utilizing iris scissors.
Complex Repair And A-T Advancement Flap Text: The defect edges were debeveled with a #15 scalpel blade.  The primary defect was closed partially with a complex linear closure.  Given the location of the remaining defect, shape of the defect and the proximity to free margins an A-T advancement flap was deemed most appropriate for complete closure of the defect.  Using a sterile surgical marker, an appropriate advancement flap was drawn incorporating the defect and placing the expected incisions within the relaxed skin tension lines where possible.    The area thus outlined was incised deep to adipose tissue with a #15 scalpel blade.  The skin margins were undermined to an appropriate distance in all directions utilizing iris scissors.
Complex Repair And O-T Advancement Flap Text: The defect edges were debeveled with a #15 scalpel blade.  The primary defect was closed partially with a complex linear closure.  Given the location of the remaining defect, shape of the defect and the proximity to free margins an O-T advancement flap was deemed most appropriate for complete closure of the defect.  Using a sterile surgical marker, an appropriate advancement flap was drawn incorporating the defect and placing the expected incisions within the relaxed skin tension lines where possible.    The area thus outlined was incised deep to adipose tissue with a #15 scalpel blade.  The skin margins were undermined to an appropriate distance in all directions utilizing iris scissors.
Complex Repair And O-L Flap Text: The defect edges were debeveled with a #15 scalpel blade.  The primary defect was closed partially with a complex linear closure.  Given the location of the remaining defect, shape of the defect and the proximity to free margins an O-L flap was deemed most appropriate for complete closure of the defect.  Using a sterile surgical marker, an appropriate flap was drawn incorporating the defect and placing the expected incisions within the relaxed skin tension lines where possible.    The area thus outlined was incised deep to adipose tissue with a #15 scalpel blade.  The skin margins were undermined to an appropriate distance in all directions utilizing iris scissors.
Complex Repair And Bilobe Flap Text: The defect edges were debeveled with a #15 scalpel blade.  The primary defect was closed partially with a complex linear closure.  Given the location of the remaining defect, shape of the defect and the proximity to free margins a bilobe flap was deemed most appropriate for complete closure of the defect.  Using a sterile surgical marker, an appropriate advancement flap was drawn incorporating the defect and placing the expected incisions within the relaxed skin tension lines where possible.    The area thus outlined was incised deep to adipose tissue with a #15 scalpel blade.  The skin margins were undermined to an appropriate distance in all directions utilizing iris scissors.
Complex Repair And Melolabial Flap Text: The defect edges were debeveled with a #15 scalpel blade.  The primary defect was closed partially with a complex linear closure.  Given the location of the remaining defect, shape of the defect and the proximity to free margins a melolabial flap was deemed most appropriate for complete closure of the defect.  Using a sterile surgical marker, an appropriate advancement flap was drawn incorporating the defect and placing the expected incisions within the relaxed skin tension lines where possible.    The area thus outlined was incised deep to adipose tissue with a #15 scalpel blade.  The skin margins were undermined to an appropriate distance in all directions utilizing iris scissors.
Complex Repair And Rotation Flap Text: The defect edges were debeveled with a #15 scalpel blade.  The primary defect was closed partially with a complex linear closure.  Given the location of the remaining defect, shape of the defect and the proximity to free margins a rotation flap was deemed most appropriate for complete closure of the defect.  Using a sterile surgical marker, an appropriate advancement flap was drawn incorporating the defect and placing the expected incisions within the relaxed skin tension lines where possible.    The area thus outlined was incised deep to adipose tissue with a #15 scalpel blade.  The skin margins were undermined to an appropriate distance in all directions utilizing iris scissors.
Complex Repair And Rhombic Flap Text: The defect edges were debeveled with a #15 scalpel blade.  The primary defect was closed partially with a complex linear closure.  Given the location of the remaining defect, shape of the defect and the proximity to free margins a rhombic flap was deemed most appropriate for complete closure of the defect.  Using a sterile surgical marker, an appropriate advancement flap was drawn incorporating the defect and placing the expected incisions within the relaxed skin tension lines where possible.    The area thus outlined was incised deep to adipose tissue with a #15 scalpel blade.  The skin margins were undermined to an appropriate distance in all directions utilizing iris scissors.
Complex Repair And Transposition Flap Text: The defect edges were debeveled with a #15 scalpel blade.  The primary defect was closed partially with a complex linear closure.  Given the location of the remaining defect, shape of the defect and the proximity to free margins a transposition flap was deemed most appropriate for complete closure of the defect.  Using a sterile surgical marker, an appropriate advancement flap was drawn incorporating the defect and placing the expected incisions within the relaxed skin tension lines where possible.    The area thus outlined was incised deep to adipose tissue with a #15 scalpel blade.  The skin margins were undermined to an appropriate distance in all directions utilizing iris scissors.
Complex Repair And V-Y Plasty Text: The defect edges were debeveled with a #15 scalpel blade.  The primary defect was closed partially with a complex linear closure.  Given the location of the remaining defect, shape of the defect and the proximity to free margins a V-Y plasty was deemed most appropriate for complete closure of the defect.  Using a sterile surgical marker, an appropriate advancement flap was drawn incorporating the defect and placing the expected incisions within the relaxed skin tension lines where possible.    The area thus outlined was incised deep to adipose tissue with a #15 scalpel blade.  The skin margins were undermined to an appropriate distance in all directions utilizing iris scissors.
Complex Repair And M Plasty Text: The defect edges were debeveled with a #15 scalpel blade.  The primary defect was closed partially with a complex linear closure.  Given the location of the remaining defect, shape of the defect and the proximity to free margins an M plasty was deemed most appropriate for complete closure of the defect.  Using a sterile surgical marker, an appropriate advancement flap was drawn incorporating the defect and placing the expected incisions within the relaxed skin tension lines where possible.    The area thus outlined was incised deep to adipose tissue with a #15 scalpel blade.  The skin margins were undermined to an appropriate distance in all directions utilizing iris scissors.
Complex Repair And Double M Plasty Text: The defect edges were debeveled with a #15 scalpel blade.  The primary defect was closed partially with a complex linear closure.  Given the location of the remaining defect, shape of the defect and the proximity to free margins a double M plasty was deemed most appropriate for complete closure of the defect.  Using a sterile surgical marker, an appropriate advancement flap was drawn incorporating the defect and placing the expected incisions within the relaxed skin tension lines where possible.    The area thus outlined was incised deep to adipose tissue with a #15 scalpel blade.  The skin margins were undermined to an appropriate distance in all directions utilizing iris scissors.
Complex Repair And W Plasty Text: The defect edges were debeveled with a #15 scalpel blade.  The primary defect was closed partially with a complex linear closure.  Given the location of the remaining defect, shape of the defect and the proximity to free margins a W plasty was deemed most appropriate for complete closure of the defect.  Using a sterile surgical marker, an appropriate advancement flap was drawn incorporating the defect and placing the expected incisions within the relaxed skin tension lines where possible.    The area thus outlined was incised deep to adipose tissue with a #15 scalpel blade.  The skin margins were undermined to an appropriate distance in all directions utilizing iris scissors.
Complex Repair And Z Plasty Text: The defect edges were debeveled with a #15 scalpel blade.  The primary defect was closed partially with a complex linear closure.  Given the location of the remaining defect, shape of the defect and the proximity to free margins a Z plasty was deemed most appropriate for complete closure of the defect.  Using a sterile surgical marker, an appropriate advancement flap was drawn incorporating the defect and placing the expected incisions within the relaxed skin tension lines where possible.    The area thus outlined was incised deep to adipose tissue with a #15 scalpel blade.  The skin margins were undermined to an appropriate distance in all directions utilizing iris scissors.
Complex Repair And Dorsal Nasal Flap Text: The defect edges were debeveled with a #15 scalpel blade.  The primary defect was closed partially with a complex linear closure.  Given the location of the remaining defect, shape of the defect and the proximity to free margins a dorsal nasal flap was deemed most appropriate for complete closure of the defect.  Using a sterile surgical marker, an appropriate flap was drawn incorporating the defect and placing the expected incisions within the relaxed skin tension lines where possible.    The area thus outlined was incised deep to adipose tissue with a #15 scalpel blade.  The skin margins were undermined to an appropriate distance in all directions utilizing iris scissors.
Complex Repair And Ftsg Text: The defect edges were debeveled with a #15 scalpel blade.  The primary defect was closed partially with a complex linear closure.  Given the location of the defect, shape of the defect and the proximity to free margins a full thickness skin graft was deemed most appropriate to repair the remaining defect.  The graft was trimmed to fit the size of the remaining defect.  The graft was then placed in the primary defect, oriented appropriately, and sutured into place.
Complex Repair And Burow's Graft Text: The defect edges were debeveled with a #15 scalpel blade.  The primary defect was closed partially with a complex linear closure.  Given the location of the defect, shape of the defect, the proximity to free margins and the presence of a standing cone deformity a Burow's graft was deemed most appropriate to repair the remaining defect.  The graft was trimmed to fit the size of the remaining defect.  The graft was then placed in the primary defect, oriented appropriately, and sutured into place.
Complex Repair And Split-Thickness Skin Graft Text: The defect edges were debeveled with a #15 scalpel blade.  The primary defect was closed partially with a complex linear closure.  Given the location of the defect, shape of the defect and the proximity to free margins a split thickness skin graft was deemed most appropriate to repair the remaining defect.  The graft was trimmed to fit the size of the remaining defect.  The graft was then placed in the primary defect, oriented appropriately, and sutured into place.
Complex Repair And Epidermal Autograft Text: The defect edges were debeveled with a #15 scalpel blade.  The primary defect was closed partially with a complex linear closure.  Given the location of the defect, shape of the defect and the proximity to free margins an epidermal autograft was deemed most appropriate to repair the remaining defect.  The graft was trimmed to fit the size of the remaining defect.  The graft was then placed in the primary defect, oriented appropriately, and sutured into place.
Complex Repair And Dermal Autograft Text: The defect edges were debeveled with a #15 scalpel blade.  The primary defect was closed partially with a complex linear closure.  Given the location of the defect, shape of the defect and the proximity to free margins an dermal autograft was deemed most appropriate to repair the remaining defect.  The graft was trimmed to fit the size of the remaining defect.  The graft was then placed in the primary defect, oriented appropriately, and sutured into place.
Complex Repair And Tissue Cultured Epidermal Autograft Text: The defect edges were debeveled with a #15 scalpel blade.  The primary defect was closed partially with a complex linear closure.  Given the location of the defect, shape of the defect and the proximity to free margins an tissue cultured epidermal autograft was deemed most appropriate to repair the remaining defect.  The graft was trimmed to fit the size of the remaining defect.  The graft was then placed in the primary defect, oriented appropriately, and sutured into place.
Complex Repair And Xenograft Text: The defect edges were debeveled with a #15 scalpel blade.  The primary defect was closed partially with a complex linear closure.  Given the location of the defect, shape of the defect and the proximity to free margins a xenograft was deemed most appropriate to repair the remaining defect.  The graft was trimmed to fit the size of the remaining defect.  The graft was then placed in the primary defect, oriented appropriately, and sutured into place.
Complex Repair And Skin Substitute Graft Text: The defect edges were debeveled with a #15 scalpel blade.  The primary defect was closed partially with a complex linear closure.  Given the location of the remaining defect, shape of the defect and the proximity to free margins a skin substitute graft was deemed most appropriate to repair the remaining defect.  The graft was trimmed to fit the size of the remaining defect.  The graft was then placed in the primary defect, oriented appropriately, and sutured into place.
Path Notes (To The Dermatopathologist): Please check margins.
Consent was obtained from the patient. The risks and benefits to therapy were discussed in detail. Specifically, the risks of infection, scarring, bleeding, prolonged wound healing, incomplete removal, allergy to anesthesia, nerve injury and recurrence were addressed. Prior to the procedure, the treatment site was clearly identified and confirmed by the patient.
Render Post-Care Instructions In Note?: yes
Post-Care Instructions: I reviewed with the patient in detail post-care instructions. Patient is not to engage in any heavy lifting, exercise, or swimming for the next 14 days. Should the patient develop any fevers, chills, bleeding, severe pain patient will contact the office immediately.
Home Suture Removal Text: Patient was provided a home suture removal kit and will remove their sutures at home.  If they have any questions or difficulties they will call the office.
Where Do You Want The Question To Include Opioid Counseling Located?: Case Summary Tab
Information: Selecting Yes will display possible errors in your note based on the variables you have selected. This validation is only offered as a suggestion for you. PLEASE NOTE THAT THE VALIDATION TEXT WILL BE REMOVED WHEN YOU FINALIZE YOUR NOTE. IF YOU WANT TO FAX A PRELIMINARY NOTE YOU WILL NEED TO TOGGLE THIS TO 'NO' IF YOU DO NOT WANT IT IN YOUR FAXED NOTE.

## 2023-10-31 NOTE — PROCEDURE: CURETTAGE AND DESTRUCTION
Body Location Override (Optional - Billing Will Still Be Based On Selected Body Map Location If Applicable): right distal calf
Detail Level: Detailed
Number Of Curettages: 3
Size Of Lesion In Cm: 1.1
Size Of Lesion After Curettage: 4
Add Intralesional Injection: No
Concentration (Mg/Ml Or Millions Of Plaque Forming Units/Cc): 0.01
Total Volume (Ccs): 1
Anesthesia Type: 1% lidocaine with epinephrine
Cautery Type: electrodesiccation
What Was Performed First?: Curettage
Final Size Statement: The size of the lesion after curettage was
Additional Information: (Optional): The wound was cleaned, and a pressure dressing was applied.  The patient received detailed post-op instructions.
Consent was obtained from the patient. The risks, benefits and alternatives to therapy were discussed in detail. Specifically, the risks of infection, scarring, bleeding, prolonged wound healing, nerve injury, incomplete removal, allergy to anesthesia and recurrence were addressed. Alternatives to ED&C, such as: surgical removal and XRT were also discussed.  Prior to the procedure, the treatment site was clearly identified and confirmed by the patient.
Post-Care Instructions: I reviewed with the patient in detail post-care instructions. Patient is to keep the area dry for 48 hours, and not to engage in any swimming until the area is healed. Should the patient develop any fevers, chills, bleeding, severe pain patient will contact the office immediately.
Bill As A Line Item Or As Units: Line Item

## 2023-11-06 DIAGNOSIS — I10 ESSENTIAL (PRIMARY) HYPERTENSION: ICD-10-CM

## 2023-11-06 DIAGNOSIS — I25.10 CAD (CORONARY ARTERY DISEASE): ICD-10-CM

## 2023-11-06 DIAGNOSIS — E78.5 DYSLIPIDEMIA: Primary | ICD-10-CM

## 2023-11-06 NOTE — TELEPHONE ENCOUNTER
Pt is returning your call ,please call pt back Mart-1 - Positive Histology Text: MART-1 staining demonstrates areas of higher density and clustering of melanocytes with Pagetoid spread upwards within the epidermis. The surgical margins are positive for tumor cells.

## 2023-11-06 NOTE — TELEPHONE ENCOUNTER
Spoke with pt, yearly appt scheduled. Pt will either get lipid done with us or PCP.          Requested Prescriptions     Pending Prescriptions Disp Refills    lisinopril (PRINIVIL;ZESTRIL) 10 MG tablet [Pharmacy Med Name: LISINOPRIL 10 MG TAB[*]] 90 tablet 3     Sig: TAKE ONE TABLET BY MOUTH ONE TIME DAILY    atorvastatin (LIPITOR) 40 MG tablet 90 tablet 3     Sig: Take 1 tablet by mouth daily    metoprolol succinate (TOPROL XL) 25 MG extended release tablet 90 tablet 3     Sig: TAKE ONE TABLET BY MOUTH ONE TIME DAILY FOR HIGH BLOOD PRESSURE    rivaroxaban (XARELTO) 20 MG TABS tablet 90 tablet 3     Sig: TAKE ONE TABLET BY MOUTH ONE TIME DAILY WITH LUNCH    verapamil (CALAN SR) 180 MG extended release tablet 90 tablet 3     Sig: TAKE ONE TABLET BY MOUTH ONE TIME DAILY FOR HIGH BLOOD PRESSURE

## 2023-11-07 ENCOUNTER — APPOINTMENT (RX ONLY)
Dept: URBAN - METROPOLITAN AREA CLINIC 329 | Facility: CLINIC | Age: 68
Setting detail: DERMATOLOGY
End: 2023-11-07

## 2023-11-07 DIAGNOSIS — Z48.02 ENCOUNTER FOR REMOVAL OF SUTURES: ICD-10-CM

## 2023-11-07 DIAGNOSIS — S31000A OPEN WOUND(S) (MULTIPLE) OF UNSPECIFIED SITE(S), WITHOUT MENTION OF COMPLICATION: ICD-10-CM

## 2023-11-07 PROBLEM — S81.801A UNSPECIFIED OPEN WOUND, RIGHT LOWER LEG, INITIAL ENCOUNTER: Status: ACTIVE | Noted: 2023-11-07

## 2023-11-07 PROCEDURE — ? FULL BODY SKIN EXAM - DECLINED

## 2023-11-07 PROCEDURE — ? PHOTO-DOCUMENTATION

## 2023-11-07 PROCEDURE — ? PRESCRIPTION

## 2023-11-07 PROCEDURE — ? COUNSELING

## 2023-11-07 PROCEDURE — 99024 POSTOP FOLLOW-UP VISIT: CPT

## 2023-11-07 PROCEDURE — ? SUTURE REMOVAL (GLOBAL PERIOD)

## 2023-11-07 RX ORDER — CEPHALEXIN 500 MG/1
TABLET ORAL
Qty: 14 | Refills: 0 | Status: ERX | COMMUNITY
Start: 2023-11-07

## 2023-11-07 RX ORDER — METOPROLOL SUCCINATE 25 MG/1
TABLET, EXTENDED RELEASE ORAL
Qty: 90 TABLET | Refills: 3 | Status: SHIPPED | OUTPATIENT
Start: 2023-11-07

## 2023-11-07 RX ORDER — MUPIROCIN 20 MG/G
OINTMENT TOPICAL
Qty: 22 | Refills: 1 | Status: ERX | COMMUNITY
Start: 2023-11-07

## 2023-11-07 RX ORDER — LISINOPRIL 10 MG/1
TABLET ORAL
Qty: 90 TABLET | Refills: 3 | Status: SHIPPED | OUTPATIENT
Start: 2023-11-07

## 2023-11-07 RX ORDER — ATORVASTATIN CALCIUM 40 MG/1
40 TABLET, FILM COATED ORAL DAILY
Qty: 90 TABLET | Refills: 3 | Status: SHIPPED | OUTPATIENT
Start: 2023-11-07

## 2023-11-07 RX ADMIN — MUPIROCIN: 20 OINTMENT TOPICAL at 00:00

## 2023-11-07 RX ADMIN — CEPHALEXIN: 500 TABLET ORAL at 00:00

## 2023-11-07 ASSESSMENT — LOCATION ZONE DERM
LOCATION ZONE: LEG
LOCATION ZONE: FACE

## 2023-11-07 ASSESSMENT — LOCATION SIMPLE DESCRIPTION DERM
LOCATION SIMPLE: RIGHT CHEEK
LOCATION SIMPLE: RIGHT CALF

## 2023-11-07 ASSESSMENT — LOCATION DETAILED DESCRIPTION DERM
LOCATION DETAILED: RIGHT PROXIMAL CALF
LOCATION DETAILED: RIGHT SUPERIOR LATERAL BUCCAL CHEEK

## 2023-11-07 NOTE — PROCEDURE: SUTURE REMOVAL (GLOBAL PERIOD)
Detail Level: Detailed
Add 45267 Cpt? (Important Note: In 2017 The Use Of 23141 Is Being Tracked By Cms To Determine Future Global Period Reimbursement For Global Periods): yes

## 2023-11-18 DIAGNOSIS — I49.9 CARDIAC DYSRHYTHMIA: ICD-10-CM

## 2023-11-20 RX ORDER — FLECAINIDE ACETATE 100 MG/1
TABLET ORAL
Qty: 180 TABLET | Refills: 1 | Status: SHIPPED | OUTPATIENT
Start: 2023-11-20

## 2023-11-28 RX ORDER — METOPROLOL SUCCINATE 25 MG/1
TABLET, EXTENDED RELEASE ORAL
Qty: 90 TABLET | Refills: 0 | Status: SHIPPED | OUTPATIENT
Start: 2023-11-28

## 2023-12-23 RX ORDER — ATORVASTATIN CALCIUM 40 MG/1
40 TABLET, FILM COATED ORAL DAILY
Qty: 90 TABLET | Refills: 3 | OUTPATIENT
Start: 2023-12-23

## 2023-12-29 DIAGNOSIS — E11.9 TYPE 2 DIABETES MELLITUS WITHOUT COMPLICATION, WITH LONG-TERM CURRENT USE OF INSULIN (HCC): ICD-10-CM

## 2023-12-29 DIAGNOSIS — E78.5 DYSLIPIDEMIA: ICD-10-CM

## 2023-12-29 DIAGNOSIS — Z79.4 TYPE 2 DIABETES MELLITUS WITHOUT COMPLICATION, WITH LONG-TERM CURRENT USE OF INSULIN (HCC): ICD-10-CM

## 2023-12-29 DIAGNOSIS — I10 ESSENTIAL HYPERTENSION: ICD-10-CM

## 2023-12-29 DIAGNOSIS — Z00.00 ANNUAL PHYSICAL EXAM: Primary | ICD-10-CM

## 2023-12-29 DIAGNOSIS — Z12.5 SCREENING PSA (PROSTATE SPECIFIC ANTIGEN): ICD-10-CM

## 2024-01-03 ENCOUNTER — NURSE ONLY (OUTPATIENT)
Dept: FAMILY MEDICINE CLINIC | Facility: CLINIC | Age: 69
End: 2024-01-03

## 2024-01-03 DIAGNOSIS — Z12.5 SCREENING PSA (PROSTATE SPECIFIC ANTIGEN): ICD-10-CM

## 2024-01-03 DIAGNOSIS — E11.9 TYPE 2 DIABETES MELLITUS WITHOUT COMPLICATION, WITH LONG-TERM CURRENT USE OF INSULIN (HCC): ICD-10-CM

## 2024-01-03 DIAGNOSIS — E78.5 DYSLIPIDEMIA: ICD-10-CM

## 2024-01-03 DIAGNOSIS — Z79.4 TYPE 2 DIABETES MELLITUS WITHOUT COMPLICATION, WITH LONG-TERM CURRENT USE OF INSULIN (HCC): ICD-10-CM

## 2024-01-03 DIAGNOSIS — I10 ESSENTIAL HYPERTENSION: ICD-10-CM

## 2024-01-03 DIAGNOSIS — Z00.00 ANNUAL PHYSICAL EXAM: ICD-10-CM

## 2024-01-03 LAB
ALBUMIN SERPL-MCNC: 4.3 G/DL (ref 3.2–4.6)
ALBUMIN/GLOB SERPL: 1.5 (ref 0.4–1.6)
ALP SERPL-CCNC: 84 U/L (ref 50–136)
ALT SERPL-CCNC: 46 U/L (ref 12–65)
ANION GAP SERPL CALC-SCNC: 5 MMOL/L (ref 2–11)
APPEARANCE UR: CLEAR
AST SERPL-CCNC: 24 U/L (ref 15–37)
BACTERIA URNS QL MICRO: NEGATIVE /HPF
BASOPHILS # BLD: 0.1 K/UL (ref 0–0.2)
BASOPHILS NFR BLD: 1 % (ref 0–2)
BILIRUB SERPL-MCNC: 0.8 MG/DL (ref 0.2–1.1)
BILIRUB UR QL: NEGATIVE
BUN SERPL-MCNC: 13 MG/DL (ref 8–23)
CALCIUM SERPL-MCNC: 9.2 MG/DL (ref 8.3–10.4)
CASTS URNS QL MICRO: NORMAL /LPF (ref 0–2)
CHLORIDE SERPL-SCNC: 102 MMOL/L (ref 103–113)
CHOLEST SERPL-MCNC: 131 MG/DL
CO2 SERPL-SCNC: 30 MMOL/L (ref 21–32)
COLOR UR: NORMAL
CREAT SERPL-MCNC: 1.1 MG/DL (ref 0.8–1.5)
DIFFERENTIAL METHOD BLD: NORMAL
EOSINOPHIL # BLD: 0.2 K/UL (ref 0–0.8)
EOSINOPHIL NFR BLD: 2 % (ref 0.5–7.8)
EPI CELLS #/AREA URNS HPF: NORMAL /HPF (ref 0–5)
ERYTHROCYTE [DISTWIDTH] IN BLOOD BY AUTOMATED COUNT: 12.5 % (ref 11.9–14.6)
GLOBULIN SER CALC-MCNC: 2.9 G/DL (ref 2.8–4.5)
GLUCOSE SERPL-MCNC: 110 MG/DL (ref 65–100)
GLUCOSE UR STRIP.AUTO-MCNC: NEGATIVE MG/DL
HCT VFR BLD AUTO: 46.8 % (ref 41.1–50.3)
HDLC SERPL-MCNC: 48 MG/DL (ref 40–60)
HDLC SERPL: 2.7
HGB BLD-MCNC: 16 G/DL (ref 13.6–17.2)
HGB UR QL STRIP: NEGATIVE
IMM GRANULOCYTES # BLD AUTO: 0 K/UL (ref 0–0.5)
IMM GRANULOCYTES NFR BLD AUTO: 0 % (ref 0–5)
KETONES UR QL STRIP.AUTO: NEGATIVE MG/DL
LDLC SERPL CALC-MCNC: 52.8 MG/DL
LEUKOCYTE ESTERASE UR QL STRIP.AUTO: NEGATIVE
LYMPHOCYTES # BLD: 2.6 K/UL (ref 0.5–4.6)
LYMPHOCYTES NFR BLD: 36 % (ref 13–44)
MCH RBC QN AUTO: 30.5 PG (ref 26.1–32.9)
MCHC RBC AUTO-ENTMCNC: 34.2 G/DL (ref 31.4–35)
MCV RBC AUTO: 89.1 FL (ref 82–102)
MONOCYTES # BLD: 0.7 K/UL (ref 0.1–1.3)
MONOCYTES NFR BLD: 10 % (ref 4–12)
MUCOUS THREADS URNS QL MICRO: 0 /LPF
NEUTS SEG # BLD: 3.6 K/UL (ref 1.7–8.2)
NEUTS SEG NFR BLD: 51 % (ref 43–78)
NITRITE UR QL STRIP.AUTO: NEGATIVE
NRBC # BLD: 0 K/UL (ref 0–0.2)
PH UR STRIP: 6 (ref 5–9)
PLATELET # BLD AUTO: 255 K/UL (ref 150–450)
PMV BLD AUTO: 9.4 FL (ref 9.4–12.3)
POTASSIUM SERPL-SCNC: 4.2 MMOL/L (ref 3.5–5.1)
PROT SERPL-MCNC: 7.2 G/DL (ref 6.3–8.2)
PROT UR STRIP-MCNC: NEGATIVE MG/DL
PSA SERPL-MCNC: 5.1 NG/ML
RBC # BLD AUTO: 5.25 M/UL (ref 4.23–5.6)
RBC #/AREA URNS HPF: NORMAL /HPF (ref 0–5)
SODIUM SERPL-SCNC: 137 MMOL/L (ref 136–146)
SP GR UR REFRACTOMETRY: 1.02 (ref 1–1.02)
TRIGL SERPL-MCNC: 151 MG/DL (ref 35–150)
TSH, 3RD GENERATION: 2.26 UIU/ML (ref 0.36–3.74)
URINE CULTURE IF INDICATED: NORMAL
UROBILINOGEN UR QL STRIP.AUTO: 0.2 EU/DL (ref 0.2–1)
VLDLC SERPL CALC-MCNC: 30.2 MG/DL (ref 6–23)
WBC # BLD AUTO: 7.2 K/UL (ref 4.3–11.1)
WBC URNS QL MICRO: NORMAL /HPF (ref 0–4)

## 2024-01-04 LAB
EST. AVERAGE GLUCOSE BLD GHB EST-MCNC: 194 MG/DL
HBA1C MFR BLD: 8.4 % (ref 4.8–5.6)

## 2024-01-08 ENCOUNTER — OFFICE VISIT (OUTPATIENT)
Dept: FAMILY MEDICINE CLINIC | Facility: CLINIC | Age: 69
End: 2024-01-08
Payer: COMMERCIAL

## 2024-01-08 VITALS
WEIGHT: 280 LBS | BODY MASS INDEX: 33.06 KG/M2 | HEIGHT: 77 IN | HEART RATE: 70 BPM | OXYGEN SATURATION: 96 % | TEMPERATURE: 97 F | DIASTOLIC BLOOD PRESSURE: 76 MMHG | SYSTOLIC BLOOD PRESSURE: 132 MMHG

## 2024-01-08 DIAGNOSIS — I48.0 PAF (PAROXYSMAL ATRIAL FIBRILLATION) (HCC): ICD-10-CM

## 2024-01-08 DIAGNOSIS — F33.9 RECURRENT DEPRESSION (HCC): ICD-10-CM

## 2024-01-08 DIAGNOSIS — C61 PROSTATE CANCER (HCC): ICD-10-CM

## 2024-01-08 DIAGNOSIS — E11.65 TYPE 2 DIABETES MELLITUS WITH HYPERGLYCEMIA, WITHOUT LONG-TERM CURRENT USE OF INSULIN (HCC): ICD-10-CM

## 2024-01-08 DIAGNOSIS — E13.69 OTHER SPECIFIED DIABETES MELLITUS WITH OTHER SPECIFIED COMPLICATION, UNSPECIFIED WHETHER LONG TERM INSULIN USE (HCC): ICD-10-CM

## 2024-01-08 DIAGNOSIS — G47.00 INSOMNIA, UNSPECIFIED TYPE: ICD-10-CM

## 2024-01-08 DIAGNOSIS — Z85.819 HISTORY OF CANCER OF BUCCAL MUCOSA: Primary | ICD-10-CM

## 2024-01-08 DIAGNOSIS — E04.2 MULTIPLE THYROID NODULES: ICD-10-CM

## 2024-01-08 DIAGNOSIS — Z00.00 ANNUAL PHYSICAL EXAM: ICD-10-CM

## 2024-01-08 PROCEDURE — 3075F SYST BP GE 130 - 139MM HG: CPT | Performed by: FAMILY MEDICINE

## 2024-01-08 PROCEDURE — 99397 PER PM REEVAL EST PAT 65+ YR: CPT | Performed by: FAMILY MEDICINE

## 2024-01-08 PROCEDURE — 3078F DIAST BP <80 MM HG: CPT | Performed by: FAMILY MEDICINE

## 2024-01-08 ASSESSMENT — PATIENT HEALTH QUESTIONNAIRE - PHQ9
2. FEELING DOWN, DEPRESSED OR HOPELESS: NOT AT ALL
3. TROUBLE FALLING OR STAYING ASLEEP: 2
6. FEELING BAD ABOUT YOURSELF - OR THAT YOU ARE A FAILURE OR HAVE LET YOURSELF OR YOUR FAMILY DOWN: NOT AT ALL
9. THOUGHTS THAT YOU WOULD BE BETTER OFF DEAD, OR OF HURTING YOURSELF: 0
1. LITTLE INTEREST OR PLEASURE IN DOING THINGS: NOT AT ALL
10. IF YOU CHECKED OFF ANY PROBLEMS, HOW DIFFICULT HAVE THESE PROBLEMS MADE IT FOR YOU TO DO YOUR WORK, TAKE CARE OF THINGS AT HOME, OR GET ALONG WITH OTHER PEOPLE: NOT DIFFICULT AT ALL
5. POOR APPETITE OR OVEREATING: NOT AT ALL
9. THOUGHTS THAT YOU WOULD BE BETTER OFF DEAD, OR OF HURTING YOURSELF: NOT AT ALL
6. FEELING BAD ABOUT YOURSELF - OR THAT YOU ARE A FAILURE OR HAVE LET YOURSELF OR YOUR FAMILY DOWN: 0
7. TROUBLE CONCENTRATING ON THINGS, SUCH AS READING THE NEWSPAPER OR WATCHING TELEVISION: 0
7. TROUBLE CONCENTRATING ON THINGS, SUCH AS READING THE NEWSPAPER OR WATCHING TELEVISION: NOT AT ALL
SUM OF ALL RESPONSES TO PHQ QUESTIONS 1-9: 4
1. LITTLE INTEREST OR PLEASURE IN DOING THINGS: 0
SUM OF ALL RESPONSES TO PHQ QUESTIONS 1-9: 4
8. MOVING OR SPEAKING SO SLOWLY THAT OTHER PEOPLE COULD HAVE NOTICED. OR THE OPPOSITE, BEING SO FIGETY OR RESTLESS THAT YOU HAVE BEEN MOVING AROUND A LOT MORE THAN USUAL: 0
SUM OF ALL RESPONSES TO PHQ QUESTIONS 1-9: 4
SUM OF ALL RESPONSES TO PHQ QUESTIONS 1-9: 4
2. FEELING DOWN, DEPRESSED OR HOPELESS: 0
5. POOR APPETITE OR OVEREATING: 0
SUM OF ALL RESPONSES TO PHQ9 QUESTIONS 1 & 2: 0
10. IF YOU CHECKED OFF ANY PROBLEMS, HOW DIFFICULT HAVE THESE PROBLEMS MADE IT FOR YOU TO DO YOUR WORK, TAKE CARE OF THINGS AT HOME, OR GET ALONG WITH OTHER PEOPLE: 0
4. FEELING TIRED OR HAVING LITTLE ENERGY: 2
8. MOVING OR SPEAKING SO SLOWLY THAT OTHER PEOPLE COULD HAVE NOTICED. OR THE OPPOSITE - BEING SO FIDGETY OR RESTLESS THAT YOU HAVE BEEN MOVING AROUND A LOT MORE THAN USUAL: NOT AT ALL
4. FEELING TIRED OR HAVING LITTLE ENERGY: MORE THAN HALF THE DAYS
SUM OF ALL RESPONSES TO PHQ QUESTIONS 1-9: 4
3. TROUBLE FALLING OR STAYING ASLEEP: MORE THAN HALF THE DAYS

## 2024-01-09 RX ORDER — MONTELUKAST SODIUM 10 MG/1
10 TABLET ORAL DAILY
Qty: 30 TABLET | Refills: 5 | Status: SHIPPED | OUTPATIENT
Start: 2024-01-09

## 2024-01-16 ENCOUNTER — OFFICE VISIT (OUTPATIENT)
Age: 69
End: 2024-01-16
Payer: COMMERCIAL

## 2024-01-16 VITALS
SYSTOLIC BLOOD PRESSURE: 120 MMHG | BODY MASS INDEX: 32.35 KG/M2 | HEIGHT: 77 IN | DIASTOLIC BLOOD PRESSURE: 70 MMHG | HEART RATE: 53 BPM | WEIGHT: 274 LBS

## 2024-01-16 DIAGNOSIS — I10 PRIMARY HYPERTENSION: ICD-10-CM

## 2024-01-16 DIAGNOSIS — I48.0 PAROXYSMAL ATRIAL FIBRILLATION (HCC): Primary | ICD-10-CM

## 2024-01-16 PROBLEM — E13.69 OTHER SPECIFIED DIABETES MELLITUS WITH OTHER SPECIFIED COMPLICATION, UNSPECIFIED WHETHER LONG TERM INSULIN USE (HCC): Status: ACTIVE | Noted: 2024-01-16

## 2024-01-16 PROBLEM — C61 PROSTATE CANCER (HCC): Status: ACTIVE | Noted: 2024-01-16

## 2024-01-16 PROCEDURE — 3074F SYST BP LT 130 MM HG: CPT | Performed by: INTERNAL MEDICINE

## 2024-01-16 PROCEDURE — 3078F DIAST BP <80 MM HG: CPT | Performed by: INTERNAL MEDICINE

## 2024-01-16 PROCEDURE — 1123F ACP DISCUSS/DSCN MKR DOCD: CPT | Performed by: INTERNAL MEDICINE

## 2024-01-16 PROCEDURE — 99214 OFFICE O/P EST MOD 30 MIN: CPT | Performed by: INTERNAL MEDICINE

## 2024-01-16 PROCEDURE — 93000 ELECTROCARDIOGRAM COMPLETE: CPT | Performed by: INTERNAL MEDICINE

## 2024-01-16 RX ORDER — METOPROLOL SUCCINATE 25 MG/1
TABLET, EXTENDED RELEASE ORAL
Qty: 90 TABLET | Refills: 3 | Status: SHIPPED | OUTPATIENT
Start: 2024-01-16

## 2024-01-16 RX ORDER — FLECAINIDE ACETATE 100 MG/1
TABLET ORAL
Qty: 180 TABLET | Refills: 3 | Status: SHIPPED | OUTPATIENT
Start: 2024-01-16

## 2024-01-16 ASSESSMENT — ENCOUNTER SYMPTOMS
EYES NEGATIVE: 1
GASTROINTESTINAL NEGATIVE: 1
RESPIRATORY NEGATIVE: 1

## 2024-01-16 NOTE — PROGRESS NOTES
to 500 mg/dL      HDL 01/03/2024 48  40 - 60 MG/DL Final    LDL Calculated 01/03/2024 52.8  <100 MG/DL Final    Comment: Near Optimal: 100-129 mg/dL  Borderline High: 130-159, High: 160-189 mg/dL  Very High: Greater than or equal to 190 mg/dL      VLDL Cholesterol Calculated 01/03/2024 30.2 (H)  6.0 - 23.0 MG/DL Final    Chol/HDL Ratio 01/03/2024 2.7    Final    Sodium 01/03/2024 137  136 - 146 mmol/L Final    Potassium 01/03/2024 4.2  3.5 - 5.1 mmol/L Final    Chloride 01/03/2024 102 (L)  103 - 113 mmol/L Final    CO2 01/03/2024 30  21 - 32 mmol/L Final    Anion Gap 01/03/2024 5  2 - 11 mmol/L Final    Glucose 01/03/2024 110 (H)  65 - 100 mg/dL Final    BUN 01/03/2024 13  8 - 23 MG/DL Final    Creatinine 01/03/2024 1.10  0.8 - 1.5 MG/DL Final    Est, Glom Filt Rate 01/03/2024 >60  >60 ml/min/1.73m2 Final    Comment:   Pediatric calculator link: https://www.kidney.org/professionals/kdoqi/gfr_calculatorped    These results are not intended for use in patients <18 years of age.    eGFR results are calculated without a race factor using  the 2021 CKD-EPI equation. Careful clinical correlation is recommended, particularly when comparing to results calculated using previous equations.  The CKD-EPI equation is less accurate in patients with extremes of muscle mass, extra-renal metabolism of creatinine, excessive creatine ingestion, or following therapy that affects renal tubular secretion.      Calcium 01/03/2024 9.2  8.3 - 10.4 MG/DL Final    Total Bilirubin 01/03/2024 0.8  0.2 - 1.1 MG/DL Final    ALT 01/03/2024 46  12 - 65 U/L Final    AST 01/03/2024 24  15 - 37 U/L Final    Alk Phosphatase 01/03/2024 84  50 - 136 U/L Final    Total Protein 01/03/2024 7.2  6.3 - 8.2 g/dL Final    Albumin 01/03/2024 4.3  3.2 - 4.6 g/dL Final    Globulin 01/03/2024 2.9  2.8 - 4.5 g/dL Final    Albumin/Globulin Ratio 01/03/2024 1.5  0.4 - 1.6   Final    WBC 01/03/2024 7.2  4.3 - 11.1 K/uL Final    RBC 01/03/2024 5.25  4.23 - 5.6 M/uL

## 2024-01-16 NOTE — PROGRESS NOTES
[facility] CARDIOLOGY  [address] [place] [address], [address]  [place] [address]  PHONE: [phone]        24        NAME:  Victor Hugo Reed  : 1955  MRN: [id number]     1. Paroxysmal atrial fibrillation (HCC)  2. Essential hypertension  3. Type 2 diabetes mellitus without complication, without long-term current use of insulin (HCC)  4. Dyslipidemia     CHIEF COMPLAINT:    Hypertension and Atrial Fibrillation      SUBJECTIVE:     No chest pain or palpitation or dizziness.       Medications were all reviewed with the patient today and updated as necessary.   Current Outpatient Medications   Medication Sig    flecainide (TAMBOCOR) 100 MG tablet TAKE ONE TABLET BY MOUTH TWICE A DAY TO PREVENT RECURRENT AFIB    metoprolol succinate (TOPROL XL) 25 MG extended release tablet TAKE ONE TABLET BY MOUTH ONE TIME DAILY FOR HIGH BLOOD PRESSURE    montelukast (SINGULAIR) 10 MG tablet Take 1 tablet by mouth daily    lisinopril (PRINIVIL;ZESTRIL) 10 MG tablet TAKE ONE TABLET BY MOUTH ONE TIME DAILY    atorvastatin (LIPITOR) 40 MG tablet Take 1 tablet by mouth daily    rivaroxaban (XARELTO) 20 MG TABS tablet TAKE ONE TABLET BY MOUTH ONE TIME DAILY WITH LUNCH    verapamil (CALAN SR) 180 MG extended release tablet TAKE ONE TABLET BY MOUTH ONE TIME DAILY FOR HIGH BLOOD PRESSURE    glimepiride (AMARYL) 4 MG tablet TAKE ONE-HALF TABLET BY MOUTH EVERY MORNING AND TAKE ONE-HALF TABLET BY MOUTH EVERY EVENING    metFORMIN (GLUCOPHAGE-XR) 500 MG extended release tablet Take 1 tablet by mouth 2 times daily    valACYclovir (VALTREX) 500 MG tablet TAKE ONE TABLET BY MOUTH ONE TIME DAILY    traZODone (DESYREL) 50 MG tablet Take 1 tablet by mouth nightly    Multiple Vitamins-Minerals (MULTIVITAMIN ADULTS PO) Take by mouth daily    Omega-3 Fatty Acids (FISH OIL PO) Take by mouth daily    Coenzyme Q10 10 MG CAPS Take by mouth at bedtime     NIACIN PO Take by mouth daily     No current facility-administered medications for this

## 2024-01-30 DIAGNOSIS — C61 PROSTATE CANCER (HCC): Primary | ICD-10-CM

## 2024-01-31 DIAGNOSIS — B00.9 HSV INFECTION: ICD-10-CM

## 2024-01-31 RX ORDER — VALACYCLOVIR HYDROCHLORIDE 500 MG/1
TABLET, FILM COATED ORAL
Qty: 90 TABLET | Refills: 1 | OUTPATIENT
Start: 2024-01-31

## 2024-02-05 ENCOUNTER — HOSPITAL ENCOUNTER (OUTPATIENT)
Dept: LAB | Age: 69
Discharge: HOME | End: 2024-02-08
Payer: COMMERCIAL

## 2024-02-05 ENCOUNTER — OFFICE VISIT (OUTPATIENT)
Dept: ONCOLOGY | Age: 69
End: 2024-02-05
Payer: COMMERCIAL

## 2024-02-05 VITALS
DIASTOLIC BLOOD PRESSURE: 81 MMHG | OXYGEN SATURATION: 98 % | SYSTOLIC BLOOD PRESSURE: 134 MMHG | RESPIRATION RATE: 18 BRPM | TEMPERATURE: 97.7 F | BODY MASS INDEX: 32.35 KG/M2 | HEIGHT: 77 IN | WEIGHT: 274 LBS

## 2024-02-05 DIAGNOSIS — C61 PROSTATE CANCER (HCC): ICD-10-CM

## 2024-02-05 DIAGNOSIS — C61 PROSTATE CANCER (HCC): Primary | ICD-10-CM

## 2024-02-05 LAB — PSA SERPL-MCNC: 7.1 NG/ML

## 2024-02-05 PROCEDURE — 36415 COLL VENOUS BLD VENIPUNCTURE: CPT

## 2024-02-05 PROCEDURE — 84153 ASSAY OF PSA TOTAL: CPT

## 2024-02-05 PROCEDURE — 3079F DIAST BP 80-89 MM HG: CPT | Performed by: UROLOGY

## 2024-02-05 PROCEDURE — 99213 OFFICE O/P EST LOW 20 MIN: CPT | Performed by: UROLOGY

## 2024-02-05 PROCEDURE — 3075F SYST BP GE 130 - 139MM HG: CPT | Performed by: UROLOGY

## 2024-02-05 PROCEDURE — 1123F ACP DISCUSS/DSCN MKR DOCD: CPT | Performed by: UROLOGY

## 2024-02-05 ASSESSMENT — PATIENT HEALTH QUESTIONNAIRE - PHQ9
1. LITTLE INTEREST OR PLEASURE IN DOING THINGS: 0
SUM OF ALL RESPONSES TO PHQ9 QUESTIONS 1 & 2: 0
SUM OF ALL RESPONSES TO PHQ QUESTIONS 1-9: 0
2. FEELING DOWN, DEPRESSED OR HOPELESS: 0
SUM OF ALL RESPONSES TO PHQ QUESTIONS 1-9: 0

## 2024-02-05 ASSESSMENT — ENCOUNTER SYMPTOMS
RESPIRATORY NEGATIVE: 1
GASTROINTESTINAL NEGATIVE: 1

## 2024-02-05 NOTE — PATIENT INSTRUCTIONS
need to do any bowel prep before the biopsy.     -We will need a urine sample when you check in for your appointment.     -You will need to be off of all major blood thinners, vitamins/herbal medications and anti-inflammatory medications 5 days before your biopsy to prevent any major bleeding. Please see enclosed list or call our line with any medication related questions.     -You will need a  after the procedure. If you need transportation assistance please let the center know at least two days prior to your scheduled procedure.      Urology Office #:  [phone]      BIOPSY INSTRUCTIONS / BLOOD THINNERS    IF YOU HAVE ANY HEART ISSUES, HISTORY OF BLOOD CLOTS, STROKE OR OTHER MEDICAL CONDITIONS THAT REQUIRE YOU TO BE ON BLOOD THINNING AGENTS, PLEASE ALERT OUR STAFF.  YOU ALSO MAY NEED TO CONTACT YOUR PRESCRIBING PHYSICIAN / CADIOLOGIST FOR THEIR RECOMMENDATION REGARDING THESE MEDICATIONS.  ANTI-INFLAMMATORY DRUGS: (SHOULD BE STOPPED 5 DAYS) If you are taking these for a specific medical condition, please alert office for a possible alternative medication         Some examples: Advil, Aleve, Ansaid, Anaprox, Clinoril, Daypro, Feldene, Ibuprofen, Indocin, Midol, Motrin, Nalfon, Naprosyn, Orudis, Ponstil, Tolectin, Toradol    IF YOUR ARE CURRENTLY ON COUMADIN (WARFARIN), PERSANTINE, PLAVIX, HEPARIN, LOVENOX, XARELTO, ELIQUIS OR TICLID:  Please discuss the use of these medications with your doctor as when to discontinue/continue use of these medications.    VITAMINS & HERBAL MEDICATIONS: (STOP 5 DAYS PRIOR TO BIOPSY)    ASPIRIN PRODUCTS: Do not need to be discontinued. If you are taking a full dose 325 mg aspirin, change to 81 mg “baby” aspirin 5 days prior to surgery.      Some examples: Faviola Simpson, Ascriptin, Bufferin, [place] Tablets, Ecotrin, Empirin, Equagesic, Excedrin, Florinol, Four way cold tablets, Midol, Norgesic, Nuprin, Pamprin, Synalgos, Triaminicin, Vanquish, Zorprin        Post Biopsy

## 2024-02-05 NOTE — PROGRESS NOTES
10/02/2023 08:44 AM    PSA 6.0 04/08/2022 08:55 AM    PSA 4.1 10/08/2021 12:39 PM        IMAGING:    MRI Results:    === 09/11/23 ===    MRI PELVIS W WO CONTRAST    - Narrative -  MRI OF THE PROSTATE    INDICATION: Prostate Cancer, elevated PSA    Standard MRI sequences were obtained through the pelvis in multiple planes.  Images were obtained before and after intravenous infusion of  24 mL of  Prohance.    COMPARISON: 05/13/2022.    FINDINGS:  - PROSTATE SIZE: 5.6 x 3.9 x 5.4 cm.  - TRANSITIONAL ZONE: Moderate hypertrophic change.  No concerning discrete mass.  - PERIPHERAL ZONE: Patchy areas of low T2 signal.    Lesion 1: Stable linear 6 mm area of low T2 signal in the lateral right mid  gland peripheral zone.  No definite restricted diffusion.    Lesion 2: Ill-defined 8 mm T2 low signal lesion in the left mid gland peripheral  zone, diffusion images obscured in this region by the hip replacement artifact.  This probably represents the biopsy positive lesion.  No evidence of  extracapsular extension.    - SEMINAL VESICLES: Normal.  - LYMPH NODES: No significant adenopathy.  - BONES: Significant artifact from left hip replacement.  No discrete bone  lesion.  - OTHER: No other significant findings.    - Impression -  1. Small left mid gland peripheral zone lesion correlates with positive biopsy  results, slightly more apparent than on the prior exam, but difficult to  evaluate due to the artifact.  No definite extra capsular extension.  2. No evidence of adenopathy or metastatic disease in the lower abdomen/pelvis      ASSESSMENT:    Mr. Victor Hugo Reed is a 68 y.o. male with a diagnosis of  PCa. S/p TRUS fusion prostate bx, Jose [place] score 3+3=6, on Active Surveillance.  We discussed options moving forward.  Ultimately I recommended a repeat prostate biopsy just to ensure there is no sign of pathologic progression.  We discussed the risk of that procedure as outlined below.  He will stop his Xarelto 3 days

## 2024-03-07 ENCOUNTER — PREP FOR PROCEDURE (OUTPATIENT)
Dept: ONCOLOGY | Age: 69
End: 2024-03-07

## 2024-03-14 DIAGNOSIS — B00.9 HSV INFECTION: ICD-10-CM

## 2024-03-14 RX ORDER — VALACYCLOVIR HYDROCHLORIDE 500 MG/1
TABLET, FILM COATED ORAL
Qty: 90 TABLET | Refills: 1 | OUTPATIENT
Start: 2024-03-14

## 2024-03-15 DIAGNOSIS — B00.9 HSV INFECTION: ICD-10-CM

## 2024-03-15 DIAGNOSIS — G47.00 INSOMNIA, UNSPECIFIED TYPE: ICD-10-CM

## 2024-03-15 RX ORDER — TRAZODONE HYDROCHLORIDE 50 MG/1
50 TABLET ORAL NIGHTLY
Qty: 90 TABLET | Refills: 1 | Status: SHIPPED | OUTPATIENT
Start: 2024-03-15

## 2024-03-15 RX ORDER — VALACYCLOVIR HYDROCHLORIDE 500 MG/1
TABLET, FILM COATED ORAL
Qty: 90 TABLET | Refills: 1 | Status: SHIPPED | OUTPATIENT
Start: 2024-03-15

## 2024-03-15 NOTE — TELEPHONE ENCOUNTER
From: Victor Hugo Reed  To: Office of Marimar Villagomez  Sent: 3/14/2024 12:07 AM EDT  Subject: Medication Renewal Request    Refills have been requested for the following medications:     traZODone (DESYREL) 50 MG tablet [Marimar Villagomez, APRN - CNP]    Preferred pharmacy: [id number] [place] [address] [address] - P [phone] - F [phone]

## 2024-03-15 NOTE — TELEPHONE ENCOUNTER
From: Victor Hugo Reed  To: Office of Marimar Villagomez  Sent: 3/14/2024 12:06 AM EDT  Subject: Medication Renewal Request    Refills have been requested for the following medications:     valACYclovir (VALTREX) 500 MG tablet [Marimar Villagomez, APRN - CNP]    Preferred pharmacy: [id number] [place] [address] [address] - P [phone] - F [phone]

## 2024-04-02 RX ORDER — IBUPROFEN 200 MG
400 TABLET ORAL AS NEEDED
COMMUNITY

## 2024-04-02 NOTE — PERIOP NOTE
Patient verified name and .  Order for consent  found in EHR and matches case posting; patient verifies procedure.   Type 1A surgery, phone assessment complete.  Orders  received.  Labs per surgeon: pre-op UA s/h  Labs per anesthesia protocol: SQBS s/h for DOS    Patient answered medical/surgical history questions at their best of ability. All prior to admission medications documented in EPIC.    Patient instructed to continue taking all prescription medications up to the day of surgery but to take only the following medications the day of surgery according to anesthesia guidelines with a small sip of water: flecainide,  valacyclovir, metoprolol, verapamil    Patient informed that all vitamins and supplements should be held 7 days prior to surgery and NSAIDS 5 days prior to surgery. Prescription meds to hold:xarelto as instructed by surgeon.  Patient stated he was instructed to hold, last dose .    Patient instructed on the following:    > Arrive at main Entrance, time of arrival to be called the day before by 1700  > NPO after midnight, unless otherwise indicated, including gum, mints, and ice chips  > Responsible adult must drive patient to the hospital, stay during surgery, and patient will need supervision 24 hours after anesthesia  > Use non moisturizing soap in shower the night before surgery and on the morning of surgery  > All piercings must be removed prior to arrival.    > Leave all valuables (money and jewelry) at home but bring insurance card and ID on DOS.   > You may be required to pay a deductible or co-pay on the day of your procedure. You can pre-pay by calling [phone] if your surgery is at the [facility] or [phone] if your surgery is at the [facility].  > Do not wear make-up, nail polish, lotions, cologne, perfumes, powders, or oil on skin. Artificial nails are not permitted.

## 2024-04-02 NOTE — PERIOP NOTE
Thank you for completing your phone assessment with me today. The following is a list of Pre-op instructions that you requested. Should you have any questions, please call # [phone].    Surgery Date: 4/4/24    Location: [facility]- [address] [place] [address], [address]. Please arrive at the Main Entrance. YOU WILL RECEIVE A CALL FROM A PREOP NURSE BY 5PM THE BUSINESS DAY PRIOR TO SURGERY. IF YOU HAVE NOT RECEIVED A CALL BY 5PM, YOU MAY THEN CALL THE NUMBER LISTED BELOW TO REQUEST THE ARRIVAL TIME. DO NOT CALL PRIOR TO 5PM. (#[phone] MAIN Preop or Outpatient Center [phone]) If you have any questions on the day of surgery, please call the pre-op department at the telephone number listed.     No food or drink after midnight which includes any gum, mints, candy, or ice chips.    Please take the following medications on the morning of surgery with a small sip of water: flecainide, valacyclovir, metoprolol, verapamil. .        Please stop all vitamins & supplements 7 days prior to surgery and stop all NSAIDS (ibuprofen/motrin/advil, naproxen/aleve) 5 days before your surgery. Should you have a surgery date that does not allow for the amount of time instructed above, please stop taking vitamins, supplements, and NSAIDS IMMEDIATELY.    A responsible adult must drive you to the hospital, remain in the building during surgery and you will need adult supervision for 24 hours after anesthesia.    Please use an antibacterial soap (Dial, Safeguard, etc.) or antiseptic wash if provided by your surgeon, the night before surgery and on the morning of surgery. Do NOT wear deodorant, make-up, nail polish, lotions, cologne, perfumes, powders or oil on your skin. Artificial nails are not permitted. All piercings/metal/jewelry must be removed prior to arrival.  If you wear contacts, then you will need to bring a case to store them in or wear your glasses. Artificial nails are not

## 2024-04-03 ENCOUNTER — ANESTHESIA EVENT (OUTPATIENT)
Dept: SURGERY | Age: 69
End: 2024-04-03
Payer: COMMERCIAL

## 2024-04-04 ENCOUNTER — ANESTHESIA (OUTPATIENT)
Dept: SURGERY | Age: 69
End: 2024-04-04
Payer: COMMERCIAL

## 2024-04-04 ENCOUNTER — HOSPITAL ENCOUNTER (OUTPATIENT)
Age: 69
Setting detail: OUTPATIENT SURGERY
Discharge: HOME | End: 2024-04-04
Attending: UROLOGY | Admitting: UROLOGY
Payer: COMMERCIAL

## 2024-04-04 VITALS
RESPIRATION RATE: 18 BRPM | OXYGEN SATURATION: 97 % | TEMPERATURE: 97.6 F | WEIGHT: 260 LBS | DIASTOLIC BLOOD PRESSURE: 69 MMHG | BODY MASS INDEX: 30.7 KG/M2 | HEIGHT: 77 IN | HEART RATE: 49 BPM | SYSTOLIC BLOOD PRESSURE: 110 MMHG

## 2024-04-04 LAB
APPEARANCE UR: CLEAR
BILIRUB UR QL: NEGATIVE
COLOR UR: NORMAL
GLUCOSE BLD STRIP.AUTO-MCNC: 121 MG/DL (ref 65–100)
GLUCOSE UR STRIP.AUTO-MCNC: NEGATIVE MG/DL
HGB UR QL STRIP: NEGATIVE
KETONES UR QL STRIP.AUTO: NEGATIVE MG/DL
LEUKOCYTE ESTERASE UR QL STRIP.AUTO: NEGATIVE
NITRITE UR QL STRIP.AUTO: NEGATIVE
PH UR STRIP: 7 (ref 5–9)
PROT UR STRIP-MCNC: NEGATIVE MG/DL
SERVICE CMNT-IMP: ABNORMAL
SP GR UR REFRACTOMETRY: 1.02 (ref 1–1.02)
UROBILINOGEN UR QL STRIP.AUTO: 0.2 EU/DL (ref 0.2–1)

## 2024-04-04 PROCEDURE — 3600000003 HC SURGERY LEVEL 3 BASE: Performed by: UROLOGY

## 2024-04-04 PROCEDURE — 2580000003 HC RX 258: Performed by: ANESTHESIOLOGY

## 2024-04-04 PROCEDURE — 76872 US TRANSRECTAL: CPT | Performed by: UROLOGY

## 2024-04-04 PROCEDURE — 55700 PR PROSTATE NEEDLE BIOPSY ANY APPROACH: CPT | Performed by: UROLOGY

## 2024-04-04 PROCEDURE — 7100000010 HC PHASE II RECOVERY - FIRST 15 MIN: Performed by: UROLOGY

## 2024-04-04 PROCEDURE — 7100000011 HC PHASE II RECOVERY - ADDTL 15 MIN: Performed by: UROLOGY

## 2024-04-04 PROCEDURE — 6360000002 HC RX W HCPCS: Performed by: UROLOGY

## 2024-04-04 PROCEDURE — 81003 URINALYSIS AUTO W/O SCOPE: CPT

## 2024-04-04 PROCEDURE — 6360000002 HC RX W HCPCS: Performed by: REGISTERED NURSE

## 2024-04-04 PROCEDURE — 82962 GLUCOSE BLOOD TEST: CPT

## 2024-04-04 PROCEDURE — 2500000003 HC RX 250 WO HCPCS: Performed by: UROLOGY

## 2024-04-04 PROCEDURE — 7100000000 HC PACU RECOVERY - FIRST 15 MIN: Performed by: UROLOGY

## 2024-04-04 PROCEDURE — 6370000000 HC RX 637 (ALT 250 FOR IP): Performed by: ANESTHESIOLOGY

## 2024-04-04 PROCEDURE — 3600000013 HC SURGERY LEVEL 3 ADDTL 15MIN: Performed by: UROLOGY

## 2024-04-04 PROCEDURE — 2709999900 HC NON-CHARGEABLE SUPPLY: Performed by: UROLOGY

## 2024-04-04 PROCEDURE — 6360000002 HC RX W HCPCS: Performed by: PHYSICIAN ASSISTANT

## 2024-04-04 PROCEDURE — 3700000001 HC ADD 15 MINUTES (ANESTHESIA): Performed by: UROLOGY

## 2024-04-04 PROCEDURE — 88305 TISSUE EXAM BY PATHOLOGIST: CPT

## 2024-04-04 PROCEDURE — 7100000001 HC PACU RECOVERY - ADDTL 15 MIN: Performed by: UROLOGY

## 2024-04-04 PROCEDURE — 3700000000 HC ANESTHESIA ATTENDED CARE: Performed by: UROLOGY

## 2024-04-04 PROCEDURE — 2500000003 HC RX 250 WO HCPCS: Performed by: REGISTERED NURSE

## 2024-04-04 RX ORDER — SODIUM CHLORIDE 0.9 % (FLUSH) 0.9 %
5-40 SYRINGE (ML) INJECTION EVERY 12 HOURS SCHEDULED
Status: DISCONTINUED | OUTPATIENT
Start: 2024-04-04 | End: 2024-04-04 | Stop reason: HOSPADM

## 2024-04-04 RX ORDER — MIDAZOLAM HYDROCHLORIDE 2 MG/2ML
2 INJECTION, SOLUTION INTRAMUSCULAR; INTRAVENOUS
Status: DISCONTINUED | OUTPATIENT
Start: 2024-04-04 | End: 2024-04-04 | Stop reason: HOSPADM

## 2024-04-04 RX ORDER — PROCHLORPERAZINE EDISYLATE 5 MG/ML
5 INJECTION INTRAMUSCULAR; INTRAVENOUS
Status: DISCONTINUED | OUTPATIENT
Start: 2024-04-04 | End: 2024-04-04 | Stop reason: HOSPADM

## 2024-04-04 RX ORDER — LIDOCAINE HYDROCHLORIDE 10 MG/ML
1 INJECTION, SOLUTION INFILTRATION; PERINEURAL
Status: DISCONTINUED | OUTPATIENT
Start: 2024-04-04 | End: 2024-04-04 | Stop reason: HOSPADM

## 2024-04-04 RX ORDER — FENTANYL CITRATE 50 UG/ML
100 INJECTION, SOLUTION INTRAMUSCULAR; INTRAVENOUS
Status: DISCONTINUED | OUTPATIENT
Start: 2024-04-04 | End: 2024-04-04 | Stop reason: HOSPADM

## 2024-04-04 RX ORDER — DEXTROSE MONOHYDRATE 100 MG/ML
INJECTION, SOLUTION INTRAVENOUS CONTINUOUS PRN
Status: DISCONTINUED | OUTPATIENT
Start: 2024-04-04 | End: 2024-04-04 | Stop reason: HOSPADM

## 2024-04-04 RX ORDER — SODIUM CHLORIDE, SODIUM LACTATE, POTASSIUM CHLORIDE, CALCIUM CHLORIDE 600; 310; 30; 20 MG/100ML; MG/100ML; MG/100ML; MG/100ML
INJECTION, SOLUTION INTRAVENOUS CONTINUOUS
Status: DISCONTINUED | OUTPATIENT
Start: 2024-04-04 | End: 2024-04-04 | Stop reason: HOSPADM

## 2024-04-04 RX ORDER — LIDOCAINE HYDROCHLORIDE 20 MG/ML
INJECTION, SOLUTION EPIDURAL; INFILTRATION; INTRACAUDAL; PERINEURAL PRN
Status: DISCONTINUED | OUTPATIENT
Start: 2024-04-04 | End: 2024-04-04 | Stop reason: SDUPTHER

## 2024-04-04 RX ORDER — ACETAMINOPHEN 500 MG
1000 TABLET ORAL ONCE
Status: COMPLETED | OUTPATIENT
Start: 2024-04-04 | End: 2024-04-04

## 2024-04-04 RX ORDER — SODIUM CHLORIDE 0.9 % (FLUSH) 0.9 %
5-40 SYRINGE (ML) INJECTION PRN
Status: DISCONTINUED | OUTPATIENT
Start: 2024-04-04 | End: 2024-04-04 | Stop reason: HOSPADM

## 2024-04-04 RX ORDER — SODIUM CHLORIDE 9 MG/ML
INJECTION, SOLUTION INTRAVENOUS PRN
Status: DISCONTINUED | OUTPATIENT
Start: 2024-04-04 | End: 2024-04-04 | Stop reason: HOSPADM

## 2024-04-04 RX ORDER — LIDOCAINE HYDROCHLORIDE 10 MG/ML
INJECTION, SOLUTION INFILTRATION; PERINEURAL PRN
Status: DISCONTINUED | OUTPATIENT
Start: 2024-04-04 | End: 2024-04-04 | Stop reason: ALTCHOICE

## 2024-04-04 RX ORDER — HYDROMORPHONE HYDROCHLORIDE 2 MG/ML
0.5 INJECTION, SOLUTION INTRAMUSCULAR; INTRAVENOUS; SUBCUTANEOUS EVERY 10 MIN PRN
Status: DISCONTINUED | OUTPATIENT
Start: 2024-04-04 | End: 2024-04-04 | Stop reason: HOSPADM

## 2024-04-04 RX ORDER — OXYCODONE HYDROCHLORIDE 5 MG/1
5 TABLET ORAL
Status: DISCONTINUED | OUTPATIENT
Start: 2024-04-04 | End: 2024-04-04 | Stop reason: HOSPADM

## 2024-04-04 RX ORDER — KETAMINE HYDROCHLORIDE 50 MG/ML
INJECTION, SOLUTION INTRAMUSCULAR; INTRAVENOUS PRN
Status: DISCONTINUED | OUTPATIENT
Start: 2024-04-04 | End: 2024-04-04 | Stop reason: SDUPTHER

## 2024-04-04 RX ORDER — DIPHENHYDRAMINE HYDROCHLORIDE 50 MG/ML
12.5 INJECTION INTRAMUSCULAR; INTRAVENOUS
Status: DISCONTINUED | OUTPATIENT
Start: 2024-04-04 | End: 2024-04-04 | Stop reason: HOSPADM

## 2024-04-04 RX ORDER — NALOXONE HYDROCHLORIDE 0.4 MG/ML
INJECTION, SOLUTION INTRAMUSCULAR; INTRAVENOUS; SUBCUTANEOUS PRN
Status: DISCONTINUED | OUTPATIENT
Start: 2024-04-04 | End: 2024-04-04 | Stop reason: HOSPADM

## 2024-04-04 RX ORDER — PROPOFOL 10 MG/ML
INJECTION, EMULSION INTRAVENOUS PRN
Status: DISCONTINUED | OUTPATIENT
Start: 2024-04-04 | End: 2024-04-04 | Stop reason: SDUPTHER

## 2024-04-04 RX ORDER — BUPIVACAINE HYDROCHLORIDE 2.5 MG/ML
INJECTION, SOLUTION EPIDURAL; INFILTRATION; INTRACAUDAL PRN
Status: DISCONTINUED | OUTPATIENT
Start: 2024-04-04 | End: 2024-04-04 | Stop reason: ALTCHOICE

## 2024-04-04 RX ORDER — IBUPROFEN 600 MG/1
1 TABLET ORAL PRN
Status: DISCONTINUED | OUTPATIENT
Start: 2024-04-04 | End: 2024-04-04 | Stop reason: HOSPADM

## 2024-04-04 RX ADMIN — SODIUM CHLORIDE, POTASSIUM CHLORIDE, SODIUM LACTATE AND CALCIUM CHLORIDE: 600; 310; 30; 20 INJECTION, SOLUTION INTRAVENOUS at 11:52

## 2024-04-04 RX ADMIN — KETAMINE HYDROCHLORIDE 20 MG: 50 INJECTION, SOLUTION INTRAMUSCULAR; INTRAVENOUS at 15:03

## 2024-04-04 RX ADMIN — PROPOFOL 40 MG: 10 INJECTION, EMULSION INTRAVENOUS at 15:05

## 2024-04-04 RX ADMIN — LIDOCAINE HYDROCHLORIDE 100 MG: 20 INJECTION, SOLUTION EPIDURAL; INFILTRATION; INTRACAUDAL; PERINEURAL at 15:02

## 2024-04-04 RX ADMIN — PROPOFOL 200 MCG/KG/MIN: 10 INJECTION, EMULSION INTRAVENOUS at 15:03

## 2024-04-04 RX ADMIN — ACETAMINOPHEN 1000 MG: 500 TABLET, FILM COATED ORAL at 11:52

## 2024-04-04 RX ADMIN — PROPOFOL 20 MG: 10 INJECTION, EMULSION INTRAVENOUS at 15:19

## 2024-04-04 RX ADMIN — PROPOFOL 30 MG: 10 INJECTION, EMULSION INTRAVENOUS at 15:10

## 2024-04-04 RX ADMIN — PROPOFOL 100 MG: 10 INJECTION, EMULSION INTRAVENOUS at 15:02

## 2024-04-04 RX ADMIN — Medication 2000 MG: at 15:08

## 2024-04-04 ASSESSMENT — PAIN - FUNCTIONAL ASSESSMENT
PAIN_FUNCTIONAL_ASSESSMENT: NONE - DENIES PAIN
PAIN_FUNCTIONAL_ASSESSMENT: 0-10

## 2024-04-04 ASSESSMENT — ENCOUNTER SYMPTOMS
GASTROINTESTINAL NEGATIVE: 1
RESPIRATORY NEGATIVE: 1

## 2024-04-04 NOTE — H&P
H&P Update:    The patient's last History and Physical was reviewed, and I examined the patient today.  There are no changes.  The surgical procedure and site were confirmed by the patient and me.    PE:  NAD  Heart- Reg, normal perfusion  Pulmonary- clear, normal respiratory effort  Abdomen- Soft, ND     Plan: The risks, benefits, and alternative treatment options were again discussed with the patient.  The patient understands and wants to proceed with the procedure-- MRI fusion guided transperineal prostate biopsy. He has stopped his Xarelto.       Urologic Oncology  [facility]  [address] [place], [place] [id number] [phone]        Mr. Victor Hugo Reed is a 68 y.o. male with a diagnosis of  PCa. S/p TRUS fusion prostate bx, [place] score 3+3=6, on Active Surveillance.     INTERVAL HISTORY: Patient is here today for follow-up.  He has a history of 3+3 equal 6 adenocarcinoma the prostate that was diagnosed on 6/7/2023.  He has been on active surveillance.  His PSA was previously as high as 9.4.  It then dropped to 5.7.  It then was 7.1.  He had it rechecked on 1/3/2024 and was 5.1.  Recheck today shows it is up to 7.1.    His gland measures approximately 55 cc.  He had a repeat MRI of his prostate on 9/11/2023 which showed a small area in the left mid peripheral zone but was otherwise unremarkable.    He does take Xarelto for atrial fibrillation.  He has stopped it for previous procedures.  He states his lower urinary tract symptoms are stable.        From previous note:  Patient is here today for follow-up of his Jose Enrique 6 adenocarcinoma prostate.  He has no complaints today.  He states he been very busy at work.  His PSA in past has been as high as 9.4.  More recently it was 5.7.  He also had a favorable Polaris score of 3.4.     His original MRI in May 2022 showed a PI-RADS 5 lesion.  That area was biopsied and was negative.  He had a repeat prostate MRI recently on 9/12/2023  discussion regarding the potential risks, benefits, and treatment alternatives, the patient has decided to proceed with an MRI guided prostate biopsy via a transperineal approach.      PLAN:     -psa today  -discussed LUTS  -pt to stop Xarelto 3 days prior to bx  -scheduling for repeat TRUS fusion prostate bx in ~ a month  ________________________________________      I have seen and examined this patient.  I have reviewed and edited the note started by the MA and agree with the outlined plan.  Part of this note was written by using a voice dictation software. The note has been proof read but may still contain some grammatical/other typographical errors.      Shashi Varma MD  Urologic Oncology  [facility]

## 2024-04-04 NOTE — OP NOTE
Patient: Victor Hugo Reed, [id number]    Date of Surgery: 04/04/24    Preoperative Diagnosis: Elevated PSA    Postoperative Diagnosis:  same    Surgeon(s) and Role:     * Shashi Varma MD - Primary     Anesthesia:  MAC     Procedure: Procedure(s):  URONAV MRI Fusion Transperineal Ultrasound-Guided Prostate Biopsy     Indications:     Discussed the risk of surgery including infection, hematoma, bleeding, urinary retention, pain, and the risks of anesthethesia.  The patient understands the risks, any and all questions were answered to the patient's satisfaction and signed the consent for operation.     URONAV MRI FUSED TRANSPERINEAL ULTRASOUND GUIDED BIOPSY OF THE PROSTATE    All risks, benefits and alternatives were again reviewed and he is willing to proceed at this time.  The patient was placed in low lithotomy.   Ancef was given as a prophylactic antibiotic.  Chloroprep was used to prep the perineal area.  Approximately 4-5 cc of 0.25% marcaine mixed equally with 1% lidocaine plain was injected under the perineal skin at the needle insertion sites. I then inserted the transrectal ultrasound probe into the rectum.  The prostate was visualized.  The prostate appeared homogenous in appearance.   Ultrasonographic sweep of the prostate was performed to obtain images to link to MRI via URONAV.  There were 2 regions of interest (1 left PZ; 2 right PZ)based upon MRI imaging.  3 biopsies of regions of interest were then obtained using the ultrasound images for guidance that had been linked to the previous MRI using Uronav.  I then performed 10 needle core biopsies using a standard transperineal biopsy format with traditional ultrasound images for guidance.  The ultrasound probe was removed.  The patient tolerated the procedure well.      PROSTATE VOLUME:  57 cc    KAREN: No induration or nodule     Estimated Blood Loss:  minimal    Specimens: prostate biopsies    ID Type Source Tests Collected by Time Destination   A :  FRANK #1 - LEFT PZ Tissue Prostate SURGICAL PATHOLOGY Aurelio Varma MD 4/4/2024 1500    B : FRANK #2 - RIGHT PZ Tissue Prostate SURGICAL PATHOLOGY Aurelio Varma MD 4/4/2024 1501    C : RPM Tissue Prostate SURGICAL PATHOLOGY Aurelio Varma MD 4/4/2024 1501    D : RPL Tissue Prostate SURGICAL PATHOLOGY Aurelio Varma MD 4/4/2024 1501    E : RB Tissue Prostate SURGICAL PATHOLOGY Aurelio Varma MD 4/4/2024 1501    F : TONG Tissue Prostate SURGICAL PATHOLOGY Aurelio Varma MD 4/4/2024 1501    G : RAL Tissue Prostate SURGICAL PATHOLOGY Aurelio Varma MD 4/4/2024 1501    H : LPM Tissue Prostate SURGICAL PATHOLOGY Aurelio Varma MD 4/4/2024 1501    I : LPL Tissue Prostate SURGICAL PATHOLOGY Aurelio Varma MD 4/4/2024 1501    J : LB Tissue Prostate SURGICAL PATHOLOGY Aurelio Varma MD 4/4/2024 1501    K : MERCADO Tissue Prostate SURGICAL PATHOLOGY Aurelio Varma MD 4/4/2024 1502    L : OMAIRA Tissue Prostate SURGICAL PATHOLOGY Aurelio Varma MD 4/4/2024 1502                 Drains: none                 Implants: * No implants in log *    Shashi Varma M.D.    [facility]  [facility]

## 2024-04-04 NOTE — DISCHARGE INSTRUCTIONS
You may restart your Xarelto in 3-4 days if your urine is clear.      If you have had surgery in the past 7-10 days by one of our providers and are having fever, bleeding, or drainage from an incision, have an opening in an incision, or having issues urinating properly, please call [phone].    Prostate Biopsy and Ultrasound:   A prostate biopsy is a type of test. Your doctor takes small tissue samples from your prostate gland. Then another doctor looks at the tissue under a microscope to see if there are cancer cells.  This test is done by a doctor who specializes in men's genital and urinary problems (urologist). It can be done in your doctor's office, a day surgery clinic, or a hospital operating room. To get the tissue samples from the prostate, the doctor inserts a thin needle through the rectum, the urethra, or the area between the anus and scrotum (perineum). The most common method is through the rectum. Your doctor may use ultrasound to help guide the needle.  What else should you know about this test?  A prostate biopsy has a slight risk of causing problems such as infection or bleeding.  If the biopsy went through your rectum, you may have a small amount of bleeding from your rectum for 2 to 3 days after the biopsy.  You may have a little pain in your pelvic area. You may also have a little blood in your urine for 1 to 5 days.  You may have some blood in your semen for a week or longer.  Do not do heavy work or exercise for 4 hours after the test.  Your doctor will tell you how long it may take to get your results back.  Follow-up care is a key part of your treatment and safety. Be sure to make and go to all appointments, and call your doctor if you are having problems. It's also a good idea to keep a list of the medicines you take. Ask your doctor when you can expect to have your test results.    After general anesthesia or intravenous sedation, for 24 hours or while taking prescription

## 2024-04-04 NOTE — PROGRESS NOTES
visited patient in pre op, as requested.  Wife was at bedside and supportive.  Patient expressed positive affect and positive outlook on his procedure/recovery.   provided pastoral presence, prayer, and empathetic listening.  Peace be with you,  Signed by  CAROLINE DixonDiv.   [phone]

## 2024-04-04 NOTE — ANESTHESIA PRE PROCEDURE
and risks discussed with patient and spouse.        Attending anesthesiologist reviewed and agrees with Preprocedure content                Luca Cunningham MD   4/4/2024

## 2024-04-04 NOTE — ANESTHESIA POSTPROCEDURE EVALUATION
Department of Anesthesiology  Postprocedure Note    Patient: Victor Hugo Reed  MRN: [id number]  YOB: 1955  Date of evaluation: 4/4/2024    Procedure Summary       Date: 04/04/24 Room / Location: [facility] MAIN OR 09 / [facility] MAIN OR    Anesthesia Start: 1456 Anesthesia Stop: 1527    Procedure: PROSTATE BIOPSY TRANSPERINEAL (Perineum) Diagnosis:       Prostate cancer (HCC)      (Prostate cancer (HCC) [C61])    Providers: Aurelio Varma MD Responsible Provider: Luca Cunningham MD    Anesthesia Type: TIVA ASA Status: 3            Anesthesia Type: No value filed.    Ernesto Phase I: Ernesto Score: 10    Ernesto Phase II: Ernesto Score: 10    Anesthesia Post Evaluation    Patient location during evaluation: PACU  Patient participation: complete - patient participated  Level of consciousness: awake and alert  Airway patency: patent  Nausea: well controlled.  Cardiovascular status: acceptable.  Respiratory status: acceptable  Hydration status: stable  Pain management: adequate    No notable events documented.

## 2024-04-05 ENCOUNTER — TELEPHONE (OUTPATIENT)
Dept: ONCOLOGY | Age: 69
End: 2024-04-05

## 2024-04-05 NOTE — TELEPHONE ENCOUNTER
Called and spoke with patient following biopsy yesterday. Patient states he is doing well with improving slight hematuria. Patient aware of follow up information. Advised patient to call office or send message with any issues or concerns prior to appointment. Patient expressed understanding.

## 2024-04-15 ENCOUNTER — OFFICE VISIT (OUTPATIENT)
Dept: ONCOLOGY | Age: 69
End: 2024-04-15
Payer: COMMERCIAL

## 2024-04-15 VITALS
RESPIRATION RATE: 16 BRPM | HEART RATE: 71 BPM | DIASTOLIC BLOOD PRESSURE: 75 MMHG | TEMPERATURE: 97.8 F | SYSTOLIC BLOOD PRESSURE: 131 MMHG | OXYGEN SATURATION: 98 % | BODY MASS INDEX: 31.19 KG/M2 | WEIGHT: 263 LBS

## 2024-04-15 DIAGNOSIS — C61 PROSTATE CANCER (HCC): Primary | ICD-10-CM

## 2024-04-15 PROCEDURE — 3075F SYST BP GE 130 - 139MM HG: CPT | Performed by: UROLOGY

## 2024-04-15 PROCEDURE — 3078F DIAST BP <80 MM HG: CPT | Performed by: UROLOGY

## 2024-04-15 PROCEDURE — 99213 OFFICE O/P EST LOW 20 MIN: CPT | Performed by: UROLOGY

## 2024-04-15 PROCEDURE — 1123F ACP DISCUSS/DSCN MKR DOCD: CPT | Performed by: UROLOGY

## 2024-04-15 ASSESSMENT — ENCOUNTER SYMPTOMS
RESPIRATORY NEGATIVE: 1
GASTROINTESTINAL NEGATIVE: 1

## 2024-04-15 ASSESSMENT — PATIENT HEALTH QUESTIONNAIRE - PHQ9
SUM OF ALL RESPONSES TO PHQ9 QUESTIONS 1 & 2: 0
SUM OF ALL RESPONSES TO PHQ QUESTIONS 1-9: 0
2. FEELING DOWN, DEPRESSED OR HOPELESS: NOT AT ALL
3. TROUBLE FALLING OR STAYING ASLEEP: NOT AT ALL
7. TROUBLE CONCENTRATING ON THINGS, SUCH AS READING THE NEWSPAPER OR WATCHING TELEVISION: NOT AT ALL
SUM OF ALL RESPONSES TO PHQ QUESTIONS 1-9: 0
8. MOVING OR SPEAKING SO SLOWLY THAT OTHER PEOPLE COULD HAVE NOTICED. OR THE OPPOSITE, BEING SO FIGETY OR RESTLESS THAT YOU HAVE BEEN MOVING AROUND A LOT MORE THAN USUAL: NOT AT ALL
6. FEELING BAD ABOUT YOURSELF - OR THAT YOU ARE A FAILURE OR HAVE LET YOURSELF OR YOUR FAMILY DOWN: NOT AT ALL
5. POOR APPETITE OR OVEREATING: NOT AT ALL
1. LITTLE INTEREST OR PLEASURE IN DOING THINGS: NOT AT ALL
4. FEELING TIRED OR HAVING LITTLE ENERGY: NOT AT ALL
9. THOUGHTS THAT YOU WOULD BE BETTER OFF DEAD, OR OF HURTING YOURSELF: NOT AT ALL
SUM OF ALL RESPONSES TO PHQ QUESTIONS 1-9: 0
SUM OF ALL RESPONSES TO PHQ QUESTIONS 1-9: 0

## 2024-04-15 ASSESSMENT — ANXIETY QUESTIONNAIRES
IF YOU CHECKED OFF ANY PROBLEMS ON THIS QUESTIONNAIRE, HOW DIFFICULT HAVE THESE PROBLEMS MADE IT FOR YOU TO DO YOUR WORK, TAKE CARE OF THINGS AT HOME, OR GET ALONG WITH OTHER PEOPLE: NOT DIFFICULT AT ALL
5. BEING SO RESTLESS THAT IT IS HARD TO SIT STILL: NOT AT ALL
1. FEELING NERVOUS, ANXIOUS, OR ON EDGE: NOT AT ALL
6. BECOMING EASILY ANNOYED OR IRRITABLE: NOT AT ALL
2. NOT BEING ABLE TO STOP OR CONTROL WORRYING: NOT AT ALL
4. TROUBLE RELAXING: NOT AT ALL
3. WORRYING TOO MUCH ABOUT DIFFERENT THINGS: NOT AT ALL

## 2024-04-15 NOTE — PATIENT INSTRUCTIONS
Patient Information from Today's Visit        Treatment Summary has been discussed and given to patient:N/A    -We will continue monitoring you closely and routinely. A repeat MRI will be ordered in about two years according to Dr. Varma.    Follow Up: Six months with Dr. Varma    Please refer to After Visit Summary or [facility] for upcoming appointment information. If you have any questions regarding your upcoming schedule please reach out to your care team through [employer] or call [phone].          -------------------------------------------------------------------------------------------------------------------  Please call our office at [phone] if you have any  of the following symptoms:   Fever of 100.5 or greater  Chills  Shortness of breath  Swelling or pain in one leg    After office hours an answering service is available and will contact a provider for emergencies or if you are experiencing any of the above symptoms.    Patient does express an interest in My Chart.  My Chart log in information explained on the after visit summary printout at the check-out desk.    Marlys Bhatt MA

## 2024-04-15 NOTE — PROGRESS NOTES
cores, low volume, on Active Surveillance.  We discussed his biopsy results in detail.  They essentially confirm what we thought, that he has low-volume Jose Enrique grade group 1 prostate cancer.  We again briefly discussed treatment options but I recommended active surveillance.  This is very much what he would like to continue along with.  I recommended a repeat PSA in about 6 months.  We will consider a repeat MRI in 18 to 24 months.      PLAN:     -review path  -discussed continued AS  -RTC in 6 months w/ psa prior  ________________________________________      I have seen and examined this patient.  I have reviewed and edited the note started by the MA and agree with the outlined plan.  Part of this note was written by using a voice dictation software. The note has been proof read but may still contain some grammatical/other typographical errors.      Shashi Varma MD  Urologic Oncology  [facility]

## 2024-05-03 ENCOUNTER — OFFICE VISIT (OUTPATIENT)
Dept: FAMILY MEDICINE CLINIC | Facility: CLINIC | Age: 69
End: 2024-05-03
Payer: COMMERCIAL

## 2024-05-03 VITALS
OXYGEN SATURATION: 97 % | DIASTOLIC BLOOD PRESSURE: 68 MMHG | BODY MASS INDEX: 30.98 KG/M2 | RESPIRATION RATE: 16 BRPM | HEIGHT: 77 IN | SYSTOLIC BLOOD PRESSURE: 124 MMHG | HEART RATE: 70 BPM | TEMPERATURE: 98.2 F | WEIGHT: 262.4 LBS

## 2024-05-03 DIAGNOSIS — M25.521 CHRONIC ELBOW PAIN, RIGHT: Primary | ICD-10-CM

## 2024-05-03 DIAGNOSIS — E11.65 UNCONTROLLED TYPE 2 DIABETES MELLITUS WITH HYPERGLYCEMIA (HCC): ICD-10-CM

## 2024-05-03 DIAGNOSIS — G89.29 CHRONIC ELBOW PAIN, RIGHT: Primary | ICD-10-CM

## 2024-05-03 DIAGNOSIS — G47.00 INSOMNIA, UNSPECIFIED TYPE: ICD-10-CM

## 2024-05-03 DIAGNOSIS — B00.9 HSV INFECTION: ICD-10-CM

## 2024-05-03 DIAGNOSIS — M75.121 COMPLETE TEAR OF RIGHT ROTATOR CUFF, UNSPECIFIED WHETHER TRAUMATIC: ICD-10-CM

## 2024-05-03 LAB — HBA1C MFR BLD: 6.2 %

## 2024-05-03 PROCEDURE — 3078F DIAST BP <80 MM HG: CPT | Performed by: NURSE PRACTITIONER

## 2024-05-03 PROCEDURE — 83036 HEMOGLOBIN GLYCOSYLATED A1C: CPT | Performed by: NURSE PRACTITIONER

## 2024-05-03 PROCEDURE — 3052F HG A1C>EQUAL 8.0%<EQUAL 9.0%: CPT | Performed by: NURSE PRACTITIONER

## 2024-05-03 PROCEDURE — 1123F ACP DISCUSS/DSCN MKR DOCD: CPT | Performed by: NURSE PRACTITIONER

## 2024-05-03 PROCEDURE — 99214 OFFICE O/P EST MOD 30 MIN: CPT | Performed by: NURSE PRACTITIONER

## 2024-05-03 PROCEDURE — 3074F SYST BP LT 130 MM HG: CPT | Performed by: NURSE PRACTITIONER

## 2024-05-03 RX ORDER — VALACYCLOVIR HYDROCHLORIDE 500 MG/1
TABLET, FILM COATED ORAL
Qty: 90 TABLET | Refills: 1 | Status: SHIPPED | OUTPATIENT
Start: 2024-05-03

## 2024-05-03 RX ORDER — METFORMIN HYDROCHLORIDE 500 MG/1
500 TABLET, EXTENDED RELEASE ORAL 2 TIMES DAILY
Qty: 180 TABLET | Refills: 3 | Status: SHIPPED | OUTPATIENT
Start: 2024-05-03

## 2024-05-03 RX ORDER — GLIMEPIRIDE 4 MG/1
TABLET ORAL
Qty: 90 TABLET | Refills: 1 | Status: SHIPPED | OUTPATIENT
Start: 2024-05-03

## 2024-05-03 RX ORDER — TRAZODONE HYDROCHLORIDE 50 MG/1
50 TABLET ORAL NIGHTLY
Qty: 90 TABLET | Refills: 1 | Status: SHIPPED | OUTPATIENT
Start: 2024-05-03

## 2024-05-03 ASSESSMENT — ENCOUNTER SYMPTOMS
SORE THROAT: 0
COUGH: 0
CONSTIPATION: 0
EYE REDNESS: 0
EYE PAIN: 0
SINUS PAIN: 0
BLOOD IN STOOL: 0
DIARRHEA: 0
NAUSEA: 0
VOMITING: 0
SHORTNESS OF BREATH: 0

## 2024-05-03 ASSESSMENT — PATIENT HEALTH QUESTIONNAIRE - PHQ9
SUM OF ALL RESPONSES TO PHQ QUESTIONS 1-9: 0
SUM OF ALL RESPONSES TO PHQ QUESTIONS 1-9: 0
9. THOUGHTS THAT YOU WOULD BE BETTER OFF DEAD, OR OF HURTING YOURSELF: NOT AT ALL
SUM OF ALL RESPONSES TO PHQ9 QUESTIONS 1 & 2: 0
5. POOR APPETITE OR OVEREATING: NOT AT ALL
8. MOVING OR SPEAKING SO SLOWLY THAT OTHER PEOPLE COULD HAVE NOTICED. OR THE OPPOSITE, BEING SO FIGETY OR RESTLESS THAT YOU HAVE BEEN MOVING AROUND A LOT MORE THAN USUAL: NOT AT ALL
SUM OF ALL RESPONSES TO PHQ QUESTIONS 1-9: 0
SUM OF ALL RESPONSES TO PHQ QUESTIONS 1-9: 0
1. LITTLE INTEREST OR PLEASURE IN DOING THINGS: NOT AT ALL
6. FEELING BAD ABOUT YOURSELF - OR THAT YOU ARE A FAILURE OR HAVE LET YOURSELF OR YOUR FAMILY DOWN: NOT AT ALL
10. IF YOU CHECKED OFF ANY PROBLEMS, HOW DIFFICULT HAVE THESE PROBLEMS MADE IT FOR YOU TO DO YOUR WORK, TAKE CARE OF THINGS AT HOME, OR GET ALONG WITH OTHER PEOPLE: NOT DIFFICULT AT ALL
4. FEELING TIRED OR HAVING LITTLE ENERGY: NOT AT ALL
7. TROUBLE CONCENTRATING ON THINGS, SUCH AS READING THE NEWSPAPER OR WATCHING TELEVISION: NOT AT ALL
2. FEELING DOWN, DEPRESSED OR HOPELESS: NOT AT ALL
3. TROUBLE FALLING OR STAYING ASLEEP: NOT AT ALL

## 2024-05-03 NOTE — PROGRESS NOTES
Victor Hugo Reed (:  1955) is a 69 y.o. male,Established patient, here for evaluation of the following chief complaint(s):  Diabetes and Follow-up Chronic Condition      Assessment & Plan   1. Chronic elbow pain, right  -     [facility]  2. Uncontrolled type 2 diabetes mellitus with hyperglycemia (HCC)  -     glimepiride (AMARYL) 4 MG tablet; TAKE ONE-HALF TABLET BY MOUTH EVERY MORNING AND TAKE ONE-HALF TABLET BY MOUTH EVERY EVENING, Disp-90 tablet, R-1Normal  -     metFORMIN (GLUCOPHAGE-XR) 500 MG extended release tablet; Take 1 tablet by mouth 2 times daily, Disp-180 tablet, R-3Normal  -     AMB POC HEMOGLOBIN A1C  3. Insomnia, unspecified type  -     traZODone (DESYREL) 50 MG tablet; Take 1 tablet by mouth nightly, Disp-90 tablet, R-1Normal  4. HSV infection  -     valACYclovir (VALTREX) 500 MG tablet; TAKE ONE TABLET BY MOUTH ONE TIME DAILY, Disp-90 tablet, R-1Normal  5. Complete tear of right rotator cuff, unspecified whether traumatic  -     [facility]    A1c 6.2 today.  Current treatment plan is effective.   Maintenance medication refills provided.  Referrals for ortho placed per patient request.    Return in about 3 months (around 8/3/2024), or if symptoms worsen or fail to improve, for DM.       Subjective   Diabetes  Pertinent negatives for hypoglycemia include no dizziness or headaches. Pertinent negatives for diabetes include no chest pain and no polydipsia.   Shoulder Pain   This is a chronic (tear to R rotator cuff - patient is asmyptomatic at present, but wants it evaluated before he retires the end of next year) problem. The current episode started more than 1 year ago. The problem has been unchanged. The pain is at a severity of 0/10. The patient is experiencing no pain. Associated symptoms include a limited range of motion (R elbow). Pertinent negatives include no fever. The symptoms are aggravated by activity. Treatments

## 2024-05-06 NOTE — TELEPHONE ENCOUNTER
Requested Prescriptions     Pending Prescriptions Disp Refills    rivaroxaban (XARELTO) 20 MG TABS tablet 90 tablet 3     Sig: Take 1 tablet by mouth Daily with supper

## 2024-05-06 NOTE — TELEPHONE ENCOUNTER
MEDICATION REFILL REQUEST      Name of Medication:  Xarelto  Dose:    Frequency:    Quantity:    Days' supply:        Pharmacy Name/Location:  [facility]/ Please call in today because he has been out

## 2024-05-07 ENCOUNTER — OFFICE VISIT (OUTPATIENT)
Dept: ORTHOPEDIC SURGERY | Age: 69
End: 2024-05-07
Payer: COMMERCIAL

## 2024-05-07 DIAGNOSIS — M75.102 TEAR OF LEFT ROTATOR CUFF, UNSPECIFIED TEAR EXTENT, UNSPECIFIED WHETHER TRAUMATIC: ICD-10-CM

## 2024-05-07 DIAGNOSIS — M25.521 RIGHT ELBOW PAIN: ICD-10-CM

## 2024-05-07 DIAGNOSIS — M54.2 NECK PAIN: ICD-10-CM

## 2024-05-07 DIAGNOSIS — M25.512 BILATERAL SHOULDER PAIN, UNSPECIFIED CHRONICITY: Primary | ICD-10-CM

## 2024-05-07 DIAGNOSIS — M75.101 TEAR OF RIGHT ROTATOR CUFF, UNSPECIFIED TEAR EXTENT, UNSPECIFIED WHETHER TRAUMATIC: ICD-10-CM

## 2024-05-07 DIAGNOSIS — M25.511 BILATERAL SHOULDER PAIN, UNSPECIFIED CHRONICITY: Primary | ICD-10-CM

## 2024-05-07 PROCEDURE — 99204 OFFICE O/P NEW MOD 45 MIN: CPT | Performed by: PHYSICIAN ASSISTANT

## 2024-05-07 PROCEDURE — 1123F ACP DISCUSS/DSCN MKR DOCD: CPT | Performed by: PHYSICIAN ASSISTANT

## 2024-05-07 NOTE — PROGRESS NOTES
Name: Victor Hugo Reed  YOB: 1955  Gender: male  MRN: [id number]    CC:   Chief Complaint   Patient presents with    Shoulder Pain      B/L Shoulder    , bilateral shoulder pain    HPI:  This patient presents with a several year history of Right and left shoulder pain. The patient denies known trauma.  The patient does admit to history of adhesive capsulitis over five years ago. T he patient did therapy at that time and notes resolution of symptoms. The patient does admit to neck pain and numbness and tingling as well.  He has never had his neck worked up.  The patient notes numbness and tingling in the entirety of the left arm.  Notes bilateral lateral shoulder pain.  Notes pain with riding his bicycle.  Notes interference with sleep as well as decreased strength.    Allergies   Allergen Reactions    Seasonal Other (See Comments)     Past Medical History:   Diagnosis Date    Arthritis     knee    Coronary artery disease     COVID-19 09/2021    Drug use     4/2/24  Patient reports taking Delta 9 gummy for sleep nightly    History of atrial fibrillation     Hypercholesterolemia     Hypertension     medication    Major depression     Morbid obesity (HCC)     Multiple thyroid nodules     Multiple thyroid nodules     TONY (obstructive sleep apnea)     does not tolerate CPAP    PONV (postoperative nausea and vomiting)     after hernia repair, medications relieved    Right inguinal hernia     Skin cancer     Staph infection     while in high school    Type 2 diabetes mellitus (HCC)     oral agents, S&S of  hypoglycemia with exercise- has not checked BS,     Ventricular tachycardia, non-sustained (HCC)     R on T     Past Surgical History:   Procedure Laterality Date    COLONOSCOPY  2013    HEMORRHOID SURGERY  1992    HERNIA REPAIR Right 2017    ORTHOPEDIC SURGERY Right 2016    bone spur removal; arthroscopy    ORTHOPEDIC SURGERY Left 2003     for broken 5th metatarsal    PROSTATE BIOPSY N/A

## 2024-05-16 DIAGNOSIS — M75.102 TEAR OF LEFT ROTATOR CUFF, UNSPECIFIED TEAR EXTENT, UNSPECIFIED WHETHER TRAUMATIC: ICD-10-CM

## 2024-05-16 DIAGNOSIS — M25.512 BILATERAL SHOULDER PAIN, UNSPECIFIED CHRONICITY: Primary | ICD-10-CM

## 2024-05-16 DIAGNOSIS — M54.2 NECK PAIN: ICD-10-CM

## 2024-05-16 DIAGNOSIS — M25.511 BILATERAL SHOULDER PAIN, UNSPECIFIED CHRONICITY: Primary | ICD-10-CM

## 2024-05-16 DIAGNOSIS — M75.101 TEAR OF RIGHT ROTATOR CUFF, UNSPECIFIED TEAR EXTENT, UNSPECIFIED WHETHER TRAUMATIC: ICD-10-CM

## 2024-05-20 RX ORDER — CEPHALEXIN 500 MG/1
CAPSULE ORAL
COMMUNITY
Start: 2024-05-14

## 2024-05-20 RX ORDER — CEPHALEXIN 500 MG/1
TABLET ORAL
COMMUNITY
Start: 2023-02-08

## 2024-05-20 RX ORDER — FLUTICASONE PROPIONATE 0.05 %
CREAM (GRAM) TOPICAL
COMMUNITY
Start: 2024-04-08

## 2024-05-28 ENCOUNTER — OFFICE VISIT (OUTPATIENT)
Dept: ORTHOPEDIC SURGERY | Age: 69
End: 2024-05-28
Payer: COMMERCIAL

## 2024-05-28 DIAGNOSIS — M25.512 BILATERAL SHOULDER PAIN, UNSPECIFIED CHRONICITY: Primary | ICD-10-CM

## 2024-05-28 DIAGNOSIS — R20.2 PARESTHESIA OF RIGHT ARM: ICD-10-CM

## 2024-05-28 DIAGNOSIS — M75.102 TEAR OF LEFT ROTATOR CUFF, UNSPECIFIED TEAR EXTENT, UNSPECIFIED WHETHER TRAUMATIC: ICD-10-CM

## 2024-05-28 DIAGNOSIS — M25.511 BILATERAL SHOULDER PAIN, UNSPECIFIED CHRONICITY: Primary | ICD-10-CM

## 2024-05-28 DIAGNOSIS — M75.101 TEAR OF RIGHT ROTATOR CUFF, UNSPECIFIED TEAR EXTENT, UNSPECIFIED WHETHER TRAUMATIC: ICD-10-CM

## 2024-05-28 DIAGNOSIS — M54.2 NECK PAIN: ICD-10-CM

## 2024-05-28 PROCEDURE — 99214 OFFICE O/P EST MOD 30 MIN: CPT | Performed by: ORTHOPAEDIC SURGERY

## 2024-05-28 PROCEDURE — 1123F ACP DISCUSS/DSCN MKR DOCD: CPT | Performed by: ORTHOPAEDIC SURGERY

## 2024-05-28 PROCEDURE — 20610 DRAIN/INJ JOINT/BURSA W/O US: CPT | Performed by: ORTHOPAEDIC SURGERY

## 2024-05-28 RX ORDER — METHYLPREDNISOLONE ACETATE 40 MG/ML
80 INJECTION, SUSPENSION INTRA-ARTICULAR; INTRALESIONAL; INTRAMUSCULAR; SOFT TISSUE ONCE
Status: COMPLETED | OUTPATIENT
Start: 2024-05-28 | End: 2024-05-28

## 2024-05-28 RX ADMIN — METHYLPREDNISOLONE ACETATE 80 MG: 40 INJECTION, SUSPENSION INTRA-ARTICULAR; INTRALESIONAL; INTRAMUSCULAR; SOFT TISSUE at 16:43

## 2024-05-28 NOTE — PROGRESS NOTES
of the proximal long head of the biceps tendon with  increased signal intensity and thickening (axial series 201 images 8-10). Normal  labrum.  Normal capsulo- ligamentous complex.  Normal rotator interval.  Marked acromioclavicular joint hypertrophy with small AC joint effusion  resulting in narrowing of the subacromial space (coronal series 301 images  3-10). There is a Type II morphology (curved), with a neutral orientation.  Subacromial-subdeltoid bursal fluid (coronal series 301 image 9).  Normal visualized coracohumeral and coracoacromial ligaments.  Normal  quadrilateral space.  Normal axillary space.  Normal deltoid muscle.  Normal trapezius muscle.  ___________________________________     IMPRESSION:  Marked supraspinatus tendinosis with a full-thickness partial width tear of the  anterior fibers as described.  Infraspinatus tendinosis with thickening and increased signal intensity without  a partial-thickness or full-thickness tear.  Marked subscapularis tendinosis with thickening of the superior and middle  fibers and with an intrasubstance longitudinal partial tear of the distal fibers  with medial dislocation of the long head of the biceps into the longitudinal  tear (\"hidden lesion\").  Moderate arthrosis of the glenohumeral joint.  Cystic change in the humeral head.  Superior labral tear.  Marked proximal long head of biceps tendinosis.  Marked AC joint hypertrophy with small AC joint effusion which results in  narrowing of the subacromial space.  Moderate-sized glenohumeral joint effusion with fluid in the  subacromial-subdeltoid bursa.    MRI independently reviewed.  There is a small full-thickness tear if not just high-grade partial-thickness on the articular side of the supraspinatus and infraspinatus.  Some thickening of the subscap with possible subluxation and tendinopathy of the biceps.  Moderate arthritic change in the shoulder.  Marked bicipital tendinopathy.  Moderate AC

## 2024-05-31 ENCOUNTER — OFFICE VISIT (OUTPATIENT)
Age: 69
End: 2024-05-31

## 2024-05-31 DIAGNOSIS — M25.521 RIGHT ELBOW PAIN: Primary | ICD-10-CM

## 2024-05-31 NOTE — PROGRESS NOTES
Orthopaedic Hand Clinic Note    Name: Victor Hugo Reed  YOB: 1955  Age: 69 y.o.  Gender: male  MRN: [id number]      CC: Patient referred for evaluation of upper extremity pain    HPI: Victor Hugo Reed is a 69 y.o. male Right hand dominant with a chief complaint of right elbow limited motion, patient reports he has had issues with extension for about 15 years, he reports no pain and he has been able to perform all activities without much limitations.      ROS/Meds/PSH/PMH/FH/SH: I personally reviewed the patients standard intake form.  Pertinents are discussed in the HPI    Physical Examination:  General: Awake and alert.  HEENT: Normocephalic, atraumatic  CV/Pulm: Breathing even and unlabored  Skin: No obvious rashes noted.  Lymphatic: No obvious evidence of lymphedema or lymphadenopathy    Musculoskeletal Exam:  Examination on the right upper extremity demonstrates cap refill < 5 seconds in all fingers, elbow motion of 20 to 120 degrees, no pain with terminal flexion or extension, near full pronation and supination.    Imaging / Electrodiagnostic Tests:     right Elbow  XR: AP, Lateral, Oblique views     Clinical Indication:  1. Right elbow pain           Report: AP, lateral, oblique elbow XR demonstrates advanced osteoarthritis of the right elbow with irregularity of the ulnar aspect of the ulnohumeral joint as well as osteophytic changes around the radial head and capitellum, large volar and dorsal osteophytes and loose bodies surrounding the olecranon and the coronoid fossa    Impression: as above     Marsha Davis MD         Assessment:   1. Right elbow pain        Plan:   We discussed the diagnosis and different treatment options. We discussed observation, therapy, antiinflammatory medications and other pertinent treatment modalities.    After discussing in detail the patient elects to proceed with observation for now, we discussed all treatment options in detail including surgical

## 2024-06-05 ENCOUNTER — PATIENT MESSAGE (OUTPATIENT)
Dept: FAMILY MEDICINE CLINIC | Facility: CLINIC | Age: 69
End: 2024-06-05

## 2024-06-06 RX ORDER — GLUCOSAMINE HCL/CHONDROITIN SU 500-400 MG
CAPSULE ORAL
Qty: 300 STRIP | Refills: 5 | Status: SHIPPED | OUTPATIENT
Start: 2024-06-06

## 2024-06-10 ENCOUNTER — HOSPITAL ENCOUNTER (OUTPATIENT)
Dept: PHYSICAL THERAPY | Age: 69
Setting detail: RECURRING SERIES
Discharge: HOME | End: 2024-06-13
Payer: COMMERCIAL

## 2024-06-10 DIAGNOSIS — M54.2 CERVICALGIA: Primary | ICD-10-CM

## 2024-06-10 DIAGNOSIS — M75.112 INCOMPLETE ROTATOR CUFF TEAR OR RUPTURE OF LEFT SHOULDER, NOT SPECIFIED AS TRAUMATIC: ICD-10-CM

## 2024-06-10 DIAGNOSIS — M75.111 INCOMPLETE ROTATOR CUFF TEAR OR RUPTURE OF RIGHT SHOULDER, NOT SPECIFIED AS TRAUMATIC: ICD-10-CM

## 2024-06-10 PROCEDURE — 97162 PT EVAL MOD COMPLEX 30 MIN: CPT

## 2024-06-10 NOTE — THERAPY EVALUATION
Approaches:  Steroid shot in R shoulder a couple weeks ago-didn't seem to help     OBJECTIVE     ORTHOSTATIC/POSTURE OBSERVATION:   Date:   6/10/2024   SITTING RESTING POSTURE -Pt sits with slight forward head and rounded shoulders which indicate tight anterior chest musculature, upper trapezius, and levator scapula and weak posterior scapula musculature and deep cervical flexors.            ROM Date:  6/10/2024    RIGHT LEFT   UEROM   Flexion WFL WFL   Extension     Abd WFL WFL   ER (Apley's) T10 T12   IR (Apley's)  Base of occiput C7   Elbow flexion 121 WFL   Elbow extension -20 WFL     *Of note, patients decrease in R elbow flexion/extension has been a chronic issue, per patient, and currently he does not feel limited by it. Pt states he had imaging done to it that revealed bone spurs and does not wish to further seek out any surgical interventions for.     PALPATION/TONE/TISSUE TEXTURE: Date:   6/10/2024   SOFT TISSUE:   Upper traps TTP   Levatore scapulae TTP                 ACCESSORY MOVEMENT   Date:  6/10/2024    RIGHT LEFT   Inferior glide hypomobile hypomobile   Posterior glide hypomobile hypomobile   Cervical Spine Hypomobile throughout    Thoracic Spine Hypomobile throughout and TTP along erector spinae      SHOULDER STRENGTH: Date:  6/10/2024    RIGHT LEFT   Shoulder Flexion 4/5 4/5   Shoulder Extension     Shoulder Abduction 4-/5 4-/5   Shoulder IR 5/5 5/5   Shoulder ER 4+/5 4+/5     ELBOW/WRIST STRENGTH: Date:  6/10/2024    RIGHT LEFT   FLEXION 5/5/ 5/5   EXTENSION 5/5 5/5    STRENGTH         Cervical flex/ext: 41/28  Cervical SBL:   R=28 L=25    SPECIAL TESTS:     -Impingement tests:       -Winslow-Chilo test: +B       -Neer test (Passive shoulder flexion & IR in scaption): -B       -Painful Arc: +R     -Biceps brachii tests:        -Yergason's test (long head of biceps) (resisted elbow flexion with supination): +B     -Rotator Cuff:       -Empty can (suprispinatus): +B     -ULTT: +median nerve

## 2024-06-17 ENCOUNTER — HOSPITAL ENCOUNTER (OUTPATIENT)
Dept: PHYSICAL THERAPY | Age: 69
Setting detail: RECURRING SERIES
Discharge: HOME | End: 2024-06-20
Payer: COMMERCIAL

## 2024-06-17 PROCEDURE — 97140 MANUAL THERAPY 1/> REGIONS: CPT

## 2024-06-17 PROCEDURE — 97110 THERAPEUTIC EXERCISES: CPT

## 2024-06-17 ASSESSMENT — PAIN SCALES - GENERAL: PAINLEVEL_OUTOF10: 2

## 2024-06-17 ASSESSMENT — PAIN DESCRIPTION - ORIENTATION: ORIENTATION: RIGHT;LEFT

## 2024-06-17 ASSESSMENT — PAIN DESCRIPTION - LOCATION: LOCATION: SHOULDER

## 2024-06-17 NOTE — PROGRESS NOTES
Victor Hugo Coelhock  : 1955  Primary: Romeo Powers (Jessie CARBAJAL)  Secondary:  [facility] [place] [address] [place] [address]  Phone: [phone]  Fax: [phone] Plan Frequency: 2x/week for 12 weeks    Plan of Care/Certification Expiration Date: 24        Plan of Care/Certification Expiration Date:  Plan of Care/Certification Expiration Date: 24    Frequency/Duration:   Plan Frequency: 2x/week for 12 weeks      Time In/Out:   Time In: 1030  Time Out: 1130      PT Visit Info:    Progress Note Counter: 2      Visit Count:  2    OUTPATIENT PHYSICAL THERAPY:   Treatment Note 2024       Episode  (Shoulder and neck pain)               Treatment Diagnosis:    Cervicalgia  Incomplete rotator cuff tear or rupture of right shoulder, not specified as traumatic  Incomplete rotator cuff tear or rupture of left shoulder, not specified as traumatic  Medical/Referring Diagnosis:    Bilateral shoulder pain, unspecified chronicity [M25.511, M25.512]  Neck pain [M54.2]  Tear of right rotator cuff, unspecified tear extent, unspecified whether traumatic [M75.101]  Tear of left rotator cuff, unspecified tear extent, unspecified whether traumatic [M75.102]      Referring Physician:  Teo Liu PA MD Orders:  PT Eval and Treat   Return MD Appt:  as needed   Date of Onset:  Onset Date: 06/10/04     Allergies:   Seasonal  Restrictions/Precautions:   None      Interventions Planned (Treatment may consist of any combination of the following):     See Assessment Note    Subjective Comments:   Taz notes he continues to travel a lot throughout the week for work, spends a lot of time in the car. Limited time to perform exercises. Continues to feel more tightness in shoulders compared to pain.   Initial Pain Level:: Right, Left Shoulder 2/10  Post Session Pain Level:  Right, Left  Shoulder 1/10  Medications Last Reviewed:  2024  Updated Objective Findings:  None

## 2024-06-24 ENCOUNTER — HOSPITAL ENCOUNTER (OUTPATIENT)
Dept: PHYSICAL THERAPY | Age: 69
Setting detail: RECURRING SERIES
Discharge: HOME | End: 2024-06-27
Payer: COMMERCIAL

## 2024-06-24 PROCEDURE — 97140 MANUAL THERAPY 1/> REGIONS: CPT

## 2024-06-24 PROCEDURE — 97110 THERAPEUTIC EXERCISES: CPT

## 2024-06-24 ASSESSMENT — PAIN DESCRIPTION - ORIENTATION: ORIENTATION: RIGHT;LEFT

## 2024-06-24 ASSESSMENT — PAIN SCALES - GENERAL: PAINLEVEL_OUTOF10: 2

## 2024-06-24 ASSESSMENT — PAIN DESCRIPTION - LOCATION: LOCATION: SHOULDER

## 2024-06-24 NOTE — PROGRESS NOTES
chest STM with breathing techniques  Supine manubrium, sternal and sternocostal joint mobilizations with breathing techniques  Supine bilateral shoulder STM: anterior shoulder, pectoralis major/minor, subscapularis, latissimus dorsi  Supine bilateral acromioclavicular joint mobilizations    Treatment/Session Summary:    Treatment Assessment:   Continues to have restrictions through anterior chest and thoracic spine rotation and extension. Continue to encourage daily stretches and looking into yoga classes as well for flexibility.   Communication/Consultation:  None today  Equipment provided today:  HEP  Recommendations/Intent for next treatment session: Next visit will focus on STM and joint mobilizations to shoulder girdle and anterior chest, postural stability strengthening.    >Total Treatment Billable Duration:  55 minutes   Time In: 1030  Time Out: 1130    Judy Munoz PT         Charge Capture  Events  [employer] Portal  Appt Desk  Attendance Report     Future Appointments   Date Time Provider Department Center   7/1/2024 10:00 AM FIVE FORKS PRN [facility]   7/2/2024  9:30 AM Charley Chanel MD [facility] AMB   7/11/2024  9:40 AM Hugh Yancey MD [facility] AMB   10/18/2024  7:20 AM PERIPHERAL [facility]   10/18/2024  8:15 AM Aurelio Varma MD [facility] AMB   1/2/2025  9:00 AM [facility] LAB RESOURCE [facility] AMB   1/9/2025  1:00 PM Hugh Yancey MD [facility] AMB

## 2024-07-01 ENCOUNTER — HOSPITAL ENCOUNTER (OUTPATIENT)
Dept: PHYSICAL THERAPY | Age: 69
Setting detail: RECURRING SERIES
Discharge: HOME | End: 2024-07-04
Payer: COMMERCIAL

## 2024-07-01 PROCEDURE — 97110 THERAPEUTIC EXERCISES: CPT

## 2024-07-01 PROCEDURE — 97140 MANUAL THERAPY 1/> REGIONS: CPT

## 2024-07-01 NOTE — PROGRESS NOTES
Victor Hugo Reed  : 1955  Primary: Romeo Powers (Jessie CARBAJAL)  Secondary:  [facility] @ [place] [address] [place] [address]  Phone: [phone]  Fax: [phone] Plan Frequency: 2x/week for 12 weeks    Plan of Care/Certification Expiration Date: 24        Plan of Care/Certification Expiration Date:  Plan of Care/Certification Expiration Date: 24    Frequency/Duration:   Plan Frequency: 2x/week for 12 weeks      Time In/Out:   Time In: 1000  Time Out: 1056      PT Visit Info:    Progress Note Counter: 4      Visit Count:  4    OUTPATIENT PHYSICAL THERAPY:   Treatment Note 2024       Episode  (Shoulder and neck pain)               Treatment Diagnosis:    Cervicalgia  Incomplete rotator cuff tear or rupture of right shoulder, not specified as traumatic  Incomplete rotator cuff tear or rupture of left shoulder, not specified as traumatic  Medical/Referring Diagnosis:    Bilateral shoulder pain, unspecified chronicity [M25.511, M25.512]  Neck pain [M54.2]  Tear of right rotator cuff, unspecified tear extent, unspecified whether traumatic [M75.101]  Tear of left rotator cuff, unspecified tear extent, unspecified whether traumatic [M75.102]      Referring Physician:  Teo Liu PA MD Orders:  PT Eval and Treat   Return MD Appt:  as needed   Date of Onset:  Onset Date: 06/10/04     Allergies:   Seasonal  Restrictions/Precautions:   None      Interventions Planned (Treatment may consist of any combination of the following):     See Assessment Note    Subjective Comments:   Taz states sleeping has improved, but still getting n/t when riding his bike this weekend 26.2 miles  Initial Pain Level::   3   /10  Post Session Pain Level:     5   /10  Medications Last Reviewed:  2024  Updated Objective Findings:  None Today  Treatment   THERAPEUTIC EXERCISE: (25 minutes):    Exercises per grid below to improve mobility, strength, balance, and coordination.

## 2024-07-08 ENCOUNTER — NURSE ONLY (OUTPATIENT)
Dept: FAMILY MEDICINE CLINIC | Facility: CLINIC | Age: 69
End: 2024-07-08
Payer: COMMERCIAL

## 2024-07-08 ENCOUNTER — HOSPITAL ENCOUNTER (OUTPATIENT)
Dept: PHYSICAL THERAPY | Age: 69
Setting detail: RECURRING SERIES
Discharge: HOME | End: 2024-07-11
Payer: COMMERCIAL

## 2024-07-08 DIAGNOSIS — E11.65 TYPE 2 DIABETES MELLITUS WITH HYPERGLYCEMIA, WITHOUT LONG-TERM CURRENT USE OF INSULIN (HCC): Primary | ICD-10-CM

## 2024-07-08 LAB — HBA1C MFR BLD: 6.5 %

## 2024-07-08 PROCEDURE — 97140 MANUAL THERAPY 1/> REGIONS: CPT

## 2024-07-08 PROCEDURE — 83036 HEMOGLOBIN GLYCOSYLATED A1C: CPT | Performed by: FAMILY MEDICINE

## 2024-07-08 PROCEDURE — 97110 THERAPEUTIC EXERCISES: CPT

## 2024-07-08 ASSESSMENT — PAIN DESCRIPTION - ORIENTATION: ORIENTATION: RIGHT;LEFT

## 2024-07-08 ASSESSMENT — PAIN SCALES - GENERAL: PAINLEVEL_OUTOF10: 1

## 2024-07-08 ASSESSMENT — PAIN DESCRIPTION - LOCATION: LOCATION: SHOULDER

## 2024-07-08 NOTE — PROGRESS NOTES
Victor Hugo Reed  : 1955  Primary: Romeo Powers (Jessie CARBAJAL)  Secondary:  [facility] @ [place] [address] [place] [address]  Phone: [phone]  Fax: [phone] Plan Frequency: 2x/week for 12 weeks    Plan of Care/Certification Expiration Date: 24        Plan of Care/Certification Expiration Date:  Plan of Care/Certification Expiration Date: 24    Frequency/Duration:   Plan Frequency: 2x/week for 12 weeks      Time In/Out:   Time In: 1030  Time Out: 1130      PT Visit Info:    Progress Note Counter: 5      Visit Count:  5    OUTPATIENT PHYSICAL THERAPY:   Treatment Note 2024       Episode  (Shoulder and neck pain)               Treatment Diagnosis:    Cervicalgia  Incomplete rotator cuff tear or rupture of right shoulder, not specified as traumatic  Incomplete rotator cuff tear or rupture of left shoulder, not specified as traumatic  Medical/Referring Diagnosis:    Bilateral shoulder pain, unspecified chronicity [M25.511, M25.512]  Neck pain [M54.2]  Tear of right rotator cuff, unspecified tear extent, unspecified whether traumatic [M75.101]  Tear of left rotator cuff, unspecified tear extent, unspecified whether traumatic [M75.102]      Referring Physician:  Teo Liu PA MD Orders:  PT Eval and Treat   Return MD Appt:  as needed   Date of Onset:  Onset Date: 06/10/04     Allergies:   Seasonal  Restrictions/Precautions:   None      Interventions Planned (Treatment may consist of any combination of the following):     See Assessment Note    Subjective Comments:   Taz states improvements on his bike with less tingling in his hands and sleeping better without onset of tingling. Would like to discontinue PT at this time and continue to work independently on his home exercises.   Initial Pain Level:: Right, Left Shoulder 1/10  Post Session Pain Level:  Right, Left  Shoulder 1/10  Medications Last Reviewed:  2024  Updated Objective Findings:  See

## 2024-07-08 NOTE — THERAPY DISCHARGE
Victor Hugo Reed  : 1955  Primary: Romeo Powers (Jessie CARBAJAL)  Secondary:  [facility] @ [place] [address] [address] [place] [address]  Phone: [phone]  Fax: [phone] Plan Frequency: 2x/week for 12 weeks    Plan of Care/Certification Expiration Date: 24        Plan of Care/Certification Expiration Date:  Plan of Care/Certification Expiration Date: 24    Frequency/Duration: Plan Frequency: 2x/week for 12 weeks      Time In/Out:   Time In: 1030  Time Out: 1130      PT Visit Info:    Progress Note Counter: 5      Visit Count:  5                OUTPATIENT PHYSICAL THERAPY:             Discharge Summary 2024               Episode (Shoulder and neck pain)         Treatment Diagnosis:     No data found  Medical/Referring Diagnosis:    Bilateral shoulder pain, unspecified chronicity [M25.511, M25.512]  Neck pain [M54.2]  Tear of right rotator cuff, unspecified tear extent, unspecified whether traumatic [M75.101]  Tear of left rotator cuff, unspecified tear extent, unspecified whether traumatic [M75.102]      Referring Physician:  Teo Liu PA MD Orders:  PT Eval and Treat   Return MD Appt:  TBD  Date of Onset:  Onset Date: 06/10/04   About 10 years ago  Allergies:  Seasonal  Restrictions/Precautions:    None      Medications Last Reviewed:  2024     SUBJECTIVE   History of Injury/Illness (Reason for Referral):  Victor Hugo Reed states B shoulder and neck pain for the past 10+ years without any significant DMITRY.  He states his arms will fall asleep at night and wake him up, as well as when he is going on long bike rides that limit how far he is able to go.   He states his insurance allowed him to have an MRI on his R shoulder, as well as a nerve conduction study, but requires him to have PT before having any imaging on his neck and L shoulder.   Pt states currently his shoulder and neck pain limit him from reaching up high, around his back and

## 2024-07-11 ENCOUNTER — OFFICE VISIT (OUTPATIENT)
Dept: FAMILY MEDICINE CLINIC | Facility: CLINIC | Age: 69
End: 2024-07-11
Payer: COMMERCIAL

## 2024-07-11 VITALS
RESPIRATION RATE: 18 BRPM | DIASTOLIC BLOOD PRESSURE: 92 MMHG | SYSTOLIC BLOOD PRESSURE: 140 MMHG | WEIGHT: 264 LBS | BODY MASS INDEX: 31.31 KG/M2 | HEART RATE: 71 BPM | TEMPERATURE: 98.2 F | OXYGEN SATURATION: 98 %

## 2024-07-11 DIAGNOSIS — I10 ESSENTIAL (PRIMARY) HYPERTENSION: ICD-10-CM

## 2024-07-11 DIAGNOSIS — E11.65 UNCONTROLLED TYPE 2 DIABETES MELLITUS WITH HYPERGLYCEMIA (HCC): Primary | ICD-10-CM

## 2024-07-11 DIAGNOSIS — G47.00 INSOMNIA, UNSPECIFIED TYPE: ICD-10-CM

## 2024-07-11 PROCEDURE — 3052F HG A1C>EQUAL 8.0%<EQUAL 9.0%: CPT | Performed by: FAMILY MEDICINE

## 2024-07-11 PROCEDURE — 99214 OFFICE O/P EST MOD 30 MIN: CPT | Performed by: FAMILY MEDICINE

## 2024-07-11 PROCEDURE — 1123F ACP DISCUSS/DSCN MKR DOCD: CPT | Performed by: FAMILY MEDICINE

## 2024-07-11 PROCEDURE — 3077F SYST BP >= 140 MM HG: CPT | Performed by: FAMILY MEDICINE

## 2024-07-11 PROCEDURE — 3080F DIAST BP >= 90 MM HG: CPT | Performed by: FAMILY MEDICINE

## 2024-07-11 SDOH — ECONOMIC STABILITY: FOOD INSECURITY: WITHIN THE PAST 12 MONTHS, THE FOOD YOU BOUGHT JUST DIDN'T LAST AND YOU DIDN'T HAVE MONEY TO GET MORE.: NEVER TRUE

## 2024-07-11 SDOH — ECONOMIC STABILITY: FOOD INSECURITY: WITHIN THE PAST 12 MONTHS, YOU WORRIED THAT YOUR FOOD WOULD RUN OUT BEFORE YOU GOT MONEY TO BUY MORE.: NEVER TRUE

## 2024-07-11 SDOH — ECONOMIC STABILITY: INCOME INSECURITY: HOW HARD IS IT FOR YOU TO PAY FOR THE VERY BASICS LIKE FOOD, HOUSING, MEDICAL CARE, AND HEATING?: NOT HARD AT ALL

## 2024-07-11 ASSESSMENT — PATIENT HEALTH QUESTIONNAIRE - PHQ9
2. FEELING DOWN, DEPRESSED OR HOPELESS: SEVERAL DAYS
SUM OF ALL RESPONSES TO PHQ QUESTIONS 1-9: 1
SUM OF ALL RESPONSES TO PHQ9 QUESTIONS 1 & 2: 1
SUM OF ALL RESPONSES TO PHQ QUESTIONS 1-9: 1
1. LITTLE INTEREST OR PLEASURE IN DOING THINGS: NOT AT ALL

## 2024-07-21 RX ORDER — ATORVASTATIN CALCIUM 40 MG/1
40 TABLET, FILM COATED ORAL DAILY
Qty: 90 TABLET | Refills: 3 | Status: SHIPPED | OUTPATIENT
Start: 2024-07-21

## 2024-07-21 RX ORDER — LISINOPRIL 10 MG/1
10 TABLET ORAL DAILY
Qty: 90 TABLET | Refills: 3 | Status: SHIPPED | OUTPATIENT
Start: 2024-07-21

## 2024-07-21 RX ORDER — TRAZODONE HYDROCHLORIDE 50 MG/1
50 TABLET ORAL NIGHTLY
Qty: 90 TABLET | Refills: 1 | Status: SHIPPED | OUTPATIENT
Start: 2024-07-21

## 2024-07-31 ENCOUNTER — OFFICE VISIT (OUTPATIENT)
Age: 69
End: 2024-07-31
Payer: COMMERCIAL

## 2024-07-31 DIAGNOSIS — G56.01 RIGHT CARPAL TUNNEL SYNDROME: Primary | ICD-10-CM

## 2024-07-31 PROCEDURE — 1123F ACP DISCUSS/DSCN MKR DOCD: CPT | Performed by: ORTHOPAEDIC SURGERY

## 2024-07-31 PROCEDURE — 99214 OFFICE O/P EST MOD 30 MIN: CPT | Performed by: ORTHOPAEDIC SURGERY

## 2024-07-31 NOTE — PROGRESS NOTES
sometimes wakes him up at night he believes his shoulder bothers him more, more than happy to reevaluate the patient if his symptoms worsen.     Patient voiced accordance and understanding of the information provided and the formulated plan. All questions were answered to the patient's satisfaction during the encounter.    Marsha Davis MD  Orthopaedic Surgery  07/31/24  12:36 PM

## 2024-09-30 ENCOUNTER — OFFICE VISIT (OUTPATIENT)
Dept: FAMILY MEDICINE CLINIC | Facility: CLINIC | Age: 69
End: 2024-09-30
Payer: COMMERCIAL

## 2024-09-30 VITALS
HEART RATE: 75 BPM | DIASTOLIC BLOOD PRESSURE: 72 MMHG | TEMPERATURE: 98.6 F | SYSTOLIC BLOOD PRESSURE: 116 MMHG | OXYGEN SATURATION: 98 % | WEIGHT: 268 LBS | BODY MASS INDEX: 31.64 KG/M2 | HEIGHT: 77 IN

## 2024-09-30 DIAGNOSIS — G89.29 CHRONIC RIGHT-SIDED LOW BACK PAIN WITH RIGHT-SIDED SCIATICA: Primary | ICD-10-CM

## 2024-09-30 DIAGNOSIS — M54.41 CHRONIC RIGHT-SIDED LOW BACK PAIN WITH RIGHT-SIDED SCIATICA: Primary | ICD-10-CM

## 2024-09-30 PROBLEM — M75.01 ADHESIVE CAPSULITIS OF RIGHT SHOULDER: Status: RESOLVED | Noted: 2020-02-23 | Resolved: 2024-09-30

## 2024-09-30 PROBLEM — G56.21 NEUROPATHY, ULNAR AT ELBOW, RIGHT: Status: ACTIVE | Noted: 2024-09-30

## 2024-09-30 PROBLEM — G56.00 CARPAL TUNNEL SYNDROME: Status: ACTIVE | Noted: 2024-09-30

## 2024-09-30 PROBLEM — M75.121 COMPLETE TEAR OF RIGHT ROTATOR CUFF: Status: RESOLVED | Noted: 2020-02-23 | Resolved: 2024-09-30

## 2024-09-30 PROCEDURE — 3078F DIAST BP <80 MM HG: CPT | Performed by: NURSE PRACTITIONER

## 2024-09-30 PROCEDURE — 99213 OFFICE O/P EST LOW 20 MIN: CPT | Performed by: NURSE PRACTITIONER

## 2024-09-30 PROCEDURE — 1123F ACP DISCUSS/DSCN MKR DOCD: CPT | Performed by: NURSE PRACTITIONER

## 2024-09-30 PROCEDURE — 3074F SYST BP LT 130 MM HG: CPT | Performed by: NURSE PRACTITIONER

## 2024-09-30 RX ORDER — PREDNISONE 10 MG/1
TABLET ORAL
Qty: 13 TABLET | Refills: 0 | Status: SHIPPED | OUTPATIENT
Start: 2024-09-30

## 2024-09-30 RX ORDER — CYCLOBENZAPRINE HCL 5 MG
5 TABLET ORAL 3 TIMES DAILY PRN
Qty: 30 TABLET | Refills: 0 | Status: SHIPPED | OUTPATIENT
Start: 2024-09-30 | End: 2024-10-10

## 2024-09-30 ASSESSMENT — ENCOUNTER SYMPTOMS
SHORTNESS OF BREATH: 0
BACK PAIN: 1
GASTROINTESTINAL NEGATIVE: 1
CHEST TIGHTNESS: 0
COLOR CHANGE: 0
ALLERGIC/IMMUNOLOGIC NEGATIVE: 1
COUGH: 0

## 2024-09-30 NOTE — PROGRESS NOTES
Chief Complaint   Patient presents with   • Follow-up     6 month f/u   • Throat Problem     fu ER - throat irritation and sob   • Imm/Inj     pneumovax          History of Present Illness:  The patient is a 41 year old male here to follow-up   .     1. Hypertension the patient is taking his medication does not have any nasal bleeding no headache no chest pain or shortness of breath no urine symptoms no dysuria or hematuria  2. Dyslipidemia the patient is taking his medication does not have any new muscle or joint pain no abdominal pain no nausea or vomiting no year which discoloration of the skin or scleral  3. Mild intermittent asthma the patient does not have any coughing wheezing no chest pain or shortness of breath   4. Patient went to ER because he had throat irritation and shortness of breath the patient was diagnosed with possible injured start on Protonix he felt better when he was taking the medication the patient is eating spicy food and his throat start to get irritated again but there is no shortness breath and there is increasing acid reflux lately  Past Medical History:  History - past medical           Past Medical History:   Diagnosis Date   • Essential (primary) hypertension     • High cholesterol     • RAD (reactive airway disease)              Past Surgeries:    History - past surgical   History reviewed. No pertinent surgical history.      Social History:    History - social   Social History                Socioeconomic History   • Marital status: Single       Spouse name: Not on file   • Number of children: Not on file   • Years of education: Not on file   • Highest education level: Not on file   Occupational History   • Not on file   Social Needs   • Financial resource strain: Not on file   • Food insecurity:       Worry: Not on file       Inability: Not on file   • Transportation needs:       Medical: Not on file       Non-medical: Not on file   Tobacco Use   • Smoking status: Never Smoker    • Smokeless tobacco: Never Used   Substance and Sexual Activity   • Alcohol use: Yes       Alcohol/week: 1.8 oz       Types: 3 Cans of beer per week       Comment: drinks 3 beers a night    • Drug use: No   • Sexual activity: Not Currently   Lifestyle   • Physical activity:       Days per week: Not on file       Minutes per session: Not on file   • Stress: Not on file   Relationships   • Social connections:       Talks on phone: Not on file       Gets together: Not on file       Attends [employer] service: Not on file       Active member of club or organization: Not on file       Attends meetings of clubs or organizations: Not on file       Relationship status: Not on file   • Intimate partner violence:       Fear of current or ex partner: Not on file       Emotionally abused: Not on file       Physically abused: Not on file       Forced sexual activity: Not on file   Other Topics Concern   • Not on file   Social History Narrative   • Not on file         Family History:    History - family              Family History   Problem Relation Age of Onset   • Cancer Maternal Grandmother     • Heart disease Maternal Grandfather     • Cancer Paternal Grandmother              Medication and Allergies:  Reviewed and updated per Epic.     REVIEW OF SYSTEMS:   GENERAL:  Patient denies fever, chills, tiredness, malaise, weight loss, weight gain.  EYES:  Patient denies blurred vision, double vision, pain, burning and itching.   NEUROLOGIC:  Patient denies tremors, dizzy spells, numbness, tingling, vision change, loss of balance.  ENDOCRINE:  Patient denies excessive thirst, heat intolerance, lymph node enlargement, tired/sluggishness.  GASTROINTESTINAL:  Patient denies abdominal pain, nausea/vomiting, indigestion/heartburn, diarrhea, constipation.  CARDIOVASCULAR:  Patient denies chest pain, shortness of breath, palpitations.  SKIN:  Patient denies skin rashes, boils, persistent itching, acne.  MUSCULOSKELETAL:  Patient denies  joint pain, neck pain, back pain, leg swelling.  ENT/MOUTH:  Patient denies ear infections, sore throats, sinus problems, hearing loss.  GENITOURINARY:  Patient denies urine retention, painful urination, urinary frequency, blood in urine, nocturia.  RESPIRATORY:  Patient denies wheezing, frequent cough, shortness of breath.         Physical Examination:  VITALS:   Blood pressure 124/66, pulse 76, temperature 98.4 °F (36.9 °C), temperature source Oral, resp. rate 16, height 6' (1.829 m), weight 119.7 kg.   GENERAL:  The patient is alert and oriented x3, and in no acute distress.  HEENT:  Normocephalic, atraumatic.  Normal conjunctivae and lids.  No  periorbital edema.  Extraocular movements intact bilaterally.  Pupils and  irises equal and react to light and accommodation.  Normal inspection of  ears and nose.  Tympanic membranes visualized bilaterally and normal in  appearance.  Nasal mucosa moist and pink.  Throat is nonerythematous, no  exudates or postnasal drip.  Tongue midline.  Uvula elevates in the midline.  NECK:  No neck or supraclavicular lymphadenopathy.  No thyromegaly.  No  jugular venous distention or bruits.  No stiffness noted.  LUNGS:  Clear to auscultation.  No wheezes or rhonchi.  Normal respiratory effort.  HEART:  Regular rate and rhythm.  No murmurs, rubs or gallops.  Normal S1 and S2.  ABDOMEN:  Soft, nontender to palpation.  Bowel sounds present.  No  hepatosplenomegaly.  No hernias present.  BACK:  No spinal or costovertebral angle tenderness.  Normal range of  motion.  EXTREMITIES:  No clubbing, cyanosis or edema.  Bilateral upper and lower  extremities have normal range of motion.  NEUROLOGIC:  Speech clear, memory intact.  Cranial nerves 2-12 grossly  intact.  Deep tendon reflexes positive throughout the body.  Babinski  downgoing.  Gait normal.  Exam of sensation is positive.  SKIN:  No rash, ecchymosis or jaundice.        Assessment and Plan:  The patient is a 40 year old male here to  follow-up his lab results.    1. Dyslipidemia.  The patient's cholesterol is 120  and LDL 70  and triglycerides 130   Fair controlled continue with the same medication  2. Hypertension.  Well-controlled  Continue with the same medication.  3. Mild intermittent asthma stable and well controlled continue with the same medication  4. Obesity the patient BMI 35.7 recommend the patient diet and exercise the patient lost about 19 lb in the last 6 month recommend the patient to continue with the diet and exercise  5. GERD and throat irritation probably due to GERD not well controlled for that start the patient again on Protonix once a day patient instructed to avoid spicy food and alcohol   predniSONE (DELTASONE) 10 MG tablet; 3 tabs daily x 2 days; 2 tabs daily x 2 days; 1 tab daily x 2 days; 1/2 tab daily x 2 days; then d/c, Disp-13 tablet, R-0Normal  -     cyclobenzaprine (FLEXERIL) 5 MG tablet; Take 1 tablet by mouth 3 times daily as needed for Muscle spasms, Disp-30 tablet, R-0Normal    Begin steroid pack.  Begin Flexeril as needed. Risks/benefits discussed. Take only sparingly.   Can also take Tylenol as needed.  Patient declines PT referral in the future.  If no improvement in symptoms, consider x-ray of lumbar spine and referral to [facility].    Return if symptoms worsen or fail to improve.    ALIYAH Rosales - NP  On this date I have spent 25 minutes reviewing previous notes, test results and face to face with the patient discussing the diagnosis and importance of compliance with the treatment plan as well as documenting on the day of the visit.

## 2024-10-07 ENCOUNTER — HOSPITAL ENCOUNTER (OUTPATIENT)
Dept: GENERAL RADIOLOGY | Age: 69
Discharge: HOME | End: 2024-10-09
Payer: COMMERCIAL

## 2024-10-07 DIAGNOSIS — M54.41 CHRONIC RIGHT-SIDED LOW BACK PAIN WITH RIGHT-SIDED SCIATICA: ICD-10-CM

## 2024-10-07 DIAGNOSIS — M54.41 CHRONIC RIGHT-SIDED LOW BACK PAIN WITH RIGHT-SIDED SCIATICA: Primary | ICD-10-CM

## 2024-10-07 DIAGNOSIS — G89.29 CHRONIC RIGHT-SIDED LOW BACK PAIN WITH RIGHT-SIDED SCIATICA: Primary | ICD-10-CM

## 2024-10-07 DIAGNOSIS — G89.29 CHRONIC RIGHT-SIDED LOW BACK PAIN WITH RIGHT-SIDED SCIATICA: ICD-10-CM

## 2024-10-07 PROCEDURE — 72100 X-RAY EXAM L-S SPINE 2/3 VWS: CPT

## 2024-10-08 DIAGNOSIS — M54.41 CHRONIC RIGHT-SIDED LOW BACK PAIN WITH RIGHT-SIDED SCIATICA: Primary | ICD-10-CM

## 2024-10-08 DIAGNOSIS — G89.29 CHRONIC RIGHT-SIDED LOW BACK PAIN WITH RIGHT-SIDED SCIATICA: Primary | ICD-10-CM

## 2024-10-09 DIAGNOSIS — M54.41 CHRONIC RIGHT-SIDED LOW BACK PAIN WITH RIGHT-SIDED SCIATICA: Primary | ICD-10-CM

## 2024-10-09 DIAGNOSIS — G89.29 CHRONIC RIGHT-SIDED LOW BACK PAIN WITH RIGHT-SIDED SCIATICA: Primary | ICD-10-CM

## 2024-10-09 RX ORDER — TRAMADOL HYDROCHLORIDE 50 MG/1
50 TABLET ORAL EVERY 6 HOURS PRN
Qty: 28 TABLET | Refills: 0 | Status: SHIPPED | OUTPATIENT
Start: 2024-10-09 | End: 2024-10-16

## 2024-10-15 ENCOUNTER — HOSPITAL ENCOUNTER (OUTPATIENT)
Dept: LAB | Age: 69
Discharge: HOME | End: 2024-10-15
Payer: COMMERCIAL

## 2024-10-15 DIAGNOSIS — C61 PROSTATE CANCER (HCC): ICD-10-CM

## 2024-10-15 LAB — PSA SERPL-MCNC: 5.8 NG/ML (ref 0–4)

## 2024-10-15 PROCEDURE — 36415 COLL VENOUS BLD VENIPUNCTURE: CPT

## 2024-10-15 PROCEDURE — 84153 ASSAY OF PSA TOTAL: CPT

## 2024-10-15 NOTE — PROGRESS NOTES
allergies, were reviewed and updated in the medical record as appropriate.    PMH:     Past Medical History:   Diagnosis Date    Adhesive capsulitis of right shoulder 02/23/2020    Anticoagulant long-term use 2004?    Anxiety 1/20/2016    Some generalized anxiety    Arthritis     knee    Atrial fibrillation (HCC)     None noted for years    Complete tear of right rotator cuff 02/23/2020    Coronary artery disease     COVID-19 09/2021    Depression 07/18/2014    Drug use     4/2/24  Patient reports taking Delta 9 gummy for sleep nightly    History of atrial fibrillation     Hypercholesterolemia     Hypertension     medication    Major depression     Morbid obesity     Multiple thyroid nodules     Multiple thyroid nodules     Neuropathy 2005?    TONY (obstructive sleep apnea)     does not tolerate CPAP    Osteoarthritis 2009    Left hip, right knee prosthetics    PONV (postoperative nausea and vomiting)     after hernia repair, medications relieved    Right inguinal hernia     Skin cancer     Staph infection     while in high school    Substance abuse (HCC)     Quit drinking 2011    Type 2 diabetes mellitus (HCC)     oral agents, S&S of  hypoglycemia with exercise- has not checked BS,     Ventricular tachycardia, non-sustained (HCC)     R on T       MEDs:     atorvastatin  blood glucose test strips  Coenzyme Q10 Caps  FISH OIL PO  flecainide  fluticasone  glimepiride  ibuprofen  lisinopril  metFORMIN  metoprolol succinate  MULTIVITAMIN ADULTS PO  NONFORMULARY  predniSONE  rivaroxaban Tabs  traZODone  valACYclovir  verapamil     ALLERGIES:    Allergies   Allergen Reactions    Seasonal Other (See Comments)       ROS:     Review of Systems   Constitutional:  Negative for chills, fatigue and fever.   Respiratory: Negative.     Cardiovascular: Negative.    Gastrointestinal: Negative.    Genitourinary:  Negative for difficulty urinating, dysuria, flank pain, frequency and hematuria.   Musculoskeletal: Negative.

## 2024-10-18 ENCOUNTER — OFFICE VISIT (OUTPATIENT)
Dept: ONCOLOGY | Age: 69
End: 2024-10-18
Payer: COMMERCIAL

## 2024-10-18 VITALS
DIASTOLIC BLOOD PRESSURE: 85 MMHG | HEIGHT: 77 IN | WEIGHT: 272.2 LBS | SYSTOLIC BLOOD PRESSURE: 138 MMHG | RESPIRATION RATE: 16 BRPM | HEART RATE: 74 BPM | OXYGEN SATURATION: 96 % | BODY MASS INDEX: 32.14 KG/M2 | TEMPERATURE: 98.2 F

## 2024-10-18 DIAGNOSIS — C61 PROSTATE CANCER (HCC): Primary | ICD-10-CM

## 2024-10-18 PROCEDURE — 99213 OFFICE O/P EST LOW 20 MIN: CPT | Performed by: UROLOGY

## 2024-10-18 PROCEDURE — 3079F DIAST BP 80-89 MM HG: CPT | Performed by: UROLOGY

## 2024-10-18 PROCEDURE — 3075F SYST BP GE 130 - 139MM HG: CPT | Performed by: UROLOGY

## 2024-10-18 PROCEDURE — 1123F ACP DISCUSS/DSCN MKR DOCD: CPT | Performed by: UROLOGY

## 2024-10-18 ASSESSMENT — ENCOUNTER SYMPTOMS
RESPIRATORY NEGATIVE: 1
GASTROINTESTINAL NEGATIVE: 1

## 2024-10-18 ASSESSMENT — PATIENT HEALTH QUESTIONNAIRE - PHQ9
1. LITTLE INTEREST OR PLEASURE IN DOING THINGS: NOT AT ALL
SUM OF ALL RESPONSES TO PHQ QUESTIONS 1-9: 0
SUM OF ALL RESPONSES TO PHQ QUESTIONS 1-9: 0
2. FEELING DOWN, DEPRESSED OR HOPELESS: NOT AT ALL
SUM OF ALL RESPONSES TO PHQ QUESTIONS 1-9: 0
SUM OF ALL RESPONSES TO PHQ QUESTIONS 1-9: 0
SUM OF ALL RESPONSES TO PHQ9 QUESTIONS 1 & 2: 0

## 2024-10-18 NOTE — PATIENT INSTRUCTIONS
Patient Information from Today's Visit    The members of your [facility] are listed below:    Physician Provider: Aurelio Varma, Urologic Oncologist  Advanced Practice Clinician: JEROD Sanchez  Nurse Navigator: N/A  Medical Assistant: Elinor GHOSH CMA  :Charo MCKEON  Supportive Care Services: Trisha VELAZQUEZ LMSW    Diagnosis: Prostate    Follow Up Instructions:   PSA reviewed  KAREN at the next visit.  Plan to repeat a prostate MRI in 1 year.  If you find yourself needing the Flomax please send us a message.  We will plan to see you back in 6 months. If you need anything prior to your next appointment please do not hesitate to call our office    Treatment Summary has been discussed and given to patient:N/A      Current Labs:   No visits with results within 3 Day(s) from this visit.   Latest known visit with results is:   Hospital Outpatient Visit on 10/15/2024   Component Date Value Ref Range Status    PSA 10/15/2024 5.8 (H)  0.0 - 4.0 ng/mL Final    Comment: Roche ECLIA methodology  Patient's results of tumor marker testing may not be comparable to labs using different manufacturers/methods.                 Please refer to After Visit Summary or [employer] mobile for upcoming appointment information. Please call our office for rescheduling needs at least 24 hours before your scheduled appointment time.If you have any questions regarding your upcoming schedule please reach out to your care team through [employer] mobile or call [phone].     Please notify your assigned Nurse Navigator of any unplanned hospital admissions or Emergency Room visits within 24 hours of discharge.    -------------------------------------------------------------------------------------------------------------------  Please call our office at [phone] if you have any  of the following symptoms:   Fever of 100.5 or greater  Chills  Shortness of breath  Swelling or pain in one leg    After office hours an answering service is

## 2024-10-22 ENCOUNTER — OFFICE VISIT (OUTPATIENT)
Dept: NEUROSURGERY | Age: 69
End: 2024-10-22
Payer: COMMERCIAL

## 2024-10-22 VITALS
BODY MASS INDEX: 32.37 KG/M2 | WEIGHT: 274.2 LBS | DIASTOLIC BLOOD PRESSURE: 79 MMHG | HEIGHT: 77 IN | SYSTOLIC BLOOD PRESSURE: 144 MMHG | OXYGEN SATURATION: 96 % | HEART RATE: 60 BPM | TEMPERATURE: 97.5 F

## 2024-10-22 DIAGNOSIS — M54.16 LUMBAR RADICULOPATHY: Primary | ICD-10-CM

## 2024-10-22 PROCEDURE — 1123F ACP DISCUSS/DSCN MKR DOCD: CPT | Performed by: NURSE PRACTITIONER

## 2024-10-22 PROCEDURE — 99202 OFFICE O/P NEW SF 15 MIN: CPT | Performed by: NURSE PRACTITIONER

## 2024-10-22 PROCEDURE — 3077F SYST BP >= 140 MM HG: CPT | Performed by: NURSE PRACTITIONER

## 2024-10-22 PROCEDURE — 3078F DIAST BP <80 MM HG: CPT | Performed by: NURSE PRACTITIONER

## 2024-10-22 RX ORDER — DEXAMETHASONE 2 MG/1
TABLET ORAL
Qty: 50 TABLET | Refills: 0 | Status: SHIPPED | OUTPATIENT
Start: 2024-10-22 | End: 2024-10-31

## 2024-10-22 NOTE — PROGRESS NOTES
pain     Integument:   No rash/itching     Neurological:   Dizziness/vertigo, No numbness/tingling sensation, tremors, No weakness in limbs, frequent or recurring headaches, memory loss or confusion.       Physical Exam:    General: No acute distress  Head normocephalic and atraumatic  Mood and affect appropriate  CV: Regular rate   Resp: No increased work of breathing  Skin: warm and dry   Awake, alert, and oriented   Speech fluent  Eyes open spontaneously   Face symmetric and tongue midline on protrusion  Sternocleidomastoid and trapezius 5/5  No mid-line cervical, thoracic, or lumbar tenderness to palpation   Patient with strength exam as follows:      Lower Extremities:      Hip Flex 5 5    Quads  5 5    Hamstrings 5 5    Dorsiflex 5 5    Plantarflex 5 5    EHL  5 5  Sensation intact to light touch and pin-prick   DTR 2+  No clonus or babinski present   No Patrick's sign present bilaterally   Gait normal    Assessment & Plan:  Victor Hugo Reed is a 69 y.o. male who presents to be evaluated for his 3-month history of right sided low back and buttock pain that radiates out into the hip and the top of the right thigh.  At this time I am sending the patient for a lumbar MRI without contrast to check for evidence of nerve compromise.  I am also giving the patient a large steroid dose of Decadron.  Patient is to call his primary care doctor to get extra medication to cover his blood sugars while taking this medication so as to not adversely affect his next A1c.  Patient will follow-up with me after the MRI is complete to review the imaging.    A total of 28 minutes was spent in chart review, patient consultation, and documentation.  No diagnosis found.    Notes are transcribed with Luis E, a medical voice recording dictation service, and may contain minor errors.    Griselda Bourgeois NP  [facility]  [facility]

## 2024-10-25 DIAGNOSIS — M54.16 LUMBAR RADICULOPATHY: Primary | ICD-10-CM

## 2024-11-01 ENCOUNTER — HOSPITAL ENCOUNTER (OUTPATIENT)
Dept: MRI IMAGING | Age: 69
Discharge: HOME | End: 2024-11-04
Payer: COMMERCIAL

## 2024-11-01 DIAGNOSIS — M54.16 LUMBAR RADICULOPATHY: ICD-10-CM

## 2024-11-01 PROCEDURE — 72148 MRI LUMBAR SPINE W/O DYE: CPT

## 2024-11-04 ENCOUNTER — OFFICE VISIT (OUTPATIENT)
Dept: NEUROSURGERY | Age: 69
End: 2024-11-04
Payer: COMMERCIAL

## 2024-11-04 VITALS
SYSTOLIC BLOOD PRESSURE: 145 MMHG | OXYGEN SATURATION: 97 % | TEMPERATURE: 97.2 F | BODY MASS INDEX: 32.12 KG/M2 | DIASTOLIC BLOOD PRESSURE: 86 MMHG | WEIGHT: 272 LBS | HEIGHT: 77 IN | HEART RATE: 77 BPM

## 2024-11-04 DIAGNOSIS — M54.16 LUMBAR RADICULOPATHY: Primary | ICD-10-CM

## 2024-11-04 PROCEDURE — 3077F SYST BP >= 140 MM HG: CPT | Performed by: NURSE PRACTITIONER

## 2024-11-04 PROCEDURE — 99213 OFFICE O/P EST LOW 20 MIN: CPT | Performed by: NURSE PRACTITIONER

## 2024-11-04 PROCEDURE — 3079F DIAST BP 80-89 MM HG: CPT | Performed by: NURSE PRACTITIONER

## 2024-11-04 PROCEDURE — 1123F ACP DISCUSS/DSCN MKR DOCD: CPT | Performed by: NURSE PRACTITIONER

## 2024-11-04 NOTE — PROGRESS NOTES
lisinopril (PRINIVIL;ZESTRIL) 10 MG tablet Take 1 tablet by mouth daily 90 tablet 3    verapamil (CALAN SR) 180 MG extended release tablet TAKE ONE TABLET BY MOUTH ONE TIME DAILY FOR HIGH BLOOD PRESSURE 90 tablet 3    atorvastatin (LIPITOR) 40 MG tablet Take 1 tablet by mouth daily 90 tablet 3    blood glucose monitor strips Test 3 times a day  for symptoms of irregular blood glucose. Dispense sufficient amount for indicated testing frequency plus additional to accommodate PRN testing needs. 300 strip 5    fluticasone (CUTIVATE) 0.05 % cream  (Patient not taking: Reported on 10/18/2024)      rivaroxaban (XARELTO) 20 MG TABS tablet Take 1 tablet by mouth Daily with supper 90 tablet 3    glimepiride (AMARYL) 4 MG tablet TAKE ONE-HALF TABLET BY MOUTH EVERY MORNING AND TAKE ONE-HALF TABLET BY MOUTH EVERY EVENING 90 tablet 1    metFORMIN (GLUCOPHAGE-XR) 500 MG extended release tablet Take 1 tablet by mouth 2 times daily 180 tablet 3    valACYclovir (VALTREX) 500 MG tablet TAKE ONE TABLET BY MOUTH ONE TIME DAILY 90 tablet 1    NONFORMULARY nightly Delta 9 gummies      ibuprofen (ADVIL;MOTRIN) 200 MG tablet Take 2 tablets by mouth as needed for Pain (Patient not taking: Reported on 10/18/2024)      flecainide (TAMBOCOR) 100 MG tablet TAKE ONE TABLET BY MOUTH TWICE A DAY TO PREVENT RECURRENT AFIB 180 tablet 3    metoprolol succinate (TOPROL XL) 25 MG extended release tablet TAKE ONE TABLET BY MOUTH ONE TIME DAILY FOR HIGH BLOOD PRESSURE 90 tablet 3    Multiple Vitamins-Minerals (MULTIVITAMIN ADULTS PO) Take by mouth daily      Omega-3 Fatty Acids (FISH OIL PO) Take by mouth daily      Coenzyme Q10 10 MG CAPS Take by mouth at bedtime        No current facility-administered medications for this visit.     Patient Active Problem List   Diagnosis    Multiple thyroid nodules    Osteoarthritis    Mitral valve insufficiency    Osteoarthritis of knee    Hx of diabetes mellitus    Recurrent depression (HCC)    CAD (coronary artery

## 2024-11-07 ENCOUNTER — OFFICE VISIT (OUTPATIENT)
Dept: FAMILY MEDICINE CLINIC | Facility: CLINIC | Age: 69
End: 2024-11-07
Payer: COMMERCIAL

## 2024-11-07 VITALS
DIASTOLIC BLOOD PRESSURE: 82 MMHG | BODY MASS INDEX: 33.82 KG/M2 | SYSTOLIC BLOOD PRESSURE: 162 MMHG | WEIGHT: 272 LBS | TEMPERATURE: 98.1 F | HEIGHT: 75 IN | HEART RATE: 69 BPM | OXYGEN SATURATION: 98 %

## 2024-11-07 DIAGNOSIS — F41.1 GENERALIZED ANXIETY DISORDER: ICD-10-CM

## 2024-11-07 DIAGNOSIS — E11.65 TYPE 2 DIABETES MELLITUS WITH HYPERGLYCEMIA, WITHOUT LONG-TERM CURRENT USE OF INSULIN (HCC): Primary | ICD-10-CM

## 2024-11-07 DIAGNOSIS — I10 ESSENTIAL (PRIMARY) HYPERTENSION: ICD-10-CM

## 2024-11-07 LAB — HBA1C MFR BLD: 7.4 %

## 2024-11-07 PROCEDURE — 99214 OFFICE O/P EST MOD 30 MIN: CPT | Performed by: NURSE PRACTITIONER

## 2024-11-07 PROCEDURE — 83036 HEMOGLOBIN GLYCOSYLATED A1C: CPT | Performed by: NURSE PRACTITIONER

## 2024-11-07 PROCEDURE — 3079F DIAST BP 80-89 MM HG: CPT | Performed by: NURSE PRACTITIONER

## 2024-11-07 PROCEDURE — 3077F SYST BP >= 140 MM HG: CPT | Performed by: NURSE PRACTITIONER

## 2024-11-07 PROCEDURE — 3052F HG A1C>EQUAL 8.0%<EQUAL 9.0%: CPT | Performed by: NURSE PRACTITIONER

## 2024-11-07 PROCEDURE — 1123F ACP DISCUSS/DSCN MKR DOCD: CPT | Performed by: NURSE PRACTITIONER

## 2024-11-07 RX ORDER — LISINOPRIL 20 MG/1
20 TABLET ORAL DAILY
Qty: 90 TABLET | Refills: 1 | Status: SHIPPED | OUTPATIENT
Start: 2024-11-07

## 2024-11-07 RX ORDER — ESCITALOPRAM OXALATE 10 MG/1
10 TABLET ORAL DAILY
Qty: 90 TABLET | Refills: 0 | Status: SHIPPED | OUTPATIENT
Start: 2024-11-07

## 2024-11-07 RX ORDER — METFORMIN HYDROCHLORIDE 500 MG/1
2000 TABLET, EXTENDED RELEASE ORAL
Qty: 360 TABLET | Refills: 1 | Status: SHIPPED | OUTPATIENT
Start: 2024-11-07

## 2024-11-07 ASSESSMENT — ENCOUNTER SYMPTOMS
COLOR CHANGE: 0
GASTROINTESTINAL NEGATIVE: 1
ALLERGIC/IMMUNOLOGIC NEGATIVE: 1
COUGH: 0
CHEST TIGHTNESS: 0
WHEEZING: 0
SHORTNESS OF BREATH: 0

## 2024-11-07 NOTE — PROGRESS NOTES
Subjective  Victor Hugo Reed is a 69 y.o. y.o. male who presents with   Chief Complaint   Patient presents with    Follow-up     Pt presents to have A1C rechecked due to using steroids , awaiting to get injections for his spine        History of Present Illness  Presents for T2DM follow up. Last A1c 6.5% in July. Remains on 1000 mg XR metformin and 2 mg Amaryl BID. Not checking BG regularly at home. Denies increased thirst, hunger, or urination. Has chronic neuropathy in fingers and toes but denies worsening symptoms recently. Suspects A1C is worse today because he has been under tremendous amounts of stress dealing with brother with recent MI, wife with memory concerns, and his own personal work stressors. Has been stress eating with unhealthy foods. Eating more desserts. Not exercising regularly due to recent back pain and being busy. Not sleeping well, having frequent racing thoughts. Denies depression. Preparing to have and epidural steroid injection in back for his low back pain next week and wants to make sure he has his diabetes in better control when getting steroid injection.      BP elevated today. Remains compliant on Lisinopril, Toprol XL, and Verapamil for his HTN. Has had daily headache for the past week, mild in nature. Denies CP,  palpitations, dizziness, weakness, leg swelling, or vision changes. Does not check BP at home.           ROS  Review of Systems   Constitutional:  Negative for diaphoresis, fatigue, fever and unexpected weight change.   HENT: Negative.     Eyes:  Negative for visual disturbance.   Respiratory:  Negative for cough, chest tightness, shortness of breath and wheezing.    Cardiovascular:  Negative for chest pain, palpitations and leg swelling.   Gastrointestinal: Negative.    Endocrine: Negative.  Negative for polydipsia, polyphagia and polyuria.   Genitourinary: Negative.  Negative for frequency.   Musculoskeletal:  Negative for arthralgias and myalgias.   Skin: Negative.

## 2024-11-15 SDOH — HEALTH STABILITY: PHYSICAL HEALTH: ON AVERAGE, HOW MANY MINUTES DO YOU ENGAGE IN EXERCISE AT THIS LEVEL?: 120 MIN

## 2024-11-15 SDOH — HEALTH STABILITY: PHYSICAL HEALTH: ON AVERAGE, HOW MANY DAYS PER WEEK DO YOU ENGAGE IN MODERATE TO STRENUOUS EXERCISE (LIKE A BRISK WALK)?: 1 DAY

## 2024-11-18 ENCOUNTER — OFFICE VISIT (OUTPATIENT)
Dept: PRIMARY CARE CLINIC | Facility: CLINIC | Age: 69
End: 2024-11-18
Payer: COMMERCIAL

## 2024-11-18 VITALS
HEART RATE: 75 BPM | TEMPERATURE: 98 F | WEIGHT: 273 LBS | DIASTOLIC BLOOD PRESSURE: 71 MMHG | SYSTOLIC BLOOD PRESSURE: 148 MMHG | OXYGEN SATURATION: 96 % | BODY MASS INDEX: 32.23 KG/M2 | HEIGHT: 77 IN

## 2024-11-18 DIAGNOSIS — E78.5 DYSLIPIDEMIA: ICD-10-CM

## 2024-11-18 DIAGNOSIS — E11.65 TYPE 2 DIABETES MELLITUS WITH HYPERGLYCEMIA, WITHOUT LONG-TERM CURRENT USE OF INSULIN (HCC): ICD-10-CM

## 2024-11-18 DIAGNOSIS — I48.0 PAROXYSMAL ATRIAL FIBRILLATION (HCC): ICD-10-CM

## 2024-11-18 DIAGNOSIS — I10 ESSENTIAL (PRIMARY) HYPERTENSION: Primary | ICD-10-CM

## 2024-11-18 DIAGNOSIS — C61 PROSTATE CANCER (HCC): ICD-10-CM

## 2024-11-18 PROCEDURE — 3052F HG A1C>EQUAL 8.0%<EQUAL 9.0%: CPT

## 2024-11-18 PROCEDURE — 1123F ACP DISCUSS/DSCN MKR DOCD: CPT

## 2024-11-18 PROCEDURE — 99213 OFFICE O/P EST LOW 20 MIN: CPT

## 2024-11-18 PROCEDURE — 3077F SYST BP >= 140 MM HG: CPT

## 2024-11-18 PROCEDURE — 3078F DIAST BP <80 MM HG: CPT

## 2024-11-18 NOTE — PATIENT INSTRUCTIONS
diabetes educator. They can give you tips and meal ideas and can answer your questions about meal planning. This health professional can also help you reach a healthy weight if that is one of your goals.  What should you know about eating carbs?  Managing the amount of carbohydrate (carbs) you eat is an important part of healthy meals when you have diabetes. Carbohydrate is found in many foods.  Learn which foods have carbs. And learn the amounts of carbs in different foods.  Bread, cereal, pasta, and rice have about 15 grams of carbs in a serving. A serving is 1 slice of bread (1 ounce), ½ cup of cooked cereal, or 1/3 cup of cooked pasta or rice.  Fruits have 15 grams of carbs in a serving. A serving is 1 small fresh fruit, such as an apple or orange; ½ of a banana; ½ cup of cooked or canned fruit; ½ cup of fruit juice; 1 cup of melon or raspberries; or 2 tablespoons of dried fruit.  Milk and no-sugar-added yogurt have 15 grams of carbs in a serving. A serving is 1 cup of milk or 3/4 cup (6 oz) of no-sugar-added yogurt.  Starchy vegetables have 15 grams of carbs in a serving. A serving is ½ cup of mashed potatoes or sweet potato; 1 cup winter squash; ½ of a small baked potato; ½ cup of cooked beans; or ½ cup cooked corn or green peas.  Learn how much carbs to eat each day and at each meal. A dietitian or certified diabetes educator can teach you how to keep track of the amount of carbs you eat. This is called carbohydrate counting.  If you are not sure how to count carbohydrate grams, use the plate method to plan meals. It is a quick way to make sure that you have a balanced meal. It also can help you manage the amount of carbohydrate you eat at meals.  Divide your plate by types of foods. Put non-starchy vegetables on half the plate, protein foods on a fourth of the plate, and carbohydrate foods on the final fourth of the plate.  Try to eat about the same amount of carbs at each meal. Do not \"save up\" your daily

## 2024-11-18 NOTE — PROGRESS NOTES
[facility] [address]             [address] [address]             Tel:[phone]      Victor Hugo Reed 1955 is a 69 y.o. male New patient, here for evaluation of the following:     Establish Primary Care previous patient at [facility]  .  Patient with PMH HLD , HTN ,Afib ,Diabetes II , DDD and prostate cancer on active surveillance by urologist oncologist diagnosed 3 years ago . No concern today .          Chief Complaint   Patient presents with    New Patient     Establish care         HPI      Allergies   Allergen Reactions    Seasonal Other (See Comments)     Past Medical History:   Diagnosis Date    Adhesive capsulitis of right shoulder 02/23/2020    Anticoagulant long-term use 2004?    Anxiety 1/20/2016    Some generalized anxiety    Arthritis     knee    Atrial fibrillation (HCC)     None noted for years    Complete tear of right rotator cuff 02/23/2020    Coronary artery disease     COVID-19 09/2021    Depression 07/18/2014    Drug use     4/2/24  Patient reports taking Delta 9 gummy for sleep nightly    History of atrial fibrillation     Hypercholesterolemia     Hypertension     medication    Lumbosacral disc disease 1983    This is the current issue.    Major depression     Morbid obesity     Multiple thyroid nodules     Multiple thyroid nodules     Neck pain 2023    Neuropathy 2005?    TONY (obstructive sleep apnea)     does not tolerate CPAP    Osteoarthritis 2009    Left hip, right knee prosthetics    PONV (postoperative nausea and vomiting)     after hernia repair, medications relieved    Right inguinal hernia     Skin cancer     Staph infection     while in high school    Substance abuse (HCC)     Quit drinking 2011    Type 2 diabetes mellitus (HCC)     oral agents, S&S of  hypoglycemia with exercise- has not checked BS,     Ventricular tachycardia, non-sustained (HCC)     R on T     Past Surgical History:   Procedure

## 2025-01-07 DIAGNOSIS — I10 ESSENTIAL (PRIMARY) HYPERTENSION: ICD-10-CM

## 2025-01-07 DIAGNOSIS — E11.65 TYPE 2 DIABETES MELLITUS WITH HYPERGLYCEMIA, WITHOUT LONG-TERM CURRENT USE OF INSULIN (HCC): ICD-10-CM

## 2025-01-07 DIAGNOSIS — E78.5 DYSLIPIDEMIA: ICD-10-CM

## 2025-01-07 DIAGNOSIS — E11.65 UNCONTROLLED TYPE 2 DIABETES MELLITUS WITH HYPERGLYCEMIA (HCC): ICD-10-CM

## 2025-01-07 LAB
ALBUMIN SERPL-MCNC: 4 G/DL (ref 3.2–4.6)
ALBUMIN/GLOB SERPL: 1.4 (ref 1–1.9)
ALP SERPL-CCNC: 100 U/L (ref 40–129)
ALT SERPL-CCNC: 35 U/L (ref 8–55)
ANION GAP SERPL CALC-SCNC: 11 MMOL/L (ref 7–16)
AST SERPL-CCNC: 28 U/L (ref 15–37)
BASOPHILS # BLD: 0.05 K/UL (ref 0–0.2)
BASOPHILS NFR BLD: 0.7 % (ref 0–2)
BILIRUB SERPL-MCNC: 0.4 MG/DL (ref 0–1.2)
BUN SERPL-MCNC: 14 MG/DL (ref 8–23)
CALCIUM SERPL-MCNC: 9.4 MG/DL (ref 8.8–10.2)
CHLORIDE SERPL-SCNC: 99 MMOL/L (ref 98–107)
CHOLEST SERPL-MCNC: 131 MG/DL (ref 0–200)
CO2 SERPL-SCNC: 27 MMOL/L (ref 20–29)
CREAT SERPL-MCNC: 0.8 MG/DL (ref 0.8–1.3)
CREAT UR-MCNC: 111 MG/DL (ref 39–259)
DIFFERENTIAL METHOD BLD: NORMAL
EOSINOPHIL # BLD: 0.16 K/UL (ref 0–0.8)
EOSINOPHIL NFR BLD: 2.2 % (ref 0.5–7.8)
ERYTHROCYTE [DISTWIDTH] IN BLOOD BY AUTOMATED COUNT: 12.5 % (ref 11.9–14.6)
EST. AVERAGE GLUCOSE BLD GHB EST-MCNC: 171 MG/DL
GLOBULIN SER CALC-MCNC: 3 G/DL (ref 2.3–3.5)
GLUCOSE SERPL-MCNC: 198 MG/DL (ref 70–99)
HBA1C MFR BLD: 7.6 % (ref 0–5.6)
HCT VFR BLD AUTO: 44.5 % (ref 41.1–50.3)
HDLC SERPL-MCNC: 52 MG/DL (ref 40–60)
HDLC SERPL: 2.5 (ref 0–5)
HGB BLD-MCNC: 15.3 G/DL (ref 13.6–17.2)
IMM GRANULOCYTES # BLD AUTO: 0.04 K/UL (ref 0–0.5)
IMM GRANULOCYTES NFR BLD AUTO: 0.6 % (ref 0–5)
LDLC SERPL CALC-MCNC: 45 MG/DL (ref 0–100)
LYMPHOCYTES # BLD: 2.07 K/UL (ref 0.5–4.6)
LYMPHOCYTES NFR BLD: 28.6 % (ref 13–44)
MCH RBC QN AUTO: 30.7 PG (ref 26.1–32.9)
MCHC RBC AUTO-ENTMCNC: 34.4 G/DL (ref 31.4–35)
MCV RBC AUTO: 89.4 FL (ref 82–102)
MICROALBUMIN UR-MCNC: <1.2 MG/DL (ref 0–20)
MICROALBUMIN/CREAT UR-RTO: NORMAL MG/G (ref 0–30)
MONOCYTES # BLD: 0.76 K/UL (ref 0.1–1.3)
MONOCYTES NFR BLD: 10.5 % (ref 4–12)
NEUTS SEG # BLD: 4.15 K/UL (ref 1.7–8.2)
NEUTS SEG NFR BLD: 57.4 % (ref 43–78)
NRBC # BLD: 0 K/UL (ref 0–0.2)
PLATELET # BLD AUTO: 250 K/UL (ref 150–450)
PMV BLD AUTO: 9.4 FL (ref 9.4–12.3)
POTASSIUM SERPL-SCNC: 4.7 MMOL/L (ref 3.5–5.1)
PROT SERPL-MCNC: 7 G/DL (ref 6.3–8.2)
RBC # BLD AUTO: 4.98 M/UL (ref 4.23–5.6)
SODIUM SERPL-SCNC: 138 MMOL/L (ref 136–145)
TRIGL SERPL-MCNC: 174 MG/DL (ref 0–150)
TSH W FREE THYROID IF ABNORMAL: 1.37 UIU/ML (ref 0.27–4.2)
VLDLC SERPL CALC-MCNC: 35 MG/DL (ref 6–23)
WBC # BLD AUTO: 7.2 K/UL (ref 4.3–11.1)

## 2025-01-08 ENCOUNTER — OFFICE VISIT (OUTPATIENT)
Age: 70
End: 2025-01-08
Payer: COMMERCIAL

## 2025-01-08 VITALS
HEIGHT: 77 IN | WEIGHT: 280 LBS | DIASTOLIC BLOOD PRESSURE: 80 MMHG | BODY MASS INDEX: 33.06 KG/M2 | HEART RATE: 64 BPM | SYSTOLIC BLOOD PRESSURE: 130 MMHG

## 2025-01-08 DIAGNOSIS — I48.0 PAROXYSMAL ATRIAL FIBRILLATION (HCC): ICD-10-CM

## 2025-01-08 DIAGNOSIS — I10 ESSENTIAL (PRIMARY) HYPERTENSION: Primary | ICD-10-CM

## 2025-01-08 PROCEDURE — 3075F SYST BP GE 130 - 139MM HG: CPT | Performed by: INTERNAL MEDICINE

## 2025-01-08 PROCEDURE — 1123F ACP DISCUSS/DSCN MKR DOCD: CPT | Performed by: INTERNAL MEDICINE

## 2025-01-08 PROCEDURE — 99214 OFFICE O/P EST MOD 30 MIN: CPT | Performed by: INTERNAL MEDICINE

## 2025-01-08 PROCEDURE — 93000 ELECTROCARDIOGRAM COMPLETE: CPT | Performed by: INTERNAL MEDICINE

## 2025-01-08 PROCEDURE — 3079F DIAST BP 80-89 MM HG: CPT | Performed by: INTERNAL MEDICINE

## 2025-01-08 RX ORDER — FLECAINIDE ACETATE 100 MG/1
TABLET ORAL
Qty: 180 TABLET | Refills: 3 | Status: SHIPPED | OUTPATIENT
Start: 2025-01-08

## 2025-01-08 RX ORDER — METOPROLOL SUCCINATE 25 MG/1
TABLET, EXTENDED RELEASE ORAL
Qty: 90 TABLET | Refills: 3 | Status: SHIPPED | OUTPATIENT
Start: 2025-01-08

## 2025-01-08 RX ORDER — GLIMEPIRIDE 4 MG/1
TABLET ORAL
Qty: 90 TABLET | Refills: 1 | OUTPATIENT
Start: 2025-01-08

## 2025-01-08 RX ORDER — ATORVASTATIN CALCIUM 40 MG/1
40 TABLET, FILM COATED ORAL DAILY
Qty: 90 TABLET | Refills: 3 | Status: SHIPPED | OUTPATIENT
Start: 2025-01-08

## 2025-01-08 RX ORDER — LISINOPRIL 20 MG/1
20 TABLET ORAL DAILY
Qty: 90 TABLET | Refills: 3 | Status: SHIPPED | OUTPATIENT
Start: 2025-01-08

## 2025-01-08 NOTE — PROGRESS NOTES
optimization.      Return in about 1 year (around 1/8/2026).       ULI OTERO MD  1/8/2025  9:29 AM

## 2025-01-14 ASSESSMENT — PATIENT HEALTH QUESTIONNAIRE - PHQ9
SUM OF ALL RESPONSES TO PHQ QUESTIONS 1-9: 7
1. LITTLE INTEREST OR PLEASURE IN DOING THINGS: NOT AT ALL
7. TROUBLE CONCENTRATING ON THINGS, SUCH AS READING THE NEWSPAPER OR WATCHING TELEVISION: SEVERAL DAYS
SUM OF ALL RESPONSES TO PHQ QUESTIONS 1-9: 7
8. MOVING OR SPEAKING SO SLOWLY THAT OTHER PEOPLE COULD HAVE NOTICED. OR THE OPPOSITE, BEING SO FIGETY OR RESTLESS THAT YOU HAVE BEEN MOVING AROUND A LOT MORE THAN USUAL: NOT AT ALL
2. FEELING DOWN, DEPRESSED OR HOPELESS: NOT AT ALL
5. POOR APPETITE OR OVEREATING: MORE THAN HALF THE DAYS
6. FEELING BAD ABOUT YOURSELF - OR THAT YOU ARE A FAILURE OR HAVE LET YOURSELF OR YOUR FAMILY DOWN: NOT AT ALL
9. THOUGHTS THAT YOU WOULD BE BETTER OFF DEAD, OR OF HURTING YOURSELF: NOT AT ALL
7. TROUBLE CONCENTRATING ON THINGS, SUCH AS READING THE NEWSPAPER OR WATCHING TELEVISION: SEVERAL DAYS
8. MOVING OR SPEAKING SO SLOWLY THAT OTHER PEOPLE COULD HAVE NOTICED. OR THE OPPOSITE - BEING SO FIDGETY OR RESTLESS THAT YOU HAVE BEEN MOVING AROUND A LOT MORE THAN USUAL: NOT AT ALL
10. IF YOU CHECKED OFF ANY PROBLEMS, HOW DIFFICULT HAVE THESE PROBLEMS MADE IT FOR YOU TO DO YOUR WORK, TAKE CARE OF THINGS AT HOME, OR GET ALONG WITH OTHER PEOPLE: NOT DIFFICULT AT ALL
SUM OF ALL RESPONSES TO PHQ QUESTIONS 1-9: 7
4. FEELING TIRED OR HAVING LITTLE ENERGY: MORE THAN HALF THE DAYS
9. THOUGHTS THAT YOU WOULD BE BETTER OFF DEAD, OR OF HURTING YOURSELF: NOT AT ALL
3. TROUBLE FALLING OR STAYING ASLEEP: MORE THAN HALF THE DAYS
4. FEELING TIRED OR HAVING LITTLE ENERGY: MORE THAN HALF THE DAYS
SUM OF ALL RESPONSES TO PHQ9 QUESTIONS 1 & 2: 0
2. FEELING DOWN, DEPRESSED OR HOPELESS: NOT AT ALL
1. LITTLE INTEREST OR PLEASURE IN DOING THINGS: NOT AT ALL
10. IF YOU CHECKED OFF ANY PROBLEMS, HOW DIFFICULT HAVE THESE PROBLEMS MADE IT FOR YOU TO DO YOUR WORK, TAKE CARE OF THINGS AT HOME, OR GET ALONG WITH OTHER PEOPLE: NOT DIFFICULT AT ALL
6. FEELING BAD ABOUT YOURSELF - OR THAT YOU ARE A FAILURE OR HAVE LET YOURSELF OR YOUR FAMILY DOWN: NOT AT ALL
SUM OF ALL RESPONSES TO PHQ QUESTIONS 1-9: 7
5. POOR APPETITE OR OVEREATING: MORE THAN HALF THE DAYS
3. TROUBLE FALLING OR STAYING ASLEEP: MORE THAN HALF THE DAYS
SUM OF ALL RESPONSES TO PHQ QUESTIONS 1-9: 7

## 2025-01-15 ENCOUNTER — TELEPHONE (OUTPATIENT)
Age: 70
End: 2025-01-15

## 2025-01-15 NOTE — TELEPHONE ENCOUNTER
Pt.has dx of PAF.He calls today reporting he went to bed w/Afib last night and is still out of rhythm today.Current HR =63.Highest HR 99 today.HR was 117 last night.He is asymptomatic.He is on Xarelto 20mg qday,Toprol xl 25mg qday,Flecainide 100mg bid and Verpamil 180mg qday.He ask if he can take an extra Flecainide to see if this will put him back in rhythm?

## 2025-01-15 NOTE — TELEPHONE ENCOUNTER
Shon Pappas MD  You2 minutes ago (2:45 PM)       No, as long as he is asymptomatic we are just going to sit tight and reevaluate   I called and informed pt.of MD response.He will try coughing/bearing down as well.

## 2025-01-15 NOTE — TELEPHONE ENCOUNTER
Went in to Afib last night and took flecainide and took again this AM Still in Afib now. Please call

## 2025-01-17 ENCOUNTER — OFFICE VISIT (OUTPATIENT)
Dept: PRIMARY CARE CLINIC | Facility: CLINIC | Age: 70
End: 2025-01-17
Payer: COMMERCIAL

## 2025-01-17 VITALS
OXYGEN SATURATION: 96 % | TEMPERATURE: 97.3 F | HEIGHT: 77 IN | HEART RATE: 89 BPM | SYSTOLIC BLOOD PRESSURE: 130 MMHG | BODY MASS INDEX: 32.82 KG/M2 | WEIGHT: 278 LBS | DIASTOLIC BLOOD PRESSURE: 72 MMHG

## 2025-01-17 DIAGNOSIS — R97.20 ELEVATED PSA: ICD-10-CM

## 2025-01-17 DIAGNOSIS — E11.65 TYPE 2 DIABETES MELLITUS WITH HYPERGLYCEMIA, WITHOUT LONG-TERM CURRENT USE OF INSULIN (HCC): Primary | ICD-10-CM

## 2025-01-17 DIAGNOSIS — E13.69 OTHER SPECIFIED DIABETES MELLITUS WITH OTHER SPECIFIED COMPLICATION, UNSPECIFIED WHETHER LONG TERM INSULIN USE (HCC): ICD-10-CM

## 2025-01-17 DIAGNOSIS — Z00.00 ENCOUNTER FOR WELL ADULT EXAM WITHOUT ABNORMAL FINDINGS: ICD-10-CM

## 2025-01-17 PROBLEM — Z86.39 HX OF DIABETES MELLITUS: Status: RESOLVED | Noted: 2018-03-22 | Resolved: 2025-01-17

## 2025-01-17 PROCEDURE — 99214 OFFICE O/P EST MOD 30 MIN: CPT

## 2025-01-17 RX ORDER — DULAGLUTIDE 1.5 MG/.5ML
1.5 INJECTION, SOLUTION SUBCUTANEOUS WEEKLY
Qty: 2 ML | Refills: 2 | Status: SHIPPED | OUTPATIENT
Start: 2025-01-17

## 2025-01-17 SDOH — ECONOMIC STABILITY: FOOD INSECURITY: WITHIN THE PAST 12 MONTHS, THE FOOD YOU BOUGHT JUST DIDN'T LAST AND YOU DIDN'T HAVE MONEY TO GET MORE.: NEVER TRUE

## 2025-01-17 SDOH — ECONOMIC STABILITY: FOOD INSECURITY: WITHIN THE PAST 12 MONTHS, YOU WORRIED THAT YOUR FOOD WOULD RUN OUT BEFORE YOU GOT MONEY TO BUY MORE.: NEVER TRUE

## 2025-01-17 ASSESSMENT — ENCOUNTER SYMPTOMS
SHORTNESS OF BREATH: 0
CHEST TIGHTNESS: 0

## 2025-01-17 NOTE — ASSESSMENT & PLAN NOTE
Orders:    TRULICITY 1.5 MG/0.5ML SC injection; Inject 0.5 mLs into the skin once a week     DIABETES FOOT EXAM    ESTABLISHED, MOD MDM, 30-39 MIN [63026]    Hemoglobin A1C - Lab draw; Future    Comprehensive Metabolic Panel; Future

## 2025-01-17 NOTE — PATIENT INSTRUCTIONS
Well Visit, Over 65: Care Instructions  Well visits can help you stay healthy. Your doctor has checked your overall health and may have suggested ways to take good care of yourself. Your doctor also may have recommended tests. You can help prevent illness with healthy eating, good sleep, vaccinations, regular exercise, and other steps.    Get the tests that you and your doctor decide on. Depending on your age and risks, examples might include hearing tests as well as screening for colon, breast, and lung cancer. Screening helps find diseases before any symptoms appear.   Eat healthy foods. Choose fruits, vegetables, whole grains, lean protein, and low-fat dairy foods. Limit saturated fat, and reduce salt.     Limit alcohol. Men should have no more than 2 drinks a day. Women should have no more than 1. For some people, no alcohol is the best choice.   Exercise. It can help prevent falls. Get at least 30 minutes of exercise on most days of the week. Walking, yoga, and temo chi can be good choices.     Reach and stay at your healthy weight. This will lower your risk for many health problems.   Take care of your mental health. Try to stay connected with friends, family, and community, and find ways to manage stress.     If you're feeling depressed or hopeless, talk to someone. A counselor can help. If you don't have a counselor, talk to your doctor.   Talk to your doctor if you think you may have a problem with alcohol or drug use. This includes prescription medicines and illegal drugs.     Avoid tobacco and nicotine: Don't smoke, vape, or chew. If you need help quitting, talk to your doctor.   Practice safer sex. Getting tested, using condoms or dental dams, and limiting sex partners can help prevent STIs.     Make an advance directive. This is a legal way to tell your family and doctor what you want to happen at the end of your life or when you can't speak for yourself.   Prevent problems where you can. Protect

## 2025-01-17 NOTE — PROGRESS NOTES
Well Adult Note  Name: Victor Hugo Reed Today’s Date: 2025   MRN: [id number] Sex: Male   Age: 69 y.o. Ethnicity: Non- / Non    : 1955 Race: White (non-)      Victor Hugo Reed is here for a well adult exam.       Assessment & Plan   Assessment & Plan  Type 2 diabetes mellitus with hyperglycemia, without long-term current use of insulin (HCC)       Orders:    TRULICITY 1.5 MG/0.5ML SC injection; Inject 0.5 mLs into the skin once a week     DIABETES FOOT EXAM    ESTABLISHED, MOD MDM, 30-39 MIN [50340]    Hemoglobin A1C - Lab draw; Future    Comprehensive Metabolic Panel; Future    Other specified diabetes mellitus with other specified complication, unspecified whether long term insulin use (HCC)            Encounter for well adult exam without abnormal findings            Elevated PSA                  Return in 3 months (on 2025) for Diabetes.       Subjective   History:  Establish Primary Care previous patient at [facility]  .  Patient with PMH HLD , HTN ,Afib ,Diabetes II , DDD and prostate cancer on active surveillance by urologist oncologist diagnosed 3 years ago . [facility] for colonoscopy up to date .    Interval   Physical and labs discussed and reviewed today. Thinks he went on afib on Tuesday stress induced but it converted back .no complains today       Review of Systems   Respiratory:  Negative for chest tightness and shortness of breath.    Cardiovascular:  Negative for chest pain, palpitations and leg swelling.       Allergies   Allergen Reactions    Seasonal Other (See Comments)     Prior to Visit Medications    Medication Sig Taking? Authorizing Provider   TRULICITY 1.5 MG/0.5ML SC injection Inject 0.5 mLs into the skin once a week Yes Reyes, Rayma Y, APRN - CNP   rivaroxaban (XARELTO) 20 MG TABS tablet Take 1 tablet by mouth Daily with supper Yes Shon Pappas MD   metoprolol succinate (TOPROL XL) 25 MG extended release tablet TAKE ONE TABLET

## 2025-02-18 DIAGNOSIS — B00.9 HSV INFECTION: ICD-10-CM

## 2025-02-19 RX ORDER — VALACYCLOVIR HYDROCHLORIDE 500 MG/1
TABLET, FILM COATED ORAL
Qty: 90 TABLET | Refills: 1 | OUTPATIENT
Start: 2025-02-19

## 2025-02-20 DIAGNOSIS — B00.9 HSV INFECTION: ICD-10-CM

## 2025-02-20 RX ORDER — VALACYCLOVIR HYDROCHLORIDE 500 MG/1
TABLET, FILM COATED ORAL
Qty: 90 TABLET | Refills: 1 | Status: SHIPPED | OUTPATIENT
Start: 2025-02-20

## 2025-02-25 ENCOUNTER — PATIENT MESSAGE (OUTPATIENT)
Age: 70
End: 2025-02-25

## 2025-02-25 ENCOUNTER — TELEPHONE (OUTPATIENT)
Age: 70
End: 2025-02-25

## 2025-02-25 RX ORDER — HYDROCHLOROTHIAZIDE 12.5 MG/1
12.5 TABLET ORAL DAILY
Qty: 90 TABLET | Refills: 3 | Status: SHIPPED | OUTPATIENT
Start: 2025-02-25

## 2025-02-25 NOTE — TELEPHONE ENCOUNTER
Shon Pappas MD  You46 minutes ago (1:11 PM)       Add hctz 12.5   Pt.notified of MD response and med escribed as below  Requested Prescriptions     Signed Prescriptions Disp Refills    hydroCHLOROthiazide 12.5 MG tablet 90 tablet 3     Sig: Take 1 tablet by mouth daily     Authorizing Provider: SANCHO SOTELO     Ordering User: ASHLEY OBANDO

## 2025-02-25 NOTE — TELEPHONE ENCOUNTER
I might have mentioned that I’m under a considerable amount of stress (and trying to deal with it.) My wife’s new doctor suggested that she record her BP and so I’ve been recording mine; not good.   2/23, 6:45p, 142/87, 90 bpm  2/23, 6:52p, 134/83, 88 bpm  2/24, 9:31a, 151/88, 80 bpm  2/24, 9:32a, 150/85, 75 bpm  2/24, 1:25p, 156/93, 75 bpm  2/24, 7:17p, 160/82, 75 bpm  2/24, 7:18p, 142/75, 72 bpm  2/24, 7:20p, 135/74, 72 bpm  2/24, 8:21p, 143/78, 82 bpm  2/25, 8:33a, 142/91, 84 bpm  2/25, 8:34a, 155/80, 80 bpm  This is with me taking 20mg of Lisinopril since before the holidays.  My stress generator will be intractable for the foreseeable future.  What should I do?      On Lisnopril 20MG QD,Toprol  XL 25MG QD,Verapamil 180mg QD,Flecainide 100mg bid  Any med adjustments needed ?

## 2025-03-28 DIAGNOSIS — E11.65 UNCONTROLLED TYPE 2 DIABETES MELLITUS WITH HYPERGLYCEMIA (HCC): ICD-10-CM

## 2025-03-31 RX ORDER — GLIMEPIRIDE 4 MG/1
TABLET ORAL
Qty: 90 TABLET | Refills: 1 | OUTPATIENT
Start: 2025-03-31

## 2025-04-15 ENCOUNTER — HOSPITAL ENCOUNTER (OUTPATIENT)
Dept: LAB | Age: 70
Discharge: HOME | End: 2025-04-15
Payer: COMMERCIAL

## 2025-04-15 DIAGNOSIS — C61 PROSTATE CANCER (HCC): ICD-10-CM

## 2025-04-15 LAB — PSA SERPL-MCNC: 5.2 NG/ML (ref 0–4)

## 2025-04-15 PROCEDURE — 84153 ASSAY OF PSA TOTAL: CPT

## 2025-04-15 PROCEDURE — 36415 COLL VENOUS BLD VENIPUNCTURE: CPT

## 2025-04-15 NOTE — PROGRESS NOTES
Urologic Oncology  [facility]  [address] [place] [id number]  [phone]        Mr. Victor Hugo Reed is a 70 y.o. male with a diagnosis of PCa, 3+3.=6 on 6/17/23. S/p repeat transperineal fusion prostate bx, Jose Enrique score 3+3=6 on 4/4/24, 3 cores, low volume, on Active Surveillance        INTERVAL HISTORY:  Patient is here today for follow-up of his [place] 3+3 equal 6 adenocarcinoma the prostate.  He was originally diagnosed on 6/7/2022 with 1 core of Jose Enrique 3+3 equal 6.  It was from the left lateral mid biopsy and involves 60% of the core.  There was perineural invasion.  He then had a repeat transperineal biopsy on 4/4/2024 that showed 3 cores of [place] 3+3 equal 6.  1 core at 20% involvement and the other 2 were both less than 5%.    His PSA on 4/15/2025 was down to 5.2.  Previously it was 5.8 in 7.1.    His last prostate MRI was 9/11/2023.    He remains on Xarelto for his history of A-fib.    He has no complaints and denies any significant lower urinary tract symptoms.          From previous note on 10/18/24:Patient is here today for follow-up of his Jose Enrique 3+3 equal 6 adenocarcinoma the prostate.  He has been on active surveillance since his original diagnosis on 6/7/2023.  He had a repeat biopsy on 4/4/2024 which showed [place] 3+3 in 3 cores involving 20% and then less than 5% and the other 2 cores.  These have always been positive on his left side.     His previous PSA was 7.1 and his KAREN was negative.  His gland measures approximately 57 cc.     PSA on 10/15/2024 was 5.8.     He remains on Xarelto for his A-fib.     His main complaint today is worsening of his chronic back pain.  He is scheduled for an MRI of his spine next week.     He does complain of some minor lower urinary tract symptoms that he thinks are worsening.  He does not take any medications for his prostate at this time.       Past medical, family and social histories, as well as medications

## 2025-04-18 ENCOUNTER — OFFICE VISIT (OUTPATIENT)
Dept: ONCOLOGY | Age: 70
End: 2025-04-18
Payer: COMMERCIAL

## 2025-04-18 VITALS
WEIGHT: 274.5 LBS | DIASTOLIC BLOOD PRESSURE: 72 MMHG | HEIGHT: 77 IN | TEMPERATURE: 97.8 F | BODY MASS INDEX: 32.41 KG/M2 | SYSTOLIC BLOOD PRESSURE: 130 MMHG | RESPIRATION RATE: 16 BRPM | HEART RATE: 76 BPM | OXYGEN SATURATION: 98 %

## 2025-04-18 DIAGNOSIS — C61 PROSTATE CANCER (HCC): Primary | ICD-10-CM

## 2025-04-18 PROCEDURE — 1123F ACP DISCUSS/DSCN MKR DOCD: CPT | Performed by: UROLOGY

## 2025-04-18 PROCEDURE — 3075F SYST BP GE 130 - 139MM HG: CPT | Performed by: UROLOGY

## 2025-04-18 PROCEDURE — 3078F DIAST BP <80 MM HG: CPT | Performed by: UROLOGY

## 2025-04-18 PROCEDURE — 99213 OFFICE O/P EST LOW 20 MIN: CPT | Performed by: UROLOGY

## 2025-04-18 RX ORDER — GABAPENTIN 100 MG/1
CAPSULE ORAL
COMMUNITY
Start: 2025-03-25

## 2025-04-18 RX ORDER — MELOXICAM 15 MG/1
15 TABLET ORAL DAILY
COMMUNITY
Start: 2025-03-11

## 2025-04-18 ASSESSMENT — PATIENT HEALTH QUESTIONNAIRE - PHQ9
2. FEELING DOWN, DEPRESSED OR HOPELESS: NOT AT ALL
SUM OF ALL RESPONSES TO PHQ QUESTIONS 1-9: 0
1. LITTLE INTEREST OR PLEASURE IN DOING THINGS: NOT AT ALL

## 2025-04-18 ASSESSMENT — ENCOUNTER SYMPTOMS
GASTROINTESTINAL NEGATIVE: 1
RESPIRATORY NEGATIVE: 1

## 2025-04-18 NOTE — PATIENT INSTRUCTIONS
Patient Information from Today's Visit    The members of your [facility] are listed below:    Physician Provider: Aurelio Varma, Urologic Oncologist  Advanced Practice Clinician: JEROD Sanchze  Medical Assistant: Elinor GHOSH CMA  :Charo MCKEON  Supportive Care Services: Destini ARENAS LMSW    Diagnosis (Information Sheet Provided on Day of Diagnosis): Prostate    Follow Up Instructions:   Labs reviewed   KAREN completed   We would also like for you to have an MRI of your prostate prior to your next appointment and you can call radiology scheduling at [phone] to get this scheduled.  We will plan to see you back in 6 months. If you austin anything prior please do not hesitate to call our office.  Has Treatment Plan Been Finalized? No    Current Labs:   No visits with results within 3 Day(s) from this visit.   Latest known visit with results is:   Hospital Outpatient Visit on 04/15/2025   Component Date Value Ref Range Status    PSA 04/15/2025 5.2 (H)  0.0 - 4.0 ng/mL Final    Comment: Roche ECLIA methodology  Patient's results of tumor marker testing may not be comparable to labs using different manufacturers/methods.             Please refer to After Visit Summary or CardLab for upcoming appointment information. Please call our office for rescheduling needs at least 24 hours before your scheduled appointment time.If you have any questions regarding your upcoming schedule please reach out to your care team through CardLab or call [phone].     Please notify your assigned Nurse Navigator of any unplanned hospital admissions or Emergency Room visits within 24 hours of discharge.    -------------------------------------------------------------------------------------------------------------------  Please call our office at [phone] if you have any  of the following symptoms:   Fever of 100.5 or greater  Chills  Shortness of breath  Swelling or pain in one leg    After office hours an

## 2025-05-05 ENCOUNTER — TELEPHONE (OUTPATIENT)
Age: 70
End: 2025-05-05

## 2025-05-05 ENCOUNTER — OFFICE VISIT (OUTPATIENT)
Age: 70
End: 2025-05-05
Payer: COMMERCIAL

## 2025-05-05 VITALS
DIASTOLIC BLOOD PRESSURE: 60 MMHG | HEIGHT: 77 IN | SYSTOLIC BLOOD PRESSURE: 130 MMHG | BODY MASS INDEX: 32 KG/M2 | WEIGHT: 271 LBS | HEART RATE: 67 BPM

## 2025-05-05 DIAGNOSIS — I48.0 PAROXYSMAL ATRIAL FIBRILLATION (HCC): ICD-10-CM

## 2025-05-05 DIAGNOSIS — I34.0 NONRHEUMATIC MITRAL VALVE REGURGITATION: Primary | ICD-10-CM

## 2025-05-05 DIAGNOSIS — M25.473 ANKLE SWELLING, UNSPECIFIED LATERALITY: ICD-10-CM

## 2025-05-05 PROCEDURE — 3078F DIAST BP <80 MM HG: CPT | Performed by: INTERNAL MEDICINE

## 2025-05-05 PROCEDURE — 1123F ACP DISCUSS/DSCN MKR DOCD: CPT | Performed by: INTERNAL MEDICINE

## 2025-05-05 PROCEDURE — 3075F SYST BP GE 130 - 139MM HG: CPT | Performed by: INTERNAL MEDICINE

## 2025-05-05 PROCEDURE — 99213 OFFICE O/P EST LOW 20 MIN: CPT | Performed by: INTERNAL MEDICINE

## 2025-05-05 RX ORDER — SEMAGLUTIDE 0.68 MG/ML
0.5 INJECTION, SOLUTION SUBCUTANEOUS
COMMUNITY
Start: 2025-05-05

## 2025-05-05 NOTE — PROGRESS NOTES
CARDIOLOGY CLINIC FOLLOW-UP    Date: 5/5/2025  Patient's Primary Care Physician:  Reyes, Rayma Y, APRN - CNP  Referring Physician:  No ref. provider found     Subjective     Reason for Visit: Follow-up of paroxysmal atrial fibrillation, hypertension    History of Present Illness   Mr. Reed is a 70 y.o. male with history of paroxysmal atrial fibrillation, hypertension, hyperlipidemia, who presents ahead of schedule follow-up with Dr. Pappas due to ankle swelling.  The ankle swelling resolved shortly after he returned from a vacation in [place], during which there were dietary indiscretions.  He reports feeling \"very slightly short [of breath] initially\" when riding his bike 26-27 miles on the road. This occurs early in the ride and then goes away. He denies chest pain. He denies palpitations, near syncope, or syncope.     Review of Systems   Cardiovascular review of systems negative accept as above.    Past Medical History:   Diagnosis Date    Adhesive capsulitis of right shoulder 02/23/2020    Anticoagulant long-term use 2004?    Anxiety 1/20/2016    Some generalized anxiety    Arthritis     knee    Atrial fibrillation (HCC)     None noted for years    Complete tear of right rotator cuff 02/23/2020    Coronary artery disease     COVID-19 09/2021    Depression 07/18/2014    Drug use     4/2/24  Patient reports taking Delta 9 gummy for sleep nightly    History of atrial fibrillation     Hx of diabetes mellitus 03/22/2018    Hypercholesterolemia     Hypertension     medication    Lumbosacral disc disease 1983    This is the current issue.    Major depression     Morbid obesity (HCC)     Multiple thyroid nodules     Multiple thyroid nodules     Neck pain 2023    Neuropathy 2005?    TONY (obstructive sleep apnea)     does not tolerate CPAP    Osteoarthritis 2009    Left hip, right knee prosthetics    PONV (postoperative nausea and vomiting)     after hernia repair, medications relieved    Right inguinal hernia     Skin cancer

## 2025-05-05 NOTE — TELEPHONE ENCOUNTER
I went on my annual golf trip with the guys, ate too much junk, played too much golf then came home, flew to [place] for three days and walked a bunch (trade show). My ankles have been a little swollen all this week. The swelling goes down at night but starts up in the morning as i move around. I also seem slightly breathless but not to the point that i can’t get up and move. The only reason i bring it up is because we’re going out of the country next weekend to scuba dive. I’m sending this to you but I’ve also sent it to my family doctor. You let me know how to proceed. Current BP is 145/88.

## 2025-05-05 NOTE — TELEPHONE ENCOUNTER
I spoke w/pt.he said last time he rode bike had a harder time.Has started having some swelling in feet that goes down over night.He denies any CP.BP is up a little.Having some SOB w/exertion.  Pt.heading out of town soon to scuba dive and wants to make sure he is okay to go.I had nothing w/ prior to his vacation.Appt.today offered and accepted w/.

## 2025-05-08 DIAGNOSIS — E11.65 UNCONTROLLED TYPE 2 DIABETES MELLITUS WITH HYPERGLYCEMIA (HCC): ICD-10-CM

## 2025-05-09 RX ORDER — GLIMEPIRIDE 4 MG/1
TABLET ORAL
Qty: 90 TABLET | Refills: 1 | OUTPATIENT
Start: 2025-05-09

## 2025-06-11 ENCOUNTER — RESULTS FOLLOW-UP (OUTPATIENT)
Age: 70
End: 2025-06-11

## 2025-06-12 NOTE — TELEPHONE ENCOUNTER
Spoke to Pt who states that he is  a little more fatigue and has been  for some time now. Pt states that he and Dr. Cook discussed this at his OV. Pt states he has  read the results of his echo and asks if Dr. Cook has any further recommendations?

## 2025-07-17 RX ORDER — TAMSULOSIN HYDROCHLORIDE 0.4 MG/1
0.4 CAPSULE ORAL DAILY
Qty: 30 CAPSULE | Refills: 5 | Status: SHIPPED | OUTPATIENT
Start: 2025-07-17

## 2025-08-04 DIAGNOSIS — B00.9 HSV INFECTION: ICD-10-CM

## 2025-08-05 RX ORDER — VALACYCLOVIR HYDROCHLORIDE 500 MG/1
500 TABLET, FILM COATED ORAL DAILY
Qty: 90 TABLET | Refills: 1 | OUTPATIENT
Start: 2025-08-05

## 2025-09-04 DIAGNOSIS — I10 ESSENTIAL (PRIMARY) HYPERTENSION: ICD-10-CM

## 2025-09-04 RX ORDER — VERAPAMIL HYDROCHLORIDE 180 MG/1
TABLET, FILM COATED, EXTENDED RELEASE ORAL
Qty: 90 TABLET | Refills: 0 | Status: SHIPPED | OUTPATIENT
Start: 2025-09-04

## (undated) DEVICE — STERILE PACKED. BORE DIAMETER 1.6 MM; ANGLE OF INSERTION 19° TO THE LONG AXIS OF THE TRANSDUCER: Brand: SINGLE-USE BIPLANE BIOPSY GUIDE

## (undated) DEVICE — SUTURE PROL SZ 0 L30IN NONABSORBABLE BLU L26MM CT-2 1/2 CIR 8412H

## (undated) DEVICE — MEDI-VAC YANKAUER SUCTION HANDLE W/BULBOUS TIP: Brand: CARDINAL HEALTH

## (undated) DEVICE — 3M™ IOBAN™ 2 ANTIMICROBIAL INCISE DRAPE 6650EZ: Brand: IOBAN™ 2

## (undated) DEVICE — TRAY PREP DRY W/ PREM GLV 2 APPL 6 SPNG 2 UNDPD 1 OVERWRAP

## (undated) DEVICE — GAUZE,SPONGE,8"X4",12PLY,XRAY,STRL,LF: Brand: MEDLINE

## (undated) DEVICE — DRAPE SHT 3 QTR PROXIMA 53X77 --

## (undated) DEVICE — NON-STERILE (4 X 30CM) COVER: Brand: NEOGUARD™

## (undated) DEVICE — DRAPE,U/SHT,SPLIT,FILM,60X84,STERILE: Brand: MEDLINE

## (undated) DEVICE — BLADE SAW PAT RMR PILT H 51MM --

## (undated) DEVICE — BUTTON SWITCH PENCIL BLADE ELECTRODE, HOLSTER: Brand: EDGE

## (undated) DEVICE — Z DISCONTINUED USE 2744636  DRESSING AQUACEL 14 IN ALG W3.5XL14IN POLYUR FLM CVR W/ HYDRCOLL

## (undated) DEVICE — SUTURE VCRL SZ 1 L27IN ABSRB UD L36MM CP-1 1/2 CIR REV CUT J268H

## (undated) DEVICE — Device

## (undated) DEVICE — SURGICAL PROCEDURE PACK BASIC ST FRANCIS

## (undated) DEVICE — T4 HOOD

## (undated) DEVICE — APPLICATOR SKIN PREP 10% PVP IODINE WITH CHLORAPREP SEPP

## (undated) DEVICE — SUTURE VCRL SZ 4-0 L27IN ABSRB UD L19MM PS-2 3/8 CIR PRIM J426H

## (undated) DEVICE — SOLUTION IV 1000ML 0.9% SOD CHL

## (undated) DEVICE — SUTURE VCRL SZ 2-0 L27IN ABSRB UD L26MM SH 1/2 CIR J417H

## (undated) DEVICE — MAX-CORE® DISPOSABLE CORE BIOPSY INSTRUMENT, 18G X 25CM: Brand: MAX-CORE

## (undated) DEVICE — 2000CC GUARDIAN II: Brand: GUARDIAN

## (undated) DEVICE — REM POLYHESIVE ADULT PATIENT RETURN ELECTRODE: Brand: VALLEYLAB

## (undated) DEVICE — K-Y LUBRICANT JELLY 4OZ

## (undated) DEVICE — SYRINGE MED 10ML LUERLOCK TIP W/O SFTY DISP

## (undated) DEVICE — DRAPE,TOP,102X53,STERILE: Brand: MEDLINE

## (undated) DEVICE — SUTURE VCRL SZ 3-0 L27IN ABSRB UD L26MM SH 1/2 CIR J416H

## (undated) DEVICE — GLOVE SURG SZ 75 L12IN FNGR THK79MIL GRN LTX FREE

## (undated) DEVICE — Device: Brand: POWER-FLO®

## (undated) DEVICE — NEEDLE HYPO 18GA L1.5IN PNK S STL HUB POLYPR SHLD REG BVL

## (undated) DEVICE — MEDI-VAC NON-CONDUCTIVE SUCTION TUBING: Brand: CARDINAL HEALTH

## (undated) DEVICE — BIPOLAR SEALER 23-112-1 AQM 6.0: Brand: AQUAMANTYS ®

## (undated) DEVICE — COVER PRB W1XL11.8IN ENDOCAVITY CLR E BND NEOGUARD

## (undated) DEVICE — KENDALL SCD EXPRESS SLEEVES, KNEE LENGTH, MEDIUM: Brand: KENDALL SCD

## (undated) DEVICE — 3M™ TEGADERM™ TRANSPARENT FILM DRESSING FRAME STYLE, 1626W, 4 IN X 4-3/4 IN (10 CM X 12 CM), 50/CT 4CT/CASE: Brand: 3M™ TEGADERM™

## (undated) DEVICE — DERMABOND SKIN ADH 0.7ML -- DERMABOND ADVANCED 12/BX

## (undated) DEVICE — 3M™ COBAN™ NL STERILE NON-LATEX SELF-ADHERENT WRAP, 2084S, 4 IN X 5 YD (10 CM X 4,5 M), 18 ROLLS/CASE: Brand: 3M™ COBAN™

## (undated) DEVICE — CURETTE BNE CEM 10IN DISP --

## (undated) DEVICE — TRAY CATH 16F DRN BG LTX -- CONVERT TO ITEM 363158

## (undated) DEVICE — SOLUTION IV 500ML 0.9% SOD CHL FLX CONT

## (undated) DEVICE — DRAPE TWL SURG 16X26IN BLU ORB04] ALLCARE INC]

## (undated) DEVICE — SHEET, DRAPE, SPLIT, STERILE: Brand: MEDLINE

## (undated) DEVICE — (D)PREP SKN CHLRAPRP APPL 26ML -- CONVERT TO ITEM 371833

## (undated) DEVICE — NEEDLE HYPO 21GA L1.5IN INTRAMUSCULAR S STL LATCH BVL UP

## (undated) DEVICE — FAN SPRAY KIT: Brand: PULSAVAC®

## (undated) DEVICE — NEEDLE SYR 18GA L1.5IN RED PLAS HUB S STL BLNT FILL W/O

## (undated) DEVICE — AMD ANTIMICROBIAL NON-ADHERENT PAD,0.2% POLYHEXAMETHYLENE BIGUANIDE HCI (PHMB): Brand: TELFA

## (undated) DEVICE — DRAIN WND PENRS RADPQ 0.25X12 --

## (undated) DEVICE — KIT BX PROST 20ML VI PREFIL W 10ML 10% NEUT BUFF FRMLN LEAK

## (undated) DEVICE — SUTURE VCRL SZ 2-0 L27IN ABSRB UD L36MM CP-1 1/2 CIR REV J266H

## (undated) DEVICE — PACK PROCEDURE SURG TOT KNEE

## (undated) DEVICE — SPONGE: SPECIALTY PEANUT XR 100/CS: Brand: MEDICAL ACTION INDUSTRIES

## (undated) DEVICE — SOLUTION IRRIG 3000ML 0.9% SOD CHL FLX CONT 0797208] ICU MEDICAL INC]

## (undated) DEVICE — SKIN STAPLER: Brand: SIGNET

## (undated) DEVICE — 3000CC GUARDIAN II: Brand: GUARDIAN

## (undated) DEVICE — SYR LR LCK 1ML GRAD NSAF 30ML --

## (undated) DEVICE — BLADE SAW W12.5XL73.5MM THK0.8MM CUT THK1MM RECIP FOR L BNE

## (undated) DEVICE — JELLY LUBRICATING 10GM PREFIL SYR LUBE

## (undated) DEVICE — SYR 50ML LR LCK 1ML GRAD NSAF --

## (undated) DEVICE — MCLASS OSCILLATING SAW BLADE 19 X 1.27 (0.050") X 90 MM: Brand: MCLASS

## (undated) DEVICE — SKIN MARKER,REGULAR TIP WITH RULER: Brand: DEVON

## (undated) DEVICE — SUTURE VCRL SZ 3-0 L18IN ABSRB UD W/O NDL POLYGLACTIN 910 J110T

## (undated) DEVICE — GOWN,REINF,POLY,ECL,PP SLV,XL: Brand: MEDLINE

## (undated) DEVICE — BANDAGE COMPR W1INXL5YD FOAM COHESIVE QUIK STK SELF ADH SFT

## (undated) DEVICE — NEEDLE HYPO 21GA L1.25IN GRN POLYPR HUB S STL REG BVL STR

## (undated) DEVICE — SUTURE STRATAFIX SPRL SZ 1 L5IN ABSRB VLT CT-1 L36MM 1/2 SXPD2B401